# Patient Record
Sex: MALE | Race: BLACK OR AFRICAN AMERICAN | NOT HISPANIC OR LATINO | Employment: OTHER | URBAN - METROPOLITAN AREA
[De-identification: names, ages, dates, MRNs, and addresses within clinical notes are randomized per-mention and may not be internally consistent; named-entity substitution may affect disease eponyms.]

---

## 2017-08-23 ENCOUNTER — OFFICE VISIT (OUTPATIENT)
Dept: FAMILY MEDICINE | Facility: CLINIC | Age: 62
End: 2017-08-23
Payer: MEDICARE

## 2017-08-23 VITALS
OXYGEN SATURATION: 97 % | WEIGHT: 168.31 LBS | DIASTOLIC BLOOD PRESSURE: 62 MMHG | BODY MASS INDEX: 28.04 KG/M2 | SYSTOLIC BLOOD PRESSURE: 106 MMHG | TEMPERATURE: 99 F | HEART RATE: 97 BPM | HEIGHT: 65 IN

## 2017-08-23 DIAGNOSIS — I50.22 CHRONIC SYSTOLIC CONGESTIVE HEART FAILURE: ICD-10-CM

## 2017-08-23 DIAGNOSIS — Z12.11 SCREEN FOR COLON CANCER: ICD-10-CM

## 2017-08-23 DIAGNOSIS — Z72.0 TOBACCO ABUSE: ICD-10-CM

## 2017-08-23 DIAGNOSIS — Z11.59 NEED FOR HEPATITIS C SCREENING TEST: ICD-10-CM

## 2017-08-23 DIAGNOSIS — E78.5 HYPERLIPIDEMIA, UNSPECIFIED HYPERLIPIDEMIA TYPE: ICD-10-CM

## 2017-08-23 DIAGNOSIS — E11.9 TYPE 2 DIABETES MELLITUS WITHOUT COMPLICATION, WITH LONG-TERM CURRENT USE OF INSULIN: ICD-10-CM

## 2017-08-23 DIAGNOSIS — I25.2 HISTORY OF MI (MYOCARDIAL INFARCTION): ICD-10-CM

## 2017-08-23 DIAGNOSIS — I10 ESSENTIAL HYPERTENSION: ICD-10-CM

## 2017-08-23 DIAGNOSIS — Z79.4 TYPE 2 DIABETES MELLITUS WITHOUT COMPLICATION, WITH LONG-TERM CURRENT USE OF INSULIN: ICD-10-CM

## 2017-08-23 DIAGNOSIS — I25.10 CORONARY ARTERY DISEASE, ANGINA PRESENCE UNSPECIFIED, UNSPECIFIED VESSEL OR LESION TYPE, UNSPECIFIED WHETHER NATIVE OR TRANSPLANTED HEART: Primary | ICD-10-CM

## 2017-08-23 PROCEDURE — 3078F DIAST BP <80 MM HG: CPT | Mod: S$GLB,,, | Performed by: NURSE PRACTITIONER

## 2017-08-23 PROCEDURE — 99499 UNLISTED E&M SERVICE: CPT | Mod: S$GLB,,, | Performed by: NURSE PRACTITIONER

## 2017-08-23 PROCEDURE — 99999 PR PBB SHADOW E&M-EST. PATIENT-LVL V: CPT | Mod: PBBFAC,,, | Performed by: NURSE PRACTITIONER

## 2017-08-23 PROCEDURE — 3008F BODY MASS INDEX DOCD: CPT | Mod: S$GLB,,, | Performed by: NURSE PRACTITIONER

## 2017-08-23 PROCEDURE — 99214 OFFICE O/P EST MOD 30 MIN: CPT | Mod: S$GLB,,, | Performed by: NURSE PRACTITIONER

## 2017-08-23 PROCEDURE — 3074F SYST BP LT 130 MM HG: CPT | Mod: S$GLB,,, | Performed by: NURSE PRACTITIONER

## 2017-08-23 RX ORDER — NITROGLYCERIN 0.4 MG/1
0.4 TABLET SUBLINGUAL EVERY 5 MIN PRN
Qty: 30 TABLET | Refills: 6 | Status: ON HOLD | OUTPATIENT
Start: 2017-08-23 | End: 2018-12-05 | Stop reason: SDUPTHER

## 2017-08-23 NOTE — PROGRESS NOTES
Subjective:       Patient ID: Fredrick Cox is a 62 y.o. male.    Chief Complaint: Hyperlipidemia (F/U); Hypertension (F/U); and Diabetes (F/U)    62-year-old male presents to the clinic today for follow-up on hyperlipidemia, hypertension, and diabetes.  He does not take his insulin as prescribed.  He does not check his blood sugars her blood pressures.  He has fair dietary habits.  He does not exercise but says he is very active.  He is noncompliant with his medications.  He denies any chest pain, heart palpitations, shortness breath, or swelling to lower extremities.  He denies any headaches, dizziness, or blurred vision.      Past Medical History:   Diagnosis Date    CHF (congestive heart failure)     Coronary artery disease     HLD (hyperlipidemia)     HTN (hypertension)     Tobacco abuse      Past Surgical History:   Procedure Laterality Date    CARDIAC CATHETERIZATION  2007    x1    CARDIAC CATHETERIZATION  ?    x1      reports that he has been smoking Cigarettes.  He has a 17.50 pack-year smoking history. He does not have any smokeless tobacco history on file. He reports that he drinks about 0.5 oz of alcohol per week . He reports that he does not use drugs.  Review of Systems   Respiratory: Negative for cough, shortness of breath and wheezing.    Cardiovascular: Negative for chest pain, palpitations and leg swelling.   Gastrointestinal: Negative for abdominal pain, diarrhea, nausea and vomiting.   Musculoskeletal: Negative for gait problem.   Neurological: Negative for dizziness, light-headedness and headaches.       Objective:      Physical Exam   Constitutional: He is oriented to person, place, and time. He appears well-developed and well-nourished. No distress.   Eyes: Conjunctivae and EOM are normal. Pupils are equal, round, and reactive to light. Right eye exhibits no discharge. Left eye exhibits no discharge. No scleral icterus.   Neck: Normal range of motion. Neck supple. No JVD present.    Cardiovascular: Normal rate, regular rhythm and normal heart sounds.  Exam reveals no gallop and no friction rub.    No murmur heard.  Pulmonary/Chest: Effort normal and breath sounds normal. No respiratory distress. He has no wheezes. He has no rales.   Abdominal: Soft. Bowel sounds are normal. There is no tenderness.   Musculoskeletal: Normal range of motion. He exhibits no edema.   Protective Sensation (w/ 10 gram monofilament):  Right: Intact  Left: Intact    Visual Inspection:  Onychomycosis -  Bilateral Bunions noted both feet     Pedal Pulses:   Right: Present  Left: Present    Posterior tibialis:   Right:Present  Left: Present     Neurological: He is alert and oriented to person, place, and time.   Skin: Skin is warm and dry. He is not diaphoretic.   Psychiatric: He has a normal mood and affect.       Assessment:       1. Coronary artery disease, angina presence unspecified, unspecified vessel or lesion type, unspecified whether native or transplanted heart    2. Chronic systolic congestive heart failure    3. Type 2 diabetes mellitus without complication, with long-term current use of insulin    4. Hyperlipidemia, unspecified hyperlipidemia type    5. Essential hypertension    6. Tobacco abuse    7. History of MI (myocardial infarction)    8. Screen for colon cancer    9. Need for hepatitis C screening test        Plan:         Coronary artery disease, angina presence unspecified, unspecified vessel or lesion type, unspecified whether native or transplanted heart  -     ticagrelor (BRILINTA) 90 mg tablet; Take 1 tablet (90 mg total) by mouth 2 (two) times daily.  Dispense: 60 tablet; Refill: 5  -     nitroGLYCERIN (NITROSTAT) 0.4 MG SL tablet; Place 1 tablet (0.4 mg total) under the tongue every 5 (five) minutes as needed.  Dispense: 30 tablet; Refill: 6  -     Ambulatory referral to Cardiology  - The current medical regimen is effective;  continue present plan and medications.    Chronic systolic  congestive heart failure  - The current medical regimen is effective;  continue present plan and medications.    Type 2 diabetes mellitus without complication, with long-term current use of insulin  -     Comprehensive metabolic panel; Future; Expected date: 08/23/2017  -     Microalbumin/creatinine urine ratio; Future; Expected date: 08/23/2017  -     Ambulatory referral to Optometry  - stressed the importance of t heaking his insulin but he refuses     Hyperlipidemia, unspecified hyperlipidemia type  -     Comprehensive metabolic panel; Future; Expected date: 08/23/2017  -     Lipid panel; Future; Expected date: 08/23/2017  - The current medical regimen is effective;  continue present plan and medications.    Essential hypertension  -     CBC auto differential; Future; Expected date: 08/23/2017  -     Hemoglobin A1c; Future; Expected date: 08/23/2017  -     Urinalysis; Future; Expected date: 08/23/2017  - The current medical regimen is effective;  continue present plan and medications.    Tobacco abuse  - does not want to go to the smoking cessation clinic     History of MI (myocardial infarction)  -     Ambulatory referral to Cardiology  - Stable / Asymptomatic is on blood pressure and cholesterol lowering medications    Screen for colon cancer  -     Case request GI: COLONOSCOPY    Need for hepatitis C screening test  -     Hepatitis C antibody; Future; Expected date: 08/23/2017

## 2017-08-24 ENCOUNTER — LAB VISIT (OUTPATIENT)
Dept: LAB | Facility: HOSPITAL | Age: 62
End: 2017-08-24
Attending: NURSE PRACTITIONER
Payer: MEDICARE

## 2017-08-24 DIAGNOSIS — E78.5 HYPERLIPIDEMIA, UNSPECIFIED HYPERLIPIDEMIA TYPE: ICD-10-CM

## 2017-08-24 DIAGNOSIS — Z11.59 NEED FOR HEPATITIS C SCREENING TEST: ICD-10-CM

## 2017-08-24 DIAGNOSIS — Z79.4 TYPE 2 DIABETES MELLITUS WITHOUT COMPLICATION, WITH LONG-TERM CURRENT USE OF INSULIN: ICD-10-CM

## 2017-08-24 DIAGNOSIS — E11.9 TYPE 2 DIABETES MELLITUS WITHOUT COMPLICATION, WITH LONG-TERM CURRENT USE OF INSULIN: ICD-10-CM

## 2017-08-24 DIAGNOSIS — I10 ESSENTIAL HYPERTENSION: ICD-10-CM

## 2017-08-24 LAB
ALBUMIN SERPL BCP-MCNC: 3.7 G/DL
ALP SERPL-CCNC: 102 U/L
ALT SERPL W/O P-5'-P-CCNC: 20 U/L
ANION GAP SERPL CALC-SCNC: 8 MMOL/L
AST SERPL-CCNC: 16 U/L
BASOPHILS # BLD AUTO: 0.03 K/UL
BASOPHILS NFR BLD: 0.3 %
BILIRUB SERPL-MCNC: 0.3 MG/DL
BUN SERPL-MCNC: 21 MG/DL
CALCIUM SERPL-MCNC: 9.6 MG/DL
CHLORIDE SERPL-SCNC: 102 MMOL/L
CHOLEST/HDLC SERPL: 4 {RATIO}
CO2 SERPL-SCNC: 27 MMOL/L
CREAT SERPL-MCNC: 1.1 MG/DL
DIFFERENTIAL METHOD: NORMAL
EOSINOPHIL # BLD AUTO: 0.3 K/UL
EOSINOPHIL NFR BLD: 3 %
ERYTHROCYTE [DISTWIDTH] IN BLOOD BY AUTOMATED COUNT: 13.3 %
EST. GFR  (AFRICAN AMERICAN): >60 ML/MIN/1.73 M^2
EST. GFR  (NON AFRICAN AMERICAN): >60 ML/MIN/1.73 M^2
ESTIMATED AVG GLUCOSE: 266 MG/DL
GLUCOSE SERPL-MCNC: 251 MG/DL
HBA1C MFR BLD HPLC: 10.9 %
HCT VFR BLD AUTO: 44.2 %
HDL/CHOLESTEROL RATIO: 24.9 %
HDLC SERPL-MCNC: 217 MG/DL
HDLC SERPL-MCNC: 54 MG/DL
HGB BLD-MCNC: 14.7 G/DL
LDLC SERPL CALC-MCNC: 130.4 MG/DL
LYMPHOCYTES # BLD AUTO: 2.9 K/UL
LYMPHOCYTES NFR BLD: 30 %
MCH RBC QN AUTO: 28.4 PG
MCHC RBC AUTO-ENTMCNC: 33.3 G/DL
MCV RBC AUTO: 85 FL
MONOCYTES # BLD AUTO: 0.7 K/UL
MONOCYTES NFR BLD: 7 %
NEUTROPHILS # BLD AUTO: 5.7 K/UL
NEUTROPHILS NFR BLD: 59.5 %
NONHDLC SERPL-MCNC: 163 MG/DL
PLATELET # BLD AUTO: 251 K/UL
PMV BLD AUTO: 9.3 FL
POTASSIUM SERPL-SCNC: 4.2 MMOL/L
PROT SERPL-MCNC: 7.8 G/DL
RBC # BLD AUTO: 5.18 M/UL
SODIUM SERPL-SCNC: 137 MMOL/L
TRIGL SERPL-MCNC: 163 MG/DL
WBC # BLD AUTO: 9.64 K/UL

## 2017-08-24 PROCEDURE — 80061 LIPID PANEL: CPT

## 2017-08-24 PROCEDURE — 80053 COMPREHEN METABOLIC PANEL: CPT

## 2017-08-24 PROCEDURE — 85025 COMPLETE CBC W/AUTO DIFF WBC: CPT

## 2017-08-24 PROCEDURE — 36415 COLL VENOUS BLD VENIPUNCTURE: CPT | Mod: PO

## 2017-08-24 PROCEDURE — 83036 HEMOGLOBIN GLYCOSYLATED A1C: CPT

## 2017-08-24 PROCEDURE — 86803 HEPATITIS C AB TEST: CPT

## 2017-08-25 LAB — HCV AB SERPL QL IA: NEGATIVE

## 2017-08-31 ENCOUNTER — TELEPHONE (OUTPATIENT)
Dept: OPTOMETRY | Facility: CLINIC | Age: 62
End: 2017-08-31

## 2017-09-01 ENCOUNTER — INITIAL CONSULT (OUTPATIENT)
Dept: CARDIOLOGY | Facility: CLINIC | Age: 62
End: 2017-09-01
Payer: MEDICARE

## 2017-09-01 ENCOUNTER — OFFICE VISIT (OUTPATIENT)
Dept: OPTOMETRY | Facility: CLINIC | Age: 62
End: 2017-09-01
Payer: MEDICARE

## 2017-09-01 VITALS
HEIGHT: 65 IN | HEART RATE: 88 BPM | DIASTOLIC BLOOD PRESSURE: 78 MMHG | WEIGHT: 175 LBS | BODY MASS INDEX: 29.16 KG/M2 | SYSTOLIC BLOOD PRESSURE: 118 MMHG | OXYGEN SATURATION: 98 %

## 2017-09-01 DIAGNOSIS — I25.10 CORONARY ARTERY DISEASE INVOLVING NATIVE CORONARY ARTERY OF NATIVE HEART WITHOUT ANGINA PECTORIS: Primary | ICD-10-CM

## 2017-09-01 DIAGNOSIS — E11.9 TYPE 2 DIABETES MELLITUS WITHOUT RETINOPATHY: Primary | ICD-10-CM

## 2017-09-01 DIAGNOSIS — I50.22 CHRONIC SYSTOLIC CONGESTIVE HEART FAILURE: ICD-10-CM

## 2017-09-01 DIAGNOSIS — E78.2 MIXED HYPERLIPIDEMIA: ICD-10-CM

## 2017-09-01 DIAGNOSIS — H52.4 PRESBYOPIA: ICD-10-CM

## 2017-09-01 DIAGNOSIS — I10 ESSENTIAL HYPERTENSION: ICD-10-CM

## 2017-09-01 DIAGNOSIS — H25.13 NUCLEAR SCLEROSIS, BILATERAL: ICD-10-CM

## 2017-09-01 DIAGNOSIS — E08.00 DIABETES MELLITUS DUE TO UNDERLYING CONDITION WITH HYPEROSMOLARITY WITHOUT COMA, WITHOUT LONG-TERM CURRENT USE OF INSULIN: ICD-10-CM

## 2017-09-01 DIAGNOSIS — R06.02 SOB (SHORTNESS OF BREATH): ICD-10-CM

## 2017-09-01 DIAGNOSIS — Z72.0 TOBACCO ABUSE: ICD-10-CM

## 2017-09-01 DIAGNOSIS — Z13.5 SCREENING FOR GLAUCOMA: ICD-10-CM

## 2017-09-01 PROCEDURE — 93000 ELECTROCARDIOGRAM COMPLETE: CPT | Mod: S$GLB,,, | Performed by: INTERNAL MEDICINE

## 2017-09-01 PROCEDURE — 3078F DIAST BP <80 MM HG: CPT | Mod: S$GLB,,, | Performed by: INTERNAL MEDICINE

## 2017-09-01 PROCEDURE — 99499 UNLISTED E&M SERVICE: CPT | Mod: S$GLB,,, | Performed by: INTERNAL MEDICINE

## 2017-09-01 PROCEDURE — 3008F BODY MASS INDEX DOCD: CPT | Mod: S$GLB,,, | Performed by: INTERNAL MEDICINE

## 2017-09-01 PROCEDURE — 99999 PR PBB SHADOW E&M-EST. PATIENT-LVL III: CPT | Mod: PBBFAC,,, | Performed by: INTERNAL MEDICINE

## 2017-09-01 PROCEDURE — 99214 OFFICE O/P EST MOD 30 MIN: CPT | Mod: 25,S$GLB,, | Performed by: INTERNAL MEDICINE

## 2017-09-01 PROCEDURE — 3074F SYST BP LT 130 MM HG: CPT | Mod: S$GLB,,, | Performed by: INTERNAL MEDICINE

## 2017-09-01 PROCEDURE — 99499 UNLISTED E&M SERVICE: CPT | Mod: S$GLB,,, | Performed by: OPTOMETRIST

## 2017-09-01 PROCEDURE — 99999 PR PBB SHADOW E&M-EST. PATIENT-LVL II: CPT | Mod: PBBFAC,,, | Performed by: OPTOMETRIST

## 2017-09-01 PROCEDURE — 92004 COMPRE OPH EXAM NEW PT 1/>: CPT | Mod: S$GLB,,, | Performed by: OPTOMETRIST

## 2017-09-01 NOTE — LETTER
September 1, 2017      Ilir Russo, FNP-C  [address] [facility]  [place] [address]           [facility]  [address] [facility]  [place] [address]  Phone: [phone]          Patient: Fredrick Cox   MR Number: [id number]   YOB: 1955   Date of Visit: 9/1/2017       Dear Ilir Russo:    Thank you for referring Fredrick Cox to me for evaluation. Attached you will find relevant portions of my assessment and plan of care.    If you have questions, please do not hesitate to call me. I look forward to following Fredrick Cox along with you.    Sincerely,    Stevie Cesar MD    Enclosure  CC:  No Recipients    If you would like to receive this communication electronically, please contact [email or web address] or [phone] to request more information on [employer] Link access.    For providers and/or their staff who would like to refer a patient to [facility], please contact us through our one-stop-shop provider referral line, [facility] , at [phone].    If you feel you have received this communication in error or would no longer like to receive these types of communications, please e-mail [email or web address]

## 2017-09-01 NOTE — PROGRESS NOTES
"Subjective:       Patient ID: Fredrick Cox is a 62 y.o. male      Chief Complaint   Patient presents with    Diabetic Eye Exam     IDDM 2; A1c = 10.9 (8/24/17), LBS unknown     History of Present Illness  Dls: ? Yrs     Pt here for diabetic eye exam.  Pt states no changes in vision. Pt does not wear any glasses. Pt states no   tearing no itching no burning ha's floaters ou.     Eye meds:   None    Hemoglobin A1C       Date                     Value               Ref Range           Status                08/24/2017               10.9 (H)            4.0 - 5.6 %         Final                  05/06/2016               11.3 (H)            4.8 - 5.6 %         Final             ----------     Assessment/Plan:     1. Type 2 diabetes mellitus without retinopathy  No diabetic retinopathy. Educated patient on importance of good blood sugar control, compliance with meds, and follow up care with PCP. Return in 1 year for dilated eye exam, sooner PRN.    2. Nuclear sclerosis, bilateral  Educated patient on the presence of cataracts and effects on vision, including clouded, blurred or dim vision, increasing difficulty with vision at night, sensitivity to light and glare, need for brighter light for reading and other activities, and seeing "halos" around lights. No surgery at this time. Recheck in one year.    3. Screening for glaucoma  No changes related to glaucoma. Monitor yearly.    4. Presbyopia  Declined refraction. Recommend OTC readers for near PRN.    Return in about 1 year (around 9/1/2018) for Diabetic Eye Exam.       "

## 2017-09-01 NOTE — PROGRESS NOTES
Subjective:    Patient ID:  Fredrick Cox is a 62 y.o. male who presents for follow-up of No chief complaint on file.      HPI  Previous history:  Here for f/u.  Records reviewed from [facility].  Anterior STEMI 3.0 MICH to prox LAD by Dr. Stuart.  Has still sob but no cp.  Still smoking 3-4 cigs/day.  No pnd, orthopnea or le edema.  No dizziness, pre or syncope.    Today:  Here for follow-up of coronary artery disease.  He was remotely seen by me in 2014.  He previously had had an acute MI undergoing PCI and outside hospital.  He was lost to follow-up after that.  He was scheduled to do an echo with depressed EF post [facility].  He's continued to smoke.  He's been having worsening substernal chest pains.  He does have some associated shortness of breath but no palpitations.  He denies any PND, orthopnea or lower edema.  He's not expressing dizziness, presyncope or syncope.      Review of Systems   Constitution: Negative.   HENT: Negative.    Eyes: Negative.    Cardiovascular: Positive for chest pain and dyspnea on exertion. Negative for irregular heartbeat, leg swelling, near-syncope, orthopnea, palpitations, paroxysmal nocturnal dyspnea and syncope.   Respiratory: Positive for shortness of breath.    Skin: Negative.    Musculoskeletal: Negative.    Gastrointestinal: Negative for abdominal pain, constipation and diarrhea.   Genitourinary: Negative for dysuria.   Neurological: Negative for dizziness.   Psychiatric/Behavioral: Negative.         Objective:    Physical Exam   Constitutional: He is oriented to person, place, and time. He appears well-developed and well-nourished. No distress.   HENT:   Head: Normocephalic and atraumatic.   Eyes: Conjunctivae and EOM are normal. Pupils are equal, round, and reactive to light.   Neck: Normal range of motion. Neck supple. No thyromegaly present.   Cardiovascular: Normal rate, regular rhythm and normal heart sounds.    No murmur heard.  Pulmonary/Chest: Effort normal and breath  sounds normal. No respiratory distress. He has no wheezes. He has no rales. He exhibits no tenderness.   Abdominal: Soft. Bowel sounds are normal.   Musculoskeletal: He exhibits no edema.   Neurological: He is alert and oriented to person, place, and time.   Skin: Skin is warm and dry.   Psychiatric: He has a normal mood and affect. His behavior is normal.         Assessment:       1. Coronary artery disease involving native coronary artery of native heart without angina pectoris    2. Chronic systolic congestive heart failure    3. Essential hypertension    4. Mixed hyperlipidemia    5. Diabetes mellitus due to underlying condition with hyperosmolarity without coma, without long-term current use of insulin    6. Tobacco abuse         Plan:       -Previous anterior MI with continued tobacco use disorder, plan for echo and stress ASAP  -Also on tobacco cessation, refer to cessation clinic  -ED for worsening symptoms    Return to clinic in 2 weeks with testing ASAP

## 2017-09-01 NOTE — LETTER
September 1, 2017      Ilir Russo, FNP-C  [address]  [place] [address]           [facility]  [address] [facility]  [place] [address]  Phone: [phone]  Fax: [phone]          Patient: Ferdrick Cox   MR Number: [id number]   YOB: 1955   Date of Visit: 9/1/2017       Dear Ilir Russo:    Thank you for referring Fredrick Cox to me for evaluation. Attached you will find relevant portions of my assessment and plan of care.    If you have questions, please do not hesitate to call me. I look forward to following Fredrick Cox along with you.    Sincerely,    Bhavani Parker, OD    Enclosure  CC:  No Recipients    If you would like to receive this communication electronically, please contact [email or web address] or [phone] to request more information on [employer] Link access.    For providers and/or their staff who would like to refer a patient to [facility], please contact us through our one-stop-shop provider referral line, [facility] , at [phone].    If you feel you have received this communication in error or would no longer like to receive these types of communications, please e-mail [email or web address]

## 2017-09-12 ENCOUNTER — HOSPITAL ENCOUNTER (OUTPATIENT)
Dept: RADIOLOGY | Facility: HOSPITAL | Age: 62
Discharge: HOME | End: 2017-09-12
Attending: INTERNAL MEDICINE
Payer: MEDICARE

## 2017-09-12 ENCOUNTER — HOSPITAL ENCOUNTER (OUTPATIENT)
Dept: CARDIOLOGY | Facility: HOSPITAL | Age: 62
Discharge: HOME | End: 2017-09-12
Attending: INTERNAL MEDICINE
Payer: MEDICARE

## 2017-09-12 DIAGNOSIS — I25.10 CORONARY ARTERY DISEASE INVOLVING NATIVE CORONARY ARTERY OF NATIVE HEART WITHOUT ANGINA PECTORIS: ICD-10-CM

## 2017-09-12 PROCEDURE — 93306 TTE W/DOPPLER COMPLETE: CPT | Mod: 26,,, | Performed by: INTERNAL MEDICINE

## 2017-09-12 PROCEDURE — 93306 TTE W/DOPPLER COMPLETE: CPT

## 2017-09-13 LAB
AORTIC VALVE REGURGITATION: ABNORMAL
DIASTOLIC DYSFUNCTION: YES
ESTIMATED PA SYSTOLIC PRESSURE: 24.72
GLOBAL PERICARDIAL EFFUSION: ABNORMAL
RETIRED EF AND QEF - SEE NOTES: 40 (ref 55–65)
TRICUSPID VALVE REGURGITATION: ABNORMAL

## 2017-09-14 ENCOUNTER — HOSPITAL ENCOUNTER (OUTPATIENT)
Dept: CARDIOLOGY | Facility: HOSPITAL | Age: 62
Discharge: HOME | End: 2017-09-14
Attending: INTERNAL MEDICINE
Payer: MEDICARE

## 2017-09-14 ENCOUNTER — HOSPITAL ENCOUNTER (OUTPATIENT)
Dept: RADIOLOGY | Facility: HOSPITAL | Age: 62
Discharge: HOME | End: 2017-09-14
Attending: INTERNAL MEDICINE
Payer: MEDICARE

## 2017-09-14 LAB — DIASTOLIC DYSFUNCTION: NO

## 2017-09-14 PROCEDURE — 93016 CV STRESS TEST SUPVJ ONLY: CPT | Mod: ,,, | Performed by: INTERNAL MEDICINE

## 2017-09-14 PROCEDURE — 63600175 PHARM REV CODE 636 W HCPCS

## 2017-09-14 PROCEDURE — 78452 HT MUSCLE IMAGE SPECT MULT: CPT | Mod: 26,,, | Performed by: INTERNAL MEDICINE

## 2017-09-14 PROCEDURE — 93017 CV STRESS TEST TRACING ONLY: CPT

## 2017-09-14 PROCEDURE — 78452 HT MUSCLE IMAGE SPECT MULT: CPT | Mod: TC

## 2017-09-14 PROCEDURE — 93018 CV STRESS TEST I&R ONLY: CPT | Mod: ,,, | Performed by: INTERNAL MEDICINE

## 2017-09-14 RX ORDER — REGADENOSON 0.08 MG/ML
INJECTION, SOLUTION INTRAVENOUS
Status: DISPENSED
Start: 2017-09-14 | End: 2017-09-14

## 2017-12-04 ENCOUNTER — HOSPITAL ENCOUNTER (EMERGENCY)
Facility: OTHER | Age: 62
Discharge: LEFT BEFORE DISCHARGE | End: 2017-12-04
Attending: EMERGENCY MEDICINE
Payer: MEDICARE

## 2017-12-04 VITALS
RESPIRATION RATE: 20 BRPM | HEIGHT: 65 IN | OXYGEN SATURATION: 100 % | DIASTOLIC BLOOD PRESSURE: 78 MMHG | HEART RATE: 81 BPM | WEIGHT: 160 LBS | TEMPERATURE: 98 F | BODY MASS INDEX: 26.66 KG/M2 | SYSTOLIC BLOOD PRESSURE: 135 MMHG

## 2017-12-04 DIAGNOSIS — R07.9 CHEST PAIN: ICD-10-CM

## 2017-12-04 LAB
ALBUMIN SERPL-MCNC: 3.5 G/DL (ref 3.3–5.5)
ALP SERPL-CCNC: 84 U/L (ref 42–141)
BILIRUB SERPL-MCNC: 0.6 MG/DL (ref 0.2–1.6)
BUN SERPL-MCNC: 14 MG/DL (ref 7–22)
CALCIUM SERPL-MCNC: 9.6 MG/DL (ref 8–10.3)
CHLORIDE SERPL-SCNC: 101 MMOL/L (ref 98–108)
CREAT SERPL-MCNC: 0.8 MG/DL (ref 0.6–1.2)
GLUCOSE SERPL-MCNC: 254 MG/DL (ref 73–118)
POC ALT (SGPT): 23 U/L (ref 10–47)
POC AST (SGOT): 28 U/L (ref 11–38)
POC B-TYPE NATRIURETIC PEPTIDE: 32.9 PG/ML (ref 0–100)
POC CARDIAC TROPONIN I: 0.01 NG/ML
POC TCO2: 25 MMOL/L (ref 18–33)
POCT GLUCOSE: 256 MG/DL (ref 70–110)
POCT GLUCOSE: 264 MG/DL (ref 70–110)
POTASSIUM BLD-SCNC: 4.2 MMOL/L (ref 3.6–5.1)
PROTEIN, POC: 7.5 G/DL (ref 6.4–8.1)
SAMPLE: NORMAL
SODIUM BLD-SCNC: 138 MMOL/L (ref 128–145)

## 2017-12-04 PROCEDURE — 80053 COMPREHEN METABOLIC PANEL: CPT

## 2017-12-04 PROCEDURE — 84484 ASSAY OF TROPONIN QUANT: CPT

## 2017-12-04 PROCEDURE — 85025 COMPLETE CBC W/AUTO DIFF WBC: CPT

## 2017-12-04 PROCEDURE — 25000003 PHARM REV CODE 250: Performed by: EMERGENCY MEDICINE

## 2017-12-04 PROCEDURE — 83880 ASSAY OF NATRIURETIC PEPTIDE: CPT

## 2017-12-04 PROCEDURE — 99284 EMERGENCY DEPT VISIT MOD MDM: CPT

## 2017-12-04 RX ORDER — ASPIRIN 325 MG
325 TABLET ORAL
Status: COMPLETED | OUTPATIENT
Start: 2017-12-04 | End: 2017-12-04

## 2017-12-04 RX ORDER — NITROGLYCERIN 0.4 MG/1
0.4 TABLET SUBLINGUAL
Status: DISCONTINUED | OUTPATIENT
Start: 2017-12-04 | End: 2017-12-04

## 2017-12-04 RX ADMIN — ASPIRIN 325 MG ORAL TABLET 325 MG: 325 PILL ORAL at 07:12

## 2017-12-04 NOTE — ED PROVIDER NOTES
Encounter Date: 12/4/2017       History     Chief Complaint   Patient presents with    Chest Pain     reports CP since 0610 denies SOB, reports dizziness. Pt aaox4  PS 1/10     Mr Cox was walking around outside of his house this am and began feeling pain in center of chest 'up high'. Reports took one nitro and all his daily meds and came to ed. Upon my eval in room just after arrival, he states he is pain free and has no complaints. He admits he doesn't take any of his meds regularly and usu takes his meds about 1-2 times a week. He ate 3 plates of boiled seafood last night and his wife thinks that caused his pain. He has no complaints now.         Chest Pain   The current episode started just prior to arrival. Duration of episode(s) is 15 minutes. Chest pain occurs constantly. The chest pain is resolved. Associated with: light activity. The chest pain is currently at 0/10. The quality of the pain is described as tightness and pressure-like. The pain does not radiate. Pertinent negatives for primary symptoms include no fatigue, no syncope, no shortness of breath, no wheezing, no palpitations, no abdominal pain, no nausea, no vomiting, no dizziness and no altered mental status.   Pertinent negatives for associated symptoms include no lower extremity edema, no near-syncope, no numbness, no orthopnea, no paroxysmal nocturnal dyspnea and no weakness. He tried nitroglycerin for the symptoms. Risk factors include male gender and smoking/tobacco exposure.   His past medical history is significant for CAD, CHF, diabetes, hyperlipidemia and hypertension.   Procedure history is positive for cardiac catheterization, echocardiogram and stress echo.     Review of patient's allergies indicates:  No Known Allergies  Past Medical History:   Diagnosis Date    CHF (congestive heart failure)     Coronary artery disease     Diabetes mellitus     HLD (hyperlipidemia)     HTN (hypertension)     Tobacco abuse      Past  Surgical History:   Procedure Laterality Date    CARDIAC CATHETERIZATION  2007    x1    CARDIAC CATHETERIZATION  ?    x1     Family History   Problem Relation Age of Onset    No Known Problems Mother     No Known Problems Father     No Known Problems Sister     No Known Problems Brother     No Known Problems Maternal Aunt     No Known Problems Maternal Uncle     No Known Problems Paternal Aunt     No Known Problems Paternal Uncle     No Known Problems Maternal Grandmother     No Known Problems Maternal Grandfather     No Known Problems Paternal Grandmother     No Known Problems Paternal Grandfather     Amblyopia Neg Hx     Blindness Neg Hx     Cancer Neg Hx     Cataracts Neg Hx     Diabetes Neg Hx     Glaucoma Neg Hx     Hypertension Neg Hx     Macular degeneration Neg Hx     Retinal detachment Neg Hx     Strabismus Neg Hx     Stroke Neg Hx     Thyroid disease Neg Hx      Social History   Substance Use Topics    Smoking status: Current Every Day Smoker     Packs/day: 0.50     Years: 35.00     Types: Cigarettes    Smokeless tobacco: Never Used    Alcohol use 0.5 oz/week     1 Standard drinks or equivalent per week      Comment: social     Review of Systems   Constitutional: Negative for fatigue.   HENT: Negative.    Respiratory: Negative.  Negative for shortness of breath and wheezing.    Cardiovascular: Positive for chest pain. Negative for palpitations, orthopnea, syncope and near-syncope.   Gastrointestinal: Negative for abdominal pain, nausea and vomiting.   Musculoskeletal: Negative.    Neurological: Negative for dizziness, weakness and numbness.   All other systems reviewed and are negative.      Physical Exam     Initial Vitals   BP Pulse Resp Temp SpO2   12/04/17 0655 12/04/17 0655 12/04/17 0655 12/04/17 0659 12/04/17 0655   (!) 142/89 82 20 97.9 °F (36.6 °C) 98 %      MAP       12/04/17 0655       106.67         Physical Exam    Nursing note and vitals reviewed.  Constitutional:  He appears well-developed and well-nourished. He is not diaphoretic. No distress.   HENT:   Head: Normocephalic and atraumatic.   Right Ear: External ear normal.   Left Ear: External ear normal.   Eyes: EOM are normal.   Neck: Neck supple.   Cardiovascular: Normal rate, regular rhythm and normal heart sounds.   No murmur heard.  Pulmonary/Chest: Breath sounds normal. No respiratory distress. He has no wheezes. He has no rales. He exhibits no tenderness.   Abdominal: Soft. He exhibits no distension. There is no tenderness. There is no rebound and no guarding.   Musculoskeletal: Normal range of motion. He exhibits no edema or tenderness.   Neurological: He is alert and oriented to person, place, and time. He has normal strength. No cranial nerve deficit or sensory deficit.   Skin: Skin is warm. Capillary refill takes less than 2 seconds. No rash noted. No erythema.   Psychiatric: He has a normal mood and affect. His behavior is normal. Thought content normal.         ED Course   Procedures  Labs Reviewed   POCT GLUCOSE - Abnormal; Notable for the following:        Result Value    POCT Glucose 264 (*)     All other components within normal limits   POCT GLUCOSE - Abnormal; Notable for the following:     POCT Glucose 256 (*)     All other components within normal limits   POCT CMP - Abnormal; Notable for the following:     POC Glucose 254 (*)     All other components within normal limits   TROPONIN ISTAT   POCT CBC   POCT GLUCOSE   POCT CMP   POCT B-TYPE NATRIURETIC PEPTIDE (BNP)   POCT B-TYPE NATRIURETIC PEPTIDE (BNP)   POCT TROPONIN     EKG Readings: (Independently Interpreted)   Initial Reading: No STEMI. Rhythm: Normal Sinus Rhythm. Ectopy: No Ectopy. Conduction: Normal.          Medical Decision Making:   Clinical Tests:   Lab Tests: Ordered and Reviewed  ED Management:  Mr Cox has refused to be admitted for further evaluation at [facility]. I have spoken with him several times and his wife has as well. He voiced  good understanding of the risks of leaving, including heart attack and death and he still wants to leave.                    ED Course      Clinical Impression:   The encounter diagnosis was Chest pain.                           Cata Parada MD  12/22/17 1097       Cata Parada MD  12/22/17 1382

## 2017-12-04 NOTE — ED NOTES
"Pt refusing to sign consent for transfer. States he does not want to be admitted, "I'm going to see my doctor today". md notified.   "

## 2017-12-04 NOTE — ED NOTES
Pt denies pain/discomfort at this time. resp even/unlabored. nad noted. Bed in lowest position/locked. sr up x2. Call bell in reach. Wife at bedside. Test results pending.

## 2017-12-04 NOTE — ED TRIAGE NOTES
Reports chest pain, onset this am, improved pta after taking ntg sl at home. Denies chest pain at this time.

## 2017-12-04 NOTE — DISCHARGE INSTRUCTIONS
You need to be admitted to the hospital. Since you are refusing, you need to follow-up with your cardiologist as soon as possible, preferably today.  Please feel free to come back to the ER any time you would like.  We are always here to see.

## 2018-02-22 ENCOUNTER — TELEPHONE (OUTPATIENT)
Dept: ENDOCRINOLOGY | Facility: CLINIC | Age: 63
End: 2018-02-22

## 2018-04-17 ENCOUNTER — TELEPHONE (OUTPATIENT)
Dept: FAMILY MEDICINE | Facility: CLINIC | Age: 63
End: 2018-04-17

## 2018-04-17 NOTE — TELEPHONE ENCOUNTER
I left a message on patient's voice mail to return a call to the office to discuss scheduling a colonoscopy or can do a fit kit.

## 2018-05-03 ENCOUNTER — TELEPHONE (OUTPATIENT)
Dept: FAMILY MEDICINE | Facility: CLINIC | Age: 63
End: 2018-05-03

## 2018-05-03 NOTE — TELEPHONE ENCOUNTER
Called patient to advise patient due for colon screening, FLORA Russo NP will place order for fit kit if patient wants it.

## 2018-11-09 DIAGNOSIS — M25.511 RIGHT SHOULDER PAIN: Primary | ICD-10-CM

## 2018-12-04 ENCOUNTER — HOSPITAL ENCOUNTER (OUTPATIENT)
Facility: HOSPITAL | Age: 63
Discharge: HOME | End: 2018-12-05
Attending: EMERGENCY MEDICINE | Admitting: EMERGENCY MEDICINE
Payer: MEDICARE

## 2018-12-04 DIAGNOSIS — I21.4 NSTEMI (NON-ST ELEVATED MYOCARDIAL INFARCTION): Primary | ICD-10-CM

## 2018-12-04 DIAGNOSIS — I25.10 CORONARY ARTERY DISEASE, ANGINA PRESENCE UNSPECIFIED, UNSPECIFIED VESSEL OR LESION TYPE, UNSPECIFIED WHETHER NATIVE OR TRANSPLANTED HEART: ICD-10-CM

## 2018-12-04 DIAGNOSIS — R07.9 CHEST PAIN: ICD-10-CM

## 2018-12-04 DIAGNOSIS — I50.9 CONGESTIVE HEART FAILURE: ICD-10-CM

## 2018-12-04 DIAGNOSIS — I10 BENIGN ESSENTIAL HTN: ICD-10-CM

## 2018-12-04 LAB
ALBUMIN SERPL BCP-MCNC: 4.1 G/DL
ALP SERPL-CCNC: 88 U/L
ALT SERPL W/O P-5'-P-CCNC: 14 U/L
ANION GAP SERPL CALC-SCNC: 10 MMOL/L
AST SERPL-CCNC: 23 U/L
BASOPHILS # BLD AUTO: 0.01 K/UL
BASOPHILS NFR BLD: 0.1 %
BILIRUB SERPL-MCNC: 0.6 MG/DL
BNP SERPL-MCNC: 33 PG/ML
BUN SERPL-MCNC: 12 MG/DL
CALCIUM SERPL-MCNC: 10 MG/DL
CHLORIDE SERPL-SCNC: 99 MMOL/L
CO2 SERPL-SCNC: 26 MMOL/L
CREAT SERPL-MCNC: 1 MG/DL
DIFFERENTIAL METHOD: ABNORMAL
EOSINOPHIL # BLD AUTO: 0.1 K/UL
EOSINOPHIL NFR BLD: 0.9 %
ERYTHROCYTE [DISTWIDTH] IN BLOOD BY AUTOMATED COUNT: 12.8 %
EST. GFR  (AFRICAN AMERICAN): >60 ML/MIN/1.73 M^2
EST. GFR  (NON AFRICAN AMERICAN): >60 ML/MIN/1.73 M^2
GLUCOSE SERPL-MCNC: 167 MG/DL
HCT VFR BLD AUTO: 43.5 %
HGB BLD-MCNC: 14.4 G/DL
LYMPHOCYTES # BLD AUTO: 1.6 K/UL
LYMPHOCYTES NFR BLD: 17.7 %
MCH RBC QN AUTO: 28.5 PG
MCHC RBC AUTO-ENTMCNC: 33.1 G/DL
MCV RBC AUTO: 86 FL
MONOCYTES # BLD AUTO: 0.4 K/UL
MONOCYTES NFR BLD: 4.9 %
NEUTROPHILS # BLD AUTO: 6.8 K/UL
NEUTROPHILS NFR BLD: 76.4 %
PLATELET # BLD AUTO: 239 K/UL
PMV BLD AUTO: 9.9 FL
POCT GLUCOSE: 188 MG/DL (ref 70–110)
POCT GLUCOSE: 216 MG/DL (ref 70–110)
POTASSIUM SERPL-SCNC: 4.3 MMOL/L
PROT SERPL-MCNC: 7.9 G/DL
RBC # BLD AUTO: 5.06 M/UL
SODIUM SERPL-SCNC: 135 MMOL/L
T4 FREE SERPL-MCNC: 1.22 NG/DL
TROPONIN I SERPL DL<=0.01 NG/ML-MCNC: 0.02 NG/ML
TROPONIN I SERPL DL<=0.01 NG/ML-MCNC: 0.11 NG/ML
TROPONIN I SERPL DL<=0.01 NG/ML-MCNC: 0.25 NG/ML
TSH SERPL DL<=0.005 MIU/L-ACNC: 0.06 UIU/ML
WBC # BLD AUTO: 8.96 K/UL

## 2018-12-04 PROCEDURE — 63600175 PHARM REV CODE 636 W HCPCS: Performed by: EMERGENCY MEDICINE

## 2018-12-04 PROCEDURE — 96372 THER/PROPH/DIAG INJ SC/IM: CPT | Mod: 59

## 2018-12-04 PROCEDURE — 36415 COLL VENOUS BLD VENIPUNCTURE: CPT

## 2018-12-04 PROCEDURE — 83036 HEMOGLOBIN GLYCOSYLATED A1C: CPT

## 2018-12-04 PROCEDURE — 84443 ASSAY THYROID STIM HORMONE: CPT

## 2018-12-04 PROCEDURE — 84484 ASSAY OF TROPONIN QUANT: CPT | Mod: 91

## 2018-12-04 PROCEDURE — 25000003 PHARM REV CODE 250: Performed by: HOSPITALIST

## 2018-12-04 PROCEDURE — 85025 COMPLETE CBC W/AUTO DIFF WBC: CPT

## 2018-12-04 PROCEDURE — S5571 INSULIN DISPOS PEN 3 ML: HCPCS | Performed by: HOSPITALIST

## 2018-12-04 PROCEDURE — 94761 N-INVAS EAR/PLS OXIMETRY MLT: CPT

## 2018-12-04 PROCEDURE — G0378 HOSPITAL OBSERVATION PER HR: HCPCS

## 2018-12-04 PROCEDURE — 82962 GLUCOSE BLOOD TEST: CPT

## 2018-12-04 PROCEDURE — 93010 ELECTROCARDIOGRAM REPORT: CPT | Mod: ,,, | Performed by: INTERNAL MEDICINE

## 2018-12-04 PROCEDURE — A4216 STERILE WATER/SALINE, 10 ML: HCPCS | Performed by: HOSPITALIST

## 2018-12-04 PROCEDURE — 80053 COMPREHEN METABOLIC PANEL: CPT

## 2018-12-04 PROCEDURE — 84439 ASSAY OF FREE THYROXINE: CPT

## 2018-12-04 PROCEDURE — 93005 ELECTROCARDIOGRAM TRACING: CPT

## 2018-12-04 PROCEDURE — 83880 ASSAY OF NATRIURETIC PEPTIDE: CPT

## 2018-12-04 PROCEDURE — 96374 THER/PROPH/DIAG INJ IV PUSH: CPT

## 2018-12-04 PROCEDURE — 63600175 PHARM REV CODE 636 W HCPCS: Performed by: HOSPITALIST

## 2018-12-04 PROCEDURE — 99285 EMERGENCY DEPT VISIT HI MDM: CPT | Mod: 25

## 2018-12-04 RX ORDER — ACETAMINOPHEN 325 MG/1
650 TABLET ORAL EVERY 4 HOURS PRN
Status: DISCONTINUED | OUTPATIENT
Start: 2018-12-04 | End: 2018-12-05 | Stop reason: SDUPTHER

## 2018-12-04 RX ORDER — RANOLAZINE 500 MG/1
500 TABLET, EXTENDED RELEASE ORAL 2 TIMES DAILY
Status: DISCONTINUED | OUTPATIENT
Start: 2018-12-04 | End: 2018-12-05 | Stop reason: HOSPADM

## 2018-12-04 RX ORDER — ASPIRIN 325 MG
325 TABLET ORAL DAILY
COMMUNITY
End: 2019-04-05

## 2018-12-04 RX ORDER — ENOXAPARIN SODIUM 100 MG/ML
40 INJECTION SUBCUTANEOUS EVERY 24 HOURS
Status: DISCONTINUED | OUTPATIENT
Start: 2018-12-04 | End: 2018-12-05

## 2018-12-04 RX ORDER — POLYETHYLENE GLYCOL 3350 17 G/17G
17 POWDER, FOR SOLUTION ORAL DAILY
Status: DISCONTINUED | OUTPATIENT
Start: 2018-12-05 | End: 2018-12-05 | Stop reason: HOSPADM

## 2018-12-04 RX ORDER — METOPROLOL TARTRATE 25 MG/1
12.5 TABLET ORAL 2 TIMES DAILY
Status: DISCONTINUED | OUTPATIENT
Start: 2018-12-04 | End: 2018-12-05 | Stop reason: HOSPADM

## 2018-12-04 RX ORDER — ONDANSETRON 2 MG/ML
4 INJECTION INTRAMUSCULAR; INTRAVENOUS EVERY 8 HOURS PRN
Status: DISCONTINUED | OUTPATIENT
Start: 2018-12-04 | End: 2018-12-05 | Stop reason: HOSPADM

## 2018-12-04 RX ORDER — IBUPROFEN 200 MG
24 TABLET ORAL
Status: DISCONTINUED | OUTPATIENT
Start: 2018-12-04 | End: 2018-12-05 | Stop reason: HOSPADM

## 2018-12-04 RX ORDER — LISINOPRIL 5 MG/1
5 TABLET ORAL DAILY
Status: DISCONTINUED | OUTPATIENT
Start: 2018-12-05 | End: 2018-12-05 | Stop reason: HOSPADM

## 2018-12-04 RX ORDER — SODIUM CHLORIDE 0.9 % (FLUSH) 0.9 %
3 SYRINGE (ML) INJECTION EVERY 8 HOURS
Status: DISCONTINUED | OUTPATIENT
Start: 2018-12-04 | End: 2018-12-05 | Stop reason: HOSPADM

## 2018-12-04 RX ORDER — GLUCAGON 1 MG
1 KIT INJECTION
Status: DISCONTINUED | OUTPATIENT
Start: 2018-12-04 | End: 2018-12-05 | Stop reason: HOSPADM

## 2018-12-04 RX ORDER — METFORMIN HYDROCHLORIDE 1000 MG/1
1000 TABLET ORAL 2 TIMES DAILY WITH MEALS
Status: ON HOLD | COMMUNITY
End: 2019-04-15 | Stop reason: HOSPADM

## 2018-12-04 RX ORDER — RAMELTEON 8 MG/1
8 TABLET ORAL NIGHTLY PRN
Status: DISCONTINUED | OUTPATIENT
Start: 2018-12-04 | End: 2018-12-05 | Stop reason: HOSPADM

## 2018-12-04 RX ORDER — ASPIRIN 325 MG
325 TABLET ORAL DAILY
Status: DISCONTINUED | OUTPATIENT
Start: 2018-12-05 | End: 2018-12-05 | Stop reason: HOSPADM

## 2018-12-04 RX ORDER — IBUPROFEN 200 MG
16 TABLET ORAL
Status: DISCONTINUED | OUTPATIENT
Start: 2018-12-04 | End: 2018-12-05 | Stop reason: HOSPADM

## 2018-12-04 RX ORDER — ONDANSETRON 2 MG/ML
4 INJECTION INTRAMUSCULAR; INTRAVENOUS
Status: COMPLETED | OUTPATIENT
Start: 2018-12-04 | End: 2018-12-04

## 2018-12-04 RX ORDER — FUROSEMIDE 40 MG/1
40 TABLET ORAL DAILY
Status: DISCONTINUED | OUTPATIENT
Start: 2018-12-05 | End: 2018-12-05 | Stop reason: HOSPADM

## 2018-12-04 RX ORDER — ROSUVASTATIN CALCIUM 10 MG/1
40 TABLET, COATED ORAL DAILY
Status: DISCONTINUED | OUTPATIENT
Start: 2018-12-05 | End: 2018-12-05 | Stop reason: HOSPADM

## 2018-12-04 RX ORDER — ISOSORBIDE MONONITRATE 30 MG/1
30 TABLET, EXTENDED RELEASE ORAL DAILY
Status: DISCONTINUED | OUTPATIENT
Start: 2018-12-05 | End: 2018-12-05 | Stop reason: HOSPADM

## 2018-12-04 RX ORDER — NITROGLYCERIN 0.4 MG/1
0.4 TABLET SUBLINGUAL EVERY 5 MIN PRN
Status: DISCONTINUED | OUTPATIENT
Start: 2018-12-04 | End: 2018-12-05 | Stop reason: HOSPADM

## 2018-12-04 RX ADMIN — Medication 3 ML: at 10:12

## 2018-12-04 RX ADMIN — METOPROLOL TARTRATE 12.5 MG: 25 TABLET, FILM COATED ORAL at 08:12

## 2018-12-04 RX ADMIN — TICAGRELOR 90 MG: 90 TABLET ORAL at 08:12

## 2018-12-04 RX ADMIN — ONDANSETRON 4 MG: 2 INJECTION INTRAMUSCULAR; INTRAVENOUS at 12:12

## 2018-12-04 RX ADMIN — RANOLAZINE 500 MG: 500 TABLET, FILM COATED, EXTENDED RELEASE ORAL at 08:12

## 2018-12-04 RX ADMIN — INSULIN DETEMIR 20 UNITS: 100 INJECTION, SOLUTION SUBCUTANEOUS at 08:12

## 2018-12-04 NOTE — SUBJECTIVE & OBJECTIVE
Past Medical History:   Diagnosis Date    CHF (congestive heart failure)     Coronary artery disease     Diabetes mellitus     HLD (hyperlipidemia)     HTN (hypertension)     MI (myocardial infarction)     X's 2    Tobacco abuse        Past Surgical History:   Procedure Laterality Date    CARDIAC CATHETERIZATION  2007    x1    CARDIAC CATHETERIZATION  ?    x1    CORONARY ANGIOPLASTY WITH STENT PLACEMENT  2007 and ???       Review of patient's allergies indicates:  No Known Allergies    No current facility-administered medications on file prior to encounter.      Current Outpatient Medications on File Prior to Encounter   Medication Sig    aspirin 325 MG tablet Take 325 mg by mouth once daily.    lisinopril (PRINIVIL,ZESTRIL) 5 MG tablet Take 1 tablet (5 mg total) by mouth once daily.    metFORMIN (GLUCOPHAGE) 1000 MG tablet Take 1,000 mg by mouth 2 (two) times daily with meals.    metoprolol tartrate (LOPRESSOR) 25 MG tablet Take 0.5 tablets (12.5 mg total) by mouth 2 (two) times daily.    nitroGLYCERIN (NITROSTAT) 0.4 MG SL tablet Place 1 tablet (0.4 mg total) under the tongue every 5 (five) minutes as needed.    ranolazine (RANEXA) 500 MG Tb12 Take 1 tablet (500 mg total) by mouth 2 (two) times daily.    rosuvastatin (CRESTOR) 40 MG Tab TAKE 1 TABLET (40 MG TOTAL) BY MOUTH ONCE DAILY.    ticagrelor (BRILINTA) 90 mg tablet Take 1 tablet (90 mg total) by mouth 2 (two) times daily.    blood sugar diagnostic Strp 1 strip by Misc.(Non-Drug; Combo Route) route 2 (two) times daily.    canagliflozin (INVOKANA) 100 mg Tab Take 1 tablet (100 mg total) by mouth once daily.    dapagliflozin 5 mg Tab Take 5 mg by mouth once daily.    furosemide (LASIX) 40 MG tablet Take 1 tablet (40 mg total) by mouth once daily.    insulin glargine (LANTUS SOLOSTAR) 100 unit/mL (3 mL) InPn pen Inject 30 Units into the skin once daily.    isosorbide mononitrate (IMDUR) 30 MG 24 hr tablet Take 1 tablet (30 mg total) by  "mouth once daily.    pen needle, diabetic (BD INSULIN PEN NEEDLE UF MINI) 31 gauge x 3/16" Ndle USE AS DIRECTED    pen needle, diabetic 31 gauge x 5/16" Ndle 1 each by Misc.(Non-Drug; Combo Route) route 2 (two) times daily.    tramadol (ULTRAM) 50 mg tablet Take 1 tablet (50 mg total) by mouth 2 (two) times daily as needed.     Family History     Problem Relation (Age of Onset)    No Known Problems Mother, Father, Sister, Brother, Maternal Aunt, Maternal Uncle, Paternal Aunt, Paternal Uncle, Maternal Grandmother, Maternal Grandfather, Paternal Grandmother, Paternal Grandfather        Tobacco Use    Smoking status: Current Every Day Smoker     Packs/day: 0.50     Years: 35.00     Pack years: 17.50     Types: Cigarettes    Smokeless tobacco: Never Used   Substance and Sexual Activity    Alcohol use: Yes     Alcohol/week: 0.5 oz     Types: 1 Standard drinks or equivalent per week     Comment: social    Drug use: No    Sexual activity: Not on file     Review of Systems   Constitutional: Negative for chills and fever.   Eyes: Negative for photophobia and visual disturbance.   Respiratory: Negative for cough and shortness of breath.    Cardiovascular: Positive for chest pain. Negative for palpitations and leg swelling.   Gastrointestinal: Negative for abdominal pain, diarrhea, nausea and vomiting.   Genitourinary: Negative for frequency, hematuria and urgency.   Skin: Negative for pallor, rash and wound.   Neurological: Negative for light-headedness and headaches.   Psychiatric/Behavioral: Negative for confusion and decreased concentration.     Objective:     Vital Signs (Most Recent):  Temp: 99.1 °F (37.3 °C) (12/04/18 1420)  Pulse: 76 (12/04/18 1452)  Resp: 15 (12/04/18 1452)  BP: 114/67 (12/04/18 1341)  SpO2: 100 % (12/04/18 1452) Vital Signs (24h Range):  Temp:  [98.2 °F (36.8 °C)-99.1 °F (37.3 °C)] 99.1 °F (37.3 °C)  Pulse:  [73-92] 76  Resp:  [13-20] 15  SpO2:  [99 %-100 %] 100 %  BP: (114-145)/(67-87) " 114/67     Weight: 74.8 kg (165 lb)  Body mass index is 27.46 kg/m².    Physical Exam   Constitutional: He is oriented to person, place, and time. He appears well-developed and well-nourished. No distress.   HENT:   Head: Normocephalic and atraumatic.   Right Ear: External ear normal.   Left Ear: External ear normal.   Nose: Nose normal.   Mouth/Throat: Oropharynx is clear and moist.   Eyes: Conjunctivae and EOM are normal. Pupils are equal, round, and reactive to light.   Neck: Normal range of motion. Neck supple.   Cardiovascular: Normal rate, regular rhythm and intact distal pulses.   Pulmonary/Chest: Effort normal and breath sounds normal. No respiratory distress. He has no wheezes. He has no rales.   Abdominal: Soft. Bowel sounds are normal. He exhibits no distension. There is no tenderness.   No palpable hepatomegaly or splenomegaly   Musculoskeletal: Normal range of motion. He exhibits no edema or tenderness.   Neurological: He is alert and oriented to person, place, and time.   Skin: Skin is warm and dry.   Psychiatric: He has a normal mood and affect. Thought content normal.   Nursing note and vitals reviewed.        CRANIAL NERVES     CN III, IV, VI   Pupils are equal, round, and reactive to light.  Extraocular motions are normal.        Significant Labs: All pertinent labs within the past 24 hours have been reviewed.    Significant Imaging: I have reviewed all pertinent imaging results/findings within the past 24 hours.

## 2018-12-04 NOTE — ASSESSMENT & PLAN NOTE
Concerning pain characteristics, abnormal stress a year ago and has not since followed up, EKG nonischemic and initial trop neg.  HEART score 5.  -cardiac monitoring  -serial troponins  -ASA and PRN NTG  -2D echo  -TSH and lipid panel  -consult Cardiology  -NPO p MN

## 2018-12-04 NOTE — ED PROVIDER NOTES
Encounter Date: 12/4/2018    SCRIBE #1 NOTE: I, Lin Senia, am scribing for, and in the presence of,  Robert Oquendo MD. I have scribed the following portions of the note - the EKG reading. Other sections scribed: HPI, ROS, PE.       History     Chief Complaint   Patient presents with    Chest Pain     pt states he was working on his car when he began to have 10/10 cp; pt denies any pain at this time; denies n/v/d, dizziness or numbness; pt took 1 nitro sublingual at home and per ems  was given en route     CC: Chest Pain    HPI: This is a 62 y/o male with PMHx of CHF, CAD, DM, HLD, HTN, and Tobacco abuse who presents to the ED for emergent evaluation of acute onset mid-sternal chest pain that began while working on a car with a lot of exertion just PTA. Pt rates pain as severe (10/10) when onset, but denies any current pain after taking old Nitro at home with relief. Denies radiation of pain. Pt notes that this episode of chest pain felt similar to MI 4-5 years ago. Pt takes Aspirin, and he took it today. Pt takes Brilinta 90 mg BID. Pt's wife reports that pt is not compliant with medications but pt denies this allegation. Pt had 2 stents placed after past MI. No complications since last MI. Pt followed up with cardiologist, Dr. Cesar, after past MI, but he has not been to cardiologist in years. Denies further symptoms.        The history is provided by the patient. No  was used.     Review of patient's allergies indicates:  No Known Allergies  Past Medical History:   Diagnosis Date    CHF (congestive heart failure)     Coronary artery disease     Diabetes mellitus     HLD (hyperlipidemia)     HTN (hypertension)     MI (myocardial infarction)     X's 2    Tobacco abuse      Past Surgical History:   Procedure Laterality Date    CARDIAC CATHETERIZATION  2007    x1    CARDIAC CATHETERIZATION  ?    x1    CORONARY ANGIOPLASTY WITH STENT PLACEMENT  2007 and ???     Family  History   Problem Relation Age of Onset    No Known Problems Mother     No Known Problems Father     No Known Problems Sister     No Known Problems Brother     No Known Problems Maternal Aunt     No Known Problems Maternal Uncle     No Known Problems Paternal Aunt     No Known Problems Paternal Uncle     No Known Problems Maternal Grandmother     No Known Problems Maternal Grandfather     No Known Problems Paternal Grandmother     No Known Problems Paternal Grandfather     Amblyopia Neg Hx     Blindness Neg Hx     Cancer Neg Hx     Cataracts Neg Hx     Diabetes Neg Hx     Glaucoma Neg Hx     Hypertension Neg Hx     Macular degeneration Neg Hx     Retinal detachment Neg Hx     Strabismus Neg Hx     Stroke Neg Hx     Thyroid disease Neg Hx      Social History     Tobacco Use    Smoking status: Current Every Day Smoker     Packs/day: 0.50     Years: 35.00     Pack years: 17.50     Types: Cigarettes    Smokeless tobacco: Never Used   Substance Use Topics    Alcohol use: Yes     Alcohol/week: 0.5 oz     Types: 1 Standard drinks or equivalent per week     Comment: social    Drug use: No     Review of Systems   Constitutional: Negative for chills and fever.   HENT: Negative for congestion, ear pain, rhinorrhea and sore throat.    Eyes: Negative for pain and visual disturbance.   Respiratory: Negative for cough and shortness of breath.    Cardiovascular: Positive for chest pain (mid-sternal (currently resolved)).   Gastrointestinal: Negative for abdominal pain, diarrhea, nausea and vomiting.   Genitourinary: Negative for dysuria.   Musculoskeletal: Negative for back pain and neck pain.   Skin: Negative for rash.   Neurological: Negative for headaches.   All other systems reviewed and are negative.      Physical Exam     Initial Vitals [12/04/18 1101]   BP Pulse Resp Temp SpO2   123/71 80 20 98.2 °F (36.8 °C) 99 %      MAP       --         Physical Exam    Nursing note and vitals  reviewed.  Constitutional: Vital signs are normal. He appears well-developed and well-nourished. He is active.  Non-toxic appearance. No distress.   HENT:   Head: Normocephalic and atraumatic.   Eyes: EOM are normal.   Neck: Trachea normal. Neck supple.   Cardiovascular: Normal rate and regular rhythm.   Pulmonary/Chest: Breath sounds normal. No respiratory distress.   Abdominal: Soft. Normal appearance and bowel sounds are normal. He exhibits no distension. There is no tenderness.   Musculoskeletal: Normal range of motion. He exhibits no edema.   Neurological: He is alert.   Skin: Skin is warm, dry and intact.   Psychiatric: He has a normal mood and affect.         ED Course   Procedures  Labs Reviewed   CBC W/ AUTO DIFFERENTIAL - Abnormal; Notable for the following components:       Result Value    Gran% 76.4 (*)     Lymph% 17.7 (*)     All other components within normal limits   COMPREHENSIVE METABOLIC PANEL - Abnormal; Notable for the following components:    Sodium 135 (*)     Glucose 167 (*)     All other components within normal limits   POCT GLUCOSE - Abnormal; Notable for the following components:    POCT Glucose 188 (*)     All other components within normal limits   TROPONIN I   B-TYPE NATRIURETIC PEPTIDE     EKG Readings: (Independently Interpreted)   Rhythm: Normal Sinus Rhythm. Heart Rate: 74. ST Segments: Normal ST Segments. Axis: Left Axis Deviation.   No ST segment elevation or depression.       Imaging Results          X-Ray Chest PA And Lateral (Final result)  Result time 12/04/18 11:24:49    Final result by Archie Tavares MD (12/04/18 11:24:49)                 Impression:      No acute abnormality.      Electronically signed by: Archie Tavares MD  Date:    12/04/2018  Time:    11:24             Narrative:    EXAMINATION:  XR CHEST PA AND LATERAL    CLINICAL HISTORY:  Chest pain, unspecified    TECHNIQUE:  PA and lateral views of the chest were  performed.    COMPARISON:  None    12/04/2018    FINDINGS:  The lungs are clear, with normal appearance of pulmonary vasculature and no pleural effusion or pneumothorax.    The cardiac silhouette is normal in size. The hilar and mediastinal contours are unremarkable.    Bones are intact.                              X-Rays:   Independently Interpreted Readings:   Other Readings:  No acute cardiopulmonary abnormality    Medical Decision Making:   Initial Assessment:   Patient with h/o MI with 2 stents per patient, continues to smoke, questionable medication compliance. Patient reports his pain today is consistent with his pain when he had his MI. Patient reports paiin alleviated with nitroglycerin. Non ischemic EKG. Troponin negative however approaching upper limit of normal. I am concerned that this patient is experiencing Acute Coronary syndrome. Patient to be admitted to obs for further eval and monitoring. Patient pain free in the ED.            Scribe Attestation:   Scribe #1: I performed the above scribed service and the documentation accurately describes the services I performed. I attest to the accuracy of the note.    Attending Attestation:           Physician Attestation for Scribe:  Physician Attestation Statement for Scribe #1: I, Robert Oquendo MD, reviewed documentation, as scribed by Lin Conn in my presence, and it is both accurate and complete.                    Clinical Impression:   The encounter diagnosis was Chest pain.      Disposition:   Disposition: Placed in Observation                        Robert Oquendo MD  12/04/18 0260

## 2018-12-04 NOTE — Clinical Note
Catheter is inserted into the ostium right coronary artery. The vessel(s) were injected and visualized.

## 2018-12-04 NOTE — Clinical Note
The site was marked. Prepped: groin. Prepped with: ChloraPrep. The site was clipped. The patient was draped.

## 2018-12-04 NOTE — ASSESSMENT & PLAN NOTE
Last echo showed EF 40% + DD, no evidence of acute decompensation or fluid overload, continue home meds and monitor on tele, obtain echo, I/O's, daily weights

## 2018-12-04 NOTE — Clinical Note
Catheter is inserted into the ostium left main artery. Wire removed prior to angiography. Catheter removed. The vessel(s) were injected and visualized in multiple views.

## 2018-12-04 NOTE — Clinical Note
45 ml injected throughout the case. 40 mL total wasted during the case. 85 mL total used in the case.

## 2018-12-04 NOTE — ASSESSMENT & PLAN NOTE
5 minutes spent counseling the patient on smoking cessation and he is not currently ready to stop smoking. He will be offereded a nicotine transdermal patch while hospitalized if ACS ruled out and monitored for withdrawal.  Will provide additional smoking cessation counseling prior to discharge.

## 2018-12-04 NOTE — ED TRIAGE NOTES
Pt reports mid sternal chest pain that began this morning while working on his car.  Reports chest pain lasted several minutes and took one nitro tab and 325mg aspirin prior to arrival of EMS.  Described pain as pressure 10/10, however, denies current CP.  Reports having nausea at time of CP however has resolved.  Denies sob, f/c/n/v/d.  Placed on cardiac monitor, bp cuff, and pulse ox.  NAD.  Will continue to monitor and assess.

## 2018-12-04 NOTE — ASSESSMENT & PLAN NOTE
Last HgbA1c   Lab Results   Component Value Date    LABA1C 11.6 (H) 12/03/2015    HGBA1C 10.9 (H) 08/24/2017     Check a1c  Hold oral antihyperglycemics while inpatient  Continue home basal insulin along with PRN sliding scale  ACHS glucose monitoring   ADA diet, NPO p MN

## 2018-12-04 NOTE — HPI
63 y.o. male with CAD s/p PCI, HTN, HLD, tobacco abuse, DM2, and chronic combined heart failure presents with a complaint of chest pain.  Pain is acute onset this morning while working on his car, located sternal, does not radiate, severity 10/10, described as similar to MI 4 years ago, relieved by 1 SL NTG at home PTA.  Denies fever, chills, cough, SOB, palpitations, orthopnea, PND, dizziness, syncope, N/V/D, abdominal pain, bloody or black stools, or dysuria.  Treated for STEMI at [facility] in 2014 with stents placed by Dr. Stuart.  Has followed up intermittently with Dr. Cesar since that time, but has been poorly adherent to outpatient follow up and treatment regimen.  Echo and stress in Sep 2017 show EF 40% + DD and moderate ischemia, but he has not followed up since undergoing testing.  He continues to smoke.  EKG NSR without evidence of acute ischemia, initial troponin negative, chest xray and routine labs unremarkable.  Placed in observation for ACS rule out.

## 2018-12-05 VITALS
HEART RATE: 96 BPM | RESPIRATION RATE: 18 BRPM | TEMPERATURE: 98 F | SYSTOLIC BLOOD PRESSURE: 107 MMHG | OXYGEN SATURATION: 97 % | DIASTOLIC BLOOD PRESSURE: 60 MMHG | HEIGHT: 65 IN | BODY MASS INDEX: 27.49 KG/M2 | WEIGHT: 165 LBS

## 2018-12-05 PROBLEM — I21.4 NSTEMI (NON-ST ELEVATED MYOCARDIAL INFARCTION): Status: ACTIVE | Noted: 2018-12-05

## 2018-12-05 PROBLEM — R07.9 CHEST PAIN: Status: RESOLVED | Noted: 2018-12-04 | Resolved: 2018-12-05

## 2018-12-05 LAB
AORTIC ROOT ANNULUS: 2.84 CM
AORTIC VALVE CUSP SEPERATION: 2.01 CM
ASCENDING AORTA: 2.86 CM
AV INDEX (PROSTH): 0.77
AV MEAN GRADIENT: 3.34 MMHG
AV PEAK GRADIENT: 5.66 MMHG
AV VALVE AREA: 3.07 CM2
BSA FOR ECHO PROCEDURE: 1.85 M2
CHOLEST SERPL-MCNC: 235 MG/DL
CHOLEST/HDLC SERPL: 4.6 {RATIO}
CV ECHO LV RWT: 0.41 CM
DOP CALC AO PEAK VEL: 1.19 M/S
DOP CALC AO VTI: 21.54 CM
DOP CALC LVOT AREA: 4.01 CM2
DOP CALC LVOT DIAMETER: 2.26 CM
DOP CALC LVOT STROKE VOLUME: 66.08 CM3
DOP CALCLVOT PEAK VEL VTI: 16.48 CM
E WAVE DECELERATION TIME: 193.48 MSEC
E/A RATIO: 0.56
ECHO LV POSTERIOR WALL: 1.01 CM (ref 0.6–1.1)
ESTIMATED AVG GLUCOSE: 252 MG/DL
FRACTIONAL SHORTENING: 28 % (ref 28–44)
HBA1C MFR BLD HPLC: 10.4 %
HDLC SERPL-MCNC: 51 MG/DL
HDLC SERPL: 21.7 %
INTERVENTRICULAR SEPTUM: 1.11 CM (ref 0.6–1.1)
IVRT: 0.12 MSEC
LA MAJOR: 4.74 CM
LA MINOR: 4.68 CM
LA WIDTH: 4.13 CM
LDLC SERPL CALC-MCNC: 161.8 MG/DL
LEFT ATRIUM SIZE: 3.55 CM
LEFT ATRIUM VOLUME INDEX: 32.2 ML/M2
LEFT ATRIUM VOLUME: 58.69 CM3
LEFT INTERNAL DIMENSION IN SYSTOLE: 3.52 CM (ref 2.1–4)
LEFT VENTRICLE DIASTOLIC VOLUME INDEX: 61.7 ML/M2
LEFT VENTRICLE DIASTOLIC VOLUME: 112.46 ML
LEFT VENTRICLE MASS INDEX: 104.2 G/M2
LEFT VENTRICLE SYSTOLIC VOLUME INDEX: 28.2 ML/M2
LEFT VENTRICLE SYSTOLIC VOLUME: 51.47 ML
LEFT VENTRICULAR INTERNAL DIMENSION IN DIASTOLE: 4.89 CM (ref 3.5–6)
LEFT VENTRICULAR MASS: 189.91 G
MV PEAK A VEL: 0.89 M/S
MV PEAK E VEL: 0.5 M/S
NONHDLC SERPL-MCNC: 184 MG/DL
OHS CV CATH LVEDP PRE: 5
PISA TR MAX VEL: 1.52 M/S
POCT GLUCOSE: 136 MG/DL (ref 70–110)
POCT GLUCOSE: 146 MG/DL (ref 70–110)
POCT GLUCOSE: 294 MG/DL (ref 70–110)
PULM VEIN S/D RATIO: 1.37
PV PEAK D VEL: 0.38 M/S
PV PEAK S VEL: 0.52 M/S
PV PEAK VELOCITY: 0.81 CM/S
RA MAJOR: 4.79 CM
RA PRESSURE: 3 MMHG
RA WIDTH: 3.77 CM
RIGHT VENTRICULAR END-DIASTOLIC DIMENSION: 3.47 CM
RV TISSUE DOPPLER FREE WALL SYSTOLIC VELOCITY 1 (APICAL 4 CHAMBER VIEW): 11.39 M/S
SINUS: 3.4 CM
STJ: 2.62 CM
TR MAX PG: 9.24 MMHG
TRICUSPID ANNULAR PLANE SYSTOLIC EXCURSION: 1.87 CM
TRIGL SERPL-MCNC: 111 MG/DL
TV REST PULMONARY ARTERY PRESSURE: 12.24 MMHG

## 2018-12-05 PROCEDURE — 25000003 PHARM REV CODE 250: Performed by: HOSPITALIST

## 2018-12-05 PROCEDURE — 99152 MOD SED SAME PHYS/QHP 5/>YRS: CPT | Mod: ,,, | Performed by: INTERNAL MEDICINE

## 2018-12-05 PROCEDURE — 80061 LIPID PANEL: CPT

## 2018-12-05 PROCEDURE — 93458 L HRT ARTERY/VENTRICLE ANGIO: CPT | Performed by: INTERNAL MEDICINE

## 2018-12-05 PROCEDURE — A4216 STERILE WATER/SALINE, 10 ML: HCPCS | Performed by: HOSPITALIST

## 2018-12-05 PROCEDURE — G0378 HOSPITAL OBSERVATION PER HR: HCPCS

## 2018-12-05 PROCEDURE — 25000003 PHARM REV CODE 250: Performed by: INTERNAL MEDICINE

## 2018-12-05 PROCEDURE — 25500020 PHARM REV CODE 255: Performed by: INTERNAL MEDICINE

## 2018-12-05 PROCEDURE — C1769 GUIDE WIRE: HCPCS | Performed by: INTERNAL MEDICINE

## 2018-12-05 PROCEDURE — 99152 MOD SED SAME PHYS/QHP 5/>YRS: CPT | Performed by: INTERNAL MEDICINE

## 2018-12-05 PROCEDURE — C1894 INTRO/SHEATH, NON-LASER: HCPCS | Performed by: INTERNAL MEDICINE

## 2018-12-05 PROCEDURE — 36415 COLL VENOUS BLD VENIPUNCTURE: CPT

## 2018-12-05 PROCEDURE — C1887 CATHETER, GUIDING: HCPCS | Performed by: INTERNAL MEDICINE

## 2018-12-05 PROCEDURE — 99203 OFFICE O/P NEW LOW 30 MIN: CPT | Mod: ,,, | Performed by: INTERNAL MEDICINE

## 2018-12-05 PROCEDURE — 63600175 PHARM REV CODE 636 W HCPCS: Performed by: INTERNAL MEDICINE

## 2018-12-05 PROCEDURE — C1760 CLOSURE DEV, VASC: HCPCS | Performed by: INTERNAL MEDICINE

## 2018-12-05 PROCEDURE — 93458 L HRT ARTERY/VENTRICLE ANGIO: CPT | Mod: 26,,, | Performed by: INTERNAL MEDICINE

## 2018-12-05 DEVICE — ANGIO-SEAL VIP VASCULAR CLOSURE DEVICE
Type: IMPLANTABLE DEVICE | Site: GROIN | Status: FUNCTIONAL
Brand: ANGIO-SEAL

## 2018-12-05 RX ORDER — ISOSORBIDE MONONITRATE 30 MG/1
30 TABLET, EXTENDED RELEASE ORAL DAILY
Qty: 90 TABLET | Refills: 1 | Status: SHIPPED | OUTPATIENT
Start: 2018-12-05 | End: 2018-12-05 | Stop reason: SDUPTHER

## 2018-12-05 RX ORDER — ROSUVASTATIN CALCIUM 40 MG/1
40 TABLET, COATED ORAL DAILY
Qty: 90 TABLET | Refills: 1 | Status: SHIPPED | OUTPATIENT
Start: 2018-12-05 | End: 2020-12-01 | Stop reason: SDUPTHER

## 2018-12-05 RX ORDER — NITROGLYCERIN 0.4 MG/1
0.4 TABLET SUBLINGUAL EVERY 5 MIN PRN
Qty: 20 TABLET | Refills: 0 | Status: SHIPPED | OUTPATIENT
Start: 2018-12-05 | End: 2019-04-05 | Stop reason: SDUPTHER

## 2018-12-05 RX ORDER — METOPROLOL TARTRATE 25 MG/1
12.5 TABLET, FILM COATED ORAL 2 TIMES DAILY
Qty: 90 TABLET | Refills: 1 | Status: SHIPPED | OUTPATIENT
Start: 2018-12-05 | End: 2018-12-05 | Stop reason: SDUPTHER

## 2018-12-05 RX ORDER — ISOSORBIDE MONONITRATE 30 MG/1
30 TABLET, EXTENDED RELEASE ORAL DAILY
Qty: 90 TABLET | Refills: 1 | Status: ON HOLD | OUTPATIENT
Start: 2018-12-05 | End: 2019-04-15 | Stop reason: HOSPADM

## 2018-12-05 RX ORDER — LISINOPRIL 5 MG/1
5 TABLET ORAL DAILY
Qty: 90 TABLET | Refills: 1 | Status: SHIPPED | OUTPATIENT
Start: 2018-12-05 | End: 2018-12-05 | Stop reason: SDUPTHER

## 2018-12-05 RX ORDER — SODIUM CHLORIDE 9 MG/ML
INJECTION, SOLUTION INTRAVENOUS CONTINUOUS
Status: CANCELLED | OUTPATIENT
Start: 2018-12-05

## 2018-12-05 RX ORDER — MIDAZOLAM HYDROCHLORIDE 1 MG/ML
INJECTION, SOLUTION INTRAMUSCULAR; INTRAVENOUS
Status: DISCONTINUED | OUTPATIENT
Start: 2018-12-05 | End: 2018-12-05 | Stop reason: HOSPADM

## 2018-12-05 RX ORDER — NITROGLYCERIN 0.4 MG/1
0.4 TABLET SUBLINGUAL EVERY 5 MIN PRN
Status: DISCONTINUED | OUTPATIENT
Start: 2018-12-05 | End: 2018-12-05 | Stop reason: HOSPADM

## 2018-12-05 RX ORDER — FUROSEMIDE 40 MG/1
40 TABLET ORAL DAILY
Qty: 90 TABLET | Refills: 1 | Status: SHIPPED | OUTPATIENT
Start: 2018-12-05 | End: 2018-12-05 | Stop reason: SDUPTHER

## 2018-12-05 RX ORDER — FENTANYL CITRATE 50 UG/ML
INJECTION, SOLUTION INTRAMUSCULAR; INTRAVENOUS
Status: DISCONTINUED | OUTPATIENT
Start: 2018-12-05 | End: 2018-12-05 | Stop reason: HOSPADM

## 2018-12-05 RX ORDER — HYDROCODONE BITARTRATE AND ACETAMINOPHEN 5; 325 MG/1; MG/1
1 TABLET ORAL EVERY 4 HOURS PRN
Status: DISCONTINUED | OUTPATIENT
Start: 2018-12-05 | End: 2018-12-05 | Stop reason: HOSPADM

## 2018-12-05 RX ORDER — METOPROLOL TARTRATE 25 MG/1
12.5 TABLET, FILM COATED ORAL 2 TIMES DAILY
Qty: 90 TABLET | Refills: 1 | Status: ON HOLD | OUTPATIENT
Start: 2018-12-05 | End: 2019-04-15 | Stop reason: HOSPADM

## 2018-12-05 RX ORDER — LISINOPRIL 5 MG/1
5 TABLET ORAL DAILY
Qty: 90 TABLET | Refills: 1 | Status: ON HOLD | OUTPATIENT
Start: 2018-12-05 | End: 2019-04-15 | Stop reason: HOSPADM

## 2018-12-05 RX ORDER — SODIUM CHLORIDE 9 MG/ML
INJECTION, SOLUTION INTRAVENOUS CONTINUOUS
Status: ACTIVE | OUTPATIENT
Start: 2018-12-05 | End: 2018-12-05

## 2018-12-05 RX ORDER — ROSUVASTATIN CALCIUM 40 MG/1
40 TABLET, COATED ORAL DAILY
Qty: 90 TABLET | Refills: 1 | Status: SHIPPED | OUTPATIENT
Start: 2018-12-05 | End: 2018-12-05 | Stop reason: SDUPTHER

## 2018-12-05 RX ORDER — LIDOCAINE HYDROCHLORIDE 10 MG/ML
INJECTION, SOLUTION EPIDURAL; INFILTRATION; INTRACAUDAL; PERINEURAL
Status: DISCONTINUED | OUTPATIENT
Start: 2018-12-05 | End: 2018-12-05 | Stop reason: HOSPADM

## 2018-12-05 RX ORDER — FUROSEMIDE 40 MG/1
40 TABLET ORAL DAILY
Qty: 90 TABLET | Refills: 1 | Status: ON HOLD | OUTPATIENT
Start: 2018-12-05 | End: 2020-04-26 | Stop reason: SDUPTHER

## 2018-12-05 RX ORDER — ACETAMINOPHEN 325 MG/1
650 TABLET ORAL EVERY 4 HOURS PRN
Status: DISCONTINUED | OUTPATIENT
Start: 2018-12-05 | End: 2018-12-05 | Stop reason: HOSPADM

## 2018-12-05 RX ORDER — HEPARIN SODIUM 200 [USP'U]/100ML
INJECTION, SOLUTION INTRAVENOUS
Status: DISCONTINUED | OUTPATIENT
Start: 2018-12-05 | End: 2018-12-05 | Stop reason: HOSPADM

## 2018-12-05 RX ADMIN — TICAGRELOR 90 MG: 90 TABLET ORAL at 10:12

## 2018-12-05 RX ADMIN — Medication 3 ML: at 06:12

## 2018-12-05 RX ADMIN — ASPIRIN 325 MG ORAL TABLET 325 MG: 325 PILL ORAL at 09:12

## 2018-12-05 RX ADMIN — ROSUVASTATIN CALCIUM 40 MG: 10 TABLET, FILM COATED ORAL at 10:12

## 2018-12-05 RX ADMIN — RANOLAZINE 500 MG: 500 TABLET, FILM COATED, EXTENDED RELEASE ORAL at 10:12

## 2018-12-05 NOTE — PROCEDURES
"Fredrick Cox is a 63 y.o. male patient.    Temp: 98.5 °F (36.9 °C) (12/05/18 0732)  Pulse: 63 (12/05/18 0732)  Resp: 18 (12/05/18 0732)  BP: (!) 100/58 (12/05/18 0732)  SpO2: 97 % (12/05/18 0732)  Weight: 75 kg (165 lb 5.5 oz) (12/05/18 0500)  Height: 5' 5" (165.1 cm) (12/04/18 1501)       Procedures     [facility]   Dr Middleton  Pre-op Dx NSTEMI  Post-op Dx same  Specimen none   EBL < 50 cc    12/5/18 [facility] - EDP 5, LM osteal 20%, LAD prox stent patent, Cx distal 90% moderate diffuse disease, % prox - some left to right collaterals    Reviewed with Dr Cesar - medical Rx recommended  Angioseal  Ok for d/c later today or in [facility]  12/5/2018  "

## 2018-12-05 NOTE — SUBJECTIVE & OBJECTIVE
Past Medical History:   Diagnosis Date    CHF (congestive heart failure)     Coronary artery disease     Diabetes mellitus     HLD (hyperlipidemia)     HTN (hypertension)     MI (myocardial infarction)     X's 2    Tobacco abuse        Past Surgical History:   Procedure Laterality Date    CARDIAC CATHETERIZATION  2007    x1    CARDIAC CATHETERIZATION  ?    x1    CORONARY ANGIOPLASTY WITH STENT PLACEMENT  2007 and ???       Review of patient's allergies indicates:  No Known Allergies    No current facility-administered medications on file prior to encounter.      Current Outpatient Medications on File Prior to Encounter   Medication Sig    aspirin 325 MG tablet Take 325 mg by mouth once daily.    lisinopril (PRINIVIL,ZESTRIL) 5 MG tablet Take 1 tablet (5 mg total) by mouth once daily.    metFORMIN (GLUCOPHAGE) 1000 MG tablet Take 1,000 mg by mouth 2 (two) times daily with meals.    metoprolol tartrate (LOPRESSOR) 25 MG tablet Take 0.5 tablets (12.5 mg total) by mouth 2 (two) times daily.    nitroGLYCERIN (NITROSTAT) 0.4 MG SL tablet Place 1 tablet (0.4 mg total) under the tongue every 5 (five) minutes as needed.    ranolazine (RANEXA) 500 MG Tb12 Take 1 tablet (500 mg total) by mouth 2 (two) times daily.    rosuvastatin (CRESTOR) 40 MG Tab TAKE 1 TABLET (40 MG TOTAL) BY MOUTH ONCE DAILY.    ticagrelor (BRILINTA) 90 mg tablet Take 1 tablet (90 mg total) by mouth 2 (two) times daily.    blood sugar diagnostic Strp 1 strip by Misc.(Non-Drug; Combo Route) route 2 (two) times daily.    canagliflozin (INVOKANA) 100 mg Tab Take 1 tablet (100 mg total) by mouth once daily.    dapagliflozin 5 mg Tab Take 5 mg by mouth once daily.    furosemide (LASIX) 40 MG tablet Take 1 tablet (40 mg total) by mouth once daily.    insulin glargine (LANTUS SOLOSTAR) 100 unit/mL (3 mL) InPn pen Inject 30 Units into the skin once daily.    isosorbide mononitrate (IMDUR) 30 MG 24 hr tablet Take 1 tablet (30 mg total) by  "mouth once daily.    pen needle, diabetic (BD INSULIN PEN NEEDLE UF MINI) 31 gauge x 3/16" Ndle USE AS DIRECTED    pen needle, diabetic 31 gauge x 5/16" Ndle 1 each by Misc.(Non-Drug; Combo Route) route 2 (two) times daily.    tramadol (ULTRAM) 50 mg tablet Take 1 tablet (50 mg total) by mouth 2 (two) times daily as needed.     Family History     Problem Relation (Age of Onset)    No Known Problems Mother, Father, Sister, Brother, Maternal Aunt, Maternal Uncle, Paternal Aunt, Paternal Uncle, Maternal Grandmother, Maternal Grandfather, Paternal Grandmother, Paternal Grandfather        Tobacco Use    Smoking status: Current Every Day Smoker     Packs/day: 0.50     Years: 35.00     Pack years: 17.50     Types: Cigarettes    Smokeless tobacco: Never Used   Substance and Sexual Activity    Alcohol use: Yes     Alcohol/week: 0.5 oz     Types: 1 Standard drinks or equivalent per week     Comment: social    Drug use: No    Sexual activity: Not on file     Review of Systems   Constitution: Negative for decreased appetite.   HENT: Negative for ear discharge.    Eyes: Negative for blurred vision.   Endocrine: Negative for polyphagia.   Skin: Negative for nail changes.   Neurological: Negative for aphonia.   Psychiatric/Behavioral: Negative for hallucinations.     Objective:     Vital Signs (Most Recent):  Temp: 98.5 °F (36.9 °C) (12/05/18 0732)  Pulse: 63 (12/05/18 0732)  Resp: 18 (12/05/18 0732)  BP: (!) 100/58 (12/05/18 0732)  SpO2: 97 % (12/05/18 0732) Vital Signs (24h Range):  Temp:  [98 °F (36.7 °C)-99.1 °F (37.3 °C)] 98.5 °F (36.9 °C)  Pulse:  [63-92] 63  Resp:  [13-20] 18  SpO2:  [96 %-100 %] 97 %  BP: ()/(53-87) 100/58     Weight: 75 kg (165 lb 5.5 oz)  Body mass index is 27.51 kg/m².    SpO2: 97 %  O2 Device (Oxygen Therapy): room air      Intake/Output Summary (Last 24 hours) at 12/5/2018 0970  Last data filed at 12/4/2018 1800  Gross per 24 hour   Intake 240 ml   Output --   Net 240 ml "       Lines/Drains/Airways     Peripheral Intravenous Line                 Peripheral IV - Single Lumen 12/04/18 Left Antecubital 1 day                Physical Exam   Constitutional: He is oriented to person, place, and time. He appears well-developed and well-nourished.   HENT:   Head: Normocephalic and atraumatic.   Eyes: Conjunctivae are normal. Pupils are equal, round, and reactive to light.   Neck: Normal range of motion. Neck supple.   Cardiovascular: Normal rate, normal heart sounds and intact distal pulses.   Pulmonary/Chest: Effort normal and breath sounds normal.   Abdominal: Soft. Bowel sounds are normal.   Musculoskeletal: Normal range of motion.   Neurological: He is alert and oriented to person, place, and time.   Skin: Skin is warm and dry.       Significant Labs: All pertinent lab results from the last 24 hours have been reviewed.    Significant Imaging: Echocardiogram:   2D echo with color flow doppler:   Results for orders placed or performed during the hospital encounter of 09/12/17   2D echo with color flow doppler   Result Value Ref Range    QEF 40 (A) 55 - 65    Diastolic Dysfunction Yes (A)     Aortic Valve Regurgitation TRIVIAL     Est. PA Systolic Pressure 24.72     Pericardial Effusion NONE     Tricuspid Valve Regurgitation TRIVIAL TO MILD

## 2018-12-05 NOTE — PROGRESS NOTES
[facility] Medicine  Progress Note    Patient Name: Fredrick Cox  MRN: [id number]  Patient Class: OP- Observation   Admission Date: 12/4/2018  Length of Stay: 0 days  Attending Physician: Dorcas Merchant MD  Primary Care Provider: Amos Tee III, MD        Subjective:     Principal Problem:NSTEMI (non-ST elevated myocardial infarction)    HPI:  63 y.o. male with CAD s/p PCI, HTN, HLD, tobacco abuse, DM2, and chronic combined heart failure presents with a complaint of chest pain.  Pain is acute onset this morning while working on his car, located sternal, does not radiate, severity 10/10, described as similar to MI 4 years ago, relieved by 1 SL NTG at home PTA.  Denies fever, chills, cough, SOB, palpitations, orthopnea, PND, dizziness, syncope, N/V/D, abdominal pain, bloody or black stools, or dysuria.  Treated for STEMI at [facility] in 2014 with stents placed by Dr. Stuart.  Has followed up intermittently with Dr. Cesar since that time, but has been poorly adherent to outpatient follow up and treatment regimen.  Echo and stress in Sep 2017 show EF 40% + DD and moderate ischemia, but he has not followed up since undergoing testing.  He continues to smoke.  EKG NSR without evidence of acute ischemia, initial troponin negative, chest xray and routine labs unremarkable.  Placed in observation for ACS rule out.    Hospital Course:  Troponin continued to elevate, consistent with NSTEMI. Repeat echo pending. Cardiology took the patient for [facility] which showed LM osteal 20%, LAD prox stent patent, Cx distal 90% moderate diffuse disease, % prox - some left to right collaterals. Cardiology recommended medical management. He is unlikely to be compliant with Plavix in the event that he required stent. He needs to stop smoking, take his medications, and follow up with cardiology. He was counseled on all these things and explained the risk of sudden cardiac death in the event he  continues to be non-compliant.    Interval History: Tolerated [facility] well.     Review of Systems   Constitutional: Negative for chills and fever.   HENT: Negative for congestion and rhinorrhea.    Eyes: Negative for photophobia and visual disturbance.   Respiratory: Negative for cough and shortness of breath.    Cardiovascular: Positive for chest pain (no chest pain since admission). Negative for leg swelling.   Gastrointestinal: Negative for abdominal pain, constipation, diarrhea and nausea.   Genitourinary: Negative for difficulty urinating and dysuria.   Musculoskeletal: Negative for gait problem and joint swelling.   Neurological: Negative for dizziness and light-headedness.   Psychiatric/Behavioral: Negative for confusion and dysphoric mood.     Objective:     Vital Signs (Most Recent):  Temp: 98.7 °F (37.1 °C) (12/05/18 1202)  Pulse: 64 (12/05/18 1230)  Resp: 18 (12/05/18 1230)  BP: 100/62 (12/05/18 1230)  SpO2: 98 % (12/05/18 1202) Vital Signs (24h Range):  Temp:  [98 °F (36.7 °C)-99 °F (37.2 °C)] 98.7 °F (37.1 °C)  Pulse:  [58-91] 64  Resp:  [16-18] 18  SpO2:  [96 %-99 %] 98 %  BP: ()/(53-84) 100/62     Weight: 74.8 kg (165 lb)  Body mass index is 27.46 kg/m².    Intake/Output Summary (Last 24 hours) at 12/5/2018 1556  Last data filed at 12/4/2018 1800  Gross per 24 hour   Intake 240 ml   Output --   Net 240 ml      Physical Exam   Constitutional: He is oriented to person, place, and time. He appears well-developed and well-nourished. No distress.   chronically ill appearing   HENT:   Right Ear: External ear normal.   Left Ear: External ear normal.   Nose: Nose normal.   Eyes: EOM are normal. Pupils are equal, round, and reactive to light. No scleral icterus.   Neck: Normal range of motion. Neck supple. No thyromegaly present.   Cardiovascular: Normal rate and normal heart sounds. Exam reveals no friction rub.   No murmur heard.  Pulmonary/Chest: Effort normal and breath sounds normal. No respiratory  distress. He has no wheezes. He has no rales.   Abdominal: Soft. Bowel sounds are normal. He exhibits no mass. There is no tenderness.   Musculoskeletal: Normal range of motion. He exhibits no edema or deformity.   Neurological: He is alert and oriented to person, place, and time. No cranial nerve deficit.   Skin: Skin is warm and dry. No pallor.   Psychiatric: He has a normal mood and affect. His behavior is normal. Thought content normal.       Significant Labs: All pertinent labs within the past 24 hours have been reviewed.    Significant Imaging: I have reviewed and interpreted all pertinent imaging results/findings within the past 24 hours.    Assessment/Plan:      * NSTEMI (non-ST elevated myocardial infarction)    See hospital course.     Type 2 diabetes mellitus, with long-term current use of insulin    Last HgbA1c   Lab Results   Component Value Date    LABA1C 11.6 (H) 12/03/2015    HGBA1C 10.4 (H) 12/04/2018     Hold oral antihyperglycemics while inpatient  Continue home basal insulin along with PRN sliding scale  ACHS glucose monitoring   ADA diet     HLD (hyperlipidemia)    Continue statin     Tobacco abuse    5 minutes spent counseling the patient on smoking cessation and he is not currently ready to stop smoking. He will be offereded a nicotine transdermal patch while hospitalized if ACS ruled out and monitored for withdrawal.  Will provide additional smoking cessation counseling prior to discharge.     Chronic combined systolic and diastolic congestive heart failure    Last echo showed EF 40% + DD, no evidence of acute decompensation or fluid overload, continue home meds and monitor on tele, obtain echo, I/O's, daily weights     Essential hypertension    Well controlled, continue home medications and monitor blood pressure, adjust as needed.     Coronary artery disease of native artery of native heart with stable angina pectoris    As above.       VTE Risk Mitigation (From admission, onward)         Ordered     heparin infusion 1,000 units/500 ml in 0.9% NaCl (pressure line flush)  Intra-op continuous PRN      12/05/18 1040     IP VTE HIGH RISK PATIENT  Once      12/04/18 1540              Dorcas Merchant MD  Department of Hospital Medicine   [facility]

## 2018-12-05 NOTE — NURSING
Received report from ABBY Epperson. Alert and oriented. No pain. No sign of distress. IV line intact over LAC. Call bell given on his reach. Instructed to call for assistane or for any concerns. Safety maintained.

## 2018-12-05 NOTE — PLAN OF CARE
12/05/18 1628   Final Note   Assessment Type Final Discharge Note   Anticipated Discharge Disposition Home   Hospital Follow Up  Appt(s) scheduled? Yes   Discharge plans and expectations educations in teach back method with documentation complete? Yes   Right Care Referral Info   Post Acute Recommendation No Care

## 2018-12-05 NOTE — SUBJECTIVE & OBJECTIVE
Interval History: Tolerated [facility] well.     Review of Systems   Constitutional: Negative for chills and fever.   HENT: Negative for congestion and rhinorrhea.    Eyes: Negative for photophobia and visual disturbance.   Respiratory: Negative for cough and shortness of breath.    Cardiovascular: Positive for chest pain (no chest pain since admission). Negative for leg swelling.   Gastrointestinal: Negative for abdominal pain, constipation, diarrhea and nausea.   Genitourinary: Negative for difficulty urinating and dysuria.   Musculoskeletal: Negative for gait problem and joint swelling.   Neurological: Negative for dizziness and light-headedness.   Psychiatric/Behavioral: Negative for confusion and dysphoric mood.     Objective:     Vital Signs (Most Recent):  Temp: 98.7 °F (37.1 °C) (12/05/18 1202)  Pulse: 64 (12/05/18 1230)  Resp: 18 (12/05/18 1230)  BP: 100/62 (12/05/18 1230)  SpO2: 98 % (12/05/18 1202) Vital Signs (24h Range):  Temp:  [98 °F (36.7 °C)-99 °F (37.2 °C)] 98.7 °F (37.1 °C)  Pulse:  [58-91] 64  Resp:  [16-18] 18  SpO2:  [96 %-99 %] 98 %  BP: ()/(53-84) 100/62     Weight: 74.8 kg (165 lb)  Body mass index is 27.46 kg/m².    Intake/Output Summary (Last 24 hours) at 12/5/2018 1556  Last data filed at 12/4/2018 1800  Gross per 24 hour   Intake 240 ml   Output --   Net 240 ml      Physical Exam   Constitutional: He is oriented to person, place, and time. He appears well-developed and well-nourished. No distress.   chronically ill appearing   HENT:   Right Ear: External ear normal.   Left Ear: External ear normal.   Nose: Nose normal.   Eyes: EOM are normal. Pupils are equal, round, and reactive to light. No scleral icterus.   Neck: Normal range of motion. Neck supple. No thyromegaly present.   Cardiovascular: Normal rate and normal heart sounds. Exam reveals no friction rub.   No murmur heard.  Pulmonary/Chest: Effort normal and breath sounds normal. No respiratory distress. He has no wheezes. He has no  rales.   Abdominal: Soft. Bowel sounds are normal. He exhibits no mass. There is no tenderness.   Musculoskeletal: Normal range of motion. He exhibits no edema or deformity.   Neurological: He is alert and oriented to person, place, and time. No cranial nerve deficit.   Skin: Skin is warm and dry. No pallor.   Psychiatric: He has a normal mood and affect. His behavior is normal. Thought content normal.       Significant Labs: All pertinent labs within the past 24 hours have been reviewed.    Significant Imaging: I have reviewed and interpreted all pertinent imaging results/findings within the past 24 hours.

## 2018-12-05 NOTE — PLAN OF CARE
Problem: Diabetes, Type 2 (Adult)  Intervention: Support/Optimize Psychosocial Response to Condition   12/05/18 0323   Coping/Psychosocial Interventions   Supportive Measures active listening utilized;verbalization of feelings encouraged;relaxation techniques promoted   Environmental Support calm environment promoted;rest periods encouraged;environmental consistency promoted     Intervention: Optimize Glycemic Control   12/05/18 0323   Nutrition Interventions   Glycemic Management blood glucose monitoring       Goal: Signs and Symptoms of Listed Potential Problems Will be Absent, Minimized or Managed (Diabetes, Type 2)  Signs and symptoms of listed potential problems will be absent, minimized or managed by discharge/transition of care (reference Diabetes, Type 2 (Adult) CPG).  Outcome: Ongoing (interventions implemented as appropriate)   12/05/18 0323   Diabetes, Type 2   Problems Assessed (Type 2 Diabetes) all   Problems Present (Type 2 Diabetes) hyperglycemia

## 2018-12-05 NOTE — PROGRESS NOTES
Received call from  nurse Lashaun stating that she spoke to Dr Merchant regarding discharge to home on today and advised that the pt reported that he has no ride to home on today to discharge on today.  TN discussed this information with Jyoti  Manager and was given permission to get pt a cab to his home address despite living in [place].  TN notified pts nurse Zhou of this information and will schedule a cab for pt to discharge to home on today.  TN also notified Dr Merchant of this information and will be putting in d/c order shortly.

## 2018-12-05 NOTE — H&P
[facility] Medicine  History & Physical    Patient Name: Fredrick Cox  MRN: [id number]  Admission Date: 12/4/2018  Attending Physician: Amos Chen MD   Primary Care Provider: Amos Tee III, MD         Patient information was obtained from patient, past medical records and ER records.     Subjective:     Principal Problem:Chest pain    Chief Complaint:   Chief Complaint   Patient presents with    Chest Pain     pt states he was working on his car when he began to have 10/10 cp; pt denies any pain at this time; denies n/v/d, dizziness or numbness; pt took 1 nitro sublingual at home and per ems  was given en route        HPI: 63 y.o. male with CAD s/p PCI, HTN, HLD, tobacco abuse, DM2, and chronic combined heart failure presents with a complaint of chest pain.  Pain is acute onset this morning while working on his car, located sternal, does not radiate, severity 10/10, described as similar to MI 4 years ago, relieved by 1 SL NTG at home PTA.  Denies fever, chills, cough, sOB, palpitations, orthopnea, PND, dizziness, syncope, N/V/D, abdominal pain, bloody or black stools, or dysuria.  Treated for STEMI at [facility] in 2014 with stents placed by Dr. Stuart.  Has followed up intermittently with Dr. Cesar since that time, but has been poorly adherent to outpatient follow up and treatment regimen.  Echo and stress in Sep 2017 show EF 40% + DD and moderate ischemia, but he has not followed up since undergoing testing.  He continues to smoke.  EKG NSR without evidence of acute ischemia, initial troponin negative, chest xray and routine labs unremarkable.  Placed in observation for ACS rule out.    Past Medical History:   Diagnosis Date    CHF (congestive heart failure)     Coronary artery disease     Diabetes mellitus     HLD (hyperlipidemia)     HTN (hypertension)     MI (myocardial infarction)     X's 2    Tobacco abuse        Past Surgical History:   Procedure  "Laterality Date    CARDIAC CATHETERIZATION  2007    x1    CARDIAC CATHETERIZATION  ?    x1    CORONARY ANGIOPLASTY WITH STENT PLACEMENT  2007 and ???       Review of patient's allergies indicates:  No Known Allergies    No current facility-administered medications on file prior to encounter.      Current Outpatient Medications on File Prior to Encounter   Medication Sig    aspirin 325 MG tablet Take 325 mg by mouth once daily.    lisinopril (PRINIVIL,ZESTRIL) 5 MG tablet Take 1 tablet (5 mg total) by mouth once daily.    metFORMIN (GLUCOPHAGE) 1000 MG tablet Take 1,000 mg by mouth 2 (two) times daily with meals.    metoprolol tartrate (LOPRESSOR) 25 MG tablet Take 0.5 tablets (12.5 mg total) by mouth 2 (two) times daily.    nitroGLYCERIN (NITROSTAT) 0.4 MG SL tablet Place 1 tablet (0.4 mg total) under the tongue every 5 (five) minutes as needed.    ranolazine (RANEXA) 500 MG Tb12 Take 1 tablet (500 mg total) by mouth 2 (two) times daily.    rosuvastatin (CRESTOR) 40 MG Tab TAKE 1 TABLET (40 MG TOTAL) BY MOUTH ONCE DAILY.    ticagrelor (BRILINTA) 90 mg tablet Take 1 tablet (90 mg total) by mouth 2 (two) times daily.    blood sugar diagnostic Strp 1 strip by Misc.(Non-Drug; Combo Route) route 2 (two) times daily.    canagliflozin (INVOKANA) 100 mg Tab Take 1 tablet (100 mg total) by mouth once daily.    dapagliflozin 5 mg Tab Take 5 mg by mouth once daily.    furosemide (LASIX) 40 MG tablet Take 1 tablet (40 mg total) by mouth once daily.    insulin glargine (LANTUS SOLOSTAR) 100 unit/mL (3 mL) InPn pen Inject 30 Units into the skin once daily.    isosorbide mononitrate (IMDUR) 30 MG 24 hr tablet Take 1 tablet (30 mg total) by mouth once daily.    pen needle, diabetic (BD INSULIN PEN NEEDLE UF MINI) 31 gauge x 3/16" Ndle USE AS DIRECTED    pen needle, diabetic 31 gauge x 5/16" Ndle 1 each by Misc.(Non-Drug; Combo Route) route 2 (two) times daily.    tramadol (ULTRAM) 50 mg tablet Take 1 tablet (50 " mg total) by mouth 2 (two) times daily as needed.     Family History     Problem Relation (Age of Onset)    No Known Problems Mother, Father, Sister, Brother, Maternal Aunt, Maternal Uncle, Paternal Aunt, Paternal Uncle, Maternal Grandmother, Maternal Grandfather, Paternal Grandmother, Paternal Grandfather        Tobacco Use    Smoking status: Current Every Day Smoker     Packs/day: 0.50     Years: 35.00     Pack years: 17.50     Types: Cigarettes    Smokeless tobacco: Never Used   Substance and Sexual Activity    Alcohol use: Yes     Alcohol/week: 0.5 oz     Types: 1 Standard drinks or equivalent per week     Comment: social    Drug use: No    Sexual activity: Not on file     Review of Systems   Constitutional: Negative for chills and fever.   Eyes: Negative for photophobia and visual disturbance.   Respiratory: Negative for cough and shortness of breath.    Cardiovascular: Positive for chest pain. Negative for palpitations and leg swelling.   Gastrointestinal: Negative for abdominal pain, diarrhea, nausea and vomiting.   Genitourinary: Negative for frequency, hematuria and urgency.   Skin: Negative for pallor, rash and wound.   Neurological: Negative for light-headedness and headaches.   Psychiatric/Behavioral: Negative for confusion and decreased concentration.     Objective:     Vital Signs (Most Recent):  Temp: 99.1 °F (37.3 °C) (12/04/18 1420)  Pulse: 76 (12/04/18 1452)  Resp: 15 (12/04/18 1452)  BP: 114/67 (12/04/18 1341)  SpO2: 100 % (12/04/18 1452) Vital Signs (24h Range):  Temp:  [98.2 °F (36.8 °C)-99.1 °F (37.3 °C)] 99.1 °F (37.3 °C)  Pulse:  [73-92] 76  Resp:  [13-20] 15  SpO2:  [99 %-100 %] 100 %  BP: (114-145)/(67-87) 114/67     Weight: 74.8 kg (165 lb)  Body mass index is 27.46 kg/m².    Physical Exam   Constitutional: He is oriented to person, place, and time. He appears well-developed and well-nourished. No distress.   HENT:   Head: Normocephalic and atraumatic.   Right Ear: External ear  normal.   Left Ear: External ear normal.   Nose: Nose normal.   Mouth/Throat: Oropharynx is clear and moist.   Eyes: Conjunctivae and EOM are normal. Pupils are equal, round, and reactive to light.   Neck: Normal range of motion. Neck supple.   Cardiovascular: Normal rate, regular rhythm and intact distal pulses.   Pulmonary/Chest: Effort normal and breath sounds normal. No respiratory distress. He has no wheezes. He has no rales.   Abdominal: Soft. Bowel sounds are normal. He exhibits no distension. There is no tenderness.   No palpable hepatomegaly or splenomegaly   Musculoskeletal: Normal range of motion. He exhibits no edema or tenderness.   Neurological: He is alert and oriented to person, place, and time.   Skin: Skin is warm and dry.   Psychiatric: He has a normal mood and affect. Thought content normal.   Nursing note and vitals reviewed.        CRANIAL NERVES     CN III, IV, VI   Pupils are equal, round, and reactive to light.  Extraocular motions are normal.        Significant Labs: All pertinent labs within the past 24 hours have been reviewed.    Significant Imaging: I have reviewed all pertinent imaging results/findings within the past 24 hours.    Assessment/Plan:     * Chest pain    Concerning pain characteristics, abnormal stress a year ago and has not since followed up, EKG nonischemic and initial trop neg.  HEART score 5.  -cardiac monitoring  -serial troponins  -ASA and PRN NTG  -2D echo  -TSH and lipid panel  -consult Cardiology  -NPO p MN     Coronary artery disease of native artery of native heart with stable angina pectoris    As above.     Type 2 diabetes mellitus, with long-term current use of insulin    Last HgbA1c   Lab Results   Component Value Date    LABA1C 11.6 (H) 12/03/2015    HGBA1C 10.9 (H) 08/24/2017     Check a1c  Hold oral antihyperglycemics while inpatient  Continue home basal insulin along with PRN sliding scale  ACHS glucose monitoring   ADA diet, NPO p MN     HLD  (hyperlipidemia)    Continue statin     Tobacco abuse    5 minutes spent counseling the patient on smoking cessation and he is not currently ready to stop smoking. He will be offereded a nicotine transdermal patch while hospitalized if ACS ruled out and monitored for withdrawal.  Will provide additional smoking cessation counseling prior to discharge.     Chronic combined systolic and diastolic congestive heart failure    Last echo showed EF 40% + DD, no evidence of acute decompensation or fluid overload, continue home meds and monitor on tele, obtain echo, I/O's, daily weights     Essential hypertension    Well controlled, continue home medications and monitor blood pressure, adjust as needed.       VTE Risk Mitigation (From admission, onward)        Ordered     enoxaparin injection 40 mg  Daily      12/04/18 1540     IP VTE HIGH RISK PATIENT  Once      12/04/18 1540        Jeffery Mccabe Jr., APRN, AGACN-BC  Hospitalist - Department of Hospital Medicine  [facility] [address] [facility] [place] [address]  Office #: [phone]; Pager #: [phone]

## 2018-12-05 NOTE — PROGRESS NOTES
WRITTEN HEALTHCARE DISCHARGE INFORMATION      Things that YOU are responsible for to Manage Your Care At Home:  1. Getting your prescriptions filled.  2. Taking you medications as directed. DO NOT MISS ANY DOSES!  3. Going to your follow-up doctor appointments. This is important because it allows the doctor to monitor your progress and to determine if any changes need to be made to your treatment plan.     If you are unable to make your follow up appointments, please call the number listed and reschedule this appointment.      After discharge, if you need assistance, you can call [facility] Line for 24/7 assistance at [phone]     If you are experience any signs or symptoms, Call your doctor or Call 911 and come to your nearest Emergency Room.     Thank you for choosing [facility] and allowing us to care for you.        You should receive a call from [facility] within 48-72 hours to help manage your care after discharge. Please try to make sure that you answer your phone for this important phone call.     Follow-up Information     Kenny Middleton MD On 12/14/2018.    Specialty:  Cardiology  Why:  Appointment scheduled for Friday December 14th at 1:45pm  Contact information:  [address]  [address]  [facility] [address]  [phone]             Amos Tee III, MD.    Specialty:  Internal Medicine  Why:  call to schedule PCP follow up appointment as needed  Contact information:  [address]  [facility]  [place] [address]  [phone]

## 2018-12-05 NOTE — HOSPITAL COURSE
Troponin continued to elevate, consistent with NSTEMI. Repeat echo pending. Cardiology took the patient for [facility] which showed LM osteal 20%, LAD prox stent patent, Cx distal 90% moderate diffuse disease, % prox - some left to right collaterals. Cardiology recommended medical management. He is unlikely to be compliant with Plavix in the event that he required stent. He needs to stop smoking, take his medications, and follow up with cardiology. He was counseled on all these things and explained the risk of sudden cardiac death in the event he continues to be non-compliant.

## 2018-12-05 NOTE — ASSESSMENT & PLAN NOTE
Peak troponin 0.2. Known CAD with MI and stent to LAD. noncompliant with follow up. [facility] today - risks/benefits explained and he agrees to proceed. On brilinta

## 2018-12-05 NOTE — ASSESSMENT & PLAN NOTE
Last HgbA1c   Lab Results   Component Value Date    LABA1C 11.6 (H) 12/03/2015    HGBA1C 10.4 (H) 12/04/2018     Hold oral antihyperglycemics while inpatient  Continue home basal insulin along with PRN sliding scale  ACHS glucose monitoring   ADA diet

## 2018-12-05 NOTE — NURSING
Dr Cloud informed of repeat troponin result 0.248. No chest pain. To inform him if patient developes further pain. Cardiology already consulted.

## 2018-12-05 NOTE — PROCEDURES
"Fredrick Cox is a 63 y.o. male patient.    Temp: 98.5 °F (36.9 °C) (12/05/18 0732)  Pulse: 63 (12/05/18 0732)  Resp: 18 (12/05/18 0732)  BP: (!) 100/58 (12/05/18 0732)  SpO2: 97 % (12/05/18 0732)  Weight: 75 kg (165 lb 5.5 oz) (12/05/18 0500)  Height: 5' 5" (165.1 cm) (12/04/18 1501)       Procedures     Pre-sedation Assessment:    1. ASA Score: ASA 2 - Patient with mild systemic disease with no functional limitations  2. Mallampati Class: II (hard and soft palate, upper portion of tonsils anduvula visible)  3. Patient or family history of any reaction to anesthesia or sedation: No  4. Plan for Sedation: Moderate  5. H&P within 30 days of the procedure and updated within 24 hrs of admission or registration: Yes    Kenny Middleton  12/5/2018  "

## 2018-12-05 NOTE — PLAN OF CARE
12/05/18 1020   Discharge Assessment   Assessment Type Discharge Planning Assessment   Assessment information obtained from? Patient   Prior to hospitilization cognitive status: Alert/Oriented   Prior to hospitalization functional status: Independent   Current cognitive status: Alert/Oriented   Current Functional Status: Independent   Lives With spouse   Able to Return to Prior Arrangements yes   Is patient able to care for self after discharge? Yes   Who are your caregiver(s) and their phone number(s)? [phone]   Patient's perception of discharge disposition home or selfcare   Readmission Within The Last 30 Days no previous admission in last 30 days   Patient currently receives any other outside agency services? No   Equipment Currently Used at Home glucometer   Do you have any problems affording any of your prescribed medications? TBD   Is the patient taking medications as prescribed? yes   Does the patient have transportation home? Yes   Transportation Available family or friend will provide   Does the patient receive services at the [facility]? No   Discharge Plan A Home with family   Discharge Plan B Home with family  (with follow up appointments)   Patient/Family In Agreement With Plan yes     [facility] - [facility]  [address], [address]  [address], [address]  [place] [address]  Phone: [phone] Fax: [phone]

## 2018-12-05 NOTE — PLAN OF CARE
12/05/18 1510   Post-Acute Status   Post-Acute Authorization (TBD- likely d/c to home with no needs)

## 2018-12-05 NOTE — DISCHARGE SUMMARY
[facility] Hospital Medicine  Discharge Summary      Patient Name: Fredrick Cox  MRN: [id number]  Admission Date: 12/4/2018  Hospital Length of Stay: 0 days  Discharge Date and Time:  12/05/2018 4:32 PM  Attending Physician: Dorcas Merchant MD   Discharging Provider: Dorcas Merchant MD  Primary Care Provider: Amos Tee III, MD      HPI:   63 y.o. male with CAD s/p PCI, HTN, HLD, tobacco abuse, DM2, and chronic combined heart failure presents with a complaint of chest pain.  Pain is acute onset this morning while working on his car, located sternal, does not radiate, severity 10/10, described as similar to MI 4 years ago, relieved by 1 SL NTG at home PTA.  Denies fever, chills, cough, SOB, palpitations, orthopnea, PND, dizziness, syncope, N/V/D, abdominal pain, bloody or black stools, or dysuria.  Treated for STEMI at [facility] in 2014 with stents placed by Dr. Stuart.  Has followed up intermittently with Dr. Cesar since that time, but has been poorly adherent to outpatient follow up and treatment regimen.  Echo and stress in Sep 2017 show EF 40% + DD and moderate ischemia, but he has not followed up since undergoing testing.  He continues to smoke.  EKG NSR without evidence of acute ischemia, initial troponin negative, chest xray and routine labs unremarkable.  Placed in observation for ACS rule out.    Procedure(s) (LRB):  Left heart cath (Left)      Hospital Course:   Troponin continued to elevate, consistent with NSTEMI. Repeat echo pending. Cardiology took the patient for [facility] which showed LM osteal 20%, LAD prox stent patent, Cx distal 90% moderate diffuse disease, % prox - some left to right collaterals. Cardiology recommended medical management. He is unlikely to be compliant with Plavix in the event that he required stent. He needs to stop smoking, take his medications, and follow up with cardiology. He was counseled on all these things and explained the risk of  sudden cardiac death in the event he continues to be non-compliant.     Consults:   Consults (From admission, onward)        Status Ordering Provider     Inpatient consult to Cardiology  Once     Provider:  Kenny Middleton MD    Acknowledged DOC CUEVAS JR        Final Active Diagnoses:    Diagnosis Date Noted POA    PRINCIPAL PROBLEM:  NSTEMI (non-ST elevated myocardial infarction) [I21.4] 12/05/2018 Yes    Type 2 diabetes mellitus, with long-term current use of insulin [E11.9, Z79.4] 04/02/2014 Not Applicable     Chronic    Coronary artery disease of native artery of native heart with stable angina pectoris [I25.118] 01/17/2014 Yes     Chronic    Essential hypertension [I10] 01/17/2014 Yes     Chronic    Chronic combined systolic and diastolic congestive heart failure [I50.42] 01/17/2014 Yes     Chronic    Tobacco abuse [Z72.0] 01/17/2014 Yes     Chronic    HLD (hyperlipidemia) [E78.5] 01/17/2014 Yes     Chronic      Problems Resolved During this Admission:    Diagnosis Date Noted Date Resolved POA    Chest pain [R07.9] 12/04/2018 12/05/2018 Yes       Discharged Condition: stable    Disposition: Home or Self Care    Follow Up:  Follow-up Information     Kenny Middleton MD On 12/14/2018.    Specialty:  Cardiology  Why:  Appointment scheduled for Friday December 14th at 1:45pm  Contact information:  [address]  [address]  [facility] [address]  [phone]             Amos Tee III, MD.    Specialty:  Internal Medicine  Why:  call to schedule PCP follow up appointment as needed  Contact information:  [address]  [place] [facility]  [facility] [address]  [phone]                 Patient Instructions:      Diet Cardiac     Diet diabetic     Notify your health care provider if you experience any of the following:  temperature >100.4     Notify your health care provider if you experience any of the following:  increased confusion or weakness     Notify your health care provider if you  experience any of the following:  persistent dizziness, light-headedness, or visual disturbances     Notify your health care provider if you experience any of the following:  worsening rash     Notify your health care provider if you experience any of the following:  severe persistent headache     Notify your health care provider if you experience any of the following:  difficulty breathing or increased cough     Notify your health care provider if you experience any of the following:  redness, tenderness, or signs of infection (pain, swelling, redness, odor or green/yellow discharge around incision site)     Notify your health care provider if you experience any of the following:  severe uncontrolled pain     Notify your health care provider if you experience any of the following:  persistent nausea and vomiting or diarrhea     Activity as tolerated        Medications:  Reconciled Home Medications:      Medication List      CONTINUE taking these medications    aspirin 325 MG tablet  Take 325 mg by mouth once daily.     blood sugar diagnostic Strp  1 strip by Misc.(Non-Drug; Combo Route) route 2 (two) times daily.     canagliflozin 100 mg Tab  Commonly known as:  INVOKANA  Take 1 tablet (100 mg total) by mouth once daily.     dapagliflozin 5 mg Tab tablet  Commonly known as:  FARXIGA  Take 5 mg by mouth once daily.     furosemide 40 MG tablet  Commonly known as:  LASIX  Take 1 tablet (40 mg total) by mouth once daily.     insulin glargine 100 unit/mL (3 mL) Inpn pen  Commonly known as:  LANTUS SOLOSTAR  Inject 30 Units into the skin once daily.     isosorbide mononitrate 30 MG 24 hr tablet  Commonly known as:  IMDUR  Take 1 tablet (30 mg total) by mouth once daily.     lisinopril 5 MG tablet  Commonly known as:  PRINIVIL,ZESTRIL  Take 1 tablet (5 mg total) by mouth once daily.     metFORMIN 1000 MG tablet  Commonly known as:  GLUCOPHAGE  Take 1,000 mg by mouth 2 (two) times daily with meals.     metoprolol tartrate  "25 MG tablet  Commonly known as:  LOPRESSOR  Take 0.5 tablets (12.5 mg total) by mouth 2 (two) times daily.     nitroGLYCERIN 0.4 MG SL tablet  Commonly known as:  NITROSTAT  Place 1 tablet (0.4 mg total) under the tongue every 5 (five) minutes as needed.     pen needle, diabetic 31 gauge x 3/16" Ndle  Commonly known as:  BD ULTRA-FINE MINI PEN NEEDLE  USE AS DIRECTED     pen needle, diabetic 31 gauge x 5/16" Ndle  1 each by Misc.(Non-Drug; Combo Route) route 2 (two) times daily.     ranolazine 500 MG Tb12  Commonly known as:  RANEXA  Take 1 tablet (500 mg total) by mouth 2 (two) times daily.     rosuvastatin 40 MG Tab  Commonly known as:  CRESTOR  Take 1 tablet (40 mg total) by mouth once daily.     ticagrelor 90 mg tablet  Commonly known as:  BRILINTA  Take 1 tablet (90 mg total) by mouth 2 (two) times daily.     traMADol 50 mg tablet  Commonly known as:  ULTRAM  Take 1 tablet (50 mg total) by mouth 2 (two) times daily as needed.          Time spent on the discharge of patient: 35 minutes  Patient was seen and examined on the date of discharge and determined to be suitable for discharge.         Dorcas Merchant MD  Department of Hospital Medicine  [facility]  "

## 2018-12-14 ENCOUNTER — OFFICE VISIT (OUTPATIENT)
Dept: CARDIOLOGY | Facility: CLINIC | Age: 63
End: 2018-12-14
Payer: MEDICARE

## 2018-12-14 VITALS
WEIGHT: 165 LBS | DIASTOLIC BLOOD PRESSURE: 80 MMHG | BODY MASS INDEX: 27.49 KG/M2 | HEART RATE: 84 BPM | HEIGHT: 65 IN | OXYGEN SATURATION: 99 % | SYSTOLIC BLOOD PRESSURE: 116 MMHG

## 2018-12-14 DIAGNOSIS — Z79.4 TYPE 2 DIABETES MELLITUS WITH HYPEROSMOLARITY WITHOUT COMA, WITH LONG-TERM CURRENT USE OF INSULIN: Chronic | ICD-10-CM

## 2018-12-14 DIAGNOSIS — E11.00 TYPE 2 DIABETES MELLITUS WITH HYPEROSMOLARITY WITHOUT COMA, WITH LONG-TERM CURRENT USE OF INSULIN: Chronic | ICD-10-CM

## 2018-12-14 DIAGNOSIS — I21.4 NSTEMI (NON-ST ELEVATED MYOCARDIAL INFARCTION): ICD-10-CM

## 2018-12-14 DIAGNOSIS — I50.42 CHRONIC COMBINED SYSTOLIC AND DIASTOLIC CONGESTIVE HEART FAILURE: Chronic | ICD-10-CM

## 2018-12-14 DIAGNOSIS — I10 ESSENTIAL HYPERTENSION: Chronic | ICD-10-CM

## 2018-12-14 DIAGNOSIS — E78.2 MIXED HYPERLIPIDEMIA: Chronic | ICD-10-CM

## 2018-12-14 DIAGNOSIS — Z72.0 TOBACCO ABUSE: Chronic | ICD-10-CM

## 2018-12-14 DIAGNOSIS — I25.118 CORONARY ARTERY DISEASE OF NATIVE ARTERY OF NATIVE HEART WITH STABLE ANGINA PECTORIS: Primary | Chronic | ICD-10-CM

## 2018-12-14 PROCEDURE — 99999 PR PBB SHADOW E&M-EST. PATIENT-LVL III: CPT | Mod: PBBFAC,,, | Performed by: INTERNAL MEDICINE

## 2018-12-14 PROCEDURE — 99214 OFFICE O/P EST MOD 30 MIN: CPT | Mod: S$GLB,,, | Performed by: INTERNAL MEDICINE

## 2018-12-14 PROCEDURE — 3046F HEMOGLOBIN A1C LEVEL >9.0%: CPT | Mod: S$GLB,,, | Performed by: INTERNAL MEDICINE

## 2018-12-14 PROCEDURE — 3008F BODY MASS INDEX DOCD: CPT | Mod: S$GLB,,, | Performed by: INTERNAL MEDICINE

## 2018-12-14 PROCEDURE — 3079F DIAST BP 80-89 MM HG: CPT | Mod: S$GLB,,, | Performed by: INTERNAL MEDICINE

## 2018-12-14 PROCEDURE — 3074F SYST BP LT 130 MM HG: CPT | Mod: S$GLB,,, | Performed by: INTERNAL MEDICINE

## 2018-12-14 NOTE — PROGRESS NOTES
Subjective:    Patient ID:  Fredrick Cox is a 63 y.o. male who presents for follow-up of Hospital Follow Up      HPI     Admitted 12/4/18  63 y.o. male with CAD s/p PCI, HTN, HLD, tobacco abuse, DM2, and chronic combined heart failure presents with a complaint of chest pain.  Pain is acute onset this morning while working on his car, located sternal, does not radiate, severity 10/10, described as similar to MI 4 years ago, relieved by 1 SL NTG at home PTA.  Denies fever, chills, cough, SOB, palpitations, orthopnea, PND, dizziness, syncope, N/V/D, abdominal pain, bloody or black stools, or dysuria.  Treated for STEMI at [facility] in 2014 with stents placed by Dr. Stuart.  Has followed up intermittently with Dr. Cesar since that time, but has been poorly adherent to outpatient follow up and treatment regimen.  Echo and stress in Sep 2017 show EF 40% + DD and moderate ischemia, but he has not followed up since undergoing testing.  He continues to smoke.  EKG NSR without evidence of acute ischemia, initial troponin negative, chest xray and routine labs unremarkable.  Placed in observation for ACS rule out.    Troponin continued to elevate, consistent with NSTEMI. Repeat echo pending. Cardiology took the patient for [facility] which showed LM osteal 20%, LAD prox stent patent, Cx distal 90% moderate diffuse disease, % prox - some left to right collaterals. Cardiology recommended medical management. He is unlikely to be compliant with Plavix in the event that he required stent. He needs to stop smoking, take his medications, and follow up with cardiology. He was counseled on all these things and explained the risk of sudden cardiac death in the event he continues to be non-compliant.     12/5/18 [facility] - EDP 5, LM osteal 20%, LAD prox stent patent, Cx distal 90% moderate diffuse disease, % prox - some left to right collaterals     Reviewed with Dr Cesar - medical Rx recommended    Echo 12/5/18  · Normal left  ventricular systolic function. The estimated ejection fraction is 55%  · Normal LV diastolic function.  · Concentric left ventricular hypertrophy.  · Normal right ventricular systolic function.  · Mild left atrial enlargement.  · Trace aortic regurgitation.  · Trace tricuspid regurgitation.    Denies CP since discharge    Review of Systems   Constitution: Negative for decreased appetite.   HENT: Negative for ear discharge.    Eyes: Negative for blurred vision.   Endocrine: Negative for polyphagia.   Skin: Negative for nail changes.   Neurological: Negative for aphonia.   Psychiatric/Behavioral: Negative for hallucinations.        Objective:    Physical Exam   Constitutional: He is oriented to person, place, and time. He appears well-developed and well-nourished.   HENT:   Head: Normocephalic and atraumatic.   Eyes: Conjunctivae are normal. Pupils are equal, round, and reactive to light.   Neck: Normal range of motion. Neck supple.   Cardiovascular: Normal rate, normal heart sounds and intact distal pulses.   Pulmonary/Chest: Effort normal and breath sounds normal.   Abdominal: Soft. Bowel sounds are normal.   Musculoskeletal: Normal range of motion.   Neurological: He is alert and oriented to person, place, and time.   Skin: Skin is warm and dry.         Assessment:       1. Coronary artery disease of native artery of native heart with stable angina pectoris    2. Essential hypertension    3. Chronic combined systolic and diastolic congestive heart failure    4. Mixed hyperlipidemia    5. NSTEMI (non-ST elevated myocardial infarction)    6. Type 2 diabetes mellitus with hyperosmolarity without coma, with long-term current use of insulin    7. Tobacco abuse         Plan:       Discussed cardiac rehab - he wants to exercise on his own  If angina worsens could consider PCI of Cx  OV 3 months

## 2019-04-05 ENCOUNTER — OFFICE VISIT (OUTPATIENT)
Dept: CARDIOLOGY | Facility: CLINIC | Age: 64
End: 2019-04-05
Payer: MEDICARE

## 2019-04-05 VITALS
SYSTOLIC BLOOD PRESSURE: 122 MMHG | HEIGHT: 65 IN | BODY MASS INDEX: 26.08 KG/M2 | OXYGEN SATURATION: 99 % | HEART RATE: 74 BPM | WEIGHT: 156.5 LBS | DIASTOLIC BLOOD PRESSURE: 75 MMHG

## 2019-04-05 DIAGNOSIS — Z79.4 TYPE 2 DIABETES MELLITUS WITH HYPEROSMOLARITY WITHOUT COMA, WITH LONG-TERM CURRENT USE OF INSULIN: Chronic | ICD-10-CM

## 2019-04-05 DIAGNOSIS — E11.00 TYPE 2 DIABETES MELLITUS WITH HYPEROSMOLARITY WITHOUT COMA, WITH LONG-TERM CURRENT USE OF INSULIN: Chronic | ICD-10-CM

## 2019-04-05 DIAGNOSIS — E78.2 MIXED HYPERLIPIDEMIA: Chronic | ICD-10-CM

## 2019-04-05 DIAGNOSIS — I50.42 CHRONIC COMBINED SYSTOLIC AND DIASTOLIC CONGESTIVE HEART FAILURE: Chronic | ICD-10-CM

## 2019-04-05 DIAGNOSIS — I25.2 HISTORY OF MI (MYOCARDIAL INFARCTION): ICD-10-CM

## 2019-04-05 DIAGNOSIS — I10 HYPERTENSION: ICD-10-CM

## 2019-04-05 DIAGNOSIS — I25.118 CORONARY ARTERY DISEASE OF NATIVE ARTERY OF NATIVE HEART WITH STABLE ANGINA PECTORIS: Primary | Chronic | ICD-10-CM

## 2019-04-05 DIAGNOSIS — I10 ESSENTIAL HYPERTENSION: Chronic | ICD-10-CM

## 2019-04-05 DIAGNOSIS — I25.10 CORONARY ARTERY DISEASE, ANGINA PRESENCE UNSPECIFIED, UNSPECIFIED VESSEL OR LESION TYPE, UNSPECIFIED WHETHER NATIVE OR TRANSPLANTED HEART: ICD-10-CM

## 2019-04-05 PROCEDURE — 99214 PR OFFICE/OUTPT VISIT, EST, LEVL IV, 30-39 MIN: ICD-10-PCS | Mod: S$GLB,,, | Performed by: INTERNAL MEDICINE

## 2019-04-05 PROCEDURE — 99999 PR PBB SHADOW E&M-EST. PATIENT-LVL IV: ICD-10-PCS | Mod: PBBFAC,,, | Performed by: INTERNAL MEDICINE

## 2019-04-05 PROCEDURE — 3078F DIAST BP <80 MM HG: CPT | Mod: S$GLB,,, | Performed by: INTERNAL MEDICINE

## 2019-04-05 PROCEDURE — 99999 PR PBB SHADOW E&M-EST. PATIENT-LVL IV: CPT | Mod: PBBFAC,,, | Performed by: INTERNAL MEDICINE

## 2019-04-05 PROCEDURE — 3008F BODY MASS INDEX DOCD: CPT | Mod: S$GLB,,, | Performed by: INTERNAL MEDICINE

## 2019-04-05 PROCEDURE — 3074F PR MOST RECENT SYSTOLIC BLOOD PRESSURE < 130 MM HG: ICD-10-PCS | Mod: S$GLB,,, | Performed by: INTERNAL MEDICINE

## 2019-04-05 PROCEDURE — 3046F PR MOST RECENT HEMOGLOBIN A1C LEVEL > 9.0%: ICD-10-PCS | Mod: S$GLB,,, | Performed by: INTERNAL MEDICINE

## 2019-04-05 PROCEDURE — 93000 EKG 12-LEAD: ICD-10-PCS | Mod: S$GLB,,, | Performed by: INTERNAL MEDICINE

## 2019-04-05 PROCEDURE — 3008F PR BODY MASS INDEX (BMI) DOCUMENTED: ICD-10-PCS | Mod: S$GLB,,, | Performed by: INTERNAL MEDICINE

## 2019-04-05 PROCEDURE — 99214 OFFICE O/P EST MOD 30 MIN: CPT | Mod: S$GLB,,, | Performed by: INTERNAL MEDICINE

## 2019-04-05 PROCEDURE — 3046F HEMOGLOBIN A1C LEVEL >9.0%: CPT | Mod: S$GLB,,, | Performed by: INTERNAL MEDICINE

## 2019-04-05 PROCEDURE — 93000 ELECTROCARDIOGRAM COMPLETE: CPT | Mod: S$GLB,,, | Performed by: INTERNAL MEDICINE

## 2019-04-05 PROCEDURE — 3074F SYST BP LT 130 MM HG: CPT | Mod: S$GLB,,, | Performed by: INTERNAL MEDICINE

## 2019-04-05 PROCEDURE — 3078F PR MOST RECENT DIASTOLIC BLOOD PRESSURE < 80 MM HG: ICD-10-PCS | Mod: S$GLB,,, | Performed by: INTERNAL MEDICINE

## 2019-04-05 RX ORDER — ASPIRIN 81 MG/1
81 TABLET ORAL DAILY
Refills: 0 | Status: ON HOLD | COMMUNITY
Start: 2019-04-05 | End: 2020-11-21 | Stop reason: HOSPADM

## 2019-04-05 RX ORDER — NITROGLYCERIN 0.4 MG/1
0.4 TABLET SUBLINGUAL EVERY 5 MIN PRN
Qty: 25 TABLET | Refills: 11 | Status: ON HOLD | OUTPATIENT
Start: 2019-04-05 | End: 2021-06-02 | Stop reason: SDUPTHER

## 2019-04-05 NOTE — PROGRESS NOTES
Subjective:    Patient ID:  Fredrick Cox is a 63 y.o. male who presents for follow-up of Coronary Artery Disease      HPI     CAD - PCI of LAD 2014 after STEMI - %/Cx distal 90% - medical Rx, HTN, HLD, DM, tobacco abuse      Admitted 12/4/18  63 y.o. male with CAD s/p PCI, HTN, HLD, tobacco abuse, DM2, and chronic combined heart failure presents with a complaint of chest pain.  Pain is acute onset this morning while working on his car, located sternal, does not radiate, severity 10/10, described as similar to MI 4 years ago, relieved by 1 SL NTG at home PTA.  Denies fever, chills, cough, SOB, palpitations, orthopnea, PND, dizziness, syncope, N/V/D, abdominal pain, bloody or black stools, or dysuria.  Treated for STEMI at [facility] in 2014 with stents placed by Dr. Stuart.  Has followed up intermittently with Dr. Cesar since that time, but has been poorly adherent to outpatient follow up and treatment regimen.  Echo and stress in Sep 2017 show EF 40% + DD and moderate ischemia, but he has not followed up since undergoing testing.  He continues to smoke.  EKG NSR without evidence of acute ischemia, initial troponin negative, chest xray and routine labs unremarkable.  Placed in observation for ACS rule out.     Troponin continued to elevate, consistent with NSTEMI. Repeat echo pending. Cardiology took the patient for [facility] which showed LM osteal 20%, LAD prox stent patent, Cx distal 90% moderate diffuse disease, % prox - some left to right collaterals. Cardiology recommended medical management. He is unlikely to be compliant with Plavix in the event that he required stent. He needs to stop smoking, take his medications, and follow up with cardiology. He was counseled on all these things and explained the risk of sudden cardiac death in the event he continues to be non-compliant.      12/5/18 [facility] - EDP 5, LM osteal 20%, LAD prox stent patent, Cx distal 90% moderate diffuse disease, % prox - some  left to right collaterals     Reviewed with Dr Cesar - medical Rx recommended     Echo 12/5/18  · Normal left ventricular systolic function. The estimated ejection fraction is 55%  · Normal LV diastolic function.  · Concentric left ventricular hypertrophy.  · Normal right ventricular systolic function.  · Mild left atrial enlargement.  · Trace aortic regurgitation.  · Trace tricuspid regurgitation.     12/14/18 Denies CP since discharge  Discussed cardiac rehab - he wants to exercise on his own  If angina worsens could consider PCI of Cx    Needs clearance for  surgery and coloniscopy  Denies significant angina  Mild stable CHAN  EKG NSR LAD PRWP      Review of Systems   Constitution: Negative for decreased appetite.   HENT: Negative for ear discharge.    Eyes: Negative for blurred vision.   Endocrine: Negative for polyphagia.   Skin: Negative for nail changes.   Genitourinary: Negative for bladder incontinence.   Neurological: Negative for aphonia.   Psychiatric/Behavioral: Negative for hallucinations.   Allergic/Immunologic: Negative for hives.        Objective:    Physical Exam   Constitutional: He is oriented to person, place, and time. He appears well-developed and well-nourished.   HENT:   Head: Normocephalic and atraumatic.   Eyes: Pupils are equal, round, and reactive to light. Conjunctivae are normal.   Neck: Normal range of motion. Neck supple.   Cardiovascular: Normal rate, normal heart sounds and intact distal pulses.   Pulmonary/Chest: Effort normal and breath sounds normal.   Abdominal: Soft. Bowel sounds are normal.   Musculoskeletal: Normal range of motion.   Neurological: He is alert and oriented to person, place, and time.   Skin: Skin is warm and dry.         Assessment:       1. Coronary artery disease of native artery of native heart with stable angina pectoris    2. Essential hypertension    3. Chronic combined systolic and diastolic congestive heart failure    4. Mixed hyperlipidemia    5.  History of MI (myocardial infarction)    6. Type 2 diabetes mellitus with hyperosmolarity without coma, with long-term current use of insulin         Plan:       Cleared for  surgery and colonoscopy at moderate cardiac risk - ok to hold ASA/brilinta for 5-7 days before  OV 6 months with echo and lexiscan myoview

## 2019-04-12 ENCOUNTER — HOSPITAL ENCOUNTER (INPATIENT)
Facility: HOSPITAL | Age: 64
LOS: 3 days | Discharge: HOME HEALTH | DRG: 065 | End: 2019-04-15
Attending: EMERGENCY MEDICINE | Admitting: HOSPITALIST
Payer: MEDICARE

## 2019-04-12 DIAGNOSIS — I63.512 ACUTE ISCHEMIC LEFT MCA STROKE: ICD-10-CM

## 2019-04-12 DIAGNOSIS — I63.9 STROKE: Primary | ICD-10-CM

## 2019-04-12 LAB
ALBUMIN SERPL BCP-MCNC: 3.8 G/DL (ref 3.5–5.2)
ALP SERPL-CCNC: 107 U/L (ref 55–135)
ALT SERPL W/O P-5'-P-CCNC: 11 U/L (ref 10–44)
ANION GAP SERPL CALC-SCNC: 12 MMOL/L (ref 8–16)
AST SERPL-CCNC: 14 U/L (ref 10–40)
BASOPHILS # BLD AUTO: 0.02 K/UL (ref 0–0.2)
BASOPHILS NFR BLD: 0.3 % (ref 0–1.9)
BILIRUB SERPL-MCNC: 0.2 MG/DL (ref 0.1–1)
BNP SERPL-MCNC: 14 PG/ML (ref 0–99)
BUN SERPL-MCNC: 15 MG/DL (ref 8–23)
CALCIUM SERPL-MCNC: 10.2 MG/DL (ref 8.7–10.5)
CHLORIDE SERPL-SCNC: 105 MMOL/L (ref 95–110)
CHOLEST SERPL-MCNC: 235 MG/DL (ref 120–199)
CHOLEST/HDLC SERPL: 4.9 {RATIO} (ref 2–5)
CO2 SERPL-SCNC: 22 MMOL/L (ref 23–29)
CREAT SERPL-MCNC: 1.2 MG/DL (ref 0.5–1.4)
DIFFERENTIAL METHOD: NORMAL
EOSINOPHIL # BLD AUTO: 0.2 K/UL (ref 0–0.5)
EOSINOPHIL NFR BLD: 3.3 % (ref 0–8)
ERYTHROCYTE [DISTWIDTH] IN BLOOD BY AUTOMATED COUNT: 13.3 % (ref 11.5–14.5)
EST. GFR  (AFRICAN AMERICAN): >60 ML/MIN/1.73 M^2
EST. GFR  (NON AFRICAN AMERICAN): >60 ML/MIN/1.73 M^2
GLUCOSE SERPL-MCNC: 171 MG/DL (ref 70–110)
GLUCOSE SERPL-MCNC: 181 MG/DL (ref 70–110)
HCT VFR BLD AUTO: 45.6 % (ref 40–54)
HDLC SERPL-MCNC: 48 MG/DL (ref 40–75)
HDLC SERPL: 20.4 % (ref 20–50)
HGB BLD-MCNC: 14.6 G/DL (ref 14–18)
INR PPP: 1 (ref 0.8–1.2)
LDLC SERPL CALC-MCNC: 147.6 MG/DL (ref 63–159)
LYMPHOCYTES # BLD AUTO: 2.5 K/UL (ref 1–4.8)
LYMPHOCYTES NFR BLD: 34.6 % (ref 18–48)
MCH RBC QN AUTO: 27.5 PG (ref 27–31)
MCHC RBC AUTO-ENTMCNC: 32 G/DL (ref 32–36)
MCV RBC AUTO: 86 FL (ref 82–98)
MONOCYTES # BLD AUTO: 0.5 K/UL (ref 0.3–1)
MONOCYTES NFR BLD: 6.2 % (ref 4–15)
NEUTROPHILS # BLD AUTO: 4 K/UL (ref 1.8–7.7)
NEUTROPHILS NFR BLD: 55.6 % (ref 38–73)
NONHDLC SERPL-MCNC: 187 MG/DL
PLATELET # BLD AUTO: 266 K/UL (ref 150–350)
PMV BLD AUTO: 9.3 FL (ref 9.2–12.9)
POCT GLUCOSE: 199 MG/DL (ref 70–110)
POTASSIUM SERPL-SCNC: 4.2 MMOL/L (ref 3.5–5.1)
PROT SERPL-MCNC: 8.3 G/DL (ref 6–8.4)
PROTHROMBIN TIME: 10 SEC (ref 9–12.5)
RBC # BLD AUTO: 5.31 M/UL (ref 4.6–6.2)
SODIUM SERPL-SCNC: 139 MMOL/L (ref 136–145)
TRIGL SERPL-MCNC: 197 MG/DL (ref 30–150)
TSH SERPL DL<=0.005 MIU/L-ACNC: 0.7 UIU/ML (ref 0.4–4)
WBC # BLD AUTO: 7.23 K/UL (ref 3.9–12.7)

## 2019-04-12 PROCEDURE — 85610 PROTHROMBIN TIME: CPT

## 2019-04-12 PROCEDURE — 96374 THER/PROPH/DIAG INJ IV PUSH: CPT

## 2019-04-12 PROCEDURE — 82962 GLUCOSE BLOOD TEST: CPT

## 2019-04-12 PROCEDURE — 84481 FREE ASSAY (FT-3): CPT

## 2019-04-12 PROCEDURE — 93005 ELECTROCARDIOGRAM TRACING: CPT

## 2019-04-12 PROCEDURE — 25000003 PHARM REV CODE 250: Performed by: EMERGENCY MEDICINE

## 2019-04-12 PROCEDURE — 21400001 HC TELEMETRY ROOM

## 2019-04-12 PROCEDURE — 93010 ELECTROCARDIOGRAM REPORT: CPT | Mod: ,,, | Performed by: INTERNAL MEDICINE

## 2019-04-12 PROCEDURE — 83880 ASSAY OF NATRIURETIC PEPTIDE: CPT

## 2019-04-12 PROCEDURE — 80061 LIPID PANEL: CPT

## 2019-04-12 PROCEDURE — 80053 COMPREHEN METABOLIC PANEL: CPT

## 2019-04-12 PROCEDURE — 99285 EMERGENCY DEPT VISIT HI MDM: CPT | Mod: 25

## 2019-04-12 PROCEDURE — 83036 HEMOGLOBIN GLYCOSYLATED A1C: CPT

## 2019-04-12 PROCEDURE — 84443 ASSAY THYROID STIM HORMONE: CPT

## 2019-04-12 PROCEDURE — 84439 ASSAY OF FREE THYROXINE: CPT

## 2019-04-12 PROCEDURE — 93010 EKG 12-LEAD: ICD-10-PCS | Mod: ,,, | Performed by: INTERNAL MEDICINE

## 2019-04-12 PROCEDURE — 85025 COMPLETE CBC W/AUTO DIFF WBC: CPT

## 2019-04-12 RX ORDER — ATORVASTATIN CALCIUM 40 MG/1
80 TABLET, FILM COATED ORAL DAILY
Status: DISCONTINUED | OUTPATIENT
Start: 2019-04-12 | End: 2019-04-15 | Stop reason: HOSPADM

## 2019-04-12 RX ORDER — SODIUM CHLORIDE 9 MG/ML
1000 INJECTION, SOLUTION INTRAVENOUS CONTINUOUS
Status: ACTIVE | OUTPATIENT
Start: 2019-04-12 | End: 2019-04-13

## 2019-04-12 RX ORDER — IBUPROFEN 200 MG
1 TABLET ORAL DAILY
Status: DISCONTINUED | OUTPATIENT
Start: 2019-04-13 | End: 2019-04-15 | Stop reason: HOSPADM

## 2019-04-12 RX ORDER — SODIUM CHLORIDE 0.9 % (FLUSH) 0.9 %
10 SYRINGE (ML) INJECTION
Status: DISCONTINUED | OUTPATIENT
Start: 2019-04-12 | End: 2019-04-15 | Stop reason: HOSPADM

## 2019-04-12 RX ADMIN — SODIUM CHLORIDE 1000 ML: 0.9 INJECTION, SOLUTION INTRAVENOUS at 09:04

## 2019-04-12 RX ADMIN — ATORVASTATIN CALCIUM 80 MG: 40 TABLET, FILM COATED ORAL at 07:04

## 2019-04-12 RX ADMIN — TICAGRELOR 90 MG: 90 TABLET ORAL at 09:04

## 2019-04-12 NOTE — ED TRIAGE NOTES
Pt presents to the ED by EMS with wife at the bedside. Spouse states 3 days ago that the pt has been having difficulty with getting words out and right sided weakness. States symptoms have been coming and going for the past 3 days. Spouse states she noticed some foot drop on the right side earlier today. Denies SOB. Denies chest pain.

## 2019-04-12 NOTE — ED PROVIDER NOTES
"Encounter Date: 4/12/2019       History     Chief Complaint   Patient presents with    Stroke Like Symptoms x 3 days     spouse states pt started x 3 days ago with difficulty getting words out and weakness.  Symptoms have been coming and going since.   today noticed some foot drop.  Spouse attempted to get pt to ER yesterday but pt refused.           63 y.o. male with CAD s/p PCI, HTN, HLD, tobacco abuse, DM2, and chronic combined heart failure presents for evaluation of stroke like symptoms starting 3 days ago. Pts wife notes that he was "speaking in giberish" and was "dragging R leg" and "anything you put in his right hand would fall out". He has since somewhat improved in terms of his speech but his other symptoms have persisted.    The patient is on brilenta which he states he is compliant with. He was treated for STEMI at [facility] in 2014 with stents placed by Dr. Stuart.  Has followed up intermittently with Dr. Cesar since that time, but has been poorly adherent to outpatient follow up and treatment regimen.       12/5/18 Cardiology took the patient for [facility] which showed LM osteal 20%, LAD prox stent patent, Cx distal 90% moderate diffuse disease, % prox - some left to right collaterals. Cardiology recommended medical management.        Echo 12/5/18  · Normal left ventricular systolic function. The estimated ejection fraction is 55%  · Normal LV diastolic function.  · Concentric left ventricular hypertrophy.  · Normal right ventricular systolic function.  · Mild left atrial enlargement.  · Trace aortic regurgitation.  · Trace tricuspid regurgitation.          Review of patient's allergies indicates:  No Known Allergies  Past Medical History:   Diagnosis Date    CHF (congestive heart failure)     Coronary artery disease     Diabetes mellitus     HLD (hyperlipidemia)     HTN (hypertension)     MI (myocardial infarction)     X's 2    Tobacco abuse      Past Surgical History:   Procedure Laterality " Date    CARDIAC CATHETERIZATION  2007    x1    CARDIAC CATHETERIZATION  ?    x1    CORONARY ANGIOPLASTY WITH STENT PLACEMENT  2007 and ???    Left heart cath Left 12/5/2018    Performed by Kenny Middleton MD at [facility] CATH LAB     Family History   Problem Relation Age of Onset    No Known Problems Mother     No Known Problems Father     No Known Problems Sister     No Known Problems Brother     No Known Problems Maternal Aunt     No Known Problems Maternal Uncle     No Known Problems Paternal Aunt     No Known Problems Paternal Uncle     No Known Problems Maternal Grandmother     No Known Problems Maternal Grandfather     No Known Problems Paternal Grandmother     No Known Problems Paternal Grandfather     Amblyopia Neg Hx     Blindness Neg Hx     Cancer Neg Hx     Cataracts Neg Hx     Diabetes Neg Hx     Glaucoma Neg Hx     Hypertension Neg Hx     Macular degeneration Neg Hx     Retinal detachment Neg Hx     Strabismus Neg Hx     Stroke Neg Hx     Thyroid disease Neg Hx      Social History     Tobacco Use    Smoking status: Current Every Day Smoker     Packs/day: 0.50     Years: 35.00     Pack years: 17.50     Types: Cigarettes    Smokeless tobacco: Never Used   Substance Use Topics    Alcohol use: Yes     Alcohol/week: 0.5 oz     Types: 1 Standard drinks or equivalent per week     Comment: social    Drug use: No     Review of Systems   Constitutional: Negative for fever.   HENT: Negative for sore throat.    Respiratory: Negative for shortness of breath.    Cardiovascular: Negative for chest pain.   Gastrointestinal: Negative for nausea.   Genitourinary: Negative for dysuria.   Musculoskeletal: Negative for back pain.   Skin: Negative for rash.   Neurological: Positive for weakness.   Hematological: Does not bruise/bleed easily.   All other systems reviewed and are negative.      Physical Exam     Initial Vitals [04/12/19 1707]   BP Pulse Resp Temp SpO2   (!) 142/85 76 (!) 22 98.9 °F  (37.2 °C) 97 %      MAP       --         Physical Exam    Nursing note and vitals reviewed.  Constitutional: He appears well-developed and well-nourished.   HENT:   Head: Normocephalic and atraumatic.   Eyes: EOM are normal. Pupils are equal, round, and reactive to light.   Cardiovascular: Normal rate and regular rhythm.   Pulmonary/Chest: Effort normal.   Abdominal: He exhibits no distension.   Musculoskeletal: He exhibits no edema or tenderness.   Neurological: He is alert and oriented to person, place, and time. No cranial nerve deficit.   Skin: Skin is warm and dry.   Psychiatric: He has a normal mood and affect.     R nasolabial flattening  4/5 str rue/rle, see nihss    ED Course   Procedures  Labs Reviewed - No data to display  EKG Readings: (Independently Interpreted)   Hr 76, sinus, L axis, nl intervals,n o uri/twi, non acute, no stemi.       Imaging Results    None          Medical Decision Making:   Initial Assessment:   Pt is outside TPA window. I have ordered routine mri/mra/doppler and neuro consult.    I have d/w Dr. Morales from neuro who is aware of the patient and agrees with admission and will consult.    Given pt on Brilenta I have not started ASA therapy in ED.                      Clinical Impression:       ICD-10-CM ICD-9-CM   1. Stroke I63.9 434.91                                Katie Armendariz MD  04/13/19 1603

## 2019-04-13 LAB
AORTIC ROOT ANNULUS: 3.19 CM
AORTIC VALVE CUSP SEPERATION: 2.19 CM
ASCENDING AORTA: 2.97 CM
AV INDEX (PROSTH): 0.74
AV MEAN GRADIENT: 4.8 MMHG
AV PEAK GRADIENT: 7.95 MMHG
AV VALVE AREA: 2.65 CM2
AV VELOCITY RATIO: 0.7
BSA FOR ECHO PROCEDURE: 1.81 M2
CV ECHO LV RWT: 0.52 CM
DOP CALC AO PEAK VEL: 1.41 M/S
DOP CALC AO VTI: 31.61 CM
DOP CALC LVOT AREA: 3.56 CM2
DOP CALC LVOT DIAMETER: 2.13 CM
DOP CALC LVOT PEAK VEL: 0.99 M/S
DOP CALC LVOT STROKE VOLUME: 83.62 CM3
DOP CALCLVOT PEAK VEL VTI: 23.48 CM
E WAVE DECELERATION TIME: 227 MSEC
E/A RATIO: 0.91
E/E' RATIO: 8.63
ECHO LV POSTERIOR WALL: 1.21 CM (ref 0.6–1.1)
ESTIMATED AVG GLUCOSE: 289 MG/DL (ref 68–131)
FRACTIONAL SHORTENING: 29 % (ref 28–44)
HBA1C MFR BLD HPLC: 11.7 % (ref 4–5.6)
INTERVENTRICULAR SEPTUM: 1.21 CM (ref 0.6–1.1)
IVRT: 0.17 MSEC
LA MAJOR: 4.72 CM
LA MINOR: 4.34 CM
LA WIDTH: 3.28 CM
LEFT ATRIUM SIZE: 3.27 CM
LEFT ATRIUM VOLUME INDEX: 23 ML/M2
LEFT ATRIUM VOLUME: 41.23 CM3
LEFT INTERNAL DIMENSION IN SYSTOLE: 3.3 CM (ref 2.1–4)
LEFT VENTRICLE DIASTOLIC VOLUME INDEX: 56.28 ML/M2
LEFT VENTRICLE DIASTOLIC VOLUME: 100.71 ML
LEFT VENTRICLE MASS INDEX: 118.7 G/M2
LEFT VENTRICLE SYSTOLIC VOLUME INDEX: 24.7 ML/M2
LEFT VENTRICLE SYSTOLIC VOLUME: 44.22 ML
LEFT VENTRICULAR INTERNAL DIMENSION IN DIASTOLE: 4.67 CM (ref 3.5–6)
LEFT VENTRICULAR MASS: 212.39 G
LV LATERAL E/E' RATIO: 7.67
LV SEPTAL E/E' RATIO: 9.86
MV PEAK A VEL: 0.76 M/S
MV PEAK E VEL: 0.69 M/S
PISA TR MAX VEL: 2.47 M/S
POCT GLUCOSE: 171 MG/DL (ref 70–110)
POCT GLUCOSE: 184 MG/DL (ref 70–110)
POCT GLUCOSE: 361 MG/DL (ref 70–110)
PULM VEIN S/D RATIO: 0.87
PV PEAK D VEL: 0.39 M/S
PV PEAK S VEL: 0.34 M/S
PV PEAK VELOCITY: 0.88 CM/S
RA MAJOR: 4.74 CM
RA PRESSURE: 8 MMHG
RA WIDTH: 3.78 CM
RIGHT VENTRICULAR END-DIASTOLIC DIMENSION: 3.71 CM
RV TISSUE DOPPLER FREE WALL SYSTOLIC VELOCITY 1 (APICAL 4 CHAMBER VIEW): 13.62 M/S
SINUS: 3.36 CM
STJ: 2.81 CM
T3FREE SERPL-MCNC: 2.2 PG/ML (ref 2.3–4.2)
T4 FREE SERPL-MCNC: 1.17 NG/DL (ref 0.71–1.51)
TDI LATERAL: 0.09
TDI SEPTAL: 0.07
TDI: 0.08
TR MAX PG: 24.4 MMHG
TRICUSPID ANNULAR PLANE SYSTOLIC EXCURSION: 2.1 CM
TV REST PULMONARY ARTERY PRESSURE: 32 MMHG

## 2019-04-13 PROCEDURE — 99222 PR INITIAL HOSPITAL CARE,LEVL II: ICD-10-PCS | Mod: ,,, | Performed by: PSYCHIATRY & NEUROLOGY

## 2019-04-13 PROCEDURE — 25500020 PHARM REV CODE 255: Performed by: HOSPITALIST

## 2019-04-13 PROCEDURE — A9585 GADOBUTROL INJECTION: HCPCS | Performed by: HOSPITALIST

## 2019-04-13 PROCEDURE — 25000003 PHARM REV CODE 250: Performed by: HOSPITALIST

## 2019-04-13 PROCEDURE — 94761 N-INVAS EAR/PLS OXIMETRY MLT: CPT

## 2019-04-13 PROCEDURE — 97165 OT EVAL LOW COMPLEX 30 MIN: CPT

## 2019-04-13 PROCEDURE — 97162 PT EVAL MOD COMPLEX 30 MIN: CPT

## 2019-04-13 PROCEDURE — 63600175 PHARM REV CODE 636 W HCPCS: Performed by: HOSPITALIST

## 2019-04-13 PROCEDURE — S4991 NICOTINE PATCH NONLEGEND: HCPCS | Performed by: HOSPITALIST

## 2019-04-13 PROCEDURE — 99222 1ST HOSP IP/OBS MODERATE 55: CPT | Mod: ,,, | Performed by: PSYCHIATRY & NEUROLOGY

## 2019-04-13 PROCEDURE — 94799 UNLISTED PULMONARY SVC/PX: CPT

## 2019-04-13 PROCEDURE — 21400001 HC TELEMETRY ROOM

## 2019-04-13 RX ORDER — NITROGLYCERIN 0.4 MG/1
0.4 TABLET SUBLINGUAL EVERY 5 MIN PRN
Status: DISCONTINUED | OUTPATIENT
Start: 2019-04-13 | End: 2019-04-15 | Stop reason: HOSPADM

## 2019-04-13 RX ORDER — RANOLAZINE 500 MG/1
500 TABLET, EXTENDED RELEASE ORAL 2 TIMES DAILY
Status: DISCONTINUED | OUTPATIENT
Start: 2019-04-13 | End: 2019-04-15 | Stop reason: HOSPADM

## 2019-04-13 RX ORDER — GLUCAGON 1 MG
1 KIT INJECTION
Status: DISCONTINUED | OUTPATIENT
Start: 2019-04-13 | End: 2019-04-15 | Stop reason: HOSPADM

## 2019-04-13 RX ORDER — FUROSEMIDE 40 MG/1
40 TABLET ORAL DAILY
Status: DISCONTINUED | OUTPATIENT
Start: 2019-04-13 | End: 2019-04-15 | Stop reason: HOSPADM

## 2019-04-13 RX ORDER — GADOBUTROL 604.72 MG/ML
9 INJECTION INTRAVENOUS
Status: COMPLETED | OUTPATIENT
Start: 2019-04-13 | End: 2019-04-13

## 2019-04-13 RX ORDER — ISOSORBIDE MONONITRATE 30 MG/1
30 TABLET, EXTENDED RELEASE ORAL DAILY
Status: DISCONTINUED | OUTPATIENT
Start: 2019-04-13 | End: 2019-04-13

## 2019-04-13 RX ORDER — METOPROLOL TARTRATE 25 MG/1
12.5 TABLET ORAL 2 TIMES DAILY
Status: DISCONTINUED | OUTPATIENT
Start: 2019-04-13 | End: 2019-04-13

## 2019-04-13 RX ORDER — INSULIN ASPART 100 [IU]/ML
1-10 INJECTION, SOLUTION INTRAVENOUS; SUBCUTANEOUS EVERY 6 HOURS PRN
Status: DISCONTINUED | OUTPATIENT
Start: 2019-04-13 | End: 2019-04-15 | Stop reason: HOSPADM

## 2019-04-13 RX ORDER — ENOXAPARIN SODIUM 100 MG/ML
40 INJECTION SUBCUTANEOUS EVERY 24 HOURS
Status: DISCONTINUED | OUTPATIENT
Start: 2019-04-13 | End: 2019-04-15 | Stop reason: HOSPADM

## 2019-04-13 RX ADMIN — INSULIN ASPART 10 UNITS: 100 INJECTION, SOLUTION INTRAVENOUS; SUBCUTANEOUS at 02:04

## 2019-04-13 RX ADMIN — TICAGRELOR 90 MG: 90 TABLET ORAL at 08:04

## 2019-04-13 RX ADMIN — ISOSORBIDE MONONITRATE 30 MG: 30 TABLET, EXTENDED RELEASE ORAL at 08:04

## 2019-04-13 RX ADMIN — FUROSEMIDE 40 MG: 40 TABLET ORAL at 08:04

## 2019-04-13 RX ADMIN — RANOLAZINE 500 MG: 500 TABLET, FILM COATED, EXTENDED RELEASE ORAL at 08:04

## 2019-04-13 RX ADMIN — GADOBUTROL 9 ML: 604.72 INJECTION INTRAVENOUS at 11:04

## 2019-04-13 RX ADMIN — SODIUM CHLORIDE 1000 ML: 0.9 INJECTION, SOLUTION INTRAVENOUS at 08:04

## 2019-04-13 RX ADMIN — ENOXAPARIN SODIUM 40 MG: 100 INJECTION SUBCUTANEOUS at 05:04

## 2019-04-13 RX ADMIN — METOPROLOL TARTRATE 12.5 MG: 25 TABLET, FILM COATED ORAL at 08:04

## 2019-04-13 RX ADMIN — ATORVASTATIN CALCIUM 80 MG: 40 TABLET, FILM COATED ORAL at 08:04

## 2019-04-13 RX ADMIN — NICOTINE 1 PATCH: 21 PATCH, EXTENDED RELEASE TRANSDERMAL at 05:04
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
## 2019-04-13 NOTE — PT/OT/SLP EVAL
"Occupational Therapy   Evaluation and Discharge Note    Name: Fredrick Cox  MRN: [id number]  Admitting Diagnosis:  Acute ischemic left MCA stroke      Recommendations:     Discharge Recommendations: home with home health  Discharge Equipment Recommendations:  none  Barriers to discharge:  None    Assessment:     Fredrick Cox is a 63 y.o. male with a medical diagnosis of Acute ischemic left MCA stroke. At this time, patient is functioning at their prior level of function and does not require further acute OT services.     Plan:     During this hospitalization, patient does not require further acute OT services.  Please re-consult if situation changes.    · Plan of Care Reviewed with: patient    Subjective     Chief Complaint: "You want me to walk with you?"  Patient/Family Comments/goals: to go home    Occupational Profile:  Living Environment: lives with his wife in a house with a 5 step entry with B hand rails  Previous level of function: independent  Roles and Routines: was driving  Equipment Used at home:  cane, straight  Assistance upon Discharge: from his wife    Pain/Comfort:  · Pain Rating 1: 0/10    Patients cultural, spiritual, Catholic conflicts given the current situation: no    Objective:     Communicated with: nurse Guerra prior to session.  Patient found supine with telemetry, peripheral IV upon OT entry to room.    General Precautions: Standard, fall   Orthopedic Precautions:N/A   Braces: N/A     Occupational Performance:    Bed Mobility:    · Patient completed Rolling/Turning to Left with  modified independence  · Patient completed Rolling/Turning to Right with modified independence  · Patient completed Scooting/Bridging with modified independence  · Patient completed Supine to Sit with modified independence  · Patient completed Sit to Supine with modified independence    Functional Mobility/Transfers:  · Patient completed Sit <> Stand Transfer with contact guard assistance  with  no assistive " device   · Functional Mobility: ambulalted in the cohen with ' with no AD, 2 mild LOB while ambulating    Activities of Daily Living:  · Feeding:  independence    · Grooming: modified independence    · Upper Body Dressing: supervision    · Lower Body Dressing: supervision      Cognitive/Visual Perceptual:  Cognitive/Psychosocial Skills:     -       Oriented to: Person, Place and Situation   -       Follows Commands/attention:Follows one-step commands  -       Communication: clear/fluent and word-finding difficulties, difficulty organizing thoughts  -       Memory: No Deficits noted  -       Safety awareness/insight to disability: intact   -       Mood/Affect/Coping skills/emotional control: Appropriate to situation    Physical Exam:  Balance:    -       sit balance good; standing balance good minus  Postural examination/scapula alignment:    -       Rounded shoulders  Skin integrity: Visible skin intact  Motor Planning:    -       decreased  Upper Extremity Range of Motion:     -       Right Upper Extremity: WFL except shoulder limited with flexion about 60%  -       Left Upper Extremity: WFL  Upper Extremity Strength:    -       Right Upper Extremity: WFL  -       Left Upper Extremity: WFL  Fine Motor Coordination:    -       Impaired  Right hand thumb/finger opposition skills slow and decreased     AMPAC 6 Click ADL:  AMPAC Total Score: 23    Treatment & Education:  Evaluation   Education:    Patient left supine with all lines intact, call button in reach and nurse notified    GOALS:   Multidisciplinary Problems     Occupational Therapy Goals     Not on file          Multidisciplinary Problems (Resolved)        Problem: Occupational Therapy Goal    Goal Priority Disciplines Outcome Interventions   Occupational Therapy Goal   (Resolved)     OT, PT/OT Outcome(s) achieved                    History:     Past Medical History:   Diagnosis Date    CHF (congestive heart failure)     Coronary artery disease      Diabetes mellitus     HLD (hyperlipidemia)     HTN (hypertension)     MI (myocardial infarction)     X's 2    Tobacco abuse        Past Surgical History:   Procedure Laterality Date    CARDIAC CATHETERIZATION  2007    x1    CARDIAC CATHETERIZATION  ?    x1    CORONARY ANGIOPLASTY WITH STENT PLACEMENT  2007 and ???    Left heart cath Left 12/5/2018    Performed by Kenny Middleton MD at [facility] CATH LAB       Time Tracking:     OT Date of Treatment: 04/13/19  OT Start Time: 1400  OT Stop Time: 1410  OT Total Time (min): 10 min    Billable Minutes:Evaluation 10 minutes with PT    SHELTON Burks, MS  4/13/2019

## 2019-04-13 NOTE — ED NOTES
MD request to hold on report until inpatient hospitalist is reached to discuss pt's admittance to the hospital

## 2019-04-13 NOTE — PT/OT/SLP PROGRESS
Physical Therapy      Patient Name:  Fredrick Cox   MRN:  [id number]    Patient not seen today secondary to other (off floor for testing). Will follow-up again as able.    Kristin Yeager, PT

## 2019-04-13 NOTE — PLAN OF CARE
Problem: Occupational Therapy Goal  Goal: Occupational Therapy Goal  Outcome: Outcome(s) achieved Date Met: 04/13/19  Patient tolerated evaluation well, patient with no need for skilled OT services at this time in this setting.  Patient with strength and coordination deficits on the right side and would benefit from continued OT services once discharged from acute hospital. SHELTON Burks, MS

## 2019-04-13 NOTE — NURSING
Upon departure from floor: cardiac monitor in progress, patient awake, alert, and oriented x 4. No apparent distress noted at this time.
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
## 2019-04-13 NOTE — PLAN OF CARE
04/13/19 1115   Discharge Assessment   Assessment Type Discharge Planning Assessment   Confirmed/corrected address and phone number on facesheet? Yes   Assessment information obtained from? Medical Record   Discharge assessment was attempted but patient was off the unit for testing; CM to follow-up for assessment

## 2019-04-13 NOTE — PLAN OF CARE
Problem: Fall Injury Risk  Goal: Absence of Fall and Fall-Related Injury  Outcome: Ongoing (interventions implemented as appropriate)  Intervention: Identify and Manage Contributors to Fall Injury Risk     04/13/19 1512   Manage Acute Allergic Reaction   Medication Review/Management medications reviewed;infusion held;provider consulted   Identify and Manage Contributors to Fall Injury Risk   Self-Care Promotion independence encouraged;BADL personal objects within reach;meal setup provided

## 2019-04-13 NOTE — PLAN OF CARE
04/13/19 1219   Discharge Assessment   Assessment Type Discharge Planning Assessment   Confirmed/corrected address and phone number on facesheet? Yes   Assessment information obtained from? Caregiver  (Spouse-Mary: [phone])   Communicated expected length of stay with patient/caregiver no   Prior to hospitilization cognitive status: Alert/Oriented   Prior to hospitalization functional status: Independent   Current cognitive status: Alert/Oriented   Current Functional Status:   (Unsure of current needs at this time)   Facility Arrived From: Home   Lives With spouse   Able to Return to Prior Arrangements   (TBD)   Is patient able to care for self after discharge? Unable to determine at this time (comments)   Who are your caregiver(s) and their phone number(s)? Mary-spouse: [phone]   Patient's perception of discharge disposition other (comments)  (TBD; possible rehab)   Readmission Within the Last 30 Days no previous admission in last 30 days   Patient currently being followed by outpatient case management? No   Patient currently receives any other outside agency services? No   Equipment Currently Used at Home none   Do you have any problems affording any of your prescribed medications? No   Is the patient taking medications as prescribed? yes   Does the patient have transportation home? Yes   Transportation Anticipated car, drives self;family or friend will provide   Does the patient receive services at the [facility]? No   Discharge Plan A Other  (TBD)   Discharge Plan B Other  (TBD)   DME Needed Upon Discharge  other (see comments)  (TBD)   Patient/Family in Agreement with Plan yes   Introductions made; SW role explained to patient; two patient identifiers recognized; SW contact information placed on Communication board. Discussed patient managing health care at home; determined who would be helping patient at home with recovery: Mary-spouse will help with recovery at home.    PCP. Amos Tee  III, MD [address] [facility] [place]   Prefers morning appointments    Extended Emergency Contact Information  Primary Emergency Contact: Mary Cox   [place]  Mobile Phone: [phone]  Relation: Spouse  Secondary Emergency Contact: Marlen Cox  Mobile Phone: [phone]  Relation: Daughter   needed? No     [facility] - [place] - [address]  [address]  [place] [address]  Phone: [phone] Fax: [phone]    [facility] [place] - [place] - [address]  [address]  [place] [address]  Phone: [phone] Fax: [phone]    Payor: BCBS [place] MEDICARE / [place] / Product Type: Medicare Advantage /

## 2019-04-13 NOTE — PT/OT/SLP EVAL
Physical Therapy Evaluation    Patient Name:  Fredrick Cox   MRN:  [id number]    Recommendations:     Discharge Recommendations:  home health PT   Discharge Equipment Recommendations: none   Barriers to discharge: right leg weakness and loss of balance while ambulating    Assessment:     Fredrick Cox is a 63 y.o. male admitted with a medical diagnosis of Acute ischemic left MCA stroke.  He presents with the following impairments/functional limitations:  weakness, gait instability, impaired functional mobilty, impaired balance, decreased lower extremity function, decreased safety awareness, impaired cognition. Patient demonstrated R LE weakness and had 2 loss of balances while ambulating in the hallway. He required minimal assistance to regain upright posture after loss of balance. Also, he had difficulty expressing his thoughts and word finding during PT evaluation. He would continue to benefit from PT while in the hospital.     Rehab Prognosis: Good; patient would benefit from acute skilled PT services to address these deficits and reach maximum level of function.    Recent Surgery: * No surgery found *      Plan:     During this hospitalization, patient to be seen 6 x/week to address the identified rehab impairments via gait training, therapeutic activities, therapeutic exercises and progress toward the following goals:    · Plan of Care Expires:  04/27/19    Subjective     Chief Complaint: Difficulty finding his words.   Patient/Family Comments/goals: To walk.   Pain/Comfort:  · Pain Rating 1: 0/10    Living Environment:  Patient lives with his spouse in a [facility] with 5 steps and bilateral handrails. Prior to admission, patients level of function was independent. Equipment used at home: cane, straight.   Upon discharge, patient will have assistance from spouse.    Objective:     Communicated with nurse Guerra prior to session.  Patient found supine with peripheral IV, telemetry  upon PT entry to room.    General  Precautions: Standard, fall   Orthopedic Precautions:N/A   Braces: N/A     Exams:  · Cognitive Exam:  Patient is oriented to Person and Place  · Gross Motor Coordination:  impaired on heel to shin test  · Postural Exam:  Patient presented with the following abnormalities:    · -       Rounded shoulders  · -       Forward head  · Sensation:    · -       Intact  · Skin Integrity/Edema:      · -       Skin integrity: Visible skin intact  · RLE ROM: WFL  · RLE Strength: 4-/5  · LLE ROM: WFL  · LLE Strength: 5/5    Functional Mobility:  · Bed Mobility:     · Supine to Sit: modified independence  · Sit to Supine: modified independence  · Transfers:     · Sit to Stand:  contact guard assistance with no device  · Gait:  Patient ambulated 100ft with No Assistive Device and CGA. He had two loss of balances to the right when he turned to look left and required minimal assistance to recover upright posture. Patient demonstrated decreased dada, decreased velocity of limb motion, decreased step length and increased stride width during gait due to impaired balance, impaired coordination, impaired motor control and decreased strength.    Therapeutic Activities and Exercises:   Patient educated in PT role and POC.    AM-PAC 6 CLICK MOBILITY  Total Score:21     Patient left supine with all lines intact, call button in reach and nurse Mari notified.    GOALS:   Multidisciplinary Problems     Physical Therapy Goals        Problem: Physical Therapy Goal    Goal Priority Disciplines Outcome Goal Variances Interventions   Physical Therapy Goal     PT, PT/OT      Description:  Goals to be met by: 19    Patient will increase functional independence with mobility by performin. Sit to stand transfer with [place]  2. Gait x250 feet with [place]   3. Lower extremity exercise program (sitting and standing) x30 reps, with independence                      History:     Past Medical History:   Diagnosis Date    CHF  (congestive heart failure)     Coronary artery disease     Diabetes mellitus     HLD (hyperlipidemia)     HTN (hypertension)     MI (myocardial infarction)     X's 2    Tobacco abuse        Past Surgical History:   Procedure Laterality Date    CARDIAC CATHETERIZATION  2007    x1    CARDIAC CATHETERIZATION  ?    x1    CORONARY ANGIOPLASTY WITH STENT PLACEMENT  2007 and ???    Left heart cath Left 12/5/2018    Performed by Kenny Middleton MD at [facility] CATH LAB       Time Tracking:     PT Received On: 04/13/19  PT Start Time: 1359     PT Stop Time: 1409  PT Total Time (min): 10 min     Billable Minutes: Evaluation 10 with OT     Kristin Yeager, PT  04/13/2019

## 2019-04-13 NOTE — PLAN OF CARE
Problem: Diabetes Comorbidity  Goal: Blood Glucose Level Within Desired Range  Outcome: Ongoing (interventions implemented as appropriate)  Intervention: Maintain Glycemic Control     04/13/19 1513   Monitor and Manage Ketoacidosis   Glycemic Management blood glucose monitoring;oral hydration promoted;supplemental insulin given

## 2019-04-13 NOTE — NURSING
Upon arrival to floor from MRI: cardiac monitoring in progress, patient oriented to room, call bell in reach and bed in lowest position. Denies pain or discomfort at this time. No apparent distress noted.

## 2019-04-13 NOTE — CONSULTS
[facility]  Neurology  Consult Note    Patient Name: Fredrick Cox  MRN: [id number]  Admission Date: 4/12/2019  Hospital Length of Stay: 1 days  Code Status: Full Code   Attending Provider: Amy Porter MD   Consulting Provider: Nakul Morales MD  Primary Care Physician: Amos Tee III, MD  Principal Problem:Acute ischemic left MCA stroke    Consults  Subjective:     Chief Complaint: Right sided weakness     HPI: 64 y/o male with medical Hx as listed above comes to ED for intermittent RUE and RLE weakness for around one week. This has also been accompanied by slurred speech. Pt denies visual disturbances, vertigo, left sided involvement. No previous episodes. He is supposed to take ticagrelor after a STEMI (S/P stenting) in 2014. No aggravating or alleviating factors.    Past Medical History:   Diagnosis Date    CHF (congestive heart failure)     Coronary artery disease     Diabetes mellitus     HLD (hyperlipidemia)     HTN (hypertension)     MI (myocardial infarction)     X's 2    Tobacco abuse        Past Surgical History:   Procedure Laterality Date    CARDIAC CATHETERIZATION  2007    x1    CARDIAC CATHETERIZATION  ?    x1    CORONARY ANGIOPLASTY WITH STENT PLACEMENT  2007 and ???    Left heart cath Left 12/5/2018    Performed by Kenny Middleton MD at [facility] CATH LAB       Review of patient's allergies indicates:  No Known Allergies    Current Neurological Medications:     No current facility-administered medications on file prior to encounter.      Current Outpatient Medications on File Prior to Encounter   Medication Sig    aspirin (ECOTRIN) 81 MG EC tablet Take 1 tablet (81 mg total) by mouth once daily.    blood sugar diagnostic Strp 1 strip by Misc.(Non-Drug; Combo Route) route 2 (two) times daily.    canagliflozin (INVOKANA) 100 mg Tab Take 1 tablet (100 mg total) by mouth once daily.    dapagliflozin 5 mg Tab Take 5 mg by mouth once daily.    furosemide (LASIX) 40  "MG tablet Take 1 tablet (40 mg total) by mouth once daily.    insulin glargine (LANTUS SOLOSTAR) 100 unit/mL (3 mL) InPn pen Inject 30 Units into the skin once daily.    isosorbide mononitrate (IMDUR) 30 MG 24 hr tablet Take 1 tablet (30 mg total) by mouth once daily.    lisinopril (PRINIVIL,ZESTRIL) 5 MG tablet Take 1 tablet (5 mg total) by mouth once daily.    metFORMIN (GLUCOPHAGE) 1000 MG tablet Take 1,000 mg by mouth 2 (two) times daily with meals.    metoprolol tartrate (LOPRESSOR) 25 MG tablet Take 0.5 tablets (12.5 mg total) by mouth 2 (two) times daily.    nitroGLYCERIN (NITROSTAT) 0.4 MG SL tablet Place 1 tablet (0.4 mg total) under the tongue every 5 (five) minutes as needed.    pen needle, diabetic (BD INSULIN PEN NEEDLE UF MINI) 31 gauge x 3/16" Ndle USE AS DIRECTED    pen needle, diabetic 31 gauge x 5/16" Ndle 1 each by Misc.(Non-Drug; Combo Route) route 2 (two) times daily.    ranolazine (RANEXA) 500 MG Tb12 Take 1 tablet (500 mg total) by mouth 2 (two) times daily.    rosuvastatin (CRESTOR) 40 MG Tab Take 1 tablet (40 mg total) by mouth once daily.    ticagrelor (BRILINTA) 90 mg tablet Take 1 tablet (90 mg total) by mouth 2 (two) times daily.    tramadol (ULTRAM) 50 mg tablet Take 1 tablet (50 mg total) by mouth 2 (two) times daily as needed.      Family History     Problem Relation (Age of Onset)    No Known Problems Mother, Father, Sister, Brother, Maternal Aunt, Maternal Uncle, Paternal Aunt, Paternal Uncle, Maternal Grandmother, Maternal Grandfather, Paternal Grandmother, Paternal Grandfather        Tobacco Use    Smoking status: Current Every Day Smoker     Packs/day: 0.50     Years: 35.00     Pack years: 17.50     Types: Cigarettes    Smokeless tobacco: Never Used   Substance and Sexual Activity    Alcohol use: Yes     Alcohol/week: 0.5 oz     Types: 1 Standard drinks or equivalent per week     Comment: social    Drug use: No    Sexual activity: Not on file     Review of " Systems   Constitutional: Negative for fever.   HENT: Negative for trouble swallowing.    Eyes: Negative for photophobia.   Respiratory: Negative for shortness of breath.    Cardiovascular: Negative for leg swelling.   Gastrointestinal: Negative for abdominal pain.   Genitourinary: Negative for dysuria.   Musculoskeletal: Negative for back pain.   Neurological: Negative for headaches.   Psychiatric/Behavioral: Negative for confusion.          Objective:     Vital Signs (Most Recent):  Temp: 97.6 °F (36.4 °C) (04/13/19 0722)  Pulse: 62 (04/13/19 0838)  Resp: 18 (04/13/19 0521)  BP: 135/74 (04/13/19 0722)  SpO2: 100 % (04/13/19 0838) Vital Signs (24h Range):  Temp:  [97.6 °F (36.4 °C)-98.9 °F (37.2 °C)] 97.6 °F (36.4 °C)  Pulse:  [61-78] 62  Resp:  [18-22] 18  SpO2:  [96 %-100 %] 100 %  BP: (127-145)/(57-85) 135/74     Weight: 71.7 kg (158 lb)  Body mass index is 26.29 kg/m².    Physical Exam   Constitutional: He is oriented to person, place, and time. No distress.   HENT:   Head: Normocephalic.   Eyes: Right eye exhibits no discharge. Left eye exhibits no discharge.   Neck: Normal range of motion.   Cardiovascular: Regular rhythm.   Pulmonary/Chest: Breath sounds normal.   Abdominal: Bowel sounds are normal.   Musculoskeletal: Normal range of motion.   Neurological: He is oriented to person, place, and time. He has a normal Finger-Nose-Finger Test.   Skin: He is not diaphoretic.   Psychiatric: His speech is slurred.       NEUROLOGICAL EXAMINATION:     MENTAL STATUS   Oriented to person, place, and time.   Speech: slurred   Level of consciousness: alert    CRANIAL NERVES     CN III, IV, VI   Right pupil: Size: 2 mm. Shape: regular. Reactivity: brisk.   Left pupil: Size: 2 mm. Shape: regular. Reactivity: brisk.   Nystagmus: none   Ophthalmoparesis: none  Conjugate gaze: present    CN V   Right facial sensation deficit: none  Left facial sensation deficit: none    CN VII   Right facial weakness: none  Left facial  weakness: none    CN IX, X   Palate: symmetric    CN XI   Right sternocleidomastoid strength: normal  Left sternocleidomastoid strength: normal    CN XII   Tongue deviation: none    MOTOR EXAM        RUE/RLE drift  Left 5/5     SENSORY EXAM   Right arm light touch: normal  Left arm light touch: normal  Right leg light touch: normal  Left leg light touch: normal    GAIT AND COORDINATION      Coordination   Finger to nose coordination: normal      Significant Labs:   CBC:   Recent Labs   Lab 04/12/19  1758   WBC 7.23   HGB 14.6   HCT 45.6        CMP:   Recent Labs   Lab 04/12/19  1758   *      K 4.2      CO2 22*   BUN 15   CREATININE 1.2   CALCIUM 10.2   PROT 8.3   ALBUMIN 3.8   BILITOT 0.2   ALKPHOS 107   AST 14   ALT 11   ANIONGAP 12   EGFRNONAA >60       Significant Imaging:  Head CT  Impression       No acute intracranial abnormalities identified.  Multifocal areas of suspected remote infarct.  If clinical concern for acute infarction, further evaluation may be obtained with MRI with diffusion-weighted imaging.      Electronically signed by: Magdalena Angelo MD  Date: 04/12/2019  Time: 17:36         Assessment and Plan:     62 y/o male consulted for possible stroke    1. Stroke: suspect acute stroke given his right sided motor deficits.   -Permisive HTN. Treat if > 220/110.   -MRI/MRA brain and neck.   -Echo  ] -Smoking cessation.   -PT/OT    Active Diagnoses:    Diagnosis Date Noted POA    PRINCIPAL PROBLEM:  Acute ischemic left MCA stroke [I63.512] 04/12/2019 Yes    History of MI (myocardial infarction) [I25.2] 08/23/2017 Not Applicable    Type 2 diabetes mellitus, with long-term current use of insulin [E11.9, Z79.4] 04/02/2014 Not Applicable     Chronic    Chronic combined systolic and diastolic congestive heart failure [I50.42] 01/17/2014 Yes     Chronic    Coronary artery disease of native artery of native heart with stable angina pectoris [I25.118] 01/17/2014 Yes     Chronic     Essential hypertension [I10] 01/17/2014 Yes     Chronic    HLD (hyperlipidemia) [E78.5] 01/17/2014 Yes     Chronic    Tobacco abuse [Z72.0] 01/17/2014 Yes     Chronic      Problems Resolved During this Admission:       VTE Risk Mitigation (From admission, onward)        Ordered     enoxaparin injection 40 mg  Daily      04/13/19 0128     IP VTE HIGH RISK PATIENT  Once      04/12/19 2059     Place sequential compression device  Until discontinued      04/12/19 2059          Thank you for your consult. I will follow-up with patient. Please contact us if you have any additional questions.    Nakul Morales MD  Neurology  [facility]

## 2019-04-13 NOTE — PLAN OF CARE
Problem: Physical Therapy Goal  Goal: Physical Therapy Goal  Goals to be met by: 19    Patient will increase functional independence with mobility by performin. Sit to stand transfer with [place]  2. Gait x250 feet with [place]   3. Lower extremity exercise program (sitting and standing) x30 reps, with independence      Patient demonstrated 2 loss of balances while ambulating and required minimal assistance to regain upright posture.

## 2019-04-14 LAB
POCT GLUCOSE: 185 MG/DL (ref 70–110)
POCT GLUCOSE: 189 MG/DL (ref 70–110)
POCT GLUCOSE: 282 MG/DL (ref 70–110)
POCT GLUCOSE: 296 MG/DL (ref 70–110)
POCT GLUCOSE: 317 MG/DL (ref 70–110)

## 2019-04-14 PROCEDURE — 94761 N-INVAS EAR/PLS OXIMETRY MLT: CPT

## 2019-04-14 PROCEDURE — 82270 OCCULT BLOOD FECES: CPT

## 2019-04-14 PROCEDURE — 94799 UNLISTED PULMONARY SVC/PX: CPT

## 2019-04-14 PROCEDURE — S5571 INSULIN DISPOS PEN 3 ML: HCPCS | Performed by: HOSPITALIST

## 2019-04-14 PROCEDURE — 99232 PR SUBSEQUENT HOSPITAL CARE,LEVL II: ICD-10-PCS | Mod: ,,, | Performed by: PSYCHIATRY & NEUROLOGY

## 2019-04-14 PROCEDURE — S4991 NICOTINE PATCH NONLEGEND: HCPCS | Performed by: HOSPITALIST

## 2019-04-14 PROCEDURE — 25000003 PHARM REV CODE 250: Performed by: HOSPITALIST

## 2019-04-14 PROCEDURE — 99232 SBSQ HOSP IP/OBS MODERATE 35: CPT | Mod: ,,, | Performed by: PSYCHIATRY & NEUROLOGY

## 2019-04-14 PROCEDURE — 21400001 HC TELEMETRY ROOM

## 2019-04-14 PROCEDURE — 63600175 PHARM REV CODE 636 W HCPCS: Performed by: HOSPITALIST

## 2019-04-14 RX ADMIN — INSULIN ASPART 8 UNITS: 100 INJECTION, SOLUTION INTRAVENOUS; SUBCUTANEOUS at 06:04

## 2019-04-14 RX ADMIN — RANOLAZINE 500 MG: 500 TABLET, FILM COATED, EXTENDED RELEASE ORAL at 08:04

## 2019-04-14 RX ADMIN — ENOXAPARIN SODIUM 40 MG: 100 INJECTION SUBCUTANEOUS at 05:04

## 2019-04-14 RX ADMIN — INSULIN ASPART 1 UNITS: 100 INJECTION, SOLUTION INTRAVENOUS; SUBCUTANEOUS at 12:04

## 2019-04-14 RX ADMIN — NICOTINE 1 PATCH: 21 PATCH, EXTENDED RELEASE TRANSDERMAL at 09:04

## 2019-04-14 RX ADMIN — RANOLAZINE 500 MG: 500 TABLET, FILM COATED, EXTENDED RELEASE ORAL at 09:04

## 2019-04-14 RX ADMIN — ATORVASTATIN CALCIUM 80 MG: 40 TABLET, FILM COATED ORAL at 09:04

## 2019-04-14 RX ADMIN — INSULIN DETEMIR 30 UNITS: 100 INJECTION, SOLUTION SUBCUTANEOUS at 08:04

## 2019-04-14 RX ADMIN — INSULIN ASPART 6 UNITS: 100 INJECTION, SOLUTION INTRAVENOUS; SUBCUTANEOUS at 02:04

## 2019-04-14 RX ADMIN — TICAGRELOR 90 MG: 90 TABLET ORAL at 08:04

## 2019-04-14 RX ADMIN — FUROSEMIDE 40 MG: 40 TABLET ORAL at 09:04

## 2019-04-14 RX ADMIN — TICAGRELOR 90 MG: 90 TABLET ORAL at 09:04

## 2019-04-14 NOTE — HOSPITAL COURSE
Pt admitted with right sided weakness and speech difficulty. Pt found to have multiple acute infarcts on imaging. Pt outside of window for TPA. No evidence of embolic etiology. PT/OT consulted. Await dc recs. Hold Bp meds with sharp drop in BP and consider resume tomorrow.     Pt/OT recommend HH services. Pt blood pressure is normal-low range. Holding home medications but needs close follow up with PCP. Pt wants to stop pral diabetes meds and use insulin alone. Blood sugars improved with levemir and SSI. Will continue lantus 30 units Qhs and novolog SSI.  Counseled on diet. Follow up neurology as scheduled. Pt stable for dc home.
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
## 2019-04-14 NOTE — SUBJECTIVE & OBJECTIVE
Interval History: pt reports feeling better but still dropping things with right hand     Review of Systems   Constitutional: Positive for fatigue.   HENT: Negative.    Eyes: Negative.    Respiratory: Negative.    Cardiovascular: Negative.    Gastrointestinal: Negative.    Endocrine: Negative.    Genitourinary: Negative.    Musculoskeletal: Negative.  Negative for gait problem.   Neurological: Positive for speech difficulty and weakness.   Psychiatric/Behavioral: Negative.      Objective:     Vital Signs (Most Recent):  Temp: 98.7 °F (37.1 °C) (04/14/19 0055)  Pulse: 61 (04/14/19 0055)  Resp: 18 (04/14/19 0055)  BP: (!) 127/58 (04/14/19 0055)  SpO2: 98 % (04/14/19 0055) Vital Signs (24h Range):  Temp:  [98.6 °F (37 °C)-98.7 °F (37.1 °C)] 98.7 °F (37.1 °C)  Pulse:  [61-73] 61  Resp:  [18] 18  SpO2:  [97 %-98 %] 98 %  BP: (111-127)/(58-61) 127/58     Weight: 71.7 kg (158 lb)  Body mass index is 26.29 kg/m².  No intake or output data in the 24 hours ending 04/14/19 1835   Physical Exam   Constitutional: He is oriented to person, place, and time. He appears well-developed and well-nourished.   HENT:   Head: Normocephalic and atraumatic.   Mouth/Throat: Oropharynx is clear and moist.   Eyes: Pupils are equal, round, and reactive to light. EOM are normal.   Neck: Normal range of motion. Neck supple. No JVD present.   Cardiovascular: Normal rate, regular rhythm, normal heart sounds and intact distal pulses.   Pulmonary/Chest: Effort normal and breath sounds normal. No respiratory distress.   Abdominal: Soft. Bowel sounds are normal. He exhibits no distension. There is no tenderness.   Musculoskeletal: Normal range of motion. He exhibits no edema.   Neurological: He is alert and oriented to person, place, and time. Coordination abnormal.   Skin: Skin is warm and dry. Capillary refill takes less than 2 seconds.   Psychiatric: He has a normal mood and affect. His behavior is normal.       Significant Labs: All pertinent labs  within the past 24 hours have been reviewed.    Significant Imaging: I have reviewed and interpreted all pertinent imaging results/findings within the past 24 hours.

## 2019-04-14 NOTE — ASSESSMENT & PLAN NOTE
Uncontrolled, A1c 11.7%  Pt prefers going back on insulin and does not want to take metformin   Will discuss dc meds on dc   ssi + add levemir qhs

## 2019-04-15 VITALS
RESPIRATION RATE: 18 BRPM | HEART RATE: 94 BPM | SYSTOLIC BLOOD PRESSURE: 115 MMHG | DIASTOLIC BLOOD PRESSURE: 72 MMHG | BODY MASS INDEX: 26.33 KG/M2 | HEIGHT: 65 IN | WEIGHT: 158 LBS | OXYGEN SATURATION: 99 % | TEMPERATURE: 98 F

## 2019-04-15 LAB
OB PNL STL: NEGATIVE
POCT GLUCOSE: 115 MG/DL (ref 70–110)
POCT GLUCOSE: 246 MG/DL (ref 70–110)
POCT GLUCOSE: 266 MG/DL (ref 70–110)

## 2019-04-15 PROCEDURE — 25000003 PHARM REV CODE 250: Performed by: HOSPITALIST

## 2019-04-15 PROCEDURE — 92610 EVALUATE SWALLOWING FUNCTION: CPT

## 2019-04-15 PROCEDURE — S4991 NICOTINE PATCH NONLEGEND: HCPCS | Performed by: HOSPITALIST

## 2019-04-15 RX ORDER — INSULIN GLARGINE 300 [IU]/ML
30 INJECTION, SOLUTION SUBCUTANEOUS NIGHTLY
Qty: 3 ML | Refills: 3 | Status: SHIPPED | OUTPATIENT
Start: 2019-04-15 | End: 2019-04-15

## 2019-04-15 RX ORDER — INSULIN ASPART 100 [IU]/ML
1-10 INJECTION, SOLUTION INTRAVENOUS; SUBCUTANEOUS
Qty: 9 ML | Refills: 11 | Status: SHIPPED | OUTPATIENT
Start: 2019-04-15 | End: 2021-03-29 | Stop reason: SDUPTHER

## 2019-04-15 RX ORDER — INSULIN GLARGINE 300 [IU]/ML
30 INJECTION, SOLUTION SUBCUTANEOUS NIGHTLY
Qty: 3 ML | Refills: 3 | Status: SHIPPED | OUTPATIENT
Start: 2019-04-15 | End: 2021-03-29 | Stop reason: SDUPTHER

## 2019-04-15 RX ADMIN — FUROSEMIDE 40 MG: 40 TABLET ORAL at 08:04

## 2019-04-15 RX ADMIN — ATORVASTATIN CALCIUM 80 MG: 40 TABLET, FILM COATED ORAL at 08:04

## 2019-04-15 RX ADMIN — NICOTINE 1 PATCH: 21 PATCH, EXTENDED RELEASE TRANSDERMAL at 08:04

## 2019-04-15 RX ADMIN — RANOLAZINE 500 MG: 500 TABLET, FILM COATED, EXTENDED RELEASE ORAL at 08:04

## 2019-04-15 RX ADMIN — TICAGRELOR 90 MG: 90 TABLET ORAL at 08:04

## 2019-04-15 RX ADMIN — INSULIN ASPART 2 UNITS: 100 INJECTION, SOLUTION INTRAVENOUS; SUBCUTANEOUS at 12:04

## 2019-04-15 RX ADMIN — INSULIN ASPART 4 UNITS: 100 INJECTION, SOLUTION INTRAVENOUS; SUBCUTANEOUS at 11:04

## 2019-04-15 NOTE — PROGRESS NOTES
Patient awake, alert, oriented resting comfortably. No signs of distress observed. bed low and locked. Call light in reach. Report given to oncoming nurse, ABBY Toscano. 12hour chart check complete.

## 2019-04-15 NOTE — PLAN OF CARE
Problem: Adult Inpatient Plan of Care  Goal: Plan of Care Review  Outcome: Ongoing (interventions implemented as appropriate)     04/15/19 0577   Plan of Care Review   Plan of Care Reviewed With patient     Pt with no injuries or complaints and VSS this shift. Blood glucose checked Q6H and pt medicated as per MD orderBed in locked and low position with call bell within reach and bed alarm set. Will continue with current plan of care.

## 2019-04-15 NOTE — PT/OT/SLP EVAL
Speech Language Pathology Evaluation  Bedside Swallow    Patient Name:  Fredrick Cox   MRN:  [id number]  Admitting Diagnosis: Acute ischemic left MCA stroke    Recommendations:                 General Recommendations:  Follow-up not indicated  Diet recommendations:  Regular, Thin   Aspiration Precautions: 1 bite/sip at a time   General Precautions: Standard,    Communication strategies:  none    History:     Past Medical History:   Diagnosis Date    CHF (congestive heart failure)     Coronary artery disease     Diabetes mellitus     HLD (hyperlipidemia)     HTN (hypertension)     MI (myocardial infarction)     X's 2    Tobacco abuse        Past Surgical History:   Procedure Laterality Date    CARDIAC CATHETERIZATION  2007    x1    CARDIAC CATHETERIZATION  ?    x1    CORONARY ANGIOPLASTY WITH STENT PLACEMENT  2007 and ???    Left heart cath Left 12/5/2018    Performed by Kenny Middleton MD at [facility] CATH LAB       Social History: Patient (I) for ADLs.    Modified Barium Swallow: n/a    Chest X-Rays: 4/15 No acute abnormality.    Prior diet: unrestricted      Subjective   Pt reporting swallow and cognitive linguistic skill are at baseline. He passed cognitive linguistic screen.   Patient goals:discharge soon    Pain/Comfort:  · Pain Rating 1: 0/10    Objective:     Oral Musculature Evaluation  · Oral Musculature: WFL(mild left weakness upon labial retraction)  · Dentition: teeth in poor condition  · Mandibular Strength and Mobility: WFL  · Oral Labial Strength and Mobility: WFL  · Lingual Strength and Mobility: WFL  · Velar Elevation: WFL  · Buccal Strength and Mobility: WFL    Bedside Swallow Eval:   Consistencies Assessed:  · Thin liquids 8oz via straw self presented  · Solids X3     Oral Phase:   · WFL    Pharyngeal Phase:   · no overt clinical signs/symptoms of aspiration    Compensatory Strategies  · None    Treatment: please note silent aspiration cannot be r/o at bedside.     Assessment:     Fredrick MARINO  Kenny is a 63 y.o. male with dx of CVA he presents with functional swallow at reported baseline level of function.     Goals:   Multidisciplinary Problems     SLP Goals     Not on file          Multidisciplinary Problems (Resolved)        Problem: SLP Goal    Goal Priority Disciplines Outcome   SLP Goal   (Resolved)    Low SLP Outcome(s) achieved   Description:  Pt will participate in bedside swallow study -goal met 4/15                    Plan:     ·   · Plan of Care expires:   4/15  · Plan of Care reviewed with:  patient   · SLP Follow-Up:  No       Discharge recommendations:  other (see comments)(no further ST is warranted)   Barriers to Discharge:  None    Time Tracking:     SLP Treatment Date:   04/15/19  Speech Start Time:  1120  Speech Stop Time:  1130     Speech Total Time (min):  10 min    Billable Minutes: Eval Swallow and Oral Function 10    Lisa Leonard CCC-SLP  04/15/2019

## 2019-04-15 NOTE — PLAN OF CARE
"SW met with patient to provide discharge follow-up instructions. SW reviewed with patient contents of "[facility] Packet" including "help at home", "things patient responsible for to manage his health at home" and "preferences". SW discussed with patient how he will manage his health at home is by going on his doctor appointments, getting prescriptions filled and taking his medications.  EDUCATION: Patient provided with educational information on Stroke.  Information reviewed and placed in "My Healthcare Packet" to be brought home for patient to use as resource after discharge.  Information included:  signs and symptoms to look for and when to call the doctor if experiencing any symptoms that may indicate a medical emergency: CALL 911. All questions answered.  Teach back method used. Patient was able to verbalize understanding of signs and symptoms and managing his health by paying attention to having weakness and trouble walking . TASHI informed nurse Shavonne cisneros manglore completed all discharge planning for patient and is clear to discharge from case management standpoint.         04/15/19 1230   Final Note   Assessment Type Final Discharge Note   Anticipated Discharge Disposition Home   Hospital Follow Up  Appt(s) scheduled? Yes   Discharge plans and expectations educations in teach back method with documentation complete? Yes   Right Care Referral Info   Post Acute Recommendation No Care     "

## 2019-04-15 NOTE — NURSING
Patient discharged to personal vehicle. Transported via hospital transport wheelchair. All personal belongings with patient at the time of discharge.

## 2019-04-15 NOTE — PLAN OF CARE
[facility] HOME HEALTH ORDERS  FACE TO FACE ENCOUNTER    Patient Name: Fredrick Cox  YOB: 1955    PCP: Amos Tee III, MD   PCP Address: [address] [place] [address] [facility] [place] [facility]  PCP Phone Number: [phone]  PCP Fax: [phone]    Encounter Date: 04/15/2019    Admit to Home Health    Diagnoses:  Active Hospital Problems    Diagnosis  POA    *Acute ischemic left MCA stroke [I63.512]  Yes    History of MI (myocardial infarction) [I25.2]  Not Applicable     2007 plus 2 more does not know dates       Type 2 diabetes mellitus, with long-term current use of insulin [E11.9, Z79.4]  Not Applicable     Chronic    Chronic combined systolic and diastolic congestive heart failure [I50.42]  Yes     Chronic    Coronary artery disease of native artery of native heart with stable angina pectoris [I25.118]  Yes     Chronic    Essential hypertension [I10]  Yes     Chronic    HLD (hyperlipidemia) [E78.5]  Yes     Chronic    Tobacco abuse [Z72.0]  Yes     Chronic      Resolved Hospital Problems   No resolved problems to display.       No future appointments.  Follow-up Information     Amos Tee III, MD In 1 week.    Specialty:  Internal Medicine  Why:  follow up s/p CVA, BP check and diabetes   Contact information:  [address] [place] [address]  [facility]  [place] [address]  [phone]             Steve Alex MD.    Specialty:  Neurology  Why:  CVA  Contact information:  [address] [facility] [address]  [address]  [facility] [address]  [phone]                     I have seen and examined this patient face to face today. My clinical findings that support the need for the home health skilled services and home bound status are the following:  Weakness/numbness causing balance and gait disturbance due to Heart Failure making it taxing to leave home.  Requiring assistive device to leave home due to unsteady gait caused by  Stroke, Heart Failure and Coronary  Artery Disease.    Allergies:Review of patient's allergies indicates:  No Known Allergies    Diet: cardiac diet and diabetic diet: 2000 calorie    Activities: activity as tolerated    Nursing:   SN to complete comprehensive assessment including routine vital signs. Instruct on disease process and s/s of complications to report to MD. Review/verify medication list sent home with the patient at time of discharge  and instruct patient/caregiver as needed. Frequency may be adjusted depending on start of care date.    Notify MD if SBP > 160 or < 90; DBP > 90 or < 50; HR > 120 or < 50; Temp > 101; Other:         CONSULTS:    Physical Therapy to evaluate and treat. Evaluate for home safety and equipment needs; Establish/upgrade home exercise program. Perform / instruct on therapeutic exercises, gait training, transfer training, and Range of Motion.  Occupational Therapy to evaluate and treat. Evaluate home environment for safety and equipment needs. Perform/Instruct on transfers, ADL training, ROM, and therapeutic exercises.  Aide to provide assistance with personal care, ADLs, and vital signs.    MISCELLANEOUS CARE:  Diabetic Care:   SN to perform and educate Diabetic management with blood glucose monitoring:, Fingerstick blood sugar AC and HS and Report CBG < 60 or > 350 to physician.      Medications: Review discharge medications with patient and family and provide education.      Current Discharge Medication List      START taking these medications    Details   insulin aspart U-100 (NOVOLOG) 100 unit/mL InPn pen Inject 1-10 Units into the skin 3 (three) times daily with meals. Blood Glucose  mg/dL                  0600                0000                               1200                               1800  151-200                2 units             1 unit  201-250                4 units             2 units  251-300                6 units             3 units  301-350                8 units             4 units  >350    "                   10 units           5 units  Qty: 9 mL, Refills: 11      insulin glargine, TOUJEO, (TOUJEO SOLOSTAR U-300 INSULIN) 300 unit/mL (1.5 mL) InPn pen Inject 30 Units into the skin every evening.  Qty: 3 mL, Refills: 3         CONTINUE these medications which have NOT CHANGED    Details   aspirin (ECOTRIN) 81 MG EC tablet Take 1 tablet (81 mg total) by mouth once daily.  Refills: 0      blood sugar diagnostic Strp 1 strip by Misc.(Non-Drug; Combo Route) route 2 (two) times daily.  Qty: 100 each, Refills: 4    Comments: Of patient's or glucometers choice      furosemide (LASIX) 40 MG tablet Take 1 tablet (40 mg total) by mouth once daily.  Qty: 90 tablet, Refills: 1    Associated Diagnoses: Congestive heart failure      nitroGLYCERIN (NITROSTAT) 0.4 MG SL tablet Place 1 tablet (0.4 mg total) under the tongue every 5 (five) minutes as needed.  Qty: 25 tablet, Refills: 11    Associated Diagnoses: Coronary artery disease, angina presence unspecified, unspecified vessel or lesion type, unspecified whether native or transplanted heart      pen needle, diabetic (BD INSULIN PEN NEEDLE UF MINI) 31 gauge x 3/16" Ndle USE AS DIRECTED  Qty: 100 each, Refills: 3      pen needle, diabetic 31 gauge x 5/16" Ndle 1 each by Misc.(Non-Drug; Combo Route) route 2 (two) times daily.  Qty: 100 each, Refills: 0      ranolazine (RANEXA) 500 MG Tb12 Take 1 tablet (500 mg total) by mouth 2 (two) times daily.  Qty: 180 tablet, Refills: 1      rosuvastatin (CRESTOR) 40 MG Tab Take 1 tablet (40 mg total) by mouth once daily.  Qty: 90 tablet, Refills: 1      ticagrelor (BRILINTA) 90 mg tablet Take 1 tablet (90 mg total) by mouth 2 (two) times daily.  Qty: 60 tablet, Refills: 5    Associated Diagnoses: Coronary artery disease, angina presence unspecified, unspecified vessel or lesion type, unspecified whether native or transplanted heart         STOP taking these medications       canagliflozin (INVOKANA) 100 mg Tab Comments:   Reason " for Stopping:         dapagliflozin 5 mg Tab Comments:   Reason for Stopping:         insulin glargine (LANTUS SOLOSTAR) 100 unit/mL (3 mL) InPn pen Comments:   Reason for Stopping:         isosorbide mononitrate (IMDUR) 30 MG 24 hr tablet Comments:   Reason for Stopping:         lisinopril (PRINIVIL,ZESTRIL) 5 MG tablet Comments:   Reason for Stopping:         metFORMIN (GLUCOPHAGE) 1000 MG tablet Comments:   Reason for Stopping:         metoprolol tartrate (LOPRESSOR) 25 MG tablet Comments:   Reason for Stopping:         tramadol (ULTRAM) 50 mg tablet Comments:   Reason for Stopping:               I certify that this patient is confined to his home and needs intermittent skilled nursing care, physical therapy and occupational therapy.

## 2019-04-15 NOTE — PROGRESS NOTES
Follow-up Information     Amos Tee III, MD In 1 week.    Specialty:  Internal Medicine  Why:  follow up s/p CVA, BP check and diabetes   Contact information:  [address]  [facility]  [place] [address]  [phone]             Domenica Diaz MD. Go on 4/26/2019.    Specialty:  Neurology  Why:  Outpatient Services, Neurology Follow-up Appointment, Pleasea arrive to clinic for 10:30AM  Contact information:  [address] [place] [address]  [address]  [place] [address] [phone]                   [facility]    WRITTEN HEALTHCARE AND DISCHARGE INFORMATION                        Help at Home           [phone]  After discharge for assistance [facility] On Call Nurse Care Line 24/7  Assistance    Things You are responsible For To Manage Your Care At Home:  1.    Getting your prescriptions filled   2.    Taking your medications as directed, DO NOT MISS ANY DOSES!  3.    Going to your follow-up doctor appointment. This is important because it  allow the doctor to monitor your progress and determine if  any changes need to made to your treatment plan.     Thank you for choosing [facility] for your care.  Please answer any calls you may receive from [facility] we want to continue to support you as you manage your healthcare needs. [facility] is happy to have the opportunity to serve you.     Sincerely,  Your [facility] Team,  Linda Wagner LMSW   II  [phone]

## 2019-04-15 NOTE — PLAN OF CARE
Problem: SLP Goal  Goal: SLP Goal  Pt will participate in bedside swallow study -goal met 4/15  Outcome: Outcome(s) achieved Date Met: 04/15/19  ST RECS: continue current diet

## 2019-04-15 NOTE — NURSING
Bedside report received from ABBY De Dios. Patient lying in bed with eyes closed. Easily aroused by verbal stimuli. NAD noted at this time. All safety precautions in place. Will continue to monitor.
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
## 2019-04-15 NOTE — PROGRESS NOTES
[facility]  Neurology  Progress Note    Patient Name: Fredrick Cox  MRN: [id number]  Admission Date: 4/12/2019  Hospital Length of Stay: 2 days  Code Status: Full Code   Attending Provider: Amy Porter MD  Primary Care Physician: Amos Tee III, MD   Principal Problem:Acute ischemic left MCA stroke    Subjective:     Interval History: 64 y/o male with medical Hx as listed above comes to ED for intermittent RUE and RLE weakness for around one week. This has also been accompanied by slurred speech. Pt denies visual disturbances, vertigo, left sided involvement. No previous episodes. He is supposed to take ticagrelor after a STEMI (S/P stenting) in 2014. No aggravating or alleviating factors.    -4/14/19: Pt denies speech disturbances, new weakness or numbness.    Current Neurological Medications:     Current Facility-Administered Medications   Medication Dose Route Frequency Provider Last Rate Last Dose    atorvastatin tablet 80 mg  80 mg Oral Daily George La MD   80 mg at 04/14/19 0900    dextrose 50% injection 12.5 g  12.5 g Intravenous PRN George La MD        enoxaparin injection 40 mg  40 mg Subcutaneous Daily George La MD   40 mg at 04/14/19 1700    furosemide tablet 40 mg  40 mg Oral Daily George La MD   40 mg at 04/14/19 0900    glucagon (human recombinant) injection 1 mg  1 mg Intramuscular PRN George La MD        insulin aspart U-100 pen 1-10 Units  1-10 Units Subcutaneous Q6H PRN George La MD   8 Units at 04/14/19 1830    insulin detemir U-100 pen 30 Units  30 Units Subcutaneous QHS Amy Porter MD   30 Units at 04/14/19 2011    nicotine 21 mg/24 hr 1 patch  1 patch Transdermal Daily George La MD   1 patch at 04/14/19 0900    nitroGLYCERIN SL tablet 0.4 mg  0.4 mg Sublingual Q5 Min PRN George La MD        pneumoc 13-harpreet conj-dip cr(PF) (PREVNAR 13 (PF)) 0.5 mL  0.5 mL Intramuscular  Prior to discharge George La MD        ranolazine 12 hr tablet 500 mg  500 mg Oral BID George La MD   500 mg at 04/14/19 2011    sodium chloride 0.9% flush 10 mL  10 mL Intravenous PRN George La MD        ticagrelor tablet 90 mg  90 mg Oral BID Geroge La MD   90 mg at 04/14/19 2011       Review of Systems   Constitutional: Negative for fever.   HENT: Negative for trouble swallowing.    Eyes: Negative for photophobia.   Respiratory: Negative for shortness of breath.    Cardiovascular: Negative for leg swelling.   Gastrointestinal: Negative for abdominal pain.   Genitourinary: Negative for dysuria.   Musculoskeletal: Negative for back pain.   Neurological: Negative for headaches.   Psychiatric/Behavioral: Negative for confusion.         Objective:     Vital Signs (Most Recent):  Temp: 98.5 °F (36.9 °C) (04/14/19 1920)  Pulse: 80 (04/14/19 1920)  Resp: 18 (04/14/19 1920)  BP: 136/83 (04/14/19 1920)  SpO2: 100 % (04/14/19 1920) Vital Signs (24h Range):  Temp:  [98.5 °F (36.9 °C)-98.7 °F (37.1 °C)] 98.5 °F (36.9 °C)  Pulse:  [61-80] 80  Resp:  [18] 18  SpO2:  [98 %-100 %] 100 %  BP: (127-136)/(58-83) 136/83     Weight: 71.7 kg (158 lb)  Body mass index is 26.29 kg/m².    Physical Exam  Constitutional: He is oriented to person, place, and time. No distress.   HENT:   Head: Normocephalic.   Eyes: Right eye exhibits no discharge. Left eye exhibits no discharge.   Neck: Normal range of motion.   Cardiovascular: Regular rhythm.   Pulmonary/Chest: Breath sounds normal.   Abdominal: Bowel sounds are normal.   Musculoskeletal: Normal range of motion.   Neurological: He is oriented to person, place, and time. He has a normal Finger-Nose-Finger Test.   Skin: He is not diaphoretic.   Psychiatric: His speech is slurred.         NEUROLOGICAL EXAMINATION:      MENTAL STATUS   Oriented to person, place, and time.   Speech: slurred   Level of consciousness: alert     CRANIAL NERVES      CN III,  "IV, VI   Right pupil: Size: 2 mm. Shape: regular. Reactivity: brisk.   Left pupil: Size: 2 mm. Shape: regular. Reactivity: brisk.   Nystagmus: none   Ophthalmoparesis: none  Conjugate gaze: present     CN V   Right facial sensation deficit: none  Left facial sensation deficit: none     CN VII   Right facial weakness: none  Left facial weakness: none     CN IX, X   Palate: symmetric     CN XI   Right sternocleidomastoid strength: normal  Left sternocleidomastoid strength: normal     CN XII   Tongue deviation: none     MOTOR EXAM        RUE/RLE drift otherwise 5/5  Left 5/5      SENSORY EXAM   Right arm light touch: normal  Left arm light touch: normal  Right leg light touch: normal  Left leg light touch: normal     GAIT AND COORDINATION      Coordination   Finger to nose coordination: normal         Significant Imaging:   Echo  Fredrick MARINO Kenny   Transthoracic echo (TTE) complete   Order# [id number]   Reading physician: George Ho MD Ordering physician: George La MD Study date: 4/13/19   Patient Information     Name MRN Description   Fredrick Cox [id number] 63 y.o. Male   Performed Procedure     TRANSTHORACIC ECHO (TTE) COMPLETE   Conclusion        · Low normal left ventricular systolic function. The estimated ejection fraction is 50%  · Cannot exclude mid-distal anteroseptal/apical WMA.  · Concentric left ventricular hypertrophy.  · Normal LV diastolic function.  · Normal right ventricular systolic function.  · The estimated PA systolic pressure is 32 mm Hg  · No intracardiac source of embolus noted on this exam. If high clinical suspicion, consider JAMI +/- bubble study.               Performing Sonographer     ERNA Carter    Reason for Exam   Priority: Routine   Dx: Stroke [I63.9 (ICD-10-CM)]   Comments: With bubble      Vitals     Height Weight BMI (Calculated) BSA (Calculated - sq m) BP   5' 5" (1.651 m) 71.7 kg (158 lb) 26.3 1.81 sq meters 115/72   PACS Images for [employer] Viewer "      Show images for Transthoracic echo (TTE) 2D with Color Flow   Result Image Hyperlink      Show images for Transthoracic echo (TTE) 2D with Color Flow       Study Details     A complete echo was performed using complete 2D, color flow Doppler and spectral Doppler. During the study the apical, parasternal and subcostal views were captured. Overall the study quality was good. This was a portable study performed at the patient's bedside.   Echocardiography Findings     Left Ventricle Low normal ejection fraction at 50%. Normal left ventricular cavity size. Concentric observed. Normal left ventricular diastolic function. Segmental wall motion abnormalities(see wall scoring diagram).   Right Ventricle Normal cavity size, wall thickness and ejection fraction. Wall motion normal.   Left Atrium Normal atrial size. .   Right Atrium Normal atrial size.   Aortic Valve The valve is trileaflet. Aortic valve sclerosis is mild. No stenosis. No regurgitation. Mobility is normal   Mitral Valve Normal valve structure. No stenosis. No regurgitation. Normal leaflet mobility.   Tricuspid Valve The tricuspid valve is normal. No stenosis. Trace regurgitation. Mobility is normal. The estimated PA systolic pressure is 32 mmHg.   Pulmonic Valve The pulmonic valve was not well visualized.   IVC/SVC Intermediate central venous pressure (8 mm Hg).   Ascending Aorta The aortic root and ascending aorta are normal in size.   Pericardium No pericardial effusion. Pericardium is normal.   2D Measurements     Dimensions   LVIDD 4.67 cm      LVIDS 3.3 cm      IVS 1.21 cm      PW 1.21 cm      FS 29 %      LA size 3.27 cm      LA volume 41.23 cm3      LA Volume Index 23 mL/m2      LV mass 212.39 g       Dimensions   RVDD 3.71 cm      TAPSE 2.1 cm      RV S' 13.62 m/s      RA Width 3.78 cm      RA Major Axis 4.74 cm       Aortic Root - End Diastolic   Ao root annulus 3.19 cm      Sinus 3.36 cm      STJ 2.81 cm      Ascending aorta 2.97 cm            Doppler Measurements - Diastolic Filling     Diastolic Filling   E/A ratio 0.91       IVRT 0.17 msec      Pulm vein S/D ratio 0.87       Mean e' 0.08        E wave decelartion time 227 msec      E/E' ratio 8.63          Doppler Measurements - Aortic Valve     Stenosis   LVOT diameter 2.13 cm      LVOT area 3.56 cm2      LVOT peak mike [id number] m/s      LVOT peak VTI 23.48 cm      Ao peak mike 1.41 m/s      Ao VTI 31.61 cm      AV valve area 2.65 cm2      AV mean gradient 4.8 mmHg      AV peak gradient 7.95 mmHg      AV Velocity Ratio 0.7       AV index (prosthetic) 0.74             Doppler Measurements - Mitral Valve     PISA-MS   MV Peak E Mike 0.69 m/s             Doppler Measurements - Shunt Ratio     Shunt Ratio   LVOT stroke volume 83.62 cm3          Doppler Measurements - Tricuspid Valve     PISA   TR Max Mike 2.47 m/s       Stress Evaluation   TV rest pulmonary artery pressure 32 mmHg           Signed     Electronically signed by George Ho MD on 4/13/19 at 1313 CDT         Assessment and Plan:     64 y/o male consulted for possible stroke     1. Stroke: suspect acute stroke given his right sided motor deficits.           -OK to treat BP    -Continue antiplatelets statin.           -No significant vessel stenosis on MRA           -Unfortunately pt is a smoker despite Hx of MI. Smoking cessation discussed again.    -OK to D/C home when BP normalized.           Active Diagnoses:    Diagnosis Date Noted POA    PRINCIPAL PROBLEM:  Acute ischemic left MCA stroke [I63.512] 04/12/2019 Yes    History of MI (myocardial infarction) [I25.2] 08/23/2017 Not Applicable    Type 2 diabetes mellitus, with long-term current use of insulin [E11.9, Z79.4] 04/02/2014 Not Applicable     Chronic    Chronic combined systolic and diastolic congestive heart failure [I50.42] 01/17/2014 Yes     Chronic    Coronary artery disease of native artery of native heart with stable angina pectoris [I25.118] 01/17/2014 Yes      Chronic    Essential hypertension [I10] 01/17/2014 Yes     Chronic    HLD (hyperlipidemia) [E78.5] 01/17/2014 Yes     Chronic    Tobacco abuse [Z72.0] 01/17/2014 Yes     Chronic      Problems Resolved During this Admission:       VTE Risk Mitigation (From admission, onward)        Ordered     enoxaparin injection 40 mg  Daily      04/13/19 0128     IP VTE HIGH RISK PATIENT  Once      04/12/19 2059     Place sequential compression device  Until discontinued      04/12/19 2059          Nakul Morales MD  Neurology  [facility]

## 2019-04-15 NOTE — NURSING
Discharge instructions given to patient prior to discharge. Information regarding follow up appointments and prescriptions given to patient. Put into blue patient folder. States understanding of information. IV and tele monitor discontinued. Tolerated well.

## 2019-04-15 NOTE — PLAN OF CARE
Problem: Fall Injury Risk  Goal: Absence of Fall and Fall-Related Injury    Intervention: Promote Injury-Free Environment     04/15/19 1104   Optimize [place] and Functional Mobility   Environmental Safety Modification assistive device/personal items within reach;clutter free environment maintained;lighting adjusted;room organization consistent   Optimize Balance and Safe Activity   Safety Promotion/Fall Prevention assistive device/personal item within reach;bed alarm set;Fall Risk reviewed with patient/family;medications reviewed;nonskid shoes/socks when out of bed;side rails raised x 2;instructed to call staff for mobility         Problem: Adult Inpatient Plan of Care  Goal: Optimal Comfort and Wellbeing    Intervention: Provide Person-Centered Care     04/15/19 1104   Support Dyspnea Relief   Trust Relationship/Rapport care explained;choices provided;emotional support provided;empathic listening provided;questions answered;questions encouraged;reassurance provided;thoughts/feelings acknowledged

## 2019-04-15 NOTE — PROGRESS NOTES
TASHI met with patient and spouse to offer home health services and explain why patient would benefit from the services, both patient and spouse declined home health. SW provided update to attending MD. Spouse also informed SW she will schedule PCP follow-up for patient because she will schedule an appointment on the same day for herself. TASHI contacted [facility] @ [phone] to schedule follow-up, TASHI spoke to Malena that reported [facility] does not accept patient insurance. TASHI contacted [facility] @ [phone] to inquire if insurance accepted at clinic, TASHI spoke to Jennifer. Patient will see Dr. Diaz on 4/26/19 @ 10:30am.

## 2019-04-16 NOTE — PT/OT/SLP DISCHARGE
Physical Therapy Discharge Summary    Name: Fredrick Cox  MRN: [id number]   Principal Problem: Acute ischemic left MCA stroke     Patient Discharged from acute Physical Therapy on 4/15/19.  Please refer to prior PT noted date on 19 for functional status.     Assessment:     Patient appropriate for care in another setting.    Objective:     GOALS:   Multidisciplinary Problems     Physical Therapy Goals        Problem: Physical Therapy Goal    Goal Priority Disciplines Outcome Goal Variances Interventions   Physical Therapy Goal     PT, PT/OT      Description:  Goals to be met by: 19    Patient will increase functional independence with mobility by performin. Sit to stand transfer with Bibb  2. Gait x250 feet with Bibb   3. Lower extremity exercise program (sitting and standing) x30 reps, with independence                      Reasons for Discontinuation of Therapy Services  Transfer to alternate level of care.      Plan:     Patient Discharged to: patient declined home health services.    Kristin Yeager, PT  2019

## 2019-04-17 ENCOUNTER — PATIENT OUTREACH (OUTPATIENT)
Dept: ADMINISTRATIVE | Facility: CLINIC | Age: 64
End: 2019-04-17

## 2019-04-17 NOTE — PATIENT INSTRUCTIONS
Discharge Instructions for Stroke  You have been diagnosed with a stroke, or with a TIA (transient ischemic attack). Or you have been identified as having a high risk for stroke. During a stroke, blood stops flowing to part of your brain. This can damage areas in the brain that control other parts of the body. Symptoms after a stroke depend on which part of the brain has been affected.  Stroke risk factors  Once youve had a stroke, youre at greater risk for another one. Listed below are some other factors that can increase your risk for a stroke:  · High blood pressure  · High cholesterol  · Cigarette or cigar smoking  · Diabetes  · Carotid or other artery disease  · Atrial fibrillation, atrial flutter, or other heart disease  · Not being physically active  · Obesity  · Certain blood disorders (such as sickle cell anemia)  · Excessive alcohol use  · Abuse of street drugs  · Race  · Gender  · Family history of stroke  · Diet high in salty, fried, or greasy foods  Changes in daily living  Doing your regular tasks may be difficult after youve had a stroke, but you can learn new ways to manage your daily activities. In fact, doing daily activities may help you to regain muscle strength and bring back function to affected limbs. Be patient, give yourself time to adjust, and appreciate the progress you make.  Daily activities  You may be at risk of falling. Make changes to your home to help you walk more easily. A therapist will decide if you need an assistive device to walk safely.  You may need to see an occupational therapist or physical therapist to learn new ways of doing things. For example, you may need to make adjustments when bathing or dressing:  Tips for showering or bathing  · Test the water temperature with a hand or foot that was not affected by the stroke.  · Use grab bars, a shower seat, a hand-held showerhead, and a long-handled brush.  Tips for getting dressed  · Dress while sitting, starting with  the affected side or limb.  · Wear shirts that pull easily over your head. Wear pants or skirts with elastic waistbands.  · Use zippers with loops attached to the pull tabs.  Lifestyle changes  · Take your medicines exactly as directed. Dont skip doses.  · Begin an exercise program. Ask your provider how to get started. Also ask how much activity you should try to get on a daily or weekly basis. You can benefit from simple activities such as walking or gardening.  · Limit alcohol intake. Men should have no more than 2 alcoholic drinks a day. Women should limit themselves to 1 alcoholic drink per day.  · Know your cholesterol level. Follow your providers recommendations about how to keep cholesterol under control.  · If you are a smoker, quit now. Join a stop-smoking program to improve your chances of success. Ask your provider about medicines or other methods to help you quit.  · Learn stress management techniques to help you deal with stress in your home and work life.  Diet  Your healthcare provider will give you information on dietary changes that you may need to make, based on your situation. Your provider may recommend that you see a registered dietitian for help with diet changes. Changes may include:  · Reducing fat and cholesterol intake  · Reducing salt (sodium) intake, especially if you have high blood pressure  · Eating more fresh vegetables and fruits  · Eating more lean proteins, such as fish, poultry, and beans and peas (legumes)  · Eating less red meat and processed meats  · Using low-fat dairy products  · Limiting vegetable oils and nut oils  · Limiting sweets and processed foods such as chips, cookies, and baked goods  Follow-up care  · Keep your medical appointments. Close follow-up is important to stroke rehabilitation and recovery.  · Some medicines require blood tests to check for progress or problems. Keep follow-up appointments for any blood tests ordered by your providers.  When to call  911  Call 911 right away if you have any of the following symptoms of stroke:  · Weakness, tingling, or loss of feeling on one side of your face or body  · Sudden double vision or trouble seeing in one or both eyes  · Sudden trouble talking or slurred speech  · Trouble understanding others  · Sudden, severe headache  · Dizziness, loss of balance, or a sense of falling  · Blackouts or seizures      F.A.S.T. is an easy way to remember the signs of stroke. When you see these signs, you know that you need to call 911 fast.  F.A.S.T. stands for:  · F is for face drooping. One side of the face is drooping or numb. When the person smiles, the smile is uneven.  · A is for arm weakness. One arm is weak or numb. When the person lifts both arms at the same time, one arm may drift downward.  · S is for speech difficulty. You may notice slurred speech or trouble speaking. The person can't repeat a simple sentence correctly when asked.  · T is for time to call 911. If someone shows any of these symptoms, even if they go away, call 911 immediately. Make note of the time the symptoms first appeared.  Date Last Reviewed: 8/26/2015 © 2000-2017 [email or web address]. [address] [place] [address], [place] [address]. All rights reserved. This information is not intended as a substitute for professional medical care. Always follow your healthcare professional's instructions.

## 2019-09-04 DIAGNOSIS — I50.22 CHRONIC SYSTOLIC HEART FAILURE: ICD-10-CM

## 2019-09-04 DIAGNOSIS — Z13.6 ENCOUNTER FOR SCREENING FOR CARDIOVASCULAR DISORDERS: Primary | ICD-10-CM

## 2019-11-07 ENCOUNTER — HOSPITAL ENCOUNTER (OUTPATIENT)
Dept: RADIOLOGY | Facility: HOSPITAL | Age: 64
Discharge: HOME | End: 2019-11-07
Attending: GENERAL PRACTICE
Payer: MEDICARE

## 2019-11-07 ENCOUNTER — HOSPITAL ENCOUNTER (OUTPATIENT)
Dept: CARDIOLOGY | Facility: HOSPITAL | Age: 64
Discharge: HOME | End: 2019-11-07
Attending: GENERAL PRACTICE
Payer: MEDICARE

## 2019-11-07 DIAGNOSIS — Z13.6 ENCOUNTER FOR SCREENING FOR CARDIOVASCULAR DISORDERS: ICD-10-CM

## 2019-11-07 DIAGNOSIS — I50.22 CHRONIC SYSTOLIC HEART FAILURE: ICD-10-CM

## 2019-11-07 LAB
AORTIC ROOT ANNULUS: 3.28 CM
AORTIC VALVE CUSP SEPERATION: 1.76 CM
ASCENDING AORTA: 2.86 CM
AV INDEX (PROSTH): 0.77
AV MEAN GRADIENT: 4 MMHG
AV PEAK GRADIENT: 7 MMHG
AV VALVE AREA: 3.35 CM2
AV VELOCITY RATIO: 0.83
CV ECHO LV RWT: 0.42 CM
DOP CALC AO PEAK VEL: 1.35 M/S
DOP CALC AO VTI: 27.07 CM
DOP CALC LVOT AREA: 4.4 CM2
DOP CALC LVOT DIAMETER: 2.36 CM
DOP CALC LVOT PEAK VEL: 1.12 M/S
DOP CALC LVOT STROKE VOLUME: 90.59 CM3
DOP CALCLVOT PEAK VEL VTI: 20.72 CM
E WAVE DECELERATION TIME: 174.93 MSEC
E/A RATIO: 0.67
E/E' RATIO: 16.22 M/S
ECHO LV POSTERIOR WALL: 0.97 CM (ref 0.6–1.1)
FRACTIONAL SHORTENING: 17 % (ref 28–44)
INTERVENTRICULAR SEPTUM: 1.01 CM (ref 0.6–1.1)
IVRT: 0.13 MSEC
LA MAJOR: 4.94 CM
LA MINOR: 4.73 CM
LA WIDTH: 3.69 CM
LEFT ATRIUM SIZE: 3.01 CM
LEFT ATRIUM VOLUME: 45.63 CM3
LEFT INTERNAL DIMENSION IN SYSTOLE: 3.78 CM (ref 2.1–4)
LEFT VENTRICLE DIASTOLIC VOLUME: 96.26 ML
LEFT VENTRICLE SYSTOLIC VOLUME: 61.09 ML
LEFT VENTRICULAR INTERNAL DIMENSION IN DIASTOLE: 4.58 CM (ref 3.5–6)
LEFT VENTRICULAR MASS: 155.54 G
LV LATERAL E/E' RATIO: 14.6 M/S
LV SEPTAL E/E' RATIO: 18.25 M/S
MV PEAK A VEL: 1.09 M/S
MV PEAK E VEL: 0.73 M/S
PISA TR MAX VEL: 2.3 M/S
PULM VEIN S/D RATIO: 1.38
PV PEAK D VEL: 0.42 M/S
PV PEAK S VEL: 0.58 M/S
PV PEAK VELOCITY: 0.88 CM/S
RA MAJOR: 4.34 CM
RA PRESSURE: 3 MMHG
RA WIDTH: 3.7 CM
RIGHT VENTRICULAR END-DIASTOLIC DIMENSION: 3.12 CM
RV TISSUE DOPPLER FREE WALL SYSTOLIC VELOCITY 1 (APICAL 4 CHAMBER VIEW): 10.06 CM/S
SINUS: 3.64 CM
STJ: 2.8 CM
TDI LATERAL: 0.05 M/S
TDI SEPTAL: 0.04 M/S
TDI: 0.05 M/S
TR MAX PG: 21 MMHG
TRICUSPID ANNULAR PLANE SYSTOLIC EXCURSION: 1.84 CM
TV REST PULMONARY ARTERY PRESSURE: 24 MMHG

## 2019-11-07 PROCEDURE — 93306 TTE W/DOPPLER COMPLETE: CPT

## 2019-11-07 PROCEDURE — 93306 ECHO (CUPID ONLY): ICD-10-PCS | Mod: 26,,, | Performed by: INTERNAL MEDICINE

## 2019-11-07 PROCEDURE — 93306 TTE W/DOPPLER COMPLETE: CPT | Mod: 26,,, | Performed by: INTERNAL MEDICINE

## 2019-11-13 ENCOUNTER — HOSPITAL ENCOUNTER (OUTPATIENT)
Dept: RADIOLOGY | Facility: HOSPITAL | Age: 64
Discharge: HOME | End: 2019-11-13
Attending: GENERAL PRACTICE
Payer: MEDICARE

## 2019-11-13 PROCEDURE — 76775 US EXAM ABDO BACK WALL LIM: CPT | Mod: TC

## 2019-11-13 PROCEDURE — 76775 US EXAM ABDO BACK WALL LIM: CPT | Mod: 26,,, | Performed by: RADIOLOGY

## 2019-11-13 PROCEDURE — 76775 US ABDOMINAL AORTA: ICD-10-PCS | Mod: 26,,, | Performed by: RADIOLOGY

## 2020-03-02 ENCOUNTER — HOSPITAL ENCOUNTER (INPATIENT)
Facility: HOSPITAL | Age: 65
LOS: 4 days | Discharge: HOME | DRG: 270 | End: 2020-03-06
Attending: EMERGENCY MEDICINE | Admitting: INTERNAL MEDICINE
Payer: MEDICARE

## 2020-03-02 DIAGNOSIS — R53.1 WEAKNESS: Primary | ICD-10-CM

## 2020-03-02 DIAGNOSIS — I21.02 STEMI INVOLVING LEFT ANTERIOR DESCENDING CORONARY ARTERY: ICD-10-CM

## 2020-03-02 DIAGNOSIS — R07.9 CHEST PAIN: ICD-10-CM

## 2020-03-02 DIAGNOSIS — I21.02 ST ELEVATION MYOCARDIAL INFARCTION INVOLVING LEFT ANTERIOR DESCENDING (LAD) CORONARY ARTERY: ICD-10-CM

## 2020-03-02 PROBLEM — I21.3 STEMI (ST ELEVATION MYOCARDIAL INFARCTION): Status: ACTIVE | Noted: 2020-03-02

## 2020-03-02 LAB
ALBUMIN SERPL BCP-MCNC: 3.7 G/DL (ref 3.5–5.2)
ALP SERPL-CCNC: 86 U/L (ref 55–135)
ALT SERPL W/O P-5'-P-CCNC: 10 U/L (ref 10–44)
ANION GAP SERPL CALC-SCNC: 13 MMOL/L (ref 8–16)
AST SERPL-CCNC: 12 U/L (ref 10–40)
BASOPHILS # BLD AUTO: 0.04 K/UL (ref 0–0.2)
BASOPHILS NFR BLD: 0.5 % (ref 0–1.9)
BILIRUB SERPL-MCNC: 0.3 MG/DL (ref 0.1–1)
BNP SERPL-MCNC: 46 PG/ML (ref 0–99)
BUN SERPL-MCNC: 8 MG/DL (ref 8–23)
CALCIUM SERPL-MCNC: 9.7 MG/DL (ref 8.7–10.5)
CHLORIDE SERPL-SCNC: 103 MMOL/L (ref 95–110)
CO2 SERPL-SCNC: 24 MMOL/L (ref 23–29)
CREAT SERPL-MCNC: 1.1 MG/DL (ref 0.5–1.4)
DIFFERENTIAL METHOD: ABNORMAL
EOSINOPHIL # BLD AUTO: 0.2 K/UL (ref 0–0.5)
EOSINOPHIL NFR BLD: 2 % (ref 0–8)
ERYTHROCYTE [DISTWIDTH] IN BLOOD BY AUTOMATED COUNT: 12.8 % (ref 11.5–14.5)
EST. GFR  (AFRICAN AMERICAN): >60 ML/MIN/1.73 M^2
EST. GFR  (NON AFRICAN AMERICAN): >60 ML/MIN/1.73 M^2
GLUCOSE SERPL-MCNC: 295 MG/DL (ref 70–110)
HCT VFR BLD AUTO: 44.3 % (ref 40–54)
HGB BLD-MCNC: 13.5 G/DL (ref 14–18)
IMM GRANULOCYTES # BLD AUTO: 0.02 K/UL (ref 0–0.04)
IMM GRANULOCYTES NFR BLD AUTO: 0.3 % (ref 0–0.5)
LYMPHOCYTES # BLD AUTO: 2.4 K/UL (ref 1–4.8)
LYMPHOCYTES NFR BLD: 30.4 % (ref 18–48)
MCH RBC QN AUTO: 26.2 PG (ref 27–31)
MCHC RBC AUTO-ENTMCNC: 30.5 G/DL (ref 32–36)
MCV RBC AUTO: 86 FL (ref 82–98)
MONOCYTES # BLD AUTO: 0.7 K/UL (ref 0.3–1)
MONOCYTES NFR BLD: 8.4 % (ref 4–15)
NEUTROPHILS # BLD AUTO: 4.6 K/UL (ref 1.8–7.7)
NEUTROPHILS NFR BLD: 58.4 % (ref 38–73)
NRBC BLD-RTO: 0 /100 WBC
PLATELET # BLD AUTO: 238 K/UL (ref 150–350)
PMV BLD AUTO: 9 FL (ref 9.2–12.9)
POC ACTIVATED CLOTTING TIME K: 142 SEC (ref 74–137)
POC ACTIVATED CLOTTING TIME K: 323 SEC (ref 74–137)
POCT GLUCOSE: 306 MG/DL (ref 70–110)
POTASSIUM SERPL-SCNC: 3.5 MMOL/L (ref 3.5–5.1)
PROT SERPL-MCNC: 7.3 G/DL (ref 6–8.4)
RBC # BLD AUTO: 5.15 M/UL (ref 4.6–6.2)
SAMPLE: ABNORMAL
SAMPLE: ABNORMAL
SODIUM SERPL-SCNC: 140 MMOL/L (ref 136–145)
TROPONIN I SERPL DL<=0.01 NG/ML-MCNC: 8.6 NG/ML (ref 0–0.03)
TROPONIN I SERPL DL<=0.01 NG/ML-MCNC: <0.006 NG/ML (ref 0–0.03)
WBC # BLD AUTO: 7.87 K/UL (ref 3.9–12.7)

## 2020-03-02 PROCEDURE — 84484 ASSAY OF TROPONIN QUANT: CPT | Mod: 91

## 2020-03-02 PROCEDURE — 83880 ASSAY OF NATRIURETIC PEPTIDE: CPT

## 2020-03-02 PROCEDURE — 25500020 PHARM REV CODE 255: Performed by: INTERNAL MEDICINE

## 2020-03-02 PROCEDURE — 93010 ELECTROCARDIOGRAM REPORT: CPT | Mod: ,,, | Performed by: INTERNAL MEDICINE

## 2020-03-02 PROCEDURE — 33967 INSERT I-AORT PERCUT DEVICE: CPT | Mod: 59,,, | Performed by: INTERNAL MEDICINE

## 2020-03-02 PROCEDURE — 36415 COLL VENOUS BLD VENIPUNCTURE: CPT

## 2020-03-02 PROCEDURE — C1769 GUIDE WIRE: HCPCS | Performed by: INTERNAL MEDICINE

## 2020-03-02 PROCEDURE — 93005 ELECTROCARDIOGRAM TRACING: CPT

## 2020-03-02 PROCEDURE — 85025 COMPLETE CBC W/AUTO DIFF WBC: CPT

## 2020-03-02 PROCEDURE — 20000000 HC ICU ROOM

## 2020-03-02 PROCEDURE — C1753 CATH, INTRAVAS ULTRASOUND: HCPCS | Performed by: INTERNAL MEDICINE

## 2020-03-02 PROCEDURE — 33967 INSERT I-AORT PERCUT DEVICE: CPT | Mod: 59 | Performed by: INTERNAL MEDICINE

## 2020-03-02 PROCEDURE — 33967 PR IABP INSERTION: ICD-10-PCS | Mod: 59,,, | Performed by: INTERNAL MEDICINE

## 2020-03-02 PROCEDURE — C1887 CATHETER, GUIDING: HCPCS | Performed by: INTERNAL MEDICINE

## 2020-03-02 PROCEDURE — 99291 CRITICAL CARE FIRST HOUR: CPT | Mod: ,,, | Performed by: INTERNAL MEDICINE

## 2020-03-02 PROCEDURE — 25000003 PHARM REV CODE 250: Performed by: INTERNAL MEDICINE

## 2020-03-02 PROCEDURE — 99152 MOD SED SAME PHYS/QHP 5/>YRS: CPT | Mod: ,,, | Performed by: INTERNAL MEDICINE

## 2020-03-02 PROCEDURE — 93458 L HRT ARTERY/VENTRICLE ANGIO: CPT | Mod: 26,59,, | Performed by: INTERNAL MEDICINE

## 2020-03-02 PROCEDURE — C9600 PERC DRUG-EL COR STENT SING: HCPCS | Mod: LD | Performed by: INTERNAL MEDICINE

## 2020-03-02 PROCEDURE — 36569 INSJ PICC 5 YR+ W/O IMAGING: CPT

## 2020-03-02 PROCEDURE — 93010 EKG 12-LEAD: ICD-10-PCS | Mod: ,,, | Performed by: INTERNAL MEDICINE

## 2020-03-02 PROCEDURE — 92928 PR STENT: ICD-10-PCS | Mod: LC,,, | Performed by: INTERNAL MEDICINE

## 2020-03-02 PROCEDURE — C1874 STENT, COATED/COV W/DEL SYS: HCPCS | Performed by: INTERNAL MEDICINE

## 2020-03-02 PROCEDURE — 99291 PR CRITICAL CARE, E/M 30-74 MINUTES: ICD-10-PCS | Mod: ,,, | Performed by: INTERNAL MEDICINE

## 2020-03-02 PROCEDURE — 92978 ENDOLUMINL IVUS OCT C 1ST: CPT | Performed by: INTERNAL MEDICINE

## 2020-03-02 PROCEDURE — 99152 PR MOD CONSCIOUS SEDATION, SAME PHYS, 5+ YRS, FIRST 15 MIN: ICD-10-PCS | Mod: ,,, | Performed by: INTERNAL MEDICINE

## 2020-03-02 PROCEDURE — 93458 PR CATH PLACE/CORON ANGIO, IMG SUPER/INTERP,W LEFT HEART VENTRICULOGRAPHY: ICD-10-PCS | Mod: 26,59,, | Performed by: INTERNAL MEDICINE

## 2020-03-02 PROCEDURE — 93010 ELECTROCARDIOGRAM REPORT: CPT | Mod: 76,,, | Performed by: INTERNAL MEDICINE

## 2020-03-02 PROCEDURE — 63600175 PHARM REV CODE 636 W HCPCS: Performed by: INTERNAL MEDICINE

## 2020-03-02 PROCEDURE — C1725 CATH, TRANSLUMIN NON-LASER: HCPCS | Performed by: INTERNAL MEDICINE

## 2020-03-02 PROCEDURE — 25000003 PHARM REV CODE 250: Performed by: EMERGENCY MEDICINE

## 2020-03-02 PROCEDURE — 96374 THER/PROPH/DIAG INJ IV PUSH: CPT

## 2020-03-02 PROCEDURE — 99153 MOD SED SAME PHYS/QHP EA: CPT | Performed by: INTERNAL MEDICINE

## 2020-03-02 PROCEDURE — 92978 ENDOLUMINL IVUS OCT C 1ST: CPT | Mod: 26,,, | Performed by: INTERNAL MEDICINE

## 2020-03-02 PROCEDURE — 92978 PR IVUS, CORONARY, 1ST VESSEL: ICD-10-PCS | Mod: 26,,, | Performed by: INTERNAL MEDICINE

## 2020-03-02 PROCEDURE — 93458 L HRT ARTERY/VENTRICLE ANGIO: CPT | Mod: 59 | Performed by: INTERNAL MEDICINE

## 2020-03-02 PROCEDURE — 85347 COAGULATION TIME ACTIVATED: CPT | Performed by: INTERNAL MEDICINE

## 2020-03-02 PROCEDURE — C1894 INTRO/SHEATH, NON-LASER: HCPCS | Performed by: INTERNAL MEDICINE

## 2020-03-02 PROCEDURE — 99152 MOD SED SAME PHYS/QHP 5/>YRS: CPT | Performed by: INTERNAL MEDICINE

## 2020-03-02 PROCEDURE — 63600175 PHARM REV CODE 636 W HCPCS: Performed by: EMERGENCY MEDICINE

## 2020-03-02 PROCEDURE — 80053 COMPREHEN METABOLIC PANEL: CPT

## 2020-03-02 PROCEDURE — 92928 PRQ TCAT PLMT NTRAC ST 1 LES: CPT | Mod: LC,,, | Performed by: INTERNAL MEDICINE

## 2020-03-02 PROCEDURE — 27201423 OPTIME MED/SURG SUP & DEVICES STERILE SUPPLY: Performed by: INTERNAL MEDICINE

## 2020-03-02 PROCEDURE — 99291 CRITICAL CARE FIRST HOUR: CPT | Mod: 25

## 2020-03-02 DEVICE — STENT RONYX25022UX RESOLUTE ONYX 2.50X22
Type: IMPLANTABLE DEVICE | Site: HEART | Status: FUNCTIONAL
Brand: RESOLUTE ONYX™

## 2020-03-02 DEVICE — STENT RONYX27526UX RESOLUTE ONYX 2.75X26
Type: IMPLANTABLE DEVICE | Site: HEART | Status: FUNCTIONAL
Brand: RESOLUTE ONYX™

## 2020-03-02 RX ORDER — INSULIN ASPART 100 [IU]/ML
0-5 INJECTION, SOLUTION INTRAVENOUS; SUBCUTANEOUS
Status: DISCONTINUED | OUTPATIENT
Start: 2020-03-02 | End: 2020-03-06 | Stop reason: HOSPADM

## 2020-03-02 RX ORDER — SODIUM CHLORIDE 0.9 % (FLUSH) 0.9 %
10 SYRINGE (ML) INJECTION EVERY 6 HOURS
Status: DISCONTINUED | OUTPATIENT
Start: 2020-03-03 | End: 2020-03-06 | Stop reason: HOSPADM

## 2020-03-02 RX ORDER — IBUPROFEN 200 MG
16 TABLET ORAL
Status: DISCONTINUED | OUTPATIENT
Start: 2020-03-02 | End: 2020-03-06 | Stop reason: HOSPADM

## 2020-03-02 RX ORDER — MAG HYDROX/ALUMINUM HYD/SIMETH 200-200-20
15 SUSPENSION, ORAL (FINAL DOSE FORM) ORAL EVERY 6 HOURS PRN
Status: DISCONTINUED | OUTPATIENT
Start: 2020-03-02 | End: 2020-03-04

## 2020-03-02 RX ORDER — TIROFIBAN HYDROCHLORIDE 50 UG/ML
25 INJECTION INTRAVENOUS ONCE
Status: DISCONTINUED | OUTPATIENT
Start: 2020-03-02 | End: 2020-03-04

## 2020-03-02 RX ORDER — ACETAMINOPHEN 325 MG/1
650 TABLET ORAL EVERY 4 HOURS PRN
Status: DISCONTINUED | OUTPATIENT
Start: 2020-03-02 | End: 2020-03-04

## 2020-03-02 RX ORDER — SODIUM CHLORIDE 900 MG/100ML
500 INJECTION INTRAVENOUS
Status: COMPLETED | OUTPATIENT
Start: 2020-03-02 | End: 2020-03-02

## 2020-03-02 RX ORDER — LIDOCAINE HYDROCHLORIDE 10 MG/ML
INJECTION, SOLUTION EPIDURAL; INFILTRATION; INTRACAUDAL; PERINEURAL
Status: DISCONTINUED | OUTPATIENT
Start: 2020-03-02 | End: 2020-03-02 | Stop reason: HOSPADM

## 2020-03-02 RX ORDER — TIROFIBAN HYDROCHLORIDE 50 UG/ML
0.07 INJECTION INTRAVENOUS CONTINUOUS
Status: ACTIVE | OUTPATIENT
Start: 2020-03-02 | End: 2020-03-03

## 2020-03-02 RX ORDER — HYDROCODONE BITARTRATE AND ACETAMINOPHEN 5; 325 MG/1; MG/1
1 TABLET ORAL EVERY 4 HOURS PRN
Status: DISCONTINUED | OUTPATIENT
Start: 2020-03-02 | End: 2020-03-04

## 2020-03-02 RX ORDER — SODIUM CHLORIDE 0.9 % (FLUSH) 0.9 %
10 SYRINGE (ML) INJECTION
Status: DISCONTINUED | OUTPATIENT
Start: 2020-03-02 | End: 2020-03-06 | Stop reason: HOSPADM

## 2020-03-02 RX ORDER — IBUPROFEN 200 MG
24 TABLET ORAL
Status: DISCONTINUED | OUTPATIENT
Start: 2020-03-02 | End: 2020-03-06 | Stop reason: HOSPADM

## 2020-03-02 RX ORDER — CEFAZOLIN SODIUM 1 G/3ML
INJECTION, POWDER, FOR SOLUTION INTRAMUSCULAR; INTRAVENOUS
Status: DISCONTINUED | OUTPATIENT
Start: 2020-03-02 | End: 2020-03-02 | Stop reason: HOSPADM

## 2020-03-02 RX ORDER — HEPARIN SODIUM 5000 [USP'U]/ML
3500 INJECTION, SOLUTION INTRAVENOUS; SUBCUTANEOUS
Status: COMPLETED | OUTPATIENT
Start: 2020-03-02 | End: 2020-03-02

## 2020-03-02 RX ORDER — DIPHENHYDRAMINE HCL 25 MG
25 CAPSULE ORAL EVERY 6 HOURS PRN
Status: DISCONTINUED | OUTPATIENT
Start: 2020-03-02 | End: 2020-03-04

## 2020-03-02 RX ORDER — TIROFIBAN HYDROCHLORIDE 50 UG/ML
INJECTION INTRAVENOUS
Status: DISCONTINUED | OUTPATIENT
Start: 2020-03-02 | End: 2020-03-02

## 2020-03-02 RX ORDER — ASPIRIN 81 MG/1
81 TABLET ORAL DAILY
Status: DISCONTINUED | OUTPATIENT
Start: 2020-03-03 | End: 2020-03-06 | Stop reason: HOSPADM

## 2020-03-02 RX ORDER — FUROSEMIDE 10 MG/ML
20 INJECTION INTRAMUSCULAR; INTRAVENOUS ONCE
Status: COMPLETED | OUTPATIENT
Start: 2020-03-02 | End: 2020-03-02

## 2020-03-02 RX ORDER — MORPHINE SULFATE 10 MG/ML
2 INJECTION INTRAMUSCULAR; INTRAVENOUS; SUBCUTANEOUS EVERY 10 MIN PRN
Status: DISCONTINUED | OUTPATIENT
Start: 2020-03-02 | End: 2020-03-04

## 2020-03-02 RX ORDER — MIDAZOLAM HYDROCHLORIDE 1 MG/ML
INJECTION, SOLUTION INTRAMUSCULAR; INTRAVENOUS
Status: DISCONTINUED | OUTPATIENT
Start: 2020-03-02 | End: 2020-03-02 | Stop reason: HOSPADM

## 2020-03-02 RX ORDER — HEPARIN SODIUM 1000 [USP'U]/ML
INJECTION, SOLUTION INTRAVENOUS; SUBCUTANEOUS
Status: DISCONTINUED | OUTPATIENT
Start: 2020-03-02 | End: 2020-03-02 | Stop reason: HOSPADM

## 2020-03-02 RX ORDER — ATROPINE SULFATE 0.1 MG/ML
0.5 INJECTION INTRAVENOUS
Status: DISCONTINUED | OUTPATIENT
Start: 2020-03-02 | End: 2020-03-04

## 2020-03-02 RX ORDER — FENTANYL CITRATE 50 UG/ML
INJECTION, SOLUTION INTRAMUSCULAR; INTRAVENOUS
Status: DISCONTINUED | OUTPATIENT
Start: 2020-03-02 | End: 2020-03-02 | Stop reason: HOSPADM

## 2020-03-02 RX ORDER — GLUCAGON 1 MG
1 KIT INJECTION
Status: DISCONTINUED | OUTPATIENT
Start: 2020-03-02 | End: 2020-03-06 | Stop reason: HOSPADM

## 2020-03-02 RX ORDER — ROSUVASTATIN CALCIUM 10 MG/1
40 TABLET, COATED ORAL DAILY
Status: DISCONTINUED | OUTPATIENT
Start: 2020-03-03 | End: 2020-03-06 | Stop reason: HOSPADM

## 2020-03-02 RX ORDER — ONDANSETRON 2 MG/ML
4 INJECTION INTRAMUSCULAR; INTRAVENOUS EVERY 12 HOURS PRN
Status: DISCONTINUED | OUTPATIENT
Start: 2020-03-02 | End: 2020-03-03

## 2020-03-02 RX ORDER — ALPRAZOLAM 0.25 MG/1
0.25 TABLET ORAL EVERY 8 HOURS PRN
Status: DISCONTINUED | OUTPATIENT
Start: 2020-03-02 | End: 2020-03-04

## 2020-03-02 RX ADMIN — TICAGRELOR 180 MG: 90 TABLET ORAL at 05:03

## 2020-03-02 RX ADMIN — HEPARIN SODIUM 3500 UNITS: 5000 INJECTION, SOLUTION INTRAVENOUS; SUBCUTANEOUS at 05:03

## 2020-03-02 RX ADMIN — INSULIN ASPART 2 UNITS: 100 INJECTION, SOLUTION INTRAVENOUS; SUBCUTANEOUS at 10:03

## 2020-03-02 RX ADMIN — NOREPINEPHRINE BITARTRATE 0.05 MCG/KG/MIN: 1 INJECTION, SOLUTION, CONCENTRATE INTRAVENOUS at 08:03

## 2020-03-02 RX ADMIN — ONDANSETRON HYDROCHLORIDE 4 MG: 2 SOLUTION INTRAMUSCULAR; INTRAVENOUS at 09:03

## 2020-03-02 RX ADMIN — DIPHENHYDRAMINE HYDROCHLORIDE 25 MG: 25 CAPSULE ORAL at 09:03

## 2020-03-02 RX ADMIN — TICAGRELOR 90 MG: 90 TABLET ORAL at 09:03

## 2020-03-02 RX ADMIN — FUROSEMIDE 20 MG: 10 INJECTION, SOLUTION INTRAMUSCULAR; INTRAVENOUS at 08:03

## 2020-03-02 RX ADMIN — SODIUM CHLORIDE 500 ML: 0.9 INJECTION, SOLUTION INTRAVENOUS at 05:03

## 2020-03-02 RX ADMIN — SODIUM CHLORIDE 500 ML: 0.9 INJECTION, SOLUTION INTRAVENOUS at 09:03

## 2020-03-02 NOTE — ASSESSMENT & PLAN NOTE
CP and anterior WILBER on EKG  Load brilinta, cont ASA, load heparin  Emergency cath  Borderline BP, IVF resuscitation    Risks, benefits and alternatives of the catheterization procedure were discussed with the patient and his wife in attendance, which include but are not limited to: bleeding, infection, death, heart attack, arrhythmia, kidney failure, stroke, need for emergency surgery, etc.  Patient understands and and agrees to proceed.  Consent was placed on the chart.

## 2020-03-02 NOTE — SUBJECTIVE & OBJECTIVE
"Past Medical History:   Diagnosis Date    CHF (congestive heart failure)     Coronary artery disease     Diabetes mellitus     HLD (hyperlipidemia)     HTN (hypertension)     MI (myocardial infarction)     X's 2    Tobacco abuse        Past Surgical History:   Procedure Laterality Date    CARDIAC CATHETERIZATION  2007    x1    CARDIAC CATHETERIZATION  ?    x1    CORONARY ANGIOPLASTY WITH STENT PLACEMENT  2007 and ???    LEFT HEART CATHETERIZATION Left 12/5/2018    Procedure: Left heart cath;  Surgeon: Kenny Middleton MD;  Location: [facility] CATH LAB;  Service: Cardiology;  Laterality: Left;       Review of patient's allergies indicates:  No Known Allergies    No current facility-administered medications on file prior to encounter.      Current Outpatient Medications on File Prior to Encounter   Medication Sig    aspirin (ECOTRIN) 81 MG EC tablet Take 1 tablet (81 mg total) by mouth once daily.    blood sugar diagnostic Strp 1 strip by Misc.(Non-Drug; Combo Route) route 2 (two) times daily.    furosemide (LASIX) 40 MG tablet Take 1 tablet (40 mg total) by mouth once daily.    insulin aspart U-100 (NOVOLOG) 100 unit/mL InPn pen Inject 1-10 Units into the skin 3 (three) times daily with meals. Blood Glucose  mg/dL                  0600                0000                               1200                               1800  151-200                2 units             1 unit  201-250                4 units             2 units  251-300                6 units             3 units  301-350                8 units             4 units  >350                      10 units           5 units    pen needle, diabetic (BD INSULIN PEN NEEDLE UF MINI) 31 gauge x 3/16" Ndle USE AS DIRECTED    pen needle, diabetic 31 gauge x 5/16" Ndle 1 each by Misc.(Non-Drug; Combo Route) route 2 (two) times daily.    ranolazine (RANEXA) 500 MG Tb12 Take 1 tablet (500 mg total) by mouth 2 (two) times daily.    rosuvastatin (CRESTOR) 40 " MG Tab Take 1 tablet (40 mg total) by mouth once daily.    ticagrelor (BRILINTA) 90 mg tablet Take 1 tablet (90 mg total) by mouth 2 (two) times daily.    insulin glargine, TOUJEO, (TOUJEO SOLOSTAR U-300 INSULIN) 300 unit/mL (1.5 mL) InPn pen Inject 30 Units into the skin every evening.    nitroGLYCERIN (NITROSTAT) 0.4 MG SL tablet Place 1 tablet (0.4 mg total) under the tongue every 5 (five) minutes as needed.     Family History     Problem Relation (Age of Onset)    No Known Problems Mother, Father, Sister, Brother, Maternal Aunt, Maternal Uncle, Paternal Aunt, Paternal Uncle, Maternal Grandmother, Maternal Grandfather, Paternal Grandmother, Paternal Grandfather        Tobacco Use    Smoking status: Current Every Day Smoker     Packs/day: 0.50     Years: 35.00     Pack years: 17.50     Types: Cigarettes    Smokeless tobacco: Never Used   Substance and Sexual Activity    Alcohol use: Yes     Alcohol/week: 0.8 standard drinks     Types: 1 Standard drinks or equivalent per week     Comment: social    Drug use: No    Sexual activity: Not on file     Review of Systems   Constitution: Negative for chills, diaphoresis, fever and malaise/fatigue.   HENT: Negative for nosebleeds.    Eyes: Negative for blurred vision and double vision.   Cardiovascular: Positive for chest pain. Negative for claudication, cyanosis, dyspnea on exertion, leg swelling, orthopnea, palpitations, paroxysmal nocturnal dyspnea and syncope.   Respiratory: Negative for cough, shortness of breath and wheezing.    Skin: Negative for dry skin and poor wound healing.   Musculoskeletal: Negative for back pain, joint swelling and myalgias.   Gastrointestinal: Negative for abdominal pain, nausea and vomiting.   Genitourinary: Negative for hematuria.   Neurological: Negative for dizziness, headaches, numbness, seizures and weakness.   Psychiatric/Behavioral: Negative for altered mental status and depression.     Objective:     Vital Signs (Most  Recent):    Vital Signs (24h Range):           There is no height or weight on file to calculate BMI.            No intake or output data in the 24 hours ending 03/02/20 1738    Lines/Drains/Airways     Peripheral Intravenous Line                 Peripheral IV - Single Lumen 04/12/19 1600 18 G Left Antecubital 325 days         Peripheral IV - Single Lumen 03/02/20 1730 20 G Right Forearm less than 1 day                Physical Exam   Constitutional: He is oriented to person, place, and time. He appears well-developed and well-nourished.   HENT:   Head: Normocephalic and atraumatic.   Eyes: Pupils are equal, round, and reactive to light. Conjunctivae and EOM are normal. No scleral icterus.   Neck: Normal range of motion. Neck supple. No JVD present. No tracheal deviation present. No thyromegaly present.   Cardiovascular: Normal rate, regular rhythm, S1 normal and S2 normal. Exam reveals no gallop and no friction rub.   No murmur heard.  Pulses:       Radial pulses are 2+ on the right side.   Pulmonary/Chest: Effort normal and breath sounds normal. No respiratory distress. He has no wheezes. He has no rales. He exhibits no tenderness.   Abdominal: Soft. He exhibits no distension.   Musculoskeletal: Normal range of motion. He exhibits no edema.   Neurological: He is alert and oriented to person, place, and time. He has normal strength. No cranial nerve deficit.   Skin: Skin is warm and dry. No rash noted.   Psychiatric: He has a normal mood and affect. His behavior is normal.       Current Medications:          Laboratory (all labs reviewed):  CBC:  Recent Labs   Lab 08/24/17  0755 12/04/18  1115 04/12/19  1758   WBC 9.64 8.96 7.23   Hemoglobin 14.7 14.4 14.6   Hematocrit 44.2 43.5 45.6   Platelets 251 239 266       CHEMISTRIES:  Recent Labs   Lab 08/24/17  0755 12/04/18  1115 04/12/19  1758   Glucose 251 H 167 H 181 H   Sodium 137 135 L 139   Potassium 4.2 4.3 4.2   BUN, Bld 21 12 15   Creatinine 1.1 1.0 1.2   eGFR if   >60.0 >60 >60   eGFR if non African American >60.0 >60 >60   Calcium 9.6 10.0 10.2       CARDIAC BIOMARKERS:  Recent Labs   Lab 12/04/18  1115 12/04/18  1621 12/04/18  2052   Troponin I 0.023 0.110 H 0.248 H       COAGS:  Recent Labs   Lab 04/12/19  1758   INR 1.0       LIPIDS/LFTS:  Recent Labs   Lab 08/24/17  0755 12/04/18  1115 12/05/18  0448 04/12/19  1758   Cholesterol 217 H  --  235 H 235 H   Triglycerides 163 H  --  111 197 H   HDL 54  --  51 48   LDL Cholesterol 130.4  --  161.8 H 147.6   Non-HDL Cholesterol 163  --  184 187   AST 16 23  --  14   ALT 20 14  --  11       BNP:  Recent Labs   Lab 12/04/18  1115 04/12/19  1758   BNP 33 14       TSH:  Recent Labs   Lab 12/04/18  1621 04/12/19  1758   TSH 0.064 L 0.699       Free T4:  Recent Labs   Lab 12/04/18  1621 04/12/19  1758   Free T4 1.22 1.17       Diagnostic Results:  ECG (personally reviewed and interpreted tracing(s)):  3/2/20 1719 , AWMI/STEMI, septal q waves noted    Chest X-Ray (personally reviewed and interpreted image(s)): pending    Echo: 11/7/19 (repeat ordered)  · Mildly decreased left ventricular systolic function. The estimated ejection fraction is 40%. Apical septal hypokinesis  · Grade I (mild) left ventricular diastolic dysfunction consistent with impaired relaxation.  · Normal right ventricular systolic function.  · Mild tricuspid regurgitation.    Cath: 12/5/18 ([place]) (images personally reviewed and interpreted)  Left Main   20% osteal   Left Anterior Descending   The vessel exhibits minimal luminal irregularities. Proximal stent patent   Left Circumflex   Dist Cx lesion is 90% stenosed. Moderate diffuse distal disease   Right Coronary Artery   Faint left to right collaterals   Prox RCA lesion is 100% stenosed.

## 2020-03-02 NOTE — CONSULTS
[facility]  Cardiology  Consult Note    Patient Name: Fredrick Cox  MRN: [id number]  Admission Date: 3/2/2020  Hospital Length of Stay: 0 days  Code Status: Prior   Attending Provider: Yamilet Andrade MD   Consulting Provider: George Ho MD  Primary Care Physician: Amos Tee III, MD  Principal Problem:STEMI (ST elevation myocardial infarction)    Patient information was obtained from patient and ER records.     Consults  Subjective:     Chief Complaint:  CP     HPI:   The patient is a 64-year-old man with known history of coronary artery disease status post proximal and mid LAD stents in the past with known right coronary chronic occlusion and distal circumflex stenosis.  He has an ejection fraction previously measured at 40% in November 2019.  He presents to the emergency room with 1 hr of chest discomfort and found to have anterior ST elevations.  He does report not taking his Brilinta yesterday, but has been otherwise compliant with his medications.  He does smoke cigarettes.  His wife is at the bedside.  The pros and cons of catheterization were discussed with the patient and he agrees to proceed.  Permit was signed.    Past Medical History:   Diagnosis Date    CHF (congestive heart failure)     Coronary artery disease     Diabetes mellitus     HLD (hyperlipidemia)     HTN (hypertension)     MI (myocardial infarction)     X's 2    Tobacco abuse        Past Surgical History:   Procedure Laterality Date    CARDIAC CATHETERIZATION  2007    x1    CARDIAC CATHETERIZATION  ?    x1    CORONARY ANGIOPLASTY WITH STENT PLACEMENT  2007 and ???    LEFT HEART CATHETERIZATION Left 12/5/2018    Procedure: Left heart cath;  Surgeon: Kenny Middleton MD;  Location: [facility] CATH LAB;  Service: Cardiology;  Laterality: Left;       Review of patient's allergies indicates:  No Known Allergies    No current facility-administered medications on file prior to encounter.      Current Outpatient  "Medications on File Prior to Encounter   Medication Sig    aspirin (ECOTRIN) 81 MG EC tablet Take 1 tablet (81 mg total) by mouth once daily.    blood sugar diagnostic Strp 1 strip by Misc.(Non-Drug; Combo Route) route 2 (two) times daily.    furosemide (LASIX) 40 MG tablet Take 1 tablet (40 mg total) by mouth once daily.    insulin aspart U-100 (NOVOLOG) 100 unit/mL InPn pen Inject 1-10 Units into the skin 3 (three) times daily with meals. Blood Glucose  mg/dL                  0600                0000                               1200                               1800  151-200                2 units             1 unit  201-250                4 units             2 units  251-300                6 units             3 units  301-350                8 units             4 units  >350                      10 units           5 units    pen needle, diabetic (BD INSULIN PEN NEEDLE UF MINI) 31 gauge x 3/16" Ndle USE AS DIRECTED    pen needle, diabetic 31 gauge x 5/16" Ndle 1 each by Misc.(Non-Drug; Combo Route) route 2 (two) times daily.    ranolazine (RANEXA) 500 MG Tb12 Take 1 tablet (500 mg total) by mouth 2 (two) times daily.    rosuvastatin (CRESTOR) 40 MG Tab Take 1 tablet (40 mg total) by mouth once daily.    ticagrelor (BRILINTA) 90 mg tablet Take 1 tablet (90 mg total) by mouth 2 (two) times daily.    insulin glargine, TOUJEO, (TOUJEO SOLOSTAR U-300 INSULIN) 300 unit/mL (1.5 mL) InPn pen Inject 30 Units into the skin every evening.    nitroGLYCERIN (NITROSTAT) 0.4 MG SL tablet Place 1 tablet (0.4 mg total) under the tongue every 5 (five) minutes as needed.     Family History     Problem Relation (Age of Onset)    No Known Problems Mother, Father, Sister, Brother, Maternal Aunt, Maternal Uncle, Paternal Aunt, Paternal Uncle, Maternal Grandmother, Maternal Grandfather, Paternal Grandmother, Paternal Grandfather        Tobacco Use    Smoking status: Current Every Day Smoker     Packs/day: 0.50     Years: " 35.00     Pack years: 17.50     Types: Cigarettes    Smokeless tobacco: Never Used   Substance and Sexual Activity    Alcohol use: Yes     Alcohol/week: 0.8 standard drinks     Types: 1 Standard drinks or equivalent per week     Comment: social    Drug use: No    Sexual activity: Not on file     Review of Systems   Constitution: Negative for chills, diaphoresis, fever and malaise/fatigue.   HENT: Negative for nosebleeds.    Eyes: Negative for blurred vision and double vision.   Cardiovascular: Positive for chest pain. Negative for claudication, cyanosis, dyspnea on exertion, leg swelling, orthopnea, palpitations, paroxysmal nocturnal dyspnea and syncope.   Respiratory: Negative for cough, shortness of breath and wheezing.    Skin: Negative for dry skin and poor wound healing.   Musculoskeletal: Negative for back pain, joint swelling and myalgias.   Gastrointestinal: Negative for abdominal pain, nausea and vomiting.   Genitourinary: Negative for hematuria.   Neurological: Negative for dizziness, headaches, numbness, seizures and weakness.   Psychiatric/Behavioral: Negative for altered mental status and depression.     Objective:     Vital Signs (Most Recent):    Vital Signs (24h Range):           There is no height or weight on file to calculate BMI.            No intake or output data in the 24 hours ending 03/02/20 1738    Lines/Drains/Airways     Peripheral Intravenous Line                 Peripheral IV - Single Lumen 04/12/19 1600 18 G Left Antecubital 325 days         Peripheral IV - Single Lumen 03/02/20 1730 20 G Right Forearm less than 1 day                Physical Exam   Constitutional: He is oriented to person, place, and time. He appears well-developed and well-nourished.   HENT:   Head: Normocephalic and atraumatic.   Eyes: Pupils are equal, round, and reactive to light. Conjunctivae and EOM are normal. No scleral icterus.   Neck: Normal range of motion. Neck supple. No JVD present. No tracheal  deviation present. No thyromegaly present.   Cardiovascular: Normal rate, regular rhythm, S1 normal and S2 normal. Exam reveals no gallop and no friction rub.   No murmur heard.  Pulses:       Radial pulses are 2+ on the right side.   Pulmonary/Chest: Effort normal and breath sounds normal. No respiratory distress. He has no wheezes. He has no rales. He exhibits no tenderness.   Abdominal: Soft. He exhibits no distension.   Musculoskeletal: Normal range of motion. He exhibits no edema.   Neurological: He is alert and oriented to person, place, and time. He has normal strength. No cranial nerve deficit.   Skin: Skin is warm and dry. No rash noted.   Psychiatric: He has a normal mood and affect. His behavior is normal.       Current Medications:          Laboratory (all labs reviewed):  CBC:  Recent Labs   Lab 08/24/17 0755 12/04/18 1115 04/12/19  1758   WBC 9.64 8.96 7.23   Hemoglobin 14.7 14.4 14.6   Hematocrit 44.2 43.5 45.6   Platelets 251 239 266       CHEMISTRIES:  Recent Labs   Lab 08/24/17 0755 12/04/18  1115 04/12/19  1758   Glucose 251 H 167 H 181 H   Sodium 137 135 L 139   Potassium 4.2 4.3 4.2   BUN, Bld 21 12 15   Creatinine 1.1 1.0 1.2   eGFR if African American >60.0 >60 >60   eGFR if non African American >60.0 >60 >60   Calcium 9.6 10.0 10.2       CARDIAC BIOMARKERS:  Recent Labs   Lab 12/04/18  1115 12/04/18  1621 12/04/18  2052   Troponin I 0.023 0.110 H 0.248 H       COAGS:  Recent Labs   Lab 04/12/19  1758   INR 1.0       LIPIDS/LFTS:  Recent Labs   Lab 08/24/17  0755 12/04/18  1115 12/05/18  0448 04/12/19  1758   Cholesterol 217 H  --  235 H 235 H   Triglycerides 163 H  --  111 197 H   HDL 54  --  51 48   LDL Cholesterol 130.4  --  161.8 H 147.6   Non-HDL Cholesterol 163  --  184 187   AST 16 23  --  14   ALT 20 14  --  11       BNP:  Recent Labs   Lab 12/04/18  1115 04/12/19  1758   BNP 33 14       TSH:  Recent Labs   Lab 12/04/18  1621 04/12/19  1758   TSH 0.064 L 0.699       Free T4:  Recent  Labs   Lab 12/04/18  1621 04/12/19  1758   Free T4 1.22 1.17       Diagnostic Results:  ECG (personally reviewed and interpreted tracing(s)):  3/2/20 1719 , AWMI/STEMI, septal q waves noted    Chest X-Ray (personally reviewed and interpreted image(s)): pending    Echo: 11/7/19 (repeat ordered)  · Mildly decreased left ventricular systolic function. The estimated ejection fraction is 40%. Apical septal hypokinesis  · Grade I (mild) left ventricular diastolic dysfunction consistent with impaired relaxation.  · Normal right ventricular systolic function.  · Mild tricuspid regurgitation.    Cath: 12/5/18 ([place]) (images personally reviewed and interpreted)  Left Main   20% osteal   Left Anterior Descending   The vessel exhibits minimal luminal irregularities. Proximal stent patent   Left Circumflex   Dist Cx lesion is 90% stenosed. Moderate diffuse distal disease   Right Coronary Artery   Faint left to right collaterals   Prox RCA lesion is 100% stenosed.           Assessment and Plan:     * STEMI (ST elevation myocardial infarction)  CP and anterior WILBER on EKG  Load brilinta, cont ASA, load heparin  Emergency cath  Borderline BP, IVF resuscitation    Risks, benefits and alternatives of the catheterization procedure were discussed with the patient and his wife in attendance, which include but are not limited to: bleeding, infection, death, heart attack, arrhythmia, kidney failure, stroke, need for emergency surgery, etc.  Patient understands and and agrees to proceed.  Consent was placed on the chart.      Type 2 diabetes mellitus, with long-term current use of insulin  SSI with glargine 30units qhs    HLD (hyperlipidemia)  Cont rosuva 40mg    Tobacco abuse  Smoking cessation stressed to pt    Chronic combined systolic and diastolic congestive heart failure  Cont GDMT  Check echo    Essential hypertension  Cont med rx as BP will allow        VTE Risk Mitigation (From admission, onward)    None        Critical  care time 35min    Thank you for your consult. I will follow-up with patient. Please contact us if you have any additional questions.    George Ho MD  Cardiology   [facility]

## 2020-03-02 NOTE — Clinical Note
129 ml injected throughout the case. 171 mL total wasted during the case. 300 mL total used in the case.

## 2020-03-02 NOTE — Clinical Note
Catheter is inserted into the ostium   left main. Angiography performed of the left coronary arteries in multiple views. Wire removed prior to angiography. Telephone Encounter by Moni Barrientos at 05/29/18 03:46 PM     Author:  Moni Barrientos Service:  (none) Author Type:       Filed:  05/29/18 03:49 PM Encounter Date:  5/29/2018 Status:  Signed     :  Moni Barrientos ()            Call to parent. Patient scheduled on 05/31 and will be coming with sibling at 1:25 pm.[AC1.1M]  Electronically Signed by:    Moni Barrientos 5/29/2018[AC1.2T]        Revision History        User Key Date/Time User Provider Type Action    > AC1.2 05/29/18 03:49 PM Moni Barrientos  Sign     AC1.1 05/29/18 03:46 PM Moni Barrientos      M - Manual, T - Template

## 2020-03-02 NOTE — HPI
The patient is a 64-year-old man with known history of coronary artery disease status post proximal and mid LAD stents in the past with known right coronary chronic occlusion and distal circumflex stenosis.  He has an ejection fraction previously measured at 40% in November 2019.  He presents to the emergency room with 1 hr of chest discomfort and found to have anterior ST elevations.  He does report not taking his Brilinta yesterday, but has been otherwise compliant with his medications.  He does smoke cigarettes.  His wife is at the bedside.  The pros and cons of catheterization were discussed with the patient and he agrees to proceed.  Permit was signed.

## 2020-03-03 PROBLEM — R57.0 CARDIOGENIC SHOCK: Status: ACTIVE | Noted: 2020-03-03

## 2020-03-03 LAB
ANION GAP SERPL CALC-SCNC: 13 MMOL/L (ref 8–16)
AORTIC ROOT ANNULUS: 3.12 CM
AORTIC VALVE CUSP SEPERATION: 1.66 CM
ASCENDING AORTA: 2.35 CM
AV INDEX (PROSTH): 0.73
AV MEAN GRADIENT: 3 MMHG
AV PEAK GRADIENT: 5 MMHG
AV VALVE AREA: 2.86 CM2
AV VELOCITY RATIO: 0.86
BASOPHILS # BLD AUTO: 0.01 K/UL (ref 0–0.2)
BASOPHILS NFR BLD: 0.1 % (ref 0–1.9)
BSA FOR ECHO PROCEDURE: 1.85 M2
BUN SERPL-MCNC: 8 MG/DL (ref 8–23)
CALCIUM SERPL-MCNC: 9.1 MG/DL (ref 8.7–10.5)
CHLORIDE SERPL-SCNC: 106 MMOL/L (ref 95–110)
CO2 SERPL-SCNC: 22 MMOL/L (ref 23–29)
CREAT SERPL-MCNC: 1 MG/DL (ref 0.5–1.4)
CV ECHO LV RWT: 0.36 CM
DIFFERENTIAL METHOD: ABNORMAL
DOP CALC AO PEAK VEL: 1.15 M/S
DOP CALC AO VTI: 17.21 CM
DOP CALC LVOT AREA: 3.9 CM2
DOP CALC LVOT DIAMETER: 2.23 CM
DOP CALC LVOT PEAK VEL: 0.99 M/S
DOP CALC LVOT STROKE VOLUME: 49.3 CM3
DOP CALCLVOT PEAK VEL VTI: 12.63 CM
E WAVE DECELERATION TIME: 103.21 MSEC
E/A RATIO: 1.18
E/E' RATIO: 13.27 M/S
ECHO LV POSTERIOR WALL: 0.97 CM (ref 0.6–1.1)
EOSINOPHIL # BLD AUTO: 0 K/UL (ref 0–0.5)
EOSINOPHIL NFR BLD: 0 % (ref 0–8)
ERYTHROCYTE [DISTWIDTH] IN BLOOD BY AUTOMATED COUNT: 13 % (ref 11.5–14.5)
EST. GFR  (AFRICAN AMERICAN): >60 ML/MIN/1.73 M^2
EST. GFR  (NON AFRICAN AMERICAN): >60 ML/MIN/1.73 M^2
ESTIMATED AVG GLUCOSE: 318 MG/DL (ref 68–131)
FRACTIONAL SHORTENING: 29 % (ref 28–44)
GLUCOSE SERPL-MCNC: 269 MG/DL (ref 70–110)
HBA1C MFR BLD HPLC: 12.7 % (ref 4–5.6)
HCT VFR BLD AUTO: 42.9 % (ref 40–54)
HGB BLD-MCNC: 13.4 G/DL (ref 14–18)
IMM GRANULOCYTES # BLD AUTO: 0.04 K/UL (ref 0–0.04)
IMM GRANULOCYTES NFR BLD AUTO: 0.3 % (ref 0–0.5)
INTERVENTRICULAR SEPTUM: 1.14 CM (ref 0.6–1.1)
IVRT: 0.07 MSEC
LA MAJOR: 5.01 CM
LA MINOR: 5.23 CM
LA WIDTH: 3.9 CM
LEFT ATRIUM SIZE: 2.7 CM
LEFT ATRIUM VOLUME INDEX: 25.1 ML/M2
LEFT ATRIUM VOLUME: 45.81 CM3
LEFT INTERNAL DIMENSION IN SYSTOLE: 3.87 CM (ref 2.1–4)
LEFT VENTRICLE DIASTOLIC VOLUME INDEX: 78.51 ML/M2
LEFT VENTRICLE DIASTOLIC VOLUME: 143.24 ML
LEFT VENTRICLE MASS INDEX: 123 G/M2
LEFT VENTRICLE SYSTOLIC VOLUME INDEX: 35.5 ML/M2
LEFT VENTRICLE SYSTOLIC VOLUME: 64.75 ML
LEFT VENTRICULAR INTERNAL DIMENSION IN DIASTOLE: 5.43 CM (ref 3.5–6)
LEFT VENTRICULAR MASS: 224.04 G
LV LATERAL E/E' RATIO: 10.43 M/S
LV SEPTAL E/E' RATIO: 18.25 M/S
LYMPHOCYTES # BLD AUTO: 0.9 K/UL (ref 1–4.8)
LYMPHOCYTES NFR BLD: 7.7 % (ref 18–48)
MCH RBC QN AUTO: 26.3 PG (ref 27–31)
MCHC RBC AUTO-ENTMCNC: 31.2 G/DL (ref 32–36)
MCV RBC AUTO: 84 FL (ref 82–98)
MONOCYTES # BLD AUTO: 0.6 K/UL (ref 0.3–1)
MONOCYTES NFR BLD: 5.3 % (ref 4–15)
MV PEAK A VEL: 0.62 M/S
MV PEAK E VEL: 0.73 M/S
NEUTROPHILS # BLD AUTO: 10.1 K/UL (ref 1.8–7.7)
NEUTROPHILS NFR BLD: 86.6 % (ref 38–73)
NRBC BLD-RTO: 0 /100 WBC
PISA TR MAX VEL: 1.03 M/S
PLATELET # BLD AUTO: 236 K/UL (ref 150–350)
PMV BLD AUTO: 8.7 FL (ref 9.2–12.9)
POCT GLUCOSE: 210 MG/DL (ref 70–110)
POCT GLUCOSE: 220 MG/DL (ref 70–110)
POCT GLUCOSE: 245 MG/DL (ref 70–110)
POCT GLUCOSE: 248 MG/DL (ref 70–110)
POTASSIUM SERPL-SCNC: 3.9 MMOL/L (ref 3.5–5.1)
RA MAJOR: 4.05 CM
RA WIDTH: 3.67 CM
RBC # BLD AUTO: 5.1 M/UL (ref 4.6–6.2)
RIGHT VENTRICULAR END-DIASTOLIC DIMENSION: 3.22 CM
RV TISSUE DOPPLER FREE WALL SYSTOLIC VELOCITY 1 (APICAL 4 CHAMBER VIEW): 9.53 CM/S
SINUS: 3.25 CM
SODIUM SERPL-SCNC: 141 MMOL/L (ref 136–145)
STJ: 2.1 CM
TDI LATERAL: 0.07 M/S
TDI SEPTAL: 0.04 M/S
TDI: 0.06 M/S
TR MAX PG: 4 MMHG
TRICUSPID ANNULAR PLANE SYSTOLIC EXCURSION: 2.15 CM
WBC # BLD AUTO: 11.66 K/UL (ref 3.9–12.7)

## 2020-03-03 PROCEDURE — 93010 EKG 12-LEAD: ICD-10-PCS | Mod: ,,, | Performed by: INTERNAL MEDICINE

## 2020-03-03 PROCEDURE — 25000003 PHARM REV CODE 250: Performed by: INTERNAL MEDICINE

## 2020-03-03 PROCEDURE — 63600175 PHARM REV CODE 636 W HCPCS: Performed by: INTERNAL MEDICINE

## 2020-03-03 PROCEDURE — 99232 SBSQ HOSP IP/OBS MODERATE 35: CPT | Mod: 25,,, | Performed by: INTERNAL MEDICINE

## 2020-03-03 PROCEDURE — 36415 COLL VENOUS BLD VENIPUNCTURE: CPT

## 2020-03-03 PROCEDURE — A4216 STERILE WATER/SALINE, 10 ML: HCPCS | Performed by: INTERNAL MEDICINE

## 2020-03-03 PROCEDURE — 85025 COMPLETE CBC W/AUTO DIFF WBC: CPT

## 2020-03-03 PROCEDURE — 99232 PR SUBSEQUENT HOSPITAL CARE,LEVL II: ICD-10-PCS | Mod: 25,,, | Performed by: INTERNAL MEDICINE

## 2020-03-03 PROCEDURE — 80048 BASIC METABOLIC PNL TOTAL CA: CPT

## 2020-03-03 PROCEDURE — 93005 ELECTROCARDIOGRAM TRACING: CPT

## 2020-03-03 PROCEDURE — 93010 ELECTROCARDIOGRAM REPORT: CPT | Mod: ,,, | Performed by: INTERNAL MEDICINE

## 2020-03-03 PROCEDURE — 83036 HEMOGLOBIN GLYCOSYLATED A1C: CPT

## 2020-03-03 PROCEDURE — 20000000 HC ICU ROOM

## 2020-03-03 PROCEDURE — 63600175 PHARM REV CODE 636 W HCPCS: Performed by: CLINIC/CENTER

## 2020-03-03 RX ORDER — ONDANSETRON 2 MG/ML
4 INJECTION INTRAMUSCULAR; INTRAVENOUS EVERY 6 HOURS PRN
Status: DISCONTINUED | OUTPATIENT
Start: 2020-03-03 | End: 2020-03-04

## 2020-03-03 RX ORDER — ONDANSETRON 2 MG/ML
4 INJECTION INTRAMUSCULAR; INTRAVENOUS EVERY 6 HOURS PRN
Status: DISCONTINUED | OUTPATIENT
Start: 2020-03-03 | End: 2020-03-03

## 2020-03-03 RX ADMIN — Medication 10 ML: at 12:03

## 2020-03-03 RX ADMIN — TICAGRELOR 90 MG: 90 TABLET ORAL at 09:03

## 2020-03-03 RX ADMIN — Medication 10 ML: at 05:03

## 2020-03-03 RX ADMIN — INSULIN ASPART 2 UNITS: 100 INJECTION, SOLUTION INTRAVENOUS; SUBCUTANEOUS at 06:03

## 2020-03-03 RX ADMIN — Medication 10 ML: at 06:03

## 2020-03-03 RX ADMIN — HYDROCODONE BITARTRATE AND ACETAMINOPHEN 1 TABLET: 5; 325 TABLET ORAL at 02:03

## 2020-03-03 RX ADMIN — INSULIN ASPART 1 UNITS: 100 INJECTION, SOLUTION INTRAVENOUS; SUBCUTANEOUS at 09:03

## 2020-03-03 RX ADMIN — INSULIN ASPART 2 UNITS: 100 INJECTION, SOLUTION INTRAVENOUS; SUBCUTANEOUS at 11:03

## 2020-03-03 RX ADMIN — ONDANSETRON HYDROCHLORIDE 4 MG: 2 SOLUTION INTRAMUSCULAR; INTRAVENOUS at 05:03

## 2020-03-03 RX ADMIN — PROMETHAZINE HYDROCHLORIDE 25 MG: 25 INJECTION INTRAMUSCULAR; INTRAVENOUS at 02:03

## 2020-03-03 RX ADMIN — ALUMINUM HYDROXIDE, MAGNESIUM HYDROXIDE, AND SIMETHICONE 15 ML: 200; 200; 20 SUSPENSION ORAL at 01:03

## 2020-03-03 RX ADMIN — ONDANSETRON HYDROCHLORIDE 4 MG: 2 SOLUTION INTRAMUSCULAR; INTRAVENOUS at 12:03

## 2020-03-03 RX ADMIN — ASPIRIN 81 MG: 81 TABLET, COATED ORAL at 09:03

## 2020-03-03 RX ADMIN — ALPRAZOLAM 0.25 MG: 0.25 TABLET ORAL at 03:03

## 2020-03-03 RX ADMIN — ROSUVASTATIN CALCIUM 40 MG: 10 TABLET, COATED ORAL at 09:03

## 2020-03-03 RX ADMIN — INSULIN ASPART 2 UNITS: 100 INJECTION, SOLUTION INTRAVENOUS; SUBCUTANEOUS at 05:03

## 2020-03-03 RX ADMIN — SODIUM CHLORIDE 250 ML: 0.9 INJECTION, SOLUTION INTRAVENOUS at 05:03

## 2020-03-03 NOTE — PROCEDURES
Fredrick Cox is a 64 y.o. male patient.    Temp: 97.9 °F (36.6 °C) (03/02/20 1743)  Pulse: (!) 118 (03/02/20 2015)  Resp: (!) 38 (03/02/20 2015)  BP: 106/71 (03/02/20 2015)  SpO2: (!) 91 % (03/02/20 2015)  Weight: 72.6 kg (160 lb) (03/02/20 1743)    PICC  Date/Time: 3/2/2020 8:40 PM  Performed by: Sloan Sanchez, RN  Consent Done: Yes  Time out: Immediately prior to procedure a time out was called to verify the correct patient, procedure, equipment, support staff and site/side marked as required  Indications: med administration and vascular access  Anesthesia: local infiltration  Local anesthetic: lidocaine 1% without epinephrine  Anesthetic Total (mL): 2  Preparation: skin prepped with chlorhexidine (without alcohol)  Skin prep agent dried: skin prep agent completely dried prior to procedure  Sterile barriers: all five maximum sterile barriers used - cap, mask, sterile gown, sterile gloves, and large sterile sheet  Hand hygiene: hand hygiene performed prior to central venous catheter insertion  Location details: right basilic  Catheter type: double lumen  Catheter size: 5 Fr  Catheter Length: 36cm    Ultrasound guidance: yes  Vessel Caliber: medium, compressibility normal  Vascular Doppler: not done  Needle advanced into vessel with real time Ultrasound guidance.  Guidewire confirmed in vessel.  Sterile sheath used.  no esophageal manometryNumber of attempts: 1  Post-procedure: blood return through all ports and sterile dressing applied            Sloan Sanchez  3/2/2020

## 2020-03-03 NOTE — SUBJECTIVE & OBJECTIVE
Review of Systems   Cardiovascular: Negative for chest pain, leg swelling, orthopnea and paroxysmal nocturnal dyspnea.   Respiratory: Negative for shortness of breath.    Musculoskeletal: Positive for back pain.     Objective:     Vital Signs (Most Recent):  Temp: 99.4 °F (37.4 °C) (03/03/20 0715)  Pulse: 103 (03/03/20 0800)  Resp: 19 (03/03/20 0800)  BP: 123/62 (03/03/20 0800)  SpO2: 95 % (03/03/20 0800) Vital Signs (24h Range):  Temp:  [97.8 °F (36.6 °C)-99.4 °F (37.4 °C)] 99.4 °F (37.4 °C)  Pulse:  [102-122] 103  Resp:  [15-21] 19  SpO2:  [82 %-98 %] 95 %  BP: ()/(54-95) 123/62     Weight: 75 kg (165 lb 5.5 oz)  Body mass index is 27.51 kg/m².     SpO2: 95 %  O2 Device (Oxygen Therapy): nasal cannula      Intake/Output Summary (Last 24 hours) at 3/3/2020 0813  Last data filed at 3/3/2020 0800  Gross per 24 hour   Intake 1594.87 ml   Output 2380 ml   Net -785.13 ml       Lines/Drains/Airways     Peripherally Inserted Central Catheter Line            PICC Double Lumen 03/02/20 2040 right basilic less than 1 day          Drain            Male External Urinary Catheter 03/02/20 2000 Medium less than 1 day          Peripheral Intravenous Line                 Peripheral IV - Single Lumen 03/02/20 1730 20 G Right Forearm less than 1 day         Peripheral IV - Single Lumen 03/02/20 1800 18 G Left Antecubital less than 1 day                Physical Exam   Cardiovascular: Regular rhythm and intact distal pulses. Tachycardia present.   Pulmonary/Chest: Effort normal and breath sounds normal.   Abdominal: Soft. Bowel sounds are normal.   Musculoskeletal:        Right lower leg: He exhibits no edema.        Left lower leg: He exhibits no edema.   Neurological: He is alert.   Vitals reviewed.      Significant Labs:   CMP   Recent Labs   Lab 03/02/20  1730 03/03/20  0320    141   K 3.5 3.9    106   CO2 24 22*   * 269*   BUN 8 8   CREATININE 1.1 1.0   CALCIUM 9.7 9.1   PROT 7.3  --    ALBUMIN 3.7   --    BILITOT 0.3  --    ALKPHOS 86  --    AST 12  --    ALT 10  --    ANIONGAP 13 13   ESTGFRAFRICA >60 >60   EGFRNONAA >60 >60    and CBC   Recent Labs   Lab 03/02/20  1730 03/03/20  0320   WBC 7.87 11.66   HGB 13.5* 13.4*   HCT 44.3 42.9    236       Significant Imaging: Cardiac Cath:     Dominance: Right  LM: prox 40-50%, MLA 7.1mm2, no significant MLA difference vs distal vessel  LAD: prox stent thrombosed, after PCI dist/transapical LAD occluded (small caliber)  LCx: dist 95%  RCA: not injected, known severe diffuse prox-mid disease with dist  and L-R collaterals     PCI prox LAD stent thrombosis:  Preop ASA/ticagrelor/heparin, intraop aggrastat  Predil 2.5x12  Stent 2.75x26 Resolute Forbes MICH 16 patricia  De romy thrombus formed aftre PCI->POBA 2.5x12 at 20 patricia with aggrastat  Excellent result, T3 flow, 0% residual stenosis     PCI dist LCx:  Predil 2.5x12  Stent 2.5x22 Resolute Tony MICH 14 patricia  Excellent result, T3 flow 0% residual stenosis        Echocardiogram:   Transthoracic echo (TTE) complete (Cupid Only):   Results for orders placed or performed during the hospital encounter of 11/07/19   Echo Color Flow Doppler? Yes   Result Value Ref Range    Ascending aorta 2.86 cm    STJ 2.80 cm    AV mean gradient 4 mmHg    Ao peak mike 1.35 m/s    Ao VTI 27.07 cm    IVRT 0.13 msec    IVS 1.01 0.6 - 1.1 cm    LA size 3.01 cm    Left Atrium Major Axis 4.94 cm    Left Atrium Minor Axis 4.73 cm    LVIDD 4.58 3.5 - 6.0 cm    LVIDS 3.78 2.1 - 4.0 cm    LVOT diameter 2.36 cm    LVOT peak VTI 20.72 cm    PW 0.97 0.6 - 1.1 cm    MV Peak A Mike 1.09 m/s    E wave decelartion time 174.93 msec    MV Peak E Mike 0.73 m/s    PV Peak D Mike 0.42 m/s    PV Peak S Mike 0.58 m/s    RA Major Axis 4.34 cm    RA Width 3.70 cm    RVDD 3.12 cm    Sinus 3.64 cm    TAPSE 1.84 cm    TR Max Mike 2.30 m/s    TDI LATERAL 0.05 m/s    TDI SEPTAL 0.04 m/s    LA WIDTH 3.69 cm    Ao root annulus 3.28 cm    AORTIC VALVE CUSP SEPERATION 1.76 cm     PV PEAK VELOCITY 0.88 cm/s    LV Diastolic Volume 96.26 mL    LV Systolic Volume 61.09 mL    RV S' 10.06 cm/s    LVOT peak carlos 1.12 m/s    LV LATERAL E/E' RATIO 14.60 m/s    LV SEPTAL E/E' RATIO 18.25 m/s    FS 17 %    LA volume 45.63 cm3    LV mass 155.54 g    Left Ventricle Relative Wall Thickness 0.42 cm    AV valve area 3.35 cm2    AV Velocity Ratio 0.83     AV index (prosthetic) 0.77     E/A ratio 0.67     Mean e' 0.05 m/s    Pulm vein S/D ratio 1.38     LVOT area 4.4 cm2    LVOT stroke volume 90.59 cm3    AV peak gradient 7 mmHg    E/E' ratio 16.22 m/s    Triscuspid Valve Regurgitation Peak Gradient 21 mmHg    Right Atrial Pressure (from IVC) 3 mmHg    TV rest pulmonary artery pressure 24 mmHg    Narrative    · Mildly decreased left ventricular systolic function. The estimated   ejection fraction is 40%. Apical septal hypokinesis  · Grade I (mild) left ventricular diastolic dysfunction consistent with   impaired relaxation.  · Normal right ventricular systolic function.  · Mild tricuspid regurgitation.

## 2020-03-03 NOTE — CARE UPDATE
[facility]  ICU Multidisciplinary Bedside Rounds   SUMMARY     Date: 3/3/2020    Prehospitalization: Home  Admit Date / LOS : 3/2/2020/ 1 days    Diagnosis: ST elevation myocardial infarction involving left anterior descending (LAD) coronary artery    Consults:        Active: Hospitalist for DM mgmt., CM        Needed: N/A     Code Status: Prior   Advanced Directive: <no information>    LDA: PICC and PIV, condom cath       Central Lines/Site/Justification:Pressors       Urinary Cath/Order/Justification:Patient Does Not Have Urinary Catheter    Vasopressors/Infusions:    norepinephrine bitartrate-D5W 0.01 mcg/kg/min (03/03/20 0545)    tirofiban-0.9% sodium chloride 12.5 mg/250ml 0.075 mcg/kg/min (03/03/20 0500)          GOALS: Volume/ Hemodynamic: augmented blood pressure                      RASS: N/A    CAM ICU: N/A  Pain Management: PO       Pain Controlled: yes     Rhythm: ST    Respiratory Device: Nasal Cannula            Most Recent SBT/ SAT: N/A       VTE Prophylaxis: Pharm  Mobility: Bedrest  Stress Ulcer Prophylaxis: No    Dietary: PO  Tolerance: yes  /  Advancement: @ goal    Isolation: No active isolations    Restraints: No    Significant Dates:  Post Op Date: 3/2/20 cardiac cath  Rescue Date: N/A  Imaging/ Diagnostics: N/A    Noteworthy Labs:  none    CBC/Anemia Labs: Coags:    Recent Labs   Lab 03/02/20 1730 03/03/20 0320   WBC 7.87 11.66   HGB 13.5* 13.4*   HCT 44.3 42.9    236   MCV 86 84   RDW 12.8 13.0    No results for input(s): PT, INR, APTT in the last 168 hours.     Chemistries:   Recent Labs   Lab 03/02/20 1730 03/03/20  0320    141   K 3.5 3.9    106   CO2 24 22*   BUN 8 8   CREATININE 1.1 1.0   CALCIUM 9.7 9.1   PROT 7.3  --    BILITOT 0.3  --    ALKPHOS 86  --    ALT 10  --    AST 12  --         Cardiac Enzymes: Ejection Fractions:    Recent Labs     03/02/20 1730 03/02/20 2022   TROPONINI <0.006 8.603*    No results found for: EF     POCT  Glucose: HbA1c:    Recent Labs   Lab 03/02/20  2202   POCTGLUCOSE 306*    Hemoglobin A1C   Date Value Ref Range Status   04/12/2019 11.7 (H) 4.0 - 5.6 % Final     Comment:     ADA Screening Guidelines:  5.7-6.4%  Consistent with prediabetes  >or=6.5%  Consistent with diabetes  High levels of fetal hemoglobin interfere with the HbA1C  assay. Heterozygous hemoglobin variants (HbS, HgC, etc)do  not significantly interfere with this assay.   However, presence of multiple variants may affect accuracy.          Needs from Care Team: none     ICU LOS 10h  Level of Care: Critical Care

## 2020-03-03 NOTE — PLAN OF CARE
03/03/20 1520   Discharge Assessment   Assessment Type Discharge Planning Assessment   Confirmed/corrected address and phone number on facesheet? Yes   Assessment information obtained from? Patient   Prior to hospitilization cognitive status: Alert/Oriented   Prior to hospitalization functional status: Independent   Current cognitive status: Alert/Oriented   Current Functional Status: Independent   Facility Arrived From: home   Lives With spouse   Able to Return to Prior Arrangements yes   Is patient able to care for self after discharge? Yes   Who are your caregiver(s) and their phone number(s)? Mary [phone]   Patient's perception of discharge disposition home or selfcare   Readmission Within the Last 30 Days no previous admission in last 30 days   Patient currently being followed by outpatient case management? No   Patient currently receives any other outside agency services? No   Equipment Currently Used at Home glucometer   Do you have any problems affording any of your prescribed medications? No   Is the patient taking medications as prescribed? yes   Does the patient have transportation home? Yes   Transportation Anticipated family or friend will provide;car, drives self   Dialysis Name and Scheduled days N/A   Does the patient receive services at the [facility]? No   Discharge Plan A Home with family   Discharge Plan B Home with family;Home Health   DME Needed Upon Discharge  none   Patient/Family in Agreement with Plan [facility] - [place] - [address]  [address], [address]  [place] [address]  Phone: [phone] Fax: [phone]    [facility] [place] - [place] - [address]  [address]  [place] [address]  Phone: [phone] Fax: [phone]

## 2020-03-03 NOTE — PROGRESS NOTES
[facility]  Cardiology  Progress Note    Patient Name: Fredrick Cox  MRN: [id number]  Admission Date: 3/2/2020  Hospital Length of Stay: 1 days  Code Status: Prior   Attending Physician: George Ho MD   Primary Care Physician: Amos Tee III, MD  Expected Discharge Date:   Principal Problem:ST elevation myocardial infarction involving left anterior descending (LAD) coronary artery    Subjective:     Hospital Course:   3/3/20: Levophed off. IABP 1:1, augmenting 117. EKG ST        Review of Systems   Cardiovascular: Negative for chest pain, leg swelling, orthopnea and paroxysmal nocturnal dyspnea.   Respiratory: Negative for shortness of breath.    Musculoskeletal: Positive for back pain.     Objective:     Vital Signs (Most Recent):  Temp: 99.4 °F (37.4 °C) (03/03/20 0715)  Pulse: 103 (03/03/20 0800)  Resp: 19 (03/03/20 0800)  BP: 123/62 (03/03/20 0800)  SpO2: 95 % (03/03/20 0800) Vital Signs (24h Range):  Temp:  [97.8 °F (36.6 °C)-99.4 °F (37.4 °C)] 99.4 °F (37.4 °C)  Pulse:  [102-122] 103  Resp:  [15-21] 19  SpO2:  [82 %-98 %] 95 %  BP: ()/(54-95) 123/62     Weight: 75 kg (165 lb 5.5 oz)  Body mass index is 27.51 kg/m².     SpO2: 95 %  O2 Device (Oxygen Therapy): nasal cannula      Intake/Output Summary (Last 24 hours) at 3/3/2020 0813  Last data filed at 3/3/2020 0800  Gross per 24 hour   Intake 1594.87 ml   Output 2380 ml   Net -785.13 ml       Lines/Drains/Airways     Peripherally Inserted Central Catheter Line            PICC Double Lumen 03/02/20 2040 right basilic less than 1 day          Drain            Male External Urinary Catheter 03/02/20 2000 Medium less than 1 day          Peripheral Intravenous Line                 Peripheral IV - Single Lumen 03/02/20 1730 20 G Right Forearm less than 1 day         Peripheral IV - Single Lumen 03/02/20 1800 18 G Left Antecubital less than 1 day                Physical Exam   Cardiovascular: Regular rhythm and intact distal  pulses. Tachycardia present.   Pulmonary/Chest: Effort normal and breath sounds normal.   Abdominal: Soft. Bowel sounds are normal.   Musculoskeletal:        Right lower leg: He exhibits no edema.        Left lower leg: He exhibits no edema.   Neurological: He is alert.   Vitals reviewed.      Significant Labs:   CMP   Recent Labs   Lab 03/02/20  1730 03/03/20  0320    141   K 3.5 3.9    106   CO2 24 22*   * 269*   BUN 8 8   CREATININE 1.1 1.0   CALCIUM 9.7 9.1   PROT 7.3  --    ALBUMIN 3.7  --    BILITOT 0.3  --    ALKPHOS 86  --    AST 12  --    ALT 10  --    ANIONGAP 13 13   ESTGFRAFRICA >60 >60   EGFRNONAA >60 >60    and CBC   Recent Labs   Lab 03/02/20  1730 03/03/20  0320   WBC 7.87 11.66   HGB 13.5* 13.4*   HCT 44.3 42.9    236       Significant Imaging: Cardiac Cath:     Dominance: Right  LM: prox 40-50%, MLA 7.1mm2, no significant MLA difference vs distal vessel  LAD: prox stent thrombosed, after PCI dist/transapical LAD occluded (small caliber)  LCx: dist 95%  RCA: not injected, known severe diffuse prox-mid disease with dist  and L-R collaterals     PCI prox LAD stent thrombosis:  Preop ASA/ticagrelor/heparin, intraop aggrastat  Predil 2.5x12  Stent 2.75x26 Resolute Tony MICH 16 patricia  De romy thrombus formed aftre PCI->POBA 2.5x12 at 20 patricia with aggrastat  Excellent result, T3 flow, 0% residual stenosis     PCI dist LCx:  Predil 2.5x12  Stent 2.5x22 Resolute [place] MICH 14 patricia  Excellent result, T3 flow 0% residual stenosis        Echocardiogram:   Transthoracic echo (TTE) complete (Cupid Only):   Results for orders placed or performed during the hospital encounter of 11/07/19   Echo Color Flow Doppler? Yes   Result Value Ref Range    Ascending aorta 2.86 cm    STJ 2.80 cm    AV mean gradient 4 mmHg    Ao peak carlos 1.35 m/s    Ao VTI 27.07 cm    IVRT 0.13 msec    IVS 1.01 0.6 - 1.1 cm    LA size 3.01 cm    Left Atrium Major Axis 4.94 cm    Left Atrium Minor Axis 4.73 cm    LVIDD  4.58 3.5 - 6.0 cm    LVIDS 3.78 2.1 - 4.0 cm    LVOT diameter 2.36 cm    LVOT peak VTI 20.72 cm    PW 0.97 0.6 - 1.1 cm    MV Peak A Mike 1.09 m/s    E wave decelartion time 174.93 msec    MV Peak E Mike 0.73 m/s    PV Peak D Mike 0.42 m/s    PV Peak S Mike 0.58 m/s    RA Major Axis 4.34 cm    RA Width 3.70 cm    RVDD 3.12 cm    Sinus 3.64 cm    TAPSE 1.84 cm    TR Max Mike 2.30 m/s    TDI LATERAL 0.05 m/s    TDI SEPTAL 0.04 m/s    LA WIDTH 3.69 cm    Ao root annulus 3.28 cm    AORTIC VALVE CUSP SEPERATION 1.76 cm    PV PEAK VELOCITY 0.88 cm/s    LV Diastolic Volume 96.26 mL    LV Systolic Volume 61.09 mL    RV S' 10.06 cm/s    LVOT peak mike 1.12 m/s    LV LATERAL E/E' RATIO 14.60 m/s    LV SEPTAL E/E' RATIO 18.25 m/s    FS 17 %    LA volume 45.63 cm3    LV mass 155.54 g    Left Ventricle Relative Wall Thickness 0.42 cm    AV valve area 3.35 cm2    AV Velocity Ratio 0.83     AV index (prosthetic) 0.77     E/A ratio 0.67     Mean e' 0.05 m/s    Pulm vein S/D ratio 1.38     LVOT area 4.4 cm2    LVOT stroke volume 90.59 cm3    AV peak gradient 7 mmHg    E/E' ratio 16.22 m/s    Triscuspid Valve Regurgitation Peak Gradient 21 mmHg    Right Atrial Pressure (from IVC) 3 mmHg    TV rest pulmonary artery pressure 24 mmHg    Narrative    · Mildly decreased left ventricular systolic function. The estimated   ejection fraction is 40%. Apical septal hypokinesis  · Grade I (mild) left ventricular diastolic dysfunction consistent with   impaired relaxation.  · Normal right ventricular systolic function.  · Mild tricuspid regurgitation.        Assessment and Plan:       * ST elevation myocardial infarction involving left anterior descending (LAD) coronary artery  CP and anterior WILBER on EKG  Load brilinta, cont ASA, load heparin  Emergency cath  Borderline BP, IVF resuscitation    Risks, benefits and alternatives of the catheterization procedure were discussed with the patient and his wife in attendance, which include but are not limited  to: bleeding, infection, death, heart attack, arrhythmia, kidney failure, stroke, need for emergency surgery, etc.  Patient understands and and agrees to proceed.  Consent was placed on the chart.    3/3/20:   PCI prox LAD stent thrombosis:  Stent 2.75x26 Resolute Tony MICH 16 patricia  De romy thrombus formed aftre PCI->POBA 2.5x12 at 20 patricia with aggrastat  Excellent result, T3 flow, 0% residual stenosis     PCI dist LCx:  Predil 2.5x12  Stent 2.5x22 Resolute Tony MICH 14 patricia  Excellent result, T3 flow 0% residual stenosis      Cardiogenic shock  IABP currently 1:1. Monitor, wean as tolerated. Off pressors.    Type 2 diabetes mellitus, with long-term current use of insulin  SSI with glargine 30units qhs    HLD (hyperlipidemia)  Continue rosuvstatin 40mg    Tobacco abuse  Smoking cessation stressed to pt    Chronic combined systolic and diastolic congestive heart failure  Last EF 40%, FU echo    Essential hypertension  Hypotension. Off pressors. IABP in place. Wean as tolerated.        VTE Risk Mitigation (From admission, onward)    None          Archie Pathak MD  Cardiology  [facility]

## 2020-03-03 NOTE — PLAN OF CARE
Admitted to ICU on last PM via cath lab for STEMI, currently w/ IABP via R groin, site without bleeding or hematoma. O2 4 L NC intact. Condom cath in place w/ adequate urine output post Lasix 20 mg IVP on last PM. Aggrestat currently infusing, to stop at 0700. Levo infusing at 0.02 mcg/kg/min w/ weaning in process. R upper arm dual lumen PICC placed on last PM for Levo infusion. R wrist Vasband removed last PM without difficulty, no bleeding or hematoma noted to site. Patient w/ emesis X 2, PRN Zofran and one time dose of Phenergan given w/ relief. Xanax given x 1 for patient anxiety and restlessness w/ good result. HOB flat throughout night, patient continually redirected that right leg is to remain straight. Positive pulses noted to BUE and BLE. Remains free of falls and injuries.

## 2020-03-03 NOTE — PLAN OF CARE
1745  VSS, low grade temp all day (99.6), IABP d/c'd at 1635, pressure dressing clean, dry, and intact, no s/s of hematoma or bleeding, 2+ pulses BLE, emesis x2 from hiccups, nausea relieved after removal of sheath, c/o discomfort from lying flat, declined pain medication due to nausea, c/o lack of appetite, tolerating diet poorly, urine output dropped significantly after episodes of emesis, MD aware, 250 mL bolus infusing as ordered to replace fluids, no BM this shift, educated pt re: keeping leg immobilized and risk of bleeding, bed in low, locked position, call light within reach, resting comfortably with eyes closed, able to make needs known; no needs at this time.

## 2020-03-03 NOTE — PROGRESS NOTES
1125  Called Dr. Pathak re: titrating ratio from 1:1 to 1:2.  Per Dr. Pathak, change IABP to 1:2 and monitor.  Done.    1200 Pt tolerating well, VSS.    1400  Discussed case with Dr. Pathak.  Change ratio to 1:3.  Done.    1600 Assessed pt; called Dr. Pathak.  IABP to be d/c'd.    1700 Dr. Pathak pulled [id number] at 1635, held pressure for 35 minutes, pt tolerated well.  Pressure dressing applied; no s/s of hematoma or bleeding.

## 2020-03-03 NOTE — PROGRESS NOTES
Balloon pump weaned over course of day. Patient remained hemodynamically stable. IABP removed. Hemostasis achieved. Manual pressure held 35 minutes. Dressing applied. Bedrest x 6 hours.

## 2020-03-03 NOTE — CARE UPDATE
[facility]  ICU Multidisciplinary Bedside Rounds     UPDATE     Date: 3/3/2020      Plan of care reviewed with the following, Nurse, Charge Nurse, Physician, Pulm CC, , Resp. Therapist, Infection Prevention and Dietician.       Needs/ Goals for the day: d/c balloon pump      Level of Care: Critical Care

## 2020-03-03 NOTE — ED PROVIDER NOTES
Encounter Date: 3/2/2020    SCRIBE #1 NOTE: I, Viviana Kaur, am scribing for, and in the presence of,  Yamilet Andrade MD. I have scribed the following portions of the note - Other sections scribed: HPI,ROS,PE,EKG.       History     Chief Complaint   Patient presents with    Chest Pain     EMS reports pt c/o crushing misdternal chest pain starting 30 min PTA, took 2 SL nitro at home with some relief. 324 ASA given by EMS. also c/o n/v with 300cc emesis in bag upon arrival to ED. hx of MI. pt moved immediately to room 27 for EKG with MD at bedside.      The patient is a 64 year old male with past medical history of CHF(EF 40-45%), CAD, HTN, HLD, DM, and MI( 3 stents) who present to the ED for emergent evaluation of sudden onset mid sternal chest pain/ pressure-like with radiation to left arm that started 45 minutes prior to arrival. Per EMS, patient had 2 SL nitro with some relief. Patient was given 1 liter of fluids, and 324 ASA by EMS. Patient also complains of nausea with associated emesis, +SOB and lightheadedness. Patient reports he is currently on brilinta but has not taken this morning, unsure when the last time he took it was, which he denies compliance. He denies any further complaints. Patient reports pain feels like previous MI's. He smokes one pack of cigarettes a day. He denies alcohol abuse, or illicit drug use.     The history is provided by the patient and the EMS personnel. No  was used.     Review of patient's allergies indicates:  No Known Allergies  Past Medical History:   Diagnosis Date    CHF (congestive heart failure)     Coronary artery disease     Diabetes mellitus     HLD (hyperlipidemia)     HTN (hypertension)     MI (myocardial infarction)     X's 2    Tobacco abuse      Past Surgical History:   Procedure Laterality Date    CARDIAC CATHETERIZATION  2007    x1    CARDIAC CATHETERIZATION  ?    x1    CORONARY ANGIOPLASTY WITH STENT PLACEMENT  2007 and ???    LEFT  HEART CATHETERIZATION Left 12/5/2018    Procedure: Left heart cath;  Surgeon: Kenny Middleton MD;  Location: [facility] CATH LAB;  Service: Cardiology;  Laterality: Left;     Family History   Problem Relation Age of Onset    No Known Problems Mother     No Known Problems Father     No Known Problems Sister     No Known Problems Brother     No Known Problems Maternal Aunt     No Known Problems Maternal Uncle     No Known Problems Paternal Aunt     No Known Problems Paternal Uncle     No Known Problems Maternal Grandmother     No Known Problems Maternal Grandfather     No Known Problems Paternal Grandmother     No Known Problems Paternal Grandfather     Amblyopia Neg Hx     Blindness Neg Hx     Cancer Neg Hx     Cataracts Neg Hx     Diabetes Neg Hx     Glaucoma Neg Hx     Hypertension Neg Hx     Macular degeneration Neg Hx     Retinal detachment Neg Hx     Strabismus Neg Hx     Stroke Neg Hx     Thyroid disease Neg Hx      Social History     Tobacco Use    Smoking status: Current Every Day Smoker     Packs/day: 0.50     Years: 35.00     Pack years: 17.50     Types: Cigarettes    Smokeless tobacco: Never Used   Substance Use Topics    Alcohol use: Yes     Alcohol/week: 0.8 standard drinks     Types: 1 Standard drinks or equivalent per week     Comment: social    Drug use: No     Review of Systems   Constitutional: Negative for chills, diaphoresis and fever.   HENT: Negative for congestion, rhinorrhea and sore throat.    Eyes: Negative for photophobia and visual disturbance.   Respiratory: Negative for cough and shortness of breath.    Cardiovascular: Positive for chest pain. Negative for leg swelling.   Gastrointestinal: Positive for nausea and vomiting. Negative for abdominal pain, blood in stool, constipation and diarrhea.   Genitourinary: Negative for dysuria, flank pain, frequency, hematuria and urgency.   Musculoskeletal: Positive for arthralgias (left arm pain ). Negative for neck pain and  neck stiffness.   Skin: Negative for rash and wound.   Neurological: Positive for light-headedness. Negative for weakness, numbness and headaches.   Hematological: Does not bruise/bleed easily.   Psychiatric/Behavioral: Negative for confusion and suicidal ideas.   All other systems reviewed and are negative.      Physical Exam     Initial Vitals   BP Pulse Resp Temp SpO2   03/02/20 1722 03/02/20 1722 03/02/20 1722 03/02/20 1743 03/02/20 1722   99/67 104 20 97.9 °F (36.6 °C) 95 %      MAP       --                Physical Exam    Nursing note and vitals reviewed.  Constitutional: He appears well-developed and well-nourished. He is diaphoretic (slightly). He appears distressed.   Appears uncomfortable.    HENT:   Head: Normocephalic and atraumatic.   Right Ear: External ear normal.   Left Ear: External ear normal.   Mouth/Throat: Oropharynx is clear and moist. No oropharyngeal exudate.   Eyes: Conjunctivae and EOM are normal. Pupils are equal, round, and reactive to light. Right eye exhibits no discharge. Left eye exhibits no discharge.   Neck: Normal range of motion. Neck supple. No JVD present.   No JVD.   Cardiovascular: Regular rhythm, normal heart sounds and intact distal pulses. Tachycardia present.  Exam reveals no gallop and no friction rub.    No murmur heard.  Equal pulses bilaterally.    Pulmonary/Chest: Breath sounds normal. No respiratory distress. He has no wheezes. He has no rhonchi. He has no rales. He exhibits no tenderness.   No chest tenderness.    Abdominal: Soft. Bowel sounds are normal. He exhibits no distension. There is no tenderness. There is no rebound and no guarding.   Musculoskeletal: Normal range of motion. He exhibits no edema or tenderness.   Lymphadenopathy:     He has no cervical adenopathy.   Neurological: He is alert and oriented to person, place, and time. He has normal strength and normal reflexes. No cranial nerve deficit or sensory deficit. GCS score is 15. GCS eye subscore is 4.  GCS verbal subscore is 5. GCS motor subscore is 6.   Skin: Skin is warm. Capillary refill takes less than 2 seconds.   Psychiatric: He has a normal mood and affect. Thought content normal.         ED Course   Critical Care  Date/Time: 3/2/2020 7:35 PM  Performed by: Yamilet Andrade MD  Authorized by: Yamilet Andrade MD   Direct patient critical care time: 5 minutes  Additional history critical care time: 5 minutes  Ordering / reviewing critical care time: 5 minutes  Documentation critical care time: 10 minutes  Consulting other physicians critical care time: 10 minutes  Total critical care time (exclusive of procedural time) : 35 minutes  Critical care time was exclusive of separately billable procedures and treating other patients and teaching time.  Critical care was necessary to treat or prevent imminent or life-threatening deterioration of the following conditions: cardiac failure (STEMI).  Critical care was time spent personally by me on the following activities: blood draw for specimens, development of treatment plan with patient or surrogate, discussions with consultants, discussions with primary provider, review of old charts, re-evaluation of patient's condition, interpretation of cardiac output measurements, examination of patient, evaluation of patient's response to treatment, obtaining history from patient or surrogate, ordering and performing treatments and interventions, ordering and review of radiographic studies and ordering and review of laboratory studies.        Labs Reviewed   CBC W/ AUTO DIFFERENTIAL - Abnormal; Notable for the following components:       Result Value    Hemoglobin 13.5 (*)     Mean Corpuscular Hemoglobin 26.2 (*)     Mean Corpuscular Hemoglobin Conc 30.5 (*)     MPV 9.0 (*)     All other components within normal limits     EKG Readings: (Independently Interpreted)   Anterior lateral STEMI with elevation in I, aVL, V2,V3 with depression inferiorly in V5, and V6. He has normal  axes. Normal intervals. Sinus tachycardia at rate 102.        Imaging Results    None        MDM  64 year old patient with hx of CAD s/p stent x 3, DM, HTN, brilinta noncompliance presenting secondary to chest pain via EMS. Patient EKG concerning for anterio-lateral STEMI and the stemi code was activated. Patient given ASA and nitroglycernin by EMS. Blood pressure low and given 1.5L of NACL. Brilinta 180 mg given as well as 3,500 units of heparin as requested by Dr. Ho. Dr. Ho emergently evaluated the patient and he was prepped and taken emergently to the cath lab. Patient to be admitted to the ICU after cath.                   Scribe Attestation:   Scribe #1: I performed the above scribed service and the documentation accurately describes the services I performed. I attest to the accuracy of the note.                          Clinical Impression:       ICD-10-CM ICD-9-CM   1. Chest pain R07.9 786.50   2. STEMI involving left anterior descending coronary artery I21.02 410.10   3. ST elevation myocardial infarction involving left anterior descending (LAD) coronary artery I21.02 410.10             ED Disposition Condition    Admit        I, Yamilet Andrade, personally performed the services described in this documentation. All medical record entries made by the scribe were at my direction and in my presence. I have reviewed the chart and agree that the record reflects my personal performance and is accurate and complete.                   Yamilet Andrade MD  03/02/20 0557

## 2020-03-03 NOTE — BRIEF OP NOTE
[facility]  Brief Operative Note     SUMMARY     Surgery Date: 3/2/2020     Surgeon(s) and Role:     * George Ho MD - Primary    Assisting Surgeon: None    Pre-op Diagnosis:  Chest pain [R07.9]    Post-op Diagnosis:  Post-Op Diagnosis Codes:     * Chest pain [R07.9]    Procedure(s) (LRB):  Left heart cath (Left)  Percutaneous coronary intervention (N/A)  Stent, Drug Eluting, Multi Vessel, Coronary    Anesthesia: RN IV Sedation    Description of the findings of the procedure: LHC/cor angio/PCI LAD late stent thrombosis MICH x1/PCI dist LCx MICH x1/IVUS LM/IABP placement RFA.    Findings/Key Components:  LVEDP: 35mmHg  LVEF: echo ordered    Dominance: Right  LM: prox 40-50%, MLA 7.1mm2, no significant MLA difference vs distal vessel  LAD: prox stent thrombosed, after PCI dist/transapical LAD occluded (small caliber)  LCx: dist 95%  RCA: not injected, known severe diffuse prox-mid disease with dist  and L-R collaterals    PCI prox LAD stent thrombosis:  Preop ASA/ticagrelor/heparin, intraop aggrastat  Predil 2.5x12  Stent 2.75x26 Resolute [place] MICH 16 patricia  De romy thrombus formed aftre PCI->POBA 2.5x12 at 20 patricia with aggrastat  Excellent result, T3 flow, 0% residual stenosis    PCI dist LCx:  Predil 2.5x12  Stent 2.5x22 Resolute [place] MICH 14 patricia  Excellent result, T3 flow 0% residual stenosis    Hemostasis:  R Radial band  RFA IABP sheath in place    Impression:  STEMI/AWMI  CGS on arrival to lab  3V CAD  Late stent thrombosis of prox LAD-> 2.75x26 Resolute [place] MICH  De-romy dist LCx-> 2.5x22 Resolute [place] MICH  RCA , collateralized  IABP placed for hemodynamic support via RFA, sutured in place  R rad vasband for hemostasis    Plan:  Admit to ICU  ASA 81mg qd indefinitely  Ticagrelor 90mg bid for 1 year minimum (thru 3/2021), consider indefinite rx  Rosuva 40mg qd  IABP @ 1:1  Aggrastat gtt  Eventual outpat card rehab referral    Estimated Blood Loss: <50ss         Specimens: None

## 2020-03-03 NOTE — ASSESSMENT & PLAN NOTE
CP and anterior WILBER on EKG  Load brilinta, cont ASA, load heparin  Emergency cath  Borderline BP, IVF resuscitation    Risks, benefits and alternatives of the catheterization procedure were discussed with the patient and his wife in attendance, which include but are not limited to: bleeding, infection, death, heart attack, arrhythmia, kidney failure, stroke, need for emergency surgery, etc.  Patient understands and and agrees to proceed.  Consent was placed on the chart.    3/3/20:   PCI prox LAD stent thrombosis:  Stent 2.75x26 Resolute [place] MICH 16 patricia  De romy thrombus formed aftre PCI->POBA 2.5x12 at 20 patricia with aggrastat  Excellent result, T3 flow, 0% residual stenosis     PCI dist LCx:  Predil 2.5x12  Stent 2.5x22 Resolute [place] MICH 14 patricia  Excellent result, T3 flow 0% residual stenosis

## 2020-03-03 NOTE — EICU
Rounding (Video Assessment):  No    Intervention Initiated From:  Bedside    Kraig Communicated with Bedside Nurse regarding:  Other    Doctor Notified:  Yes    Comments:  Bedside RN called regarding pt's nausea

## 2020-03-03 NOTE — HOSPITAL COURSE
3/3/20: Levophed off. IABP 1:1, augmenting 117. EKG ST    3/4/20: No chest pain. No sob. Sitting up in chair. Hemodynamically stable.     03/06/2020:  Patient doing fine.  Denies any chest pain at rest on exertion, orthopnea, PND.  States his back to his baseline.  Discussed importance of compliance with dual antiplatelet therapy.  Patient will be discharged home today he is eager to go home.  Follow-up in Cardiology Clinic with Dr. Ho.

## 2020-03-04 PROBLEM — R57.0 CARDIOGENIC SHOCK: Status: RESOLVED | Noted: 2020-03-03 | Resolved: 2020-03-04

## 2020-03-04 LAB
POCT GLUCOSE: 224 MG/DL (ref 70–110)
POCT GLUCOSE: 284 MG/DL (ref 70–110)
POCT GLUCOSE: 286 MG/DL (ref 70–110)

## 2020-03-04 PROCEDURE — 21400001 HC TELEMETRY ROOM

## 2020-03-04 PROCEDURE — 63600175 PHARM REV CODE 636 W HCPCS: Performed by: HOSPITALIST

## 2020-03-04 PROCEDURE — 25000003 PHARM REV CODE 250: Performed by: INTERNAL MEDICINE

## 2020-03-04 PROCEDURE — A4216 STERILE WATER/SALINE, 10 ML: HCPCS | Performed by: INTERNAL MEDICINE

## 2020-03-04 PROCEDURE — C9399 UNCLASSIFIED DRUGS OR BIOLOG: HCPCS | Performed by: HOSPITALIST

## 2020-03-04 RX ORDER — INSULIN ASPART 100 [IU]/ML
3 INJECTION, SOLUTION INTRAVENOUS; SUBCUTANEOUS
Status: DISCONTINUED | OUTPATIENT
Start: 2020-03-04 | End: 2020-03-05

## 2020-03-04 RX ADMIN — INSULIN DETEMIR 7 UNITS: 100 INJECTION, SOLUTION SUBCUTANEOUS at 10:03

## 2020-03-04 RX ADMIN — INSULIN ASPART 3 UNITS: 100 INJECTION, SOLUTION INTRAVENOUS; SUBCUTANEOUS at 11:03

## 2020-03-04 RX ADMIN — Medication 10 ML: at 05:03

## 2020-03-04 RX ADMIN — INSULIN ASPART 3 UNITS: 100 INJECTION, SOLUTION INTRAVENOUS; SUBCUTANEOUS at 06:03

## 2020-03-04 RX ADMIN — TICAGRELOR 90 MG: 90 TABLET ORAL at 08:03

## 2020-03-04 RX ADMIN — Medication 10 ML: at 12:03

## 2020-03-04 RX ADMIN — Medication 10 ML: at 07:03

## 2020-03-04 RX ADMIN — ASPIRIN 81 MG: 81 TABLET, COATED ORAL at 08:03

## 2020-03-04 RX ADMIN — ROSUVASTATIN CALCIUM 40 MG: 10 TABLET, COATED ORAL at 08:03

## 2020-03-04 RX ADMIN — INSULIN ASPART 1 UNITS: 100 INJECTION, SOLUTION INTRAVENOUS; SUBCUTANEOUS at 10:03

## 2020-03-04 RX ADMIN — TICAGRELOR 90 MG: 90 TABLET ORAL at 10:03

## 2020-03-04 RX ADMIN — INSULIN ASPART 2 UNITS: 100 INJECTION, SOLUTION INTRAVENOUS; SUBCUTANEOUS at 05:03

## 2020-03-04 RX ADMIN — Medication 10 ML: at 11:03

## 2020-03-04 RX ADMIN — ACETAMINOPHEN 650 MG: 325 TABLET ORAL at 07:03

## 2020-03-04 NOTE — PLAN OF CARE
1400  VSS, afebrile, AAOX4, breath sounds diminished, c/o SOB w/ exertion, tolerating diet moderately well, not much appetite, voiding minimal urine to urinal, MD aware, fluids status closely monitored, PO intake encouraged, no BM this shift, ambulated in room, up in chair with wheels locked, call light within reach, able to make needs known; no needs at this time.

## 2020-03-04 NOTE — NURSING TRANSFER
Nursing Transfer Note      3/4/2020     Transfer From: ICU    Transfer via bed    Transfer with cardiac monitoring    Transported by transport and ICU nurse Ruth    Medicines sent: yes    Chart send with patient: Yes    Notified: family    Patient reassessed at: 3/4/2020b 2:45    Upon arrival to floor: cardiac monitor applied, patient oriented to room, call bell in reach and bed in lowest position

## 2020-03-04 NOTE — ASSESSMENT & PLAN NOTE
CP and anterior WILBER on EKG  Load brilinta, cont ASA, load heparin  Emergency cath  Borderline BP, IVF resuscitation    Risks, benefits and alternatives of the catheterization procedure were discussed with the patient and his wife in attendance, which include but are not limited to: bleeding, infection, death, heart attack, arrhythmia, kidney failure, stroke, need for emergency surgery, etc.  Patient understands and and agrees to proceed.  Consent was placed on the chart.    3/3/20:   PCI prox LAD stent thrombosis:  Stent 2.75x26 Resolute [place] MICH 16 patricia  De romy thrombus formed aftre PCI->POBA 2.5x12 at 20 patricia with aggrastat  Excellent result, T3 flow, 0% residual stenosis     PCI dist LCx:  Predil 2.5x12  Stent 2.5x22 Resolute [place] MICH 14 patricia  Excellent result, T3 flow 0% residual stenosis

## 2020-03-04 NOTE — PLAN OF CARE
Problem: Adult Inpatient Plan of Care  Goal: Plan of Care Review  Outcome: Ongoing, Progressing    Patient is awake alert oriented, Able to move independently on bed. Right groin pressure dressing clean dry intact No hematoma or bleeding noted. Vital signs stable and within limits. Denies any chest pain. Possible transfer to floor today.

## 2020-03-04 NOTE — CARE UPDATE
[facility]  ICU Multidisciplinary Bedside Rounds   SUMMARY     Date: 3/4/2020    Prehospitalization: Home  Admit Date / LOS : 3/2/2020/ 2 days    Diagnosis: ST elevation myocardial infarction involving left anterior descending (LAD) coronary artery    Consults:        Active: Hospital Medicine, CM       Needed: Cardiac Rehab     Code Status: Prior   Advanced Directive: <no information>    LDA: PICC and PIV, Condom Cath       Central Lines/Site/Justification:Patient Does Not Have Central Line       Urinary Cath/Order/Justification:Patient Does Not Have Urinary Catheter    Vasopressors/Infusions:    norepinephrine bitartrate-D5W Stopped (03/03/20 0600)          GOALS: Volume/ Hemodynamic: N/A                     RASS: 0  alert and calm    CAM ICU: Negative  Pain Management: PO       Pain Controlled: yes     Rhythm: ST    Respiratory Device: Nasal Cannula                  Most Recent SBT/ SAT: N/A       MOVE Screen:     VTE Prophylaxis: Mechanical  Mobility: Bedrest  Stress Ulcer Prophylaxis: Yes    Dietary: PO  Tolerance: yes  /  Advancement: @ goal    Isolation: No active isolations    Restraints: No    Significant Dates:  Post Op Date: N/A  Rescue Date: N/A  Imaging/ Diagnostics: N/A    Noteworthy Labs:      CBC/Anemia Labs: Coags:    Recent Labs   Lab 03/02/20 1730 03/03/20  0320   WBC 7.87 11.66   HGB 13.5* 13.4*   HCT 44.3 42.9    236   MCV 86 84   RDW 12.8 13.0    No results for input(s): PT, INR, APTT in the last 168 hours.     Chemistries:   Recent Labs   Lab 03/02/20 1730 03/03/20  0320    141   K 3.5 3.9    106   CO2 24 22*   BUN 8 8   CREATININE 1.1 1.0   CALCIUM 9.7 9.1   PROT 7.3  --    BILITOT 0.3  --    ALKPHOS 86  --    ALT 10  --    AST 12  --         Cardiac Enzymes: Ejection Fractions:    Recent Labs     03/02/20 1730 03/02/20 2022   TROPONINI <0.006 8.603*    No results found for: EF     POCT Glucose: HbA1c:    Recent Labs   Lab 03/03/20  1112  03/03/20  1709 03/03/20  2104   POCTGLUCOSE 248* 220* 210*    Hemoglobin A1C   Date Value Ref Range Status   03/03/2020 12.7 (H) 4.0 - 5.6 % Final     Comment:     ADA Screening Guidelines:  5.7-6.4%  Consistent with prediabetes  >or=6.5%  Consistent with diabetes  High levels of fetal hemoglobin interfere with the HbA1C  assay. Heterozygous hemoglobin variants (HbS, HgC, etc)do  not significantly interfere with this assay.   However, presence of multiple variants may affect accuracy.          Needs from Care Team: none     ICU LOS 1d 10h  Level of Care: OK to Transfer

## 2020-03-04 NOTE — NURSING TRANSFER
Nursing Transfer Note      3/4/2020     Transfer To: 313    Transfer via bed    Transfer with cardiac monitoring    Transported by RN and transport    Medicines sent: Insulin pens & Brilinta    Chart send with patient: Yes    Notified: spouse    Patient reassessed at:03/04/2020 1440    Upon arrival to floor: cardiac monitor applied, patient oriented to room, call bell in reach and bed in lowest position

## 2020-03-04 NOTE — HPI
64 year old male with past medical history of CHF(EF 40-45%), CAD, HTN, HLD, DM, and MI( 3 stents) presented to ER for emergent evaluation of sudden onset mid sternal chest pain/ pressure-like with radiation to left arm that started 45 minutes prior to arrival.  Per EMS, patient had 2 SL nitro with some relief.  Patient was given 1 liter of fluids and ASA by EMS.  Patient also complained of nausea with associated emesis, +SOB and lightheadedness.  Patient reported he is currently on brilinta.  Diagnosed with STEMI on presentation and taken to cath lab.  Stent placed to proximal LAD stent thrombosis and distal LCx.  Patient was then sent to ICU on balloon pump.  Pump removed last night and patient doing well.  No other complaints.  Hospital Medicine consulted for medical management.

## 2020-03-04 NOTE — CONSULTS
[facility] Hospital Medicine  Consult Note    Patient Name: Fredrick Cox  MRN: [id number]  Admission Date: 3/2/2020  Hospital Length of Stay: 2 days  Attending Physician: George Ho MD   Primary Care Provider: Amos Tee III, MD           Patient information was obtained from patient and ER records.     Consults  Subjective:     Principal Problem: ST elevation myocardial infarction involving left anterior descending (LAD) coronary artery    Chief Complaint:   Chief Complaint   Patient presents with    Chest Pain     EMS reports pt c/o crushing misdternal chest pain starting 30 min PTA, took 2 SL nitro at home with some relief. 324 ASA given by EMS. also c/o n/v with 300cc emesis in bag upon arrival to ED. hx of MI. pt moved immediately to room 27 for EKG with MD at bedside.         HPI: 64 year old male with past medical history of CHF(EF 40-45%), CAD, HTN, HLD, DM, and MI( 3 stents) presented to ER for emergent evaluation of sudden onset mid sternal chest pain/ pressure-like with radiation to left arm that started 45 minutes prior to arrival.  Per EMS, patient had 2 SL nitro with some relief.  Patient was given 1 liter of fluids and ASA by EMS.  Patient also complained of nausea with associated emesis, +SOB and lightheadedness.  Patient reported he is currently on brilinta.  Diagnosed with STEMI on presentation and taken to cath lab.  Stent placed to proximal LAD stent thrombosis and distal LCx.  Patient was then sent to ICU on balloon pump.  Pump removed last night and patient doing well.  No other complaints.  Hospital Medicine consulted for medical management.    Past Medical History:   Diagnosis Date    CHF (congestive heart failure)     Coronary artery disease     Diabetes mellitus     HLD (hyperlipidemia)     HTN (hypertension)     MI (myocardial infarction)     X's 2    Tobacco abuse        Past Surgical History:   Procedure Laterality Date    CARDIAC CATHETERIZATION  " 2007    x1    CARDIAC CATHETERIZATION  ?    x1    CORONARY ANGIOPLASTY WITH STENT PLACEMENT  2007 and ???    LEFT HEART CATHETERIZATION Left 12/5/2018    Procedure: Left heart cath;  Surgeon: Kenny Middleton MD;  Location: [facility] CATH LAB;  Service: Cardiology;  Laterality: Left;    LEFT HEART CATHETERIZATION Left 3/2/2020    Procedure: Left heart cath;  Surgeon: George Ho MD;  Location: [facility] CATH LAB;  Service: Cardiology;  Laterality: Left;       Review of patient's allergies indicates:  No Known Allergies    No current facility-administered medications on file prior to encounter.      Current Outpatient Medications on File Prior to Encounter   Medication Sig    aspirin (ECOTRIN) 81 MG EC tablet Take 1 tablet (81 mg total) by mouth once daily.    blood sugar diagnostic Strp 1 strip by Misc.(Non-Drug; Combo Route) route 2 (two) times daily.    furosemide (LASIX) 40 MG tablet Take 1 tablet (40 mg total) by mouth once daily.    insulin aspart U-100 (NOVOLOG) 100 unit/mL InPn pen Inject 1-10 Units into the skin 3 (three) times daily with meals. Blood Glucose  mg/dL                  0600                0000                               1200                               1800  151-200                2 units             1 unit  201-250                4 units             2 units  251-300                6 units             3 units  301-350                8 units             4 units  >350                      10 units           5 units    pen needle, diabetic (BD INSULIN PEN NEEDLE UF MINI) 31 gauge x 3/16" Ndle USE AS DIRECTED    pen needle, diabetic 31 gauge x 5/16" Ndle 1 each by Misc.(Non-Drug; Combo Route) route 2 (two) times daily.    ranolazine (RANEXA) 500 MG Tb12 Take 1 tablet (500 mg total) by mouth 2 (two) times daily.    rosuvastatin (CRESTOR) 40 MG Tab Take 1 tablet (40 mg total) by mouth once daily.    ticagrelor (BRILINTA) 90 mg tablet Take 1 tablet (90 mg total) by mouth 2 (two) times " daily.    insulin glargine, TOUJEO, (TOUJEO SOLOSTAR U-300 INSULIN) 300 unit/mL (1.5 mL) InPn pen Inject 30 Units into the skin every evening.    nitroGLYCERIN (NITROSTAT) 0.4 MG SL tablet Place 1 tablet (0.4 mg total) under the tongue every 5 (five) minutes as needed.     Family History     Problem Relation (Age of Onset)    No Known Problems Mother, Father, Sister, Brother, Maternal Aunt, Maternal Uncle, Paternal Aunt, Paternal Uncle, Maternal Grandmother, Maternal Grandfather, Paternal Grandmother, Paternal Grandfather        Tobacco Use    Smoking status: Current Every Day Smoker     Packs/day: 0.50     Years: 35.00     Pack years: 17.50     Types: Cigarettes    Smokeless tobacco: Never Used   Substance and Sexual Activity    Alcohol use: Yes     Alcohol/week: 0.8 standard drinks     Types: 1 Standard drinks or equivalent per week     Comment: social    Drug use: No    Sexual activity: Not on file     Review of Systems   Constitutional: Negative for chills and fever.   HENT: Negative for ear discharge and ear pain.    Eyes: Negative for discharge and itching.   Respiratory: Positive for shortness of breath. Negative for cough.    Cardiovascular: Positive for chest pain. Negative for palpitations.   Gastrointestinal: Positive for nausea and vomiting.   Endocrine: Negative for polyphagia and polyuria.   Genitourinary: Negative for difficulty urinating and dysuria.   Musculoskeletal: Negative for neck pain and neck stiffness.   Skin: Negative for rash and wound.   Neurological: Negative for seizures and syncope.   Psychiatric/Behavioral: Negative for agitation and hallucinations.     Objective:     Vital Signs (Most Recent):  Temp: 97.9 °F (36.6 °C) (03/04/20 0300)  Pulse: 101 (03/04/20 0700)  Resp: (!) 21 (03/04/20 0700)  BP: 105/64 (03/04/20 0700)  SpO2: 99 % (03/04/20 0700) Vital Signs (24h Range):  Temp:  [97.9 °F (36.6 °C)-99.6 °F (37.6 °C)] 97.9 °F (36.6 °C)  Pulse:  [] 101  Resp:  [15-26]  21  SpO2:  [87 %-100 %] 99 %  BP: ()/(50-85) 105/64     Weight: 75 kg (165 lb 5.5 oz)  Body mass index is 27.51 kg/m².    Physical Exam   Constitutional: He is oriented to person, place, and time. No distress.   HENT:   Head: Normocephalic and atraumatic.   Eyes: Conjunctivae are normal. Right eye exhibits no discharge. Left eye exhibits no discharge.   Neck: Neck supple. No tracheal deviation present.   Cardiovascular: Normal rate and regular rhythm.   Pulmonary/Chest: Effort normal and breath sounds normal.   Abdominal: Soft. Bowel sounds are normal.   Musculoskeletal: Normal range of motion.   Neurological: He is alert and oriented to person, place, and time. No cranial nerve deficit.   Skin: Skin is warm and dry. He is not diaphoretic.       Significant Labs:   BMP:   Recent Labs   Lab 03/03/20  0320   *      K 3.9      CO2 22*   BUN 8   CREATININE 1.0   CALCIUM 9.1     CBC:   Recent Labs   Lab 03/02/20  1730 03/03/20  0320   WBC 7.87 11.66   HGB 13.5* 13.4*   HCT 44.3 42.9    236       Significant Imaging: I have reviewed all pertinent imaging results/findings within the past 24 hours.    Assessment/Plan:     * ST elevation myocardial infarction involving left anterior descending (LAD) coronary artery  S/P PCI to proximal LAD stent thrombosis and distal LCx.  Treatment and plan per Cardiology.      Type 2 diabetes mellitus, with long-term current use of insulin  Start Novolog with meals and Levemir at night.  Resume home regimen upon discharge.  Uncontrolled with hyperglycemia with HgA1c of 12.7  Needs much better control.  Diabetic diet with insulin sliding scale.      HLD (hyperlipidemia)  Continue Statin.      Tobacco abuse  Spent more than 3 minutes on smoking cessation counseling.      Chronic combined systolic and diastolic congestive heart failure  Defer management to Cardiology.      Essential hypertension  Currently normotensive.  Continue to monitor.        VTE Risk  Mitigation (From admission, onward)    None            Thank you for your consult. I will follow-up with patient. Please contact us if you have any additional questions.    Keon Martinez MD  Department of Hospital Medicine   [facility]

## 2020-03-04 NOTE — ASSESSMENT & PLAN NOTE
Hypotension. Off pressors. IABP in place. Wean as tolerated.    3/4/20: add beta blocker if he can tolerate

## 2020-03-04 NOTE — PROVIDER TRANSFER
[facility]  Transfer of Care Note      Patient Name: Fredrick Cox  MRN: [id number]  Admission Date: 3/2/2020  Hospital Length of Stay: 2 days  Transfer Date: 3/4/2020  Attending Physician: George Ho MD   Primary Care Provider: Amos Tee III, MD  Reason for Admission: STEMI    HPI:   The patient is a 64-year-old man with known history of coronary artery disease status post proximal and mid LAD stents in the past with known right coronary chronic occlusion and distal circumflex stenosis.  He has an ejection fraction previously measured at 40% in November 2019.  He presents to the emergency room with 1 hr of chest discomfort and found to have anterior ST elevations.  He does report not taking his Brilinta yesterday, but has been otherwise compliant with his medications.  He does smoke cigarettes.  His wife is at the bedside.  The pros and cons of catheterization were discussed with the patient and he agrees to proceed.  Permit was signed.    Hospital Course:   3/3/20: Levophed off. IABP 1:1, augmenting 117. EKG ST    3/4/20: No chest pain. No sob. Sitting up in chair. Hemodynamically stable.     * ST elevation myocardial infarction involving left anterior descending (LAD) coronary artery  CP and anterior WILBER on EKG  Load brilinta, cont ASA, load heparin  Emergency cath  Borderline BP, IVF resuscitation    Risks, benefits and alternatives of the catheterization procedure were discussed with the patient and his wife in attendance, which include but are not limited to: bleeding, infection, death, heart attack, arrhythmia, kidney failure, stroke, need for emergency surgery, etc.  Patient understands and and agrees to proceed.  Consent was placed on the chart.    3/3/20:   PCI prox LAD stent thrombosis:  Stent 2.75x26 Resolute [place] [facility] 16 patricia  De romy thrombus formed aftre PCI->POBA 2.5x12 at 20 patricia with aggrastat  Excellent result, T3 flow, 0% residual stenosis     PCI dist LCx:  Predil  2.5x12  Stent 2.5x22 Resolute [place] [facility] 14 patricia  Excellent result, T3 flow 0% residual stenosis      Type 2 diabetes mellitus, with long-term current use of insulin  SSI with glargine 30units qhs    HLD (hyperlipidemia)  Continue rosuvstatin 40mg    Tobacco abuse  Smoking cessation stressed to pt    Chronic combined systolic and diastolic congestive heart failure  Last EF 40%, FU echo    3/4/20: EF roughly 25%. Initiate HF meds as tolerated.     Essential hypertension  Hypotension. Off pressors. IABP in place. Wean as tolerated.    3/4/20: add beta blocker if he can tolerate      Consults (From admission, onward)        Status Ordering Provider     Inpatient consult to Hospitalist  Once     Provider:  Amos Chen MD    Acknowledged SULMA DOUGLASS     Inpatient consult to PICC team (\[facility]\"")  Once     Provider:  (Not yet assigned)    Acknowledged SULMA DOUGLASS     Inpatient consult to Social Work/Case Management  Once     Provider:  (Not yet assigned)    Acknowledged SULMA DOUGLASS        Procedure(s) (LRB):  Left heart cath (Left)  Percutaneous coronary intervention (N/A)  Stent, Drug Eluting, Multi Vessel, Coronary  Placement, IABP  IVUS, Coronary    Diet Orders          Diet diabetic [facility]; 2000 Calorie; Cardiac (Low Na/Chol): Diabetic starting at 03/02 1915        Activity Orders          Diet diabetic [facility]; 2000 Calorie; Cardiac (Low Na/Chol): Diabetic starting at 03/02 1915        Pending Diagnostic Studies:     None        Archie Pathak MD  [facility]

## 2020-03-04 NOTE — SUBJECTIVE & OBJECTIVE
"Past Medical History:   Diagnosis Date    CHF (congestive heart failure)     Coronary artery disease     Diabetes mellitus     HLD (hyperlipidemia)     HTN (hypertension)     MI (myocardial infarction)     X's 2    Tobacco abuse        Past Surgical History:   Procedure Laterality Date    CARDIAC CATHETERIZATION  2007    x1    CARDIAC CATHETERIZATION  ?    x1    CORONARY ANGIOPLASTY WITH STENT PLACEMENT  2007 and ???    LEFT HEART CATHETERIZATION Left 12/5/2018    Procedure: Left heart cath;  Surgeon: Kenny Middleton MD;  Location: [facility] CATH LAB;  Service: Cardiology;  Laterality: Left;    LEFT HEART CATHETERIZATION Left 3/2/2020    Procedure: Left heart cath;  Surgeon: George Ho MD;  Location: [facility] CATH LAB;  Service: Cardiology;  Laterality: Left;       Review of patient's allergies indicates:  No Known Allergies    No current facility-administered medications on file prior to encounter.      Current Outpatient Medications on File Prior to Encounter   Medication Sig    aspirin (ECOTRIN) 81 MG EC tablet Take 1 tablet (81 mg total) by mouth once daily.    blood sugar diagnostic Strp 1 strip by Misc.(Non-Drug; Combo Route) route 2 (two) times daily.    furosemide (LASIX) 40 MG tablet Take 1 tablet (40 mg total) by mouth once daily.    insulin aspart U-100 (NOVOLOG) 100 unit/mL InPn pen Inject 1-10 Units into the skin 3 (three) times daily with meals. Blood Glucose  mg/dL                  0600                0000                               1200                               1800  151-200                2 units             1 unit  201-250                4 units             2 units  251-300                6 units             3 units  301-350                8 units             4 units  >350                      10 units           5 units    pen needle, diabetic (BD INSULIN PEN NEEDLE UF MINI) 31 gauge x 3/16" Ndle USE AS DIRECTED    pen needle, diabetic 31 gauge x 5/16" Ndle 1 each by " Misc.(Non-Drug; Combo Route) route 2 (two) times daily.    ranolazine (RANEXA) 500 MG Tb12 Take 1 tablet (500 mg total) by mouth 2 (two) times daily.    rosuvastatin (CRESTOR) 40 MG Tab Take 1 tablet (40 mg total) by mouth once daily.    ticagrelor (BRILINTA) 90 mg tablet Take 1 tablet (90 mg total) by mouth 2 (two) times daily.    insulin glargine, TOUJEO, (TOUJEO SOLOSTAR U-300 INSULIN) 300 unit/mL (1.5 mL) InPn pen Inject 30 Units into the skin every evening.    nitroGLYCERIN (NITROSTAT) 0.4 MG SL tablet Place 1 tablet (0.4 mg total) under the tongue every 5 (five) minutes as needed.     Family History     Problem Relation (Age of Onset)    No Known Problems Mother, Father, Sister, Brother, Maternal Aunt, Maternal Uncle, Paternal Aunt, Paternal Uncle, Maternal Grandmother, Maternal Grandfather, Paternal Grandmother, Paternal Grandfather        Tobacco Use    Smoking status: Current Every Day Smoker     Packs/day: 0.50     Years: 35.00     Pack years: 17.50     Types: Cigarettes    Smokeless tobacco: Never Used   Substance and Sexual Activity    Alcohol use: Yes     Alcohol/week: 0.8 standard drinks     Types: 1 Standard drinks or equivalent per week     Comment: social    Drug use: No    Sexual activity: Not on file     Review of Systems   Constitutional: Negative for chills and fever.   HENT: Negative for ear discharge and ear pain.    Eyes: Negative for discharge and itching.   Respiratory: Positive for shortness of breath. Negative for cough.    Cardiovascular: Positive for chest pain. Negative for palpitations.   Gastrointestinal: Positive for nausea and vomiting.   Endocrine: Negative for polyphagia and polyuria.   Genitourinary: Negative for difficulty urinating and dysuria.   Musculoskeletal: Negative for neck pain and neck stiffness.   Skin: Negative for rash and wound.   Neurological: Negative for seizures and syncope.   Psychiatric/Behavioral: Negative for agitation and hallucinations.      Objective:     Vital Signs (Most Recent):  Temp: 97.9 °F (36.6 °C) (03/04/20 0300)  Pulse: 101 (03/04/20 0700)  Resp: (!) 21 (03/04/20 0700)  BP: 105/64 (03/04/20 0700)  SpO2: 99 % (03/04/20 0700) Vital Signs (24h Range):  Temp:  [97.9 °F (36.6 °C)-99.6 °F (37.6 °C)] 97.9 °F (36.6 °C)  Pulse:  [] 101  Resp:  [15-26] 21  SpO2:  [87 %-100 %] 99 %  BP: ()/(50-85) 105/64     Weight: 75 kg (165 lb 5.5 oz)  Body mass index is 27.51 kg/m².    Physical Exam   Constitutional: He is oriented to person, place, and time. No distress.   HENT:   Head: Normocephalic and atraumatic.   Eyes: Conjunctivae are normal. Right eye exhibits no discharge. Left eye exhibits no discharge.   Neck: Neck supple. No tracheal deviation present.   Cardiovascular: Normal rate and regular rhythm.   Pulmonary/Chest: Effort normal and breath sounds normal.   Abdominal: Soft. Bowel sounds are normal.   Musculoskeletal: Normal range of motion.   Neurological: He is alert and oriented to person, place, and time. No cranial nerve deficit.   Skin: Skin is warm and dry. He is not diaphoretic.       Significant Labs:   BMP:   Recent Labs   Lab 03/03/20  0320   *      K 3.9      CO2 22*   BUN 8   CREATININE 1.0   CALCIUM 9.1     CBC:   Recent Labs   Lab 03/02/20  1730 03/03/20  0320   WBC 7.87 11.66   HGB 13.5* 13.4*   HCT 44.3 42.9    236       Significant Imaging: I have reviewed all pertinent imaging results/findings within the past 24 hours.

## 2020-03-05 PROBLEM — R07.9 CHEST PAIN: Status: RESOLVED | Noted: 2020-03-02 | Resolved: 2020-03-05

## 2020-03-05 LAB
POCT GLUCOSE: 191 MG/DL (ref 70–110)
POCT GLUCOSE: 201 MG/DL (ref 70–110)
POCT GLUCOSE: 205 MG/DL (ref 70–110)
POCT GLUCOSE: 223 MG/DL (ref 70–110)
POCT GLUCOSE: 238 MG/DL (ref 70–110)

## 2020-03-05 PROCEDURE — 99232 PR SUBSEQUENT HOSPITAL CARE,LEVL II: ICD-10-PCS | Mod: ,,, | Performed by: INTERNAL MEDICINE

## 2020-03-05 PROCEDURE — 25000003 PHARM REV CODE 250: Performed by: INTERNAL MEDICINE

## 2020-03-05 PROCEDURE — A4216 STERILE WATER/SALINE, 10 ML: HCPCS | Performed by: INTERNAL MEDICINE

## 2020-03-05 PROCEDURE — 99232 SBSQ HOSP IP/OBS MODERATE 35: CPT | Mod: ,,, | Performed by: INTERNAL MEDICINE

## 2020-03-05 PROCEDURE — 21400001 HC TELEMETRY ROOM

## 2020-03-05 RX ORDER — INSULIN ASPART 100 [IU]/ML
5 INJECTION, SOLUTION INTRAVENOUS; SUBCUTANEOUS
Status: DISCONTINUED | OUTPATIENT
Start: 2020-03-05 | End: 2020-03-06 | Stop reason: HOSPADM

## 2020-03-05 RX ORDER — MAG HYDROX/ALUMINUM HYD/SIMETH 200-200-20
15 SUSPENSION, ORAL (FINAL DOSE FORM) ORAL EVERY 6 HOURS PRN
Status: DISCONTINUED | OUTPATIENT
Start: 2020-03-05 | End: 2020-03-06 | Stop reason: HOSPADM

## 2020-03-05 RX ADMIN — TICAGRELOR 90 MG: 90 TABLET ORAL at 08:03

## 2020-03-05 RX ADMIN — ASPIRIN 81 MG: 81 TABLET, COATED ORAL at 08:03

## 2020-03-05 RX ADMIN — ROSUVASTATIN CALCIUM 40 MG: 10 TABLET, COATED ORAL at 08:03

## 2020-03-05 RX ADMIN — ALUMINUM HYDROXIDE, MAGNESIUM HYDROXIDE, AND SIMETHICONE 15 ML: 200; 200; 20 SUSPENSION ORAL at 11:03

## 2020-03-05 RX ADMIN — INSULIN ASPART 1 UNITS: 100 INJECTION, SOLUTION INTRAVENOUS; SUBCUTANEOUS at 08:03

## 2020-03-05 RX ADMIN — INSULIN ASPART 5 UNITS: 100 INJECTION, SOLUTION INTRAVENOUS; SUBCUTANEOUS at 05:03

## 2020-03-05 RX ADMIN — INSULIN ASPART 2 UNITS: 100 INJECTION, SOLUTION INTRAVENOUS; SUBCUTANEOUS at 08:03

## 2020-03-05 RX ADMIN — Medication 10 ML: at 07:03

## 2020-03-05 RX ADMIN — INSULIN ASPART 3 UNITS: 100 INJECTION, SOLUTION INTRAVENOUS; SUBCUTANEOUS at 08:03

## 2020-03-05 RX ADMIN — INSULIN ASPART 3 UNITS: 100 INJECTION, SOLUTION INTRAVENOUS; SUBCUTANEOUS at 11:03

## 2020-03-05 RX ADMIN — Medication 10 ML: at 05:03

## 2020-03-05 RX ADMIN — Medication 10 ML: at 11:03

## 2020-03-05 NOTE — NURSING
Spoke with Dr. Pathak concerning patient's b/p 87/54 p 107 no new orders given at this time. States he will go to see the patient.

## 2020-03-05 NOTE — SUBJECTIVE & OBJECTIVE
Interval History: feels well. Has no complaints. BG high.     Review of Systems   Respiratory: Negative.    Cardiovascular: Negative.    Gastrointestinal: Negative.      Objective:     Vital Signs (Most Recent):  Temp: 99.1 °F (37.3 °C) (03/05/20 1128)  Pulse: 98 (03/05/20 1128)  Resp: 18 (03/05/20 1128)  BP: 110/71 (03/05/20 1128)  SpO2: 97 % (03/05/20 1128) Vital Signs (24h Range):  Temp:  [98.3 °F (36.8 °C)-99.9 °F (37.7 °C)] 99.1 °F (37.3 °C)  Pulse:  [] 98  Resp:  [16-18] 18  SpO2:  [96 %-97 %] 97 %  BP: ()/(54-75) 110/71     Weight: 66.2 kg (145 lb 15.1 oz)  Body mass index is 24.29 kg/m².    Intake/Output Summary (Last 24 hours) at 3/5/2020 1423  Last data filed at 3/4/2020 2100  Gross per 24 hour   Intake 240 ml   Output --   Net 240 ml      Physical Exam   Constitutional: He is oriented to person, place, and time. No distress.   HENT:   Head: Normocephalic and atraumatic.   Eyes: Conjunctivae are normal. Right eye exhibits no discharge. Left eye exhibits no discharge.   Neck: Neck supple. No tracheal deviation present.   Cardiovascular: Normal rate and regular rhythm.   Pulmonary/Chest: Effort normal and breath sounds normal.   Abdominal: Soft. Bowel sounds are normal.   Musculoskeletal: Normal range of motion.   Neurological: He is alert and oriented to person, place, and time. No cranial nerve deficit.   Skin: Skin is warm and dry. He is not diaphoretic.   Nursing note and vitals reviewed.      Significant Labs: All pertinent labs within the past 24 hours have been reviewed.    Significant Imaging: I have reviewed all pertinent imaging results/findings within the past 24 hours.  I have reviewed and interpreted all pertinent imaging results/findings within the past 24 hours.

## 2020-03-05 NOTE — PROGRESS NOTES
Patient awake, alert, oriented resting comfortably. No signs of distress observed. bed low and locked. Call light in reach. Report given to oncoming nurse, ABBY Serrano. 12hour chart check complete.

## 2020-03-05 NOTE — NURSING
Called Dr. Pathak's office and spoke with Macrina given the message that the patient's b/p is 87/54 p 107. States she would give Dr. Pathak the message.

## 2020-03-05 NOTE — PROGRESS NOTES
[facility] Hospital Medicine  Progress Note    Patient Name: Fredrick Cox  MRN: [id number]  Patient Class: IP- Inpatient   Admission Date: 3/2/2020  Length of Stay: 3 days  Attending Physician: George Ho MD  Primary Care Provider: Amos Tee III, MD        Subjective:     Principal Problem:ST elevation myocardial infarction involving left anterior descending (LAD) coronary artery        HPI:  64 year old male with past medical history of CHF(EF 40-45%), CAD, HTN, HLD, DM, and MI( 3 stents) presented to ER for emergent evaluation of sudden onset mid sternal chest pain/ pressure-like with radiation to left arm that started 45 minutes prior to arrival.  Per EMS, patient had 2 SL nitro with some relief.  Patient was given 1 liter of fluids and ASA by EMS.  Patient also complained of nausea with associated emesis, +SOB and lightheadedness.  Patient reported he is currently on brilinta.  Diagnosed with STEMI on presentation and taken to cath lab.  Stent placed to proximal LAD stent thrombosis and distal LCx.  Patient was then sent to ICU on balloon pump.  Pump removed last night and patient doing well.  No other complaints.  Hospital Medicine consulted for medical management.    Overview/Hospital Course:  No notes on file    Interval History: feels well. Has no complaints. BG high.     Review of Systems   Respiratory: Negative.    Cardiovascular: Negative.    Gastrointestinal: Negative.      Objective:     Vital Signs (Most Recent):  Temp: 99.1 °F (37.3 °C) (03/05/20 1128)  Pulse: 98 (03/05/20 1128)  Resp: 18 (03/05/20 1128)  BP: 110/71 (03/05/20 1128)  SpO2: 97 % (03/05/20 1128) Vital Signs (24h Range):  Temp:  [98.3 °F (36.8 °C)-99.9 °F (37.7 °C)] 99.1 °F (37.3 °C)  Pulse:  [] 98  Resp:  [16-18] 18  SpO2:  [96 %-97 %] 97 %  BP: ()/(54-75) 110/71     Weight: 66.2 kg (145 lb 15.1 oz)  Body mass index is 24.29 kg/m².    Intake/Output Summary (Last 24 hours) at 3/5/2020  1423  Last data filed at 3/4/2020 2100  Gross per 24 hour   Intake 240 ml   Output --   Net 240 ml      Physical Exam   Constitutional: He is oriented to person, place, and time. No distress.   HENT:   Head: Normocephalic and atraumatic.   Eyes: Conjunctivae are normal. Right eye exhibits no discharge. Left eye exhibits no discharge.   Neck: Neck supple. No tracheal deviation present.   Cardiovascular: Normal rate and regular rhythm.   Pulmonary/Chest: Effort normal and breath sounds normal.   Abdominal: Soft. Bowel sounds are normal.   Musculoskeletal: Normal range of motion.   Neurological: He is alert and oriented to person, place, and time. No cranial nerve deficit.   Skin: Skin is warm and dry. He is not diaphoretic.   Nursing note and vitals reviewed.      Significant Labs: All pertinent labs within the past 24 hours have been reviewed.    Significant Imaging: I have reviewed all pertinent imaging results/findings within the past 24 hours.  I have reviewed and interpreted all pertinent imaging results/findings within the past 24 hours.      Assessment/Plan:      * ST elevation myocardial infarction involving left anterior descending (LAD) coronary artery  S/P PCI to proximal LAD stent thrombosis and distal LCx.  Treatment and plan per Cardiology.      Type 2 diabetes mellitus, with long-term current use of insulin  Adjust dose for Novolog with meals and Levemir at night for goal glucose 140-180 (done).  Resume home regimen upon discharge.  Uncontrolled with hyperglycemia with HgA1c of 12.7  Needs much better control.  Diabetic diet with insulin sliding scale.      HLD (hyperlipidemia)  Continue Statin.      Tobacco abuse  Spent more than 3 minutes on smoking cessation counseling.      Chronic combined systolic and diastolic congestive heart failure  Defer management to Cardiology.      Essential hypertension  Currently normotensive.  Continue to monitor.      VTE Risk Mitigation (From admission, onward)     None            Thank you for your consult. I will follow-up with patient. Please contact us if you have any additional questions.    Cris Nye MD  Department of Hospital Medicine   [facility]

## 2020-03-05 NOTE — ASSESSMENT & PLAN NOTE
Adjust dose for Novolog with meals and Levemir at night for goal glucose 140-180 (done).  Resume home regimen upon discharge.  Uncontrolled with hyperglycemia with HgA1c of 12.7  Needs much better control.  Diabetic diet with insulin sliding scale.

## 2020-03-05 NOTE — NURSING
Secure chat to Dr. Ho concerning patient's b/p 87/54 p107. Informed that Dr. Pathak is on call and I should call him per Dr. Ho.

## 2020-03-06 VITALS
BODY MASS INDEX: 24.28 KG/M2 | HEART RATE: 81 BPM | WEIGHT: 145.75 LBS | RESPIRATION RATE: 18 BRPM | OXYGEN SATURATION: 98 % | SYSTOLIC BLOOD PRESSURE: 109 MMHG | TEMPERATURE: 98 F | HEIGHT: 65 IN | DIASTOLIC BLOOD PRESSURE: 70 MMHG

## 2020-03-06 LAB
ALBUMIN SERPL BCP-MCNC: 2.7 G/DL (ref 3.5–5.2)
ALP SERPL-CCNC: 70 U/L (ref 55–135)
ALT SERPL W/O P-5'-P-CCNC: 16 U/L (ref 10–44)
ANION GAP SERPL CALC-SCNC: 8 MMOL/L (ref 8–16)
AST SERPL-CCNC: 33 U/L (ref 10–40)
BILIRUB SERPL-MCNC: 0.5 MG/DL (ref 0.1–1)
BUN SERPL-MCNC: 9 MG/DL (ref 8–23)
CALCIUM SERPL-MCNC: 8.7 MG/DL (ref 8.7–10.5)
CHLORIDE SERPL-SCNC: 103 MMOL/L (ref 95–110)
CO2 SERPL-SCNC: 27 MMOL/L (ref 23–29)
CREAT SERPL-MCNC: 0.8 MG/DL (ref 0.5–1.4)
ERYTHROCYTE [DISTWIDTH] IN BLOOD BY AUTOMATED COUNT: 12.9 % (ref 11.5–14.5)
EST. GFR  (AFRICAN AMERICAN): >60 ML/MIN/1.73 M^2
EST. GFR  (NON AFRICAN AMERICAN): >60 ML/MIN/1.73 M^2
GLUCOSE SERPL-MCNC: 72 MG/DL (ref 70–110)
HCT VFR BLD AUTO: 41.1 % (ref 40–54)
HGB BLD-MCNC: 12.8 G/DL (ref 14–18)
MCH RBC QN AUTO: 26.2 PG (ref 27–31)
MCHC RBC AUTO-ENTMCNC: 31.1 G/DL (ref 32–36)
MCV RBC AUTO: 84 FL (ref 82–98)
PLATELET # BLD AUTO: 201 K/UL (ref 150–350)
PMV BLD AUTO: 9.6 FL (ref 9.2–12.9)
POCT GLUCOSE: 145 MG/DL (ref 70–110)
POCT GLUCOSE: 97 MG/DL (ref 70–110)
POTASSIUM SERPL-SCNC: 2.9 MMOL/L (ref 3.5–5.1)
PROT SERPL-MCNC: 6.1 G/DL (ref 6–8.4)
RBC # BLD AUTO: 4.88 M/UL (ref 4.6–6.2)
SODIUM SERPL-SCNC: 138 MMOL/L (ref 136–145)
WBC # BLD AUTO: 9.67 K/UL (ref 3.9–12.7)

## 2020-03-06 PROCEDURE — 80053 COMPREHEN METABOLIC PANEL: CPT

## 2020-03-06 PROCEDURE — 85027 COMPLETE CBC AUTOMATED: CPT

## 2020-03-06 PROCEDURE — 25000003 PHARM REV CODE 250: Performed by: INTERNAL MEDICINE

## 2020-03-06 PROCEDURE — 99239 HOSP IP/OBS DSCHRG MGMT >30: CPT | Mod: ,,, | Performed by: INTERNAL MEDICINE

## 2020-03-06 PROCEDURE — A4216 STERILE WATER/SALINE, 10 ML: HCPCS | Performed by: INTERNAL MEDICINE

## 2020-03-06 PROCEDURE — 99239 PR HOSPITAL DISCHARGE DAY,>30 MIN: ICD-10-PCS | Mod: ,,, | Performed by: INTERNAL MEDICINE

## 2020-03-06 RX ADMIN — ROSUVASTATIN CALCIUM 40 MG: 10 TABLET, COATED ORAL at 08:03

## 2020-03-06 RX ADMIN — ASPIRIN 81 MG: 81 TABLET, COATED ORAL at 08:03

## 2020-03-06 RX ADMIN — Medication 10 ML: at 12:03

## 2020-03-06 RX ADMIN — Medication 10 ML: at 06:03

## 2020-03-06 RX ADMIN — INSULIN ASPART 5 UNITS: 100 INJECTION, SOLUTION INTRAVENOUS; SUBCUTANEOUS at 12:03

## 2020-03-06 RX ADMIN — TICAGRELOR 90 MG: 90 TABLET ORAL at 08:03

## 2020-03-06 NOTE — ASSESSMENT & PLAN NOTE
Last EF 40%, FU echo    3/4/20: EF roughly 25%. Initiate HF meds as tolerated.     3/5/20: BP still on lower side. Continue beta blocker.

## 2020-03-06 NOTE — PROGRESS NOTES
[facility] CASE MANAGEMENT                  WRITTEN DISCHARGE INFORMATION      APPOINTMENTS AND RESOURCES TO HELP YOU MANAGE YOUR CARE AT HOME BASED ON YOUR PREFERENCES:  (If an appointment is not scheduled for you when you leave the hospital, call your doctor to schedule a follow up visit within a week)    Follow-up Information     Kenny Middleton MD On 3/19/2020.    Specialty:  Cardiology  Why:  Appointment scheduled for March 19th at 2:15pm  Contact information:  [address]  [address]  [place] [address]  [phone]             Amos Tee III, MD.    Specialty:  Internal Medicine  Why:  call to schedule follow up with PCP as needed post hospital discharge.  Contact information:  [address]  [facility]  [place] [address]  [phone]                   Healthy Living Instructions to HELP MANAGE YOUR CARE AT HOME:  Things You are responsible for:  1.    Getting your prescriptions filled   2.    Taking your medications as directed, DO NOT MISS ANY DOSES!  3.    Following the diet and exercise recommended by your doctor  4.    Going to your follow-up doctor appointment. This is important because it allows the doctor to monitor your progress and determine if any changes need to made to your treatment plan.  5. If you have any questions about MANAGING YOUR CARE AT HOME Call the Nurse Care Line for 24/7 Assistance [phone]       Please answer any calls you may receive from [facility]. We want to continue to support you as you manage your healthcare needs. [facility] is happy to have the opportunity to serve you.      Thank you for choosing [facility] for your healthcare needs!  Your [facility] Case Management Team,

## 2020-03-06 NOTE — PROGRESS NOTES
[facility]  Cardiology  Progress Note    Patient Name: Fredrick Cox  MRN: [id number]  Admission Date: 3/2/2020  Hospital Length of Stay: 3 days  Code Status: Prior   Attending Physician: George Ho MD   Primary Care Physician: Amos Tee III, MD  Expected Discharge Date:   Principal Problem:ST elevation myocardial infarction involving left anterior descending (LAD) coronary artery    Subjective:     Hospital Course:   3/3/20: Levophed off. IABP 1:1, augmenting 117. EKG ST    3/4/20: No chest pain. No sob. Sitting up in chair. Hemodynamically stable.         Review of Systems   Cardiovascular: Negative for chest pain, irregular heartbeat, orthopnea and paroxysmal nocturnal dyspnea.   Respiratory: Negative for shortness of breath.    Gastrointestinal: Positive for nausea and vomiting.     Objective:     Vital Signs (Most Recent):  Temp: 98.9 °F (37.2 °C) (03/05/20 1628)  Pulse: 89 (03/05/20 1628)  Resp: 18 (03/05/20 1628)  BP: 106/64 (03/05/20 1628)  SpO2: 95 % (03/05/20 1628) Vital Signs (24h Range):  Temp:  [98.3 °F (36.8 °C)-99.1 °F (37.3 °C)] 98.9 °F (37.2 °C)  Pulse:  [] 89  Resp:  [17-18] 18  SpO2:  [95 %-97 %] 95 %  BP: ()/(54-71) 106/64     Weight: 66.2 kg (145 lb 15.1 oz)  Body mass index is 24.29 kg/m².     SpO2: 95 %  O2 Device (Oxygen Therapy): room air      Intake/Output Summary (Last 24 hours) at 3/5/2020 2012  Last data filed at 3/5/2020 1630  Gross per 24 hour   Intake 720 ml   Output --   Net 720 ml       Lines/Drains/Airways     Peripherally Inserted Central Catheter Line            PICC Double Lumen 03/02/20 2040 right basilic 2 days          Peripheral Intravenous Line                 Peripheral IV - Single Lumen 03/02/20 1730 20 G Right Forearm 3 days         Peripheral IV - Single Lumen 03/02/20 1800 18 G Left Antecubital 3 days                Physical Exam   Constitutional: He is oriented to person, place, and time. No distress.   Cardiovascular:  Normal rate, regular rhythm and intact distal pulses.   Murmur heard.   Holosystolic murmur is present with a grade of 2/6.  Pulmonary/Chest: Effort normal and breath sounds normal.   Abdominal: Soft. Bowel sounds are normal.   Musculoskeletal:        Right lower leg: He exhibits no edema.        Left lower leg: He exhibits no edema.   Neurological: He is alert and oriented to person, place, and time.   Skin: He is not diaphoretic.   Vitals reviewed.      Significant Labs: BMP: No results for input(s): GLU, NA, K, CL, CO2, BUN, CREATININE, CALCIUM, MG in the last 48 hours., CBC No results for input(s): WBC, HGB, HCT, PLT in the last 48 hours. and Lipid Panel No results for input(s): CHOL, HDL, LDLCALC, TRIG, CHOLHDL in the last 48 hours.    Significant Imaging: Echocardiogram:   Transthoracic echo (TTE) complete (Cupid Only):   Results for orders placed or performed during the hospital encounter of 03/02/20   Echo Color Flow Doppler? Yes   Result Value Ref Range    BSA 1.85 m2    TDI SEPTAL 0.04 m/s    LV LATERAL E/E' RATIO 10.43 m/s    LV SEPTAL E/E' RATIO 18.25 m/s    LA WIDTH 3.90 cm    AORTIC VALVE CUSP SEPERATION 1.66 cm    TDI LATERAL 0.07 m/s    LVIDD 5.43 3.5 - 6.0 cm    IVS 1.14 (A) 0.6 - 1.1 cm    PW 0.97 0.6 - 1.1 cm    Ao root annulus 3.12 cm    LVIDS 3.87 2.1 - 4.0 cm    FS 29 28 - 44 %    LA volume 45.81 cm3    Sinus 3.25 cm    STJ 2.10 cm    Ascending aorta 2.35 cm    LV mass 224.04 g    LA size 2.70 cm    RVDD 3.22 cm    TAPSE 2.15 cm    RV S' 9.53 cm/s    Left Ventricle Relative Wall Thickness 0.36 cm    AV mean gradient 3 mmHg    AV valve area 2.86 cm2    AV Velocity Ratio 0.86     AV index (prosthetic) 0.73     E/A ratio 1.18     Mean e' 0.06 m/s    E wave decelartion time 103.21 msec    IVRT 0.07 msec    LVOT diameter 2.23 cm    LVOT area 3.9 cm2    LVOT peak carlos 0.99 m/s    LVOT peak VTI 12.63 cm    Ao peak carlos 1.15 m/s    Ao VTI 17.21 cm    LVOT stroke volume 49.30 cm3    AV peak gradient 5  mmHg    E/E' ratio 13.27 m/s    MV Peak E Miek 0.73 m/s    TR Max Mike 1.03 m/s    MV Peak A Mike 0.62 m/s    LV Systolic Volume 64.75 mL    LV Systolic Volume Index 35.5 mL/m2    LV Diastolic Volume 143.24 mL    LV Diastolic Volume Index 78.51 mL/m2    LA Volume Index 25.1 mL/m2    LV Mass Index 123 g/m2    RA Major Axis 4.05 cm    Left Atrium Minor Axis 5.23 cm    Left Atrium Major Axis 5.01 cm    Triscuspid Valve Regurgitation Peak Gradient 4 mmHg    RA Width 3.67 cm    Narrative    · Severely decreased left ventricular systolic function. The estimated   ejection fraction is 25%.  · Eccentric left ventricular hypertrophy.  · Grade I (mild) left ventricular diastolic dysfunction consistent with   impaired relaxation.  · Local segmental wall motion abnormalities.  · Normal right ventricular systolic function.        Assessment and Plan:         * ST elevation myocardial infarction involving left anterior descending (LAD) coronary artery  CP and anterior WILBER on EKG  Load brilinta, cont ASA, load heparin  Emergency cath  Borderline BP, IVF resuscitation    Risks, benefits and alternatives of the catheterization procedure were discussed with the patient and his wife in attendance, which include but are not limited to: bleeding, infection, death, heart attack, arrhythmia, kidney failure, stroke, need for emergency surgery, etc.  Patient understands and and agrees to proceed.  Consent was placed on the chart.    3/3/20:   PCI prox LAD stent thrombosis:  Stent 2.75x26 Resolute Tony MICH 16 patricia  De romy thrombus formed aftre PCI->POBA 2.5x12 at 20 patricia with aggrastat  Excellent result, T3 flow, 0% residual stenosis     PCI dist LCx:  Predil 2.5x12  Stent 2.5x22 Resolute Tony MICH 14 patricia  Excellent result, T3 flow 0% residual stenosis      Type 2 diabetes mellitus, with long-term current use of insulin  SSI with glargine 30units qhs    HLD (hyperlipidemia)  Continue rosuvstatin 40mg    Tobacco abuse  Smoking cessation stressed  to pt    Chronic combined systolic and diastolic congestive heart failure  Last EF 40%, FU echo    3/4/20: EF roughly 25%. Initiate HF meds as tolerated.     3/5/20: BP still on lower side. Continue beta blocker.    Essential hypertension  Hypotension. Off pressors. IABP in place. Wean as tolerated.    3/4/20: add beta blocker if he can tolerate    3/5/20: monitor        VTE Risk Mitigation (From admission, onward)    None          Archie Pathak MD  Cardiology  [facility]

## 2020-03-06 NOTE — NURSING
Discharge instructions given to patient and family at bedside. Patient and family verbalized an understanding and states a willingness to comply. Picc line removed from the rt upper arm per Aj charge nurse without difficulty. No missing tip to catheter. Tele monitoring removed.

## 2020-03-06 NOTE — PROGRESS NOTES
[facility] Hospital Medicine  Progress Note    Patient Name: Fredrick Cox  MRN: [id number]  Patient Class: IP- Inpatient   Admission Date: 3/2/2020  Length of Stay: 4 days  Attending Physician: George Ho MD  Primary Care Provider: Amos Tee III, MD        Subjective:     Principal Problem:ST elevation myocardial infarction involving left anterior descending (LAD) coronary artery        HPI:  64 year old male with past medical history of CHF(EF 40-45%), CAD, HTN, HLD, DM, and MI( 3 stents) presented to ER for emergent evaluation of sudden onset mid sternal chest pain/ pressure-like with radiation to left arm that started 45 minutes prior to arrival.  Per EMS, patient had 2 SL nitro with some relief.  Patient was given 1 liter of fluids and ASA by EMS.  Patient also complained of nausea with associated emesis, +SOB and lightheadedness.  Patient reported he is currently on brilinta.  Diagnosed with STEMI on presentation and taken to cath lab.  Stent placed to proximal LAD stent thrombosis and distal LCx.  Patient was then sent to ICU on balloon pump.  Pump removed last night and patient doing well.  No other complaints.  Hospital Medicine consulted for medical management.    Overview/Hospital Course:  No notes on file    Interval History: feels well. Has no complaints. BG at goal    Review of Systems   Respiratory: Negative.    Cardiovascular: Negative.    Gastrointestinal: Negative.      Objective:     Vital Signs (Most Recent):  Temp: 98.3 °F (36.8 °C) (03/06/20 1122)  Pulse: 81 (03/06/20 1122)  Resp: 18 (03/06/20 1122)  BP: 109/70 (03/06/20 1122)  SpO2: 98 % (03/06/20 1122) Vital Signs (24h Range):  Temp:  [98.1 °F (36.7 °C)-99.5 °F (37.5 °C)] 98.3 °F (36.8 °C)  Pulse:  [] 81  Resp:  [18] 18  SpO2:  [95 %-98 %] 98 %  BP: ()/(53-74) 109/70     Weight: 66.1 kg (145 lb 11.6 oz)  Body mass index is 24.25 kg/m².    Intake/Output Summary (Last 24 hours) at 3/6/2020 1536  Last  data filed at 3/6/2020 1230  Gross per 24 hour   Intake 600 ml   Output --   Net 600 ml      Physical Exam   Constitutional: He is oriented to person, place, and time. No distress.   HENT:   Head: Normocephalic and atraumatic.   Eyes: Conjunctivae are normal. Right eye exhibits no discharge. Left eye exhibits no discharge.   Neck: Neck supple. No tracheal deviation present.   Cardiovascular: Normal rate and regular rhythm.   Pulmonary/Chest: Effort normal and breath sounds normal.   Abdominal: Soft. Bowel sounds are normal.   Musculoskeletal: Normal range of motion.   Neurological: He is alert and oriented to person, place, and time. No cranial nerve deficit.   Skin: Skin is warm and dry. He is not diaphoretic.   Nursing note and vitals reviewed.      Significant Labs: All pertinent labs within the past 24 hours have been reviewed.    Significant Imaging: I have reviewed all pertinent imaging results/findings within the past 24 hours.  I have reviewed and interpreted all pertinent imaging results/findings within the past 24 hours.      Assessment/Plan:      * ST elevation myocardial infarction involving left anterior descending (LAD) coronary artery  S/P PCI to proximal LAD stent thrombosis and distal LCx.  Treatment and plan per Cardiology.      Type 2 diabetes mellitus, with long-term current use of insulin  Adjust dose for Novolog with meals and Levemir at night for goal glucose 140-180 (done).  Resume home regimen upon discharge.  Uncontrolled with hyperglycemia with HgA1c of 12.7  Needs much better control.  Diabetic diet with insulin sliding scale.      HLD (hyperlipidemia)  Continue Statin.      Tobacco abuse  Spent more than 3 minutes on smoking cessation counseling.      Chronic combined systolic and diastolic congestive heart failure  Defer management to Cardiology.      Essential hypertension  Currently normotensive.  Continue to monitor.        VTE Risk Mitigation (From admission, onward)    None           Dispo appropriate. Will sign off. Please call with questions      Cris Nye MD  Department of Hospital Medicine   [facility]

## 2020-03-06 NOTE — PLAN OF CARE
03/06/20 1352   Medicare Message   Important Message from Medicare regarding Discharge Appeal Rights Given to patient/caregiver;Explained to patient/caregiver;Signed/date by patient/caregiver   Date IMM was signed 03/06/20   Time IMM was signed 1112

## 2020-03-06 NOTE — ASSESSMENT & PLAN NOTE
Last EF 40%, FU echo    3/4/20: EF roughly 25%. Initiate HF meds as tolerated.     3/5/20: BP still on lower side.      03/06/2020:  Patient continues to have borderline blood pressure.  Although asymptomatic.  Follow-up with Dr. Ho in Cardiology Clinic for titration of heart failure medications.  If EF fails to improve after 3 months of revascularization, consider AICD.  Not on Ace inhibitor or beta-blocker because of borderline blood pressure.  Systolic blood pressure is 90 mm without these medications.

## 2020-03-06 NOTE — PROGRESS NOTES
Bedside report given to oncoming nurse RAUL Dowell noted. Pt lying in bed, Respirations even and unlabored. Safety maintained, Call light within reach.     24 hr chart check completed.

## 2020-03-06 NOTE — SUBJECTIVE & OBJECTIVE
Interval History: feels well. Has no complaints. BG at goal    Review of Systems   Respiratory: Negative.    Cardiovascular: Negative.    Gastrointestinal: Negative.      Objective:     Vital Signs (Most Recent):  Temp: 98.3 °F (36.8 °C) (03/06/20 1122)  Pulse: 81 (03/06/20 1122)  Resp: 18 (03/06/20 1122)  BP: 109/70 (03/06/20 1122)  SpO2: 98 % (03/06/20 1122) Vital Signs (24h Range):  Temp:  [98.1 °F (36.7 °C)-99.5 °F (37.5 °C)] 98.3 °F (36.8 °C)  Pulse:  [] 81  Resp:  [18] 18  SpO2:  [95 %-98 %] 98 %  BP: ()/(53-74) 109/70     Weight: 66.1 kg (145 lb 11.6 oz)  Body mass index is 24.25 kg/m².    Intake/Output Summary (Last 24 hours) at 3/6/2020 1536  Last data filed at 3/6/2020 1230  Gross per 24 hour   Intake 600 ml   Output --   Net 600 ml      Physical Exam   Constitutional: He is oriented to person, place, and time. No distress.   HENT:   Head: Normocephalic and atraumatic.   Eyes: Conjunctivae are normal. Right eye exhibits no discharge. Left eye exhibits no discharge.   Neck: Neck supple. No tracheal deviation present.   Cardiovascular: Normal rate and regular rhythm.   Pulmonary/Chest: Effort normal and breath sounds normal.   Abdominal: Soft. Bowel sounds are normal.   Musculoskeletal: Normal range of motion.   Neurological: He is alert and oriented to person, place, and time. No cranial nerve deficit.   Skin: Skin is warm and dry. He is not diaphoretic.   Nursing note and vitals reviewed.      Significant Labs: All pertinent labs within the past 24 hours have been reviewed.    Significant Imaging: I have reviewed all pertinent imaging results/findings within the past 24 hours.  I have reviewed and interpreted all pertinent imaging results/findings within the past 24 hours.

## 2020-03-06 NOTE — PLAN OF CARE
Problem: Fall Injury Risk  Goal: Absence of Fall and Fall-Related Injury  Outcome: Ongoing, Progressing  Intervention: Identify and Manage Contributors to Fall Injury Risk  Flowsheets (Taken 3/6/2020 0415)  Self-Care Promotion: independence encouraged; BADL personal objects within reach; BADL personal routines maintained  Medication Review/Management: medications reviewed  Intervention: Promote Injury-Free Environment  Flowsheets (Taken 3/6/2020 0415)  Safety Promotion/Fall Prevention: assistive device/personal item within reach; bed alarm set; instructed to call staff for mobility; side rails raised x 3; nonskid shoes/socks when out of bed; Fall Risk reviewed with patient/family  Environmental Safety Modification: assistive device/personal items within reach; clutter free environment maintained; room organization consistent

## 2020-03-06 NOTE — PLAN OF CARE
"EDUCATION:  Mr Cox provided with educational information on MI.  Information reviewed and placed in :My Healthcare Packet" to be brought home for him to use as resource after discharge.  Information included:  signs and symptoms to look for and call the doctor if experiencing, and symptoms that may indicate a medical emergency: CALL 911.      All questions answered.  Teach back method used.    Patient stated, "CP/SOB call 911".       03/06/20 8154   Final Note   Assessment Type Final Discharge Note   Anticipated Discharge Disposition Home   Hospital Follow Up  Appt(s) scheduled? Yes   Discharge plans and expectations educations in teach back method with documentation complete? Yes   Right Care Referral Info   Post Acute Recommendation No Care   Post-Acute Status   Post-Acute Authorization Other   Discharge Delays None known at this time           "

## 2020-03-06 NOTE — PROGRESS NOTES
WRITTEN HEALTHCARE DISCHARGE INFORMATION      Things that YOU are RESPONSIBLE for to Manage Your Care At Home:     1. Getting your prescriptions filled.  2. Taking you medications as directed. DO NOT MISS ANY DOSES!  3. Going to your follow-up doctor appointments. This is important because it allows the doctor to monitor your progress and to determine if any changes need to be made to your treatment plan.     If you are unable to make your follow up appointments, please call the number listed and reschedule this appointment.      ____________HELP AT HOME____________________     Experiencing any SIGNS or SYMPTOMS: YOU CAN     Schedule a same day appopintment with your Primary Care Doctor or  you can call [facility] On [facility] Nurse Care Line for 24/7 assistance at [phone]     If you are experience any signs or symptoms that have become severe, Call 911 and come to your nearest Emergency Room.     Thank you for choosing [facility] and allowing us to care for you.   From your care management team:      You should receive a call from [facility] Discharge Department within 48-72 hours to help manage your care after discharge. Please try to make sure that you answer your phone for this important phone call.      Follow-up Information     Kenny Middleton MD On 3/19/2020.    Specialty:  Cardiology  Why:  Appointment scheduled for March 19th at 2:15pm  Contact information:  [address]  [address]  [place] [address]  [phone]             Amos Tee III, MD.    Specialty:  Internal Medicine  Why:  call to schedule follow up with PCP as needed post hospital discharge.  Contact information:  [address]  [facility]  [place] [address]  [phone]

## 2020-03-06 NOTE — DISCHARGE SUMMARY
[facility]  Cardiology  Discharge Summary      Patient Name: Fredrick Cox  MRN: [id number]  Admission Date: 3/2/2020  Hospital Length of Stay: 4 days  Discharge Date and Time:  03/06/2020 11:42 AM  Attending Physician: George Ho MD    Discharging Provider: Fariba Mcmullen MD  Primary Care Physician: Amos Tee III, MD    HPI:   The patient is a 64-year-old man with known history of coronary artery disease status post proximal and mid LAD stents in the past with known right coronary chronic occlusion and distal circumflex stenosis.  He has an ejection fraction previously measured at 40% in November 2019.  He presents to the emergency room with 1 hr of chest discomfort and found to have anterior ST elevations.  He does report not taking his Brilinta yesterday, but has been otherwise compliant with his medications.  He does smoke cigarettes.  His wife is at the bedside.  The pros and cons of catheterization were discussed with the patient and he agrees to proceed.  Permit was signed.    Procedure(s) (LRB):  Left heart cath (Left)  Percutaneous coronary intervention (N/A)  Stent, Drug Eluting, Multi Vessel, Coronary  Placement, IABP  IVUS, Coronary     Indwelling Lines/Drains at time of discharge:  Lines/Drains/Airways     Peripherally Inserted Central Catheter Line            PICC Double Lumen 03/02/20 2040 right basilic 3 days    PICC Double Lumen 03/02/20 2040 right brachial 3 days                Hospital Course:  3/3/20: Levophed off. IABP 1:1, augmenting 117. EKG ST    3/4/20: No chest pain. No sob. Sitting up in chair. Hemodynamically stable.     03/06/2020:  Patient doing fine.  Denies any chest pain at rest on exertion, orthopnea, PND.  States his back to his baseline.  Discussed importance of compliance with dual antiplatelet therapy.  Patient will be discharged home today he is eager to go home.  Follow-up in Cardiology Clinic with Dr. Ho.    Consults:   Consults (From  admission, onward)        Status Ordering Provider     Inpatient consult to Hospitalist  Once     Provider:  Amos Chen MD    Acknowledged ROSALINE FALCON     Inpatient consult to Social Work  Once     Provider:  (Not yet assigned)    Ordered JOSELINE THOMAS     Inpatient consult to Social Work/Case Management  Once     Provider:  (Not yet assigned)    Acknowledged ROSALINE FALCON          Significant Diagnostic Studies: Labs:   BMP:   Recent Labs   Lab 03/06/20 0621   GLU 72      K 2.9*      CO2 27   BUN 9   CREATININE 0.8   CALCIUM 8.7   , CMP   Recent Labs   Lab 03/06/20 0621      K 2.9*      CO2 27   GLU 72   BUN 9   CREATININE 0.8   CALCIUM 8.7   PROT 6.1   ALBUMIN 2.7*   BILITOT 0.5   ALKPHOS 70   AST 33   ALT 16   ANIONGAP 8   ESTGFRAFRICA >60   EGFRNONAA >60   , CBC   Recent Labs   Lab 03/06/20 0621   WBC 9.67   HGB 12.8*   HCT 41.1      , INR   Lab Results   Component Value Date    INR 1.0 04/12/2019   , Troponin   Recent Labs   Lab 03/02/20 2022   TROPONINI 8.603*   , A1C:   Recent Labs   Lab 03/03/20  0320   HGBA1C 12.7*    and All labs within the past 24 hours have been reviewed    Pending Diagnostic Studies:     None          Final Active Diagnoses:    Diagnosis Date Noted POA    PRINCIPAL PROBLEM:  ST elevation myocardial infarction involving left anterior descending (LAD) coronary artery [I21.02] 03/02/2020 Yes    Type 2 diabetes mellitus, with long-term current use of insulin [E11.9, Z79.4] 04/02/2014 Not Applicable     Chronic    Essential hypertension [I10] 01/17/2014 Yes     Chronic    Chronic combined systolic and diastolic congestive heart failure [I50.42] 01/17/2014 Yes     Chronic    Tobacco abuse [Z72.0] 01/17/2014 Yes     Chronic    HLD (hyperlipidemia) [E78.5] 01/17/2014 Yes     Chronic      Problems Resolved During this Admission:    Diagnosis Date Noted Date Resolved POA    Cardiogenic shock [R57.0] 03/03/2020 03/04/2020 Yes     Chest pain [R07.9] 03/02/2020 03/05/2020 Yes     Chronic combined systolic and diastolic congestive heart failure  Last EF 40%, FU echo    3/4/20: EF roughly 25%. Initiate HF meds as tolerated.     3/5/20: BP still on lower side.      03/06/2020:  Patient continues to have borderline blood pressure.  Although asymptomatic.  Follow-up with Dr. Ho in Cardiology Clinic for titration of heart failure medications.  If EF fails to improve after 3 months of revascularization, consider AICD.  Not on Ace inhibitor or beta-blocker because of borderline blood pressure.  Systolic blood pressure is 90 mm without these medications.        Discharged Condition: good    Disposition: Home or Self Care    Follow Up:  Follow-up Information     George Ho MD.    Specialties:  Cardiology, INTERVENTIONAL CARDIOLOGY  Contact information:  [address] [facility] [address]  [address]  [place] [id number] [phone]                 Patient Instructions:   No discharge procedures on file.  Medications:  Reconciled Home Medications:      Medication List      CONTINUE taking these medications    aspirin 81 MG EC tablet  Commonly known as:  ECOTRIN  Take 1 tablet (81 mg total) by mouth once daily.     blood sugar diagnostic Strp  1 strip by Misc.(Non-Drug; Combo Route) route 2 (two) times daily.     furosemide 40 MG tablet  Commonly known as:  LASIX  Take 1 tablet (40 mg total) by mouth once daily.     insulin aspart U-100 100 unit/mL (3 mL) Inpn pen  Commonly known as:  NovoLOG  Inject 1-10 Units into the skin 3 (three) times daily with meals. Blood Glucose  mg/dL                  0600                0000                               1200                               1800  151-200                2 units             1 unit  201-250                4 units             2 units  251-300                6 units             3 units  301-350                8 units             4 units  >350                      10 units           5 units     insulin  "glargine (TOUJEO) 300 unit/mL (1.5 mL) Inpn pen  Commonly known as:  Toujeo SoloStar U-300 Insulin  Inject 30 Units into the skin every evening.     nitroGLYCERIN 0.4 MG SL tablet  Commonly known as:  NITROSTAT  Place 1 tablet (0.4 mg total) under the tongue every 5 (five) minutes as needed.     pen needle, diabetic 31 gauge x 3/16" Ndle  Commonly known as:  BD Ultra-Fine Mini Pen Needle  USE AS DIRECTED     pen needle, diabetic 31 gauge x 5/16" Ndle  1 each by Misc.(Non-Drug; Combo Route) route 2 (two) times daily.     ranolazine 500 MG Tb12  Commonly known as:  Ranexa  Take 1 tablet (500 mg total) by mouth 2 (two) times daily.     rosuvastatin 40 MG Tab  Commonly known as:  CRESTOR  Take 1 tablet (40 mg total) by mouth once daily.     ticagrelor 90 mg tablet  Commonly known as:  Brilinta  Take 1 tablet (90 mg total) by mouth 2 (two) times daily.            Time spent on the discharge of patient: 40 minutes    Fariba Mcmullen MD  Cardiology  [facility]  "

## 2020-03-06 NOTE — SUBJECTIVE & OBJECTIVE
Review of Systems   Cardiovascular: Negative for chest pain, irregular heartbeat, orthopnea and paroxysmal nocturnal dyspnea.   Respiratory: Negative for shortness of breath.    Gastrointestinal: Positive for nausea and vomiting.     Objective:     Vital Signs (Most Recent):  Temp: 98.9 °F (37.2 °C) (03/05/20 1628)  Pulse: 89 (03/05/20 1628)  Resp: 18 (03/05/20 1628)  BP: 106/64 (03/05/20 1628)  SpO2: 95 % (03/05/20 1628) Vital Signs (24h Range):  Temp:  [98.3 °F (36.8 °C)-99.1 °F (37.3 °C)] 98.9 °F (37.2 °C)  Pulse:  [] 89  Resp:  [17-18] 18  SpO2:  [95 %-97 %] 95 %  BP: ()/(54-71) 106/64     Weight: 66.2 kg (145 lb 15.1 oz)  Body mass index is 24.29 kg/m².     SpO2: 95 %  O2 Device (Oxygen Therapy): room air      Intake/Output Summary (Last 24 hours) at 3/5/2020 2012  Last data filed at 3/5/2020 1630  Gross per 24 hour   Intake 720 ml   Output --   Net 720 ml       Lines/Drains/Airways     Peripherally Inserted Central Catheter Line            PICC Double Lumen 03/02/20 2040 right basilic 2 days          Peripheral Intravenous Line                 Peripheral IV - Single Lumen 03/02/20 1730 20 G Right Forearm 3 days         Peripheral IV - Single Lumen 03/02/20 1800 18 G Left Antecubital 3 days                Physical Exam   Constitutional: He is oriented to person, place, and time. No distress.   Cardiovascular: Normal rate, regular rhythm and intact distal pulses.   Murmur heard.   Holosystolic murmur is present with a grade of 2/6.  Pulmonary/Chest: Effort normal and breath sounds normal.   Abdominal: Soft. Bowel sounds are normal.   Musculoskeletal:        Right lower leg: He exhibits no edema.        Left lower leg: He exhibits no edema.   Neurological: He is alert and oriented to person, place, and time.   Skin: He is not diaphoretic.   Vitals reviewed.      Significant Labs: BMP: No results for input(s): GLU, NA, K, CL, CO2, BUN, CREATININE, CALCIUM, MG in the last 48 hours., CBC No results  for input(s): WBC, HGB, HCT, PLT in the last 48 hours. and Lipid Panel No results for input(s): CHOL, HDL, LDLCALC, TRIG, CHOLHDL in the last 48 hours.    Significant Imaging: Echocardiogram:   Transthoracic echo (TTE) complete (Cupid Only):   Results for orders placed or performed during the hospital encounter of 03/02/20   Echo Color Flow Doppler? Yes   Result Value Ref Range    BSA 1.85 m2    TDI SEPTAL 0.04 m/s    LV LATERAL E/E' RATIO 10.43 m/s    LV SEPTAL E/E' RATIO 18.25 m/s    LA WIDTH 3.90 cm    AORTIC VALVE CUSP SEPERATION 1.66 cm    TDI LATERAL 0.07 m/s    LVIDD 5.43 3.5 - 6.0 cm    IVS 1.14 (A) 0.6 - 1.1 cm    PW 0.97 0.6 - 1.1 cm    Ao root annulus 3.12 cm    LVIDS 3.87 2.1 - 4.0 cm    FS 29 28 - 44 %    LA volume 45.81 cm3    Sinus 3.25 cm    STJ 2.10 cm    Ascending aorta 2.35 cm    LV mass 224.04 g    LA size 2.70 cm    RVDD 3.22 cm    TAPSE 2.15 cm    RV S' 9.53 cm/s    Left Ventricle Relative Wall Thickness 0.36 cm    AV mean gradient 3 mmHg    AV valve area 2.86 cm2    AV Velocity Ratio 0.86     AV index (prosthetic) 0.73     E/A ratio 1.18     Mean e' 0.06 m/s    E wave decelartion time 103.21 msec    IVRT 0.07 msec    LVOT diameter 2.23 cm    LVOT area 3.9 cm2    LVOT peak mike 0.99 m/s    LVOT peak VTI 12.63 cm    Ao peak mike 1.15 m/s    Ao VTI 17.21 cm    LVOT stroke volume 49.30 cm3    AV peak gradient 5 mmHg    E/E' ratio 13.27 m/s    MV Peak E Mike 0.73 m/s    TR Max Mike 1.03 m/s    MV Peak A Mike 0.62 m/s    LV Systolic Volume 64.75 mL    LV Systolic Volume Index 35.5 mL/m2    LV Diastolic Volume 143.24 mL    LV Diastolic Volume Index 78.51 mL/m2    LA Volume Index 25.1 mL/m2    LV Mass Index 123 g/m2    RA Major Axis 4.05 cm    Left Atrium Minor Axis 5.23 cm    Left Atrium Major Axis 5.01 cm    Triscuspid Valve Regurgitation Peak Gradient 4 mmHg    RA Width 3.67 cm    Narrative    · Severely decreased left ventricular systolic function. The estimated   ejection fraction is 25%.  · Eccentric  left ventricular hypertrophy.  · Grade I (mild) left ventricular diastolic dysfunction consistent with   impaired relaxation.  · Local segmental wall motion abnormalities.  · Normal right ventricular systolic function.

## 2020-03-06 NOTE — ASSESSMENT & PLAN NOTE
Hypotension. Off pressors. IABP in place. Wean as tolerated.    3/4/20: add beta blocker if he can tolerate    3/5/20: monitor

## 2020-03-19 ENCOUNTER — HOSPITAL ENCOUNTER (OUTPATIENT)
Facility: HOSPITAL | Age: 65
LOS: 1 days | Discharge: HOME | End: 2020-03-20
Attending: EMERGENCY MEDICINE | Admitting: INTERNAL MEDICINE
Payer: MEDICARE

## 2020-03-19 DIAGNOSIS — E78.2 MIXED HYPERLIPIDEMIA: Chronic | ICD-10-CM

## 2020-03-19 DIAGNOSIS — I21.4 ACUTE NON-ST SEGMENT ELEVATION MYOCARDIAL INFARCTION: Primary | ICD-10-CM

## 2020-03-19 DIAGNOSIS — Z79.4 TYPE 2 DIABETES MELLITUS WITH HYPEROSMOLARITY WITHOUT COMA, WITH LONG-TERM CURRENT USE OF INSULIN: Chronic | ICD-10-CM

## 2020-03-19 DIAGNOSIS — I10 ESSENTIAL HYPERTENSION: Chronic | ICD-10-CM

## 2020-03-19 DIAGNOSIS — R07.9 CHEST PAIN: ICD-10-CM

## 2020-03-19 DIAGNOSIS — E11.00 TYPE 2 DIABETES MELLITUS WITH HYPEROSMOLARITY WITHOUT COMA, WITH LONG-TERM CURRENT USE OF INSULIN: Chronic | ICD-10-CM

## 2020-03-19 DIAGNOSIS — R07.9 ACUTE CHEST PAIN: ICD-10-CM

## 2020-03-19 LAB
ALBUMIN SERPL BCP-MCNC: 3.4 G/DL (ref 3.5–5.2)
ALP SERPL-CCNC: 77 U/L (ref 55–135)
ALT SERPL W/O P-5'-P-CCNC: 15 U/L (ref 10–44)
ANION GAP SERPL CALC-SCNC: 10 MMOL/L (ref 8–16)
AST SERPL-CCNC: 11 U/L (ref 10–40)
BASOPHILS # BLD AUTO: 0.02 K/UL (ref 0–0.2)
BASOPHILS NFR BLD: 0.2 % (ref 0–1.9)
BILIRUB SERPL-MCNC: 0.3 MG/DL (ref 0.1–1)
BUN SERPL-MCNC: 8 MG/DL (ref 8–23)
CALCIUM SERPL-MCNC: 9.2 MG/DL (ref 8.7–10.5)
CHLORIDE SERPL-SCNC: 104 MMOL/L (ref 95–110)
CO2 SERPL-SCNC: 26 MMOL/L (ref 23–29)
CREAT SERPL-MCNC: 1 MG/DL (ref 0.5–1.4)
DIFFERENTIAL METHOD: ABNORMAL
EOSINOPHIL # BLD AUTO: 0.2 K/UL (ref 0–0.5)
EOSINOPHIL NFR BLD: 1.7 % (ref 0–8)
ERYTHROCYTE [DISTWIDTH] IN BLOOD BY AUTOMATED COUNT: 13 % (ref 11.5–14.5)
EST. GFR  (AFRICAN AMERICAN): >60 ML/MIN/1.73 M^2
EST. GFR  (NON AFRICAN AMERICAN): >60 ML/MIN/1.73 M^2
GLUCOSE SERPL-MCNC: 252 MG/DL (ref 70–110)
HCT VFR BLD AUTO: 42.4 % (ref 40–54)
HGB BLD-MCNC: 13.1 G/DL (ref 14–18)
IMM GRANULOCYTES # BLD AUTO: 0.03 K/UL (ref 0–0.04)
IMM GRANULOCYTES NFR BLD AUTO: 0.3 % (ref 0–0.5)
INR PPP: 1 (ref 0.8–1.2)
LYMPHOCYTES # BLD AUTO: 1.6 K/UL (ref 1–4.8)
LYMPHOCYTES NFR BLD: 15.8 % (ref 18–48)
MCH RBC QN AUTO: 26.5 PG (ref 27–31)
MCHC RBC AUTO-ENTMCNC: 30.9 G/DL (ref 32–36)
MCV RBC AUTO: 86 FL (ref 82–98)
MONOCYTES # BLD AUTO: 0.6 K/UL (ref 0.3–1)
MONOCYTES NFR BLD: 5.7 % (ref 4–15)
NEUTROPHILS # BLD AUTO: 7.8 K/UL (ref 1.8–7.7)
NEUTROPHILS NFR BLD: 76.3 % (ref 38–73)
NRBC BLD-RTO: 0 /100 WBC
PLATELET # BLD AUTO: 284 K/UL (ref 150–350)
PMV BLD AUTO: 9 FL (ref 9.2–12.9)
POCT GLUCOSE: 243 MG/DL (ref 70–110)
POTASSIUM SERPL-SCNC: 4 MMOL/L (ref 3.5–5.1)
PROT SERPL-MCNC: 7.4 G/DL (ref 6–8.4)
PROTHROMBIN TIME: 11.3 SEC (ref 9–12.5)
RBC # BLD AUTO: 4.94 M/UL (ref 4.6–6.2)
SODIUM SERPL-SCNC: 140 MMOL/L (ref 136–145)
TROPONIN I SERPL DL<=0.01 NG/ML-MCNC: 0.11 NG/ML (ref 0–0.03)
WBC # BLD AUTO: 10.17 K/UL (ref 3.9–12.7)

## 2020-03-19 PROCEDURE — 85025 COMPLETE CBC W/AUTO DIFF WBC: CPT

## 2020-03-19 PROCEDURE — 80053 COMPREHEN METABOLIC PANEL: CPT

## 2020-03-19 PROCEDURE — 63600175 PHARM REV CODE 636 W HCPCS: Performed by: EMERGENCY MEDICINE

## 2020-03-19 PROCEDURE — 25000003 PHARM REV CODE 250: Performed by: EMERGENCY MEDICINE

## 2020-03-19 PROCEDURE — 93005 ELECTROCARDIOGRAM TRACING: CPT

## 2020-03-19 PROCEDURE — 99291 CRITICAL CARE FIRST HOUR: CPT | Mod: 25

## 2020-03-19 PROCEDURE — 36415 COLL VENOUS BLD VENIPUNCTURE: CPT

## 2020-03-19 PROCEDURE — 84484 ASSAY OF TROPONIN QUANT: CPT

## 2020-03-19 PROCEDURE — 85610 PROTHROMBIN TIME: CPT

## 2020-03-19 PROCEDURE — 21400001 HC TELEMETRY ROOM

## 2020-03-19 PROCEDURE — 93010 ELECTROCARDIOGRAM REPORT: CPT | Mod: ,,, | Performed by: INTERNAL MEDICINE

## 2020-03-19 PROCEDURE — 83880 ASSAY OF NATRIURETIC PEPTIDE: CPT

## 2020-03-19 PROCEDURE — 93010 EKG 12-LEAD: ICD-10-PCS | Mod: ,,, | Performed by: INTERNAL MEDICINE

## 2020-03-19 RX ORDER — POLYETHYLENE GLYCOL 3350 17 G/17G
17 POWDER, FOR SOLUTION ORAL DAILY
Status: DISCONTINUED | OUTPATIENT
Start: 2020-03-20 | End: 2020-03-19

## 2020-03-19 RX ORDER — ASPIRIN 81 MG/1
81 TABLET ORAL DAILY
Status: DISCONTINUED | OUTPATIENT
Start: 2020-03-20 | End: 2020-03-20 | Stop reason: HOSPADM

## 2020-03-19 RX ORDER — MORPHINE SULFATE 10 MG/ML
2 INJECTION INTRAMUSCULAR; INTRAVENOUS; SUBCUTANEOUS EVERY 4 HOURS PRN
Status: DISCONTINUED | OUTPATIENT
Start: 2020-03-19 | End: 2020-03-20 | Stop reason: HOSPADM

## 2020-03-19 RX ORDER — TALC
6 POWDER (GRAM) TOPICAL NIGHTLY PRN
Status: DISCONTINUED | OUTPATIENT
Start: 2020-03-19 | End: 2020-03-20 | Stop reason: HOSPADM

## 2020-03-19 RX ORDER — ASPIRIN 325 MG
325 TABLET ORAL ONCE
Status: DISCONTINUED | OUTPATIENT
Start: 2020-03-19 | End: 2020-03-19

## 2020-03-19 RX ORDER — ROSUVASTATIN CALCIUM 10 MG/1
40 TABLET, COATED ORAL DAILY
Status: DISCONTINUED | OUTPATIENT
Start: 2020-03-20 | End: 2020-03-20 | Stop reason: HOSPADM

## 2020-03-19 RX ORDER — ACETAMINOPHEN 500 MG
500 TABLET ORAL EVERY 6 HOURS PRN
Status: DISCONTINUED | OUTPATIENT
Start: 2020-03-19 | End: 2020-03-20 | Stop reason: HOSPADM

## 2020-03-19 RX ORDER — AMOXICILLIN 250 MG
1 CAPSULE ORAL DAILY PRN
Status: DISCONTINUED | OUTPATIENT
Start: 2020-03-19 | End: 2020-03-20 | Stop reason: HOSPADM

## 2020-03-19 RX ORDER — SODIUM CHLORIDE 0.9 % (FLUSH) 0.9 %
10 SYRINGE (ML) INJECTION
Status: DISCONTINUED | OUTPATIENT
Start: 2020-03-19 | End: 2020-03-20 | Stop reason: HOSPADM

## 2020-03-19 RX ORDER — IBUPROFEN 200 MG
16 TABLET ORAL
Status: DISCONTINUED | OUTPATIENT
Start: 2020-03-19 | End: 2020-03-20 | Stop reason: HOSPADM

## 2020-03-19 RX ORDER — MAG HYDROX/ALUMINUM HYD/SIMETH 200-200-20
60 SUSPENSION, ORAL (FINAL DOSE FORM) ORAL
Status: COMPLETED | OUTPATIENT
Start: 2020-03-19 | End: 2020-03-19

## 2020-03-19 RX ORDER — PANTOPRAZOLE SODIUM 40 MG/1
80 TABLET, DELAYED RELEASE ORAL
Status: COMPLETED | OUTPATIENT
Start: 2020-03-19 | End: 2020-03-19

## 2020-03-19 RX ORDER — FUROSEMIDE 40 MG/1
40 TABLET ORAL DAILY
Status: DISCONTINUED | OUTPATIENT
Start: 2020-03-20 | End: 2020-03-19

## 2020-03-19 RX ORDER — ENOXAPARIN SODIUM 100 MG/ML
70 INJECTION SUBCUTANEOUS ONCE
Status: COMPLETED | OUTPATIENT
Start: 2020-03-19 | End: 2020-03-19

## 2020-03-19 RX ORDER — RANOLAZINE 500 MG/1
500 TABLET, EXTENDED RELEASE ORAL 2 TIMES DAILY
Status: DISCONTINUED | OUTPATIENT
Start: 2020-03-19 | End: 2020-03-20 | Stop reason: HOSPADM

## 2020-03-19 RX ORDER — HYDROMORPHONE HYDROCHLORIDE 2 MG/ML
1 INJECTION, SOLUTION INTRAMUSCULAR; INTRAVENOUS; SUBCUTANEOUS EVERY 4 HOURS PRN
Status: DISCONTINUED | OUTPATIENT
Start: 2020-03-19 | End: 2020-03-19

## 2020-03-19 RX ORDER — DEXTROSE MONOHYDRATE AND SODIUM CHLORIDE 5; .45 G/100ML; G/100ML
INJECTION, SOLUTION INTRAVENOUS CONTINUOUS
Status: DISCONTINUED | OUTPATIENT
Start: 2020-03-19 | End: 2020-03-19

## 2020-03-19 RX ORDER — GLUCAGON 1 MG
1 KIT INJECTION
Status: DISCONTINUED | OUTPATIENT
Start: 2020-03-19 | End: 2020-03-20 | Stop reason: HOSPADM

## 2020-03-19 RX ORDER — NITROGLYCERIN 0.4 MG/1
0.4 TABLET SUBLINGUAL EVERY 5 MIN PRN
Status: DISCONTINUED | OUTPATIENT
Start: 2020-03-19 | End: 2020-03-20 | Stop reason: HOSPADM

## 2020-03-19 RX ORDER — INSULIN ASPART 100 [IU]/ML
0-5 INJECTION, SOLUTION INTRAVENOUS; SUBCUTANEOUS
Status: DISCONTINUED | OUTPATIENT
Start: 2020-03-19 | End: 2020-03-20 | Stop reason: HOSPADM

## 2020-03-19 RX ORDER — IBUPROFEN 200 MG
24 TABLET ORAL
Status: DISCONTINUED | OUTPATIENT
Start: 2020-03-19 | End: 2020-03-20 | Stop reason: HOSPADM

## 2020-03-19 RX ADMIN — ALUMINUM HYDROXIDE, MAGNESIUM HYDROXIDE, AND SIMETHICONE 60 ML: 200; 200; 20 SUSPENSION ORAL at 06:03

## 2020-03-19 RX ADMIN — PANTOPRAZOLE SODIUM 80 MG: 40 TABLET, DELAYED RELEASE ORAL at 06:03

## 2020-03-19 RX ADMIN — TICAGRELOR 90 MG: 90 TABLET ORAL at 10:03

## 2020-03-19 RX ADMIN — ENOXAPARIN SODIUM 70 MG: 80 INJECTION SUBCUTANEOUS at 11:03

## 2020-03-19 RX ADMIN — RANOLAZINE 500 MG: 500 TABLET, FILM COATED, EXTENDED RELEASE ORAL at 10:03

## 2020-03-19 NOTE — ED PROVIDER NOTES
Encounter Date: 3/19/2020    SCRIBE #1 NOTE: I, Viviana Kaur, am scribing for, and in the presence of,  Abram Corbin MD. I have scribed the following portions of the note - Other sections scribed: RONI DALLAS.       History     Chief Complaint   Patient presents with    Chest Pain     Pt reports mid sternal chest pain that has resolved upon arrival to ED. Pt reports recent stent placement. Pt denies shortness of breath or diaphoresis.      The patient is a 64 year old male with PMHx CHF, CAD, MI, HTN, HLD, and DM who presents to ED for emergent evaluation of mid sternal chest pain that started at 3:30-4 o'clock today. Patient reports pain has now resolved after nitroglycerin that was given by EMS. Pain lasted about an hour. Patient had stent placement on 03/02/2020. He also complains of chronic pain to his left lateral forearm, and left shoulder from arthritis. He denies shortness of breath, cough, nausea, vomiting, dysuria, fever, chills, or diaphoresis.           The history is provided by the patient. No  was used.     Review of patient's allergies indicates:  No Known Allergies  Past Medical History:   Diagnosis Date    CHF (congestive heart failure)     Coronary artery disease     Diabetes mellitus     HLD (hyperlipidemia)     HTN (hypertension)     MI (myocardial infarction)     X's 2    Tobacco abuse      Past Surgical History:   Procedure Laterality Date    CARDIAC CATHETERIZATION  2007    x1    CARDIAC CATHETERIZATION  ?    x1    CORONARY ANGIOPLASTY WITH STENT PLACEMENT  2007 and ???    LEFT HEART CATHETERIZATION Left 12/5/2018    Procedure: Left heart cath;  Surgeon: Kenny Middleton MD;  Location: [facility] CATH LAB;  Service: Cardiology;  Laterality: Left;    LEFT HEART CATHETERIZATION Left 3/2/2020    Procedure: Left heart cath;  Surgeon: George Ho MD;  Location: [facility] CATH LAB;  Service: Cardiology;  Laterality: Left;     Family History   Problem Relation Age of Onset     No Known Problems Mother     No Known Problems Father     No Known Problems Sister     No Known Problems Brother     No Known Problems Maternal Aunt     No Known Problems Maternal Uncle     No Known Problems Paternal Aunt     No Known Problems Paternal Uncle     No Known Problems Maternal Grandmother     No Known Problems Maternal Grandfather     No Known Problems Paternal Grandmother     No Known Problems Paternal Grandfather     Amblyopia Neg Hx     Blindness Neg Hx     Cancer Neg Hx     Cataracts Neg Hx     Diabetes Neg Hx     Glaucoma Neg Hx     Hypertension Neg Hx     Macular degeneration Neg Hx     Retinal detachment Neg Hx     Strabismus Neg Hx     Stroke Neg Hx     Thyroid disease Neg Hx      Social History     Tobacco Use    Smoking status: Current Every Day Smoker     Packs/day: 0.50     Years: 35.00     Pack years: 17.50     Types: Cigarettes    Smokeless tobacco: Never Used   Substance Use Topics    Alcohol use: Yes     Alcohol/week: 0.8 standard drinks     Types: 1 Standard drinks or equivalent per week     Comment: social    Drug use: No     Review of Systems   Constitutional: Negative for chills, diaphoresis and fever.   HENT: Negative for ear pain and sore throat.    Eyes: Negative for pain.   Respiratory: Negative for cough and shortness of breath.    Cardiovascular: Positive for chest pain.   Gastrointestinal: Negative for abdominal pain, diarrhea and vomiting.   Genitourinary: Negative for dysuria.   Musculoskeletal: Positive for arthralgias. Negative for back pain.   Skin: Negative for rash.   Neurological: Negative for headaches.       Physical Exam     Initial Vitals   BP Pulse Resp Temp SpO2   03/19/20 1749 03/19/20 1749 03/19/20 1750 03/19/20 1751 03/19/20 1749   121/77 85 18 98 °F (36.7 °C) 100 %      MAP       --                Physical Exam  The patient was examined specifically for the following:   General:No significant distress, Good color, Warm and dry.  Head and neck:Scalp atraumatic, Neck supple. Neurological:Appropriate conversation, Gross motor deficits. Eyes:Conjugate gaze, Clear corneas. ENT: No epistaxis. Cardiac: Regular rate and rhythm, Grossly normal heart tones. Pulmonary: Wheezing, Rales. Gastrointestinal: Abdominal tenderness, Abdominal distention. Musculoskeletal: Extremity deformity, Apparent pain with range of motion of the joints. Skin: Rash.   The findings on examination were normal except for the following:  The lungs are clear.  The heart tones are normal.  The abdomen is soft.  Extremities are nontender there is no pain with range of motion of any joints.  Patient is in no distress.   ED Course   Critical Care  Date/Time: 3/19/2020 8:50 PM  Performed by: Abram Corbin MD  Authorized by: Abram Corbin MD   Direct patient critical care time: 22 minutes  Additional history critical care time: 11 minutes  Ordering / reviewing critical care time: 11 minutes  Documentation critical care time: 11 minutes  Consulting other physicians critical care time: 11 minutes  Total critical care time (exclusive of procedural time) : 66 minutes  Critical care time was exclusive of separately billable procedures and treating other patients and teaching time.  Critical care was necessary to treat or prevent imminent or life-threatening deterioration of the following conditions: CNS failure or compromise.  Critical care was time spent personally by me on the following activities: discussions with consultants, evaluation of patient's response to treatment, obtaining history from patient or surrogate, ordering and review of laboratory studies, pulse oximetry, review of old charts, re-evaluation of patient's condition, ordering and review of radiographic studies, ordering and performing treatments and interventions, examination of patient, discussions with primary provider and development of treatment plan with patient or surrogate.        Labs Reviewed    COMPREHENSIVE METABOLIC PANEL - Abnormal; Notable for the following components:       Result Value    Glucose 252 (*)     Albumin 3.4 (*)     All other components within normal limits   CBC W/ AUTO DIFFERENTIAL - Abnormal; Notable for the following components:    Hemoglobin 13.1 (*)     Mean Corpuscular Hemoglobin 26.5 (*)     Mean Corpuscular Hemoglobin Conc 30.9 (*)     MPV 9.0 (*)     Gran # (ANC) 7.8 (*)     Gran% 76.3 (*)     Lymph% 15.8 (*)     All other components within normal limits   TROPONIN I - Abnormal; Notable for the following components:    Troponin I 0.113 (*)     All other components within normal limits   PROTIME-INR     EKG Readings: (Independently Interpreted)   This patient is in a normal sinus rhythm with a heart rate of 86.  The patient is in a right bundle branch block.  There is poor R-wave progression across the precordium.  There is no evidence of acute myocardial infarction or malignant arrhythmia.  There are nonspecific ST segment and T-wave changes.       Imaging Results          X-Ray Chest AP Portable (Final result)  Result time 03/19/20 18:19:03    Final result by Janae Lemon MD (03/19/20 18:19:03)                 Impression:      No acute intrathoracic abnormality detected.      Electronically signed by: Janae Lemon  Date:    03/19/2020  Time:    18:19             Narrative:    EXAMINATION:  AP PORTABLE CHEST    CLINICAL HISTORY:  chest pain;    TECHNIQUE:  AP portable chest radiograph was submitted.    COMPARISON:  03/03/2020 and 12/04/2017    FINDINGS:  AP portable chest radiograph demonstrates a cardiac silhouette within normal limits.  There is no new focal consolidation, pneumothorax or pleural effusion detected.                              Medical decision making:  Given the above this patient presents to the emergency room after an hour of chest pain that began around 330 this afternoon.  Resolved.  The patient has an elevated troponin.  The EKG does not  reveal significant ST segment changes.  This is a non ST segment elevation myocardial infarction.  I discussed this case with , who recommends treatment with Lovenox aspirin and Plavix.  The patient is on ticagrelor.  He claims compliance with his aspirin and ticagrelor.  The patient will be admitted.  He is chest pain-free at this time.  I will treat with nitropaste.  I will write orders on behalf of Hospital Medicine.   The patient was treated with aspirin on route to the hospital.  I continued the aspirin in his med rec.  The patient is on ticagrelor.  I will continue the to CAD the lower.  I will treat the patient with Lovenox.  I will hold off on beta-blocker until Cardiology sees the patient.  I will wait to start an Ace inhibitor until Cardiology sees the patient.                   Scribe Attestation:   Scribe #1: I performed the above scribed service and the documentation accurately describes the services I performed. I attest to the accuracy of the note.                          Clinical Impression:       ICD-10-CM ICD-9-CM   1. Acute non-ST segment elevation myocardial infarction I21.4 410.70   2. Acute chest pain R07.9 786.50             ED Disposition Condition    Admit                           Abram Corbin MD  03/19/20 2051       Abram Corbin MD  03/19/20 2101       Abram Corbin MD  03/19/20 2108

## 2020-03-19 NOTE — ED TRIAGE NOTES
Pt arrived to the ED via [facility] from [facility] with c/o chest pain. Pt reports having midsternal chest pain that started approx around 2564-5169 on today. Reports shopping when pain started. Denies n/v, SOB, dizziness/weakness, diaphoresis, numbness/tingling. Rates pain 6/10 on pain scale. On admit to ED bed, pt AAOx4, NAD noted, VSS. Pt placed on cardiac monitor, pulse ox and BP cuff. EKG obtained at 1747.

## 2020-03-20 VITALS
HEART RATE: 97 BPM | RESPIRATION RATE: 18 BRPM | HEIGHT: 65 IN | TEMPERATURE: 98 F | SYSTOLIC BLOOD PRESSURE: 102 MMHG | BODY MASS INDEX: 23.73 KG/M2 | OXYGEN SATURATION: 99 % | WEIGHT: 142.44 LBS | DIASTOLIC BLOOD PRESSURE: 63 MMHG

## 2020-03-20 PROBLEM — I21.4 ACUTE NON-ST SEGMENT ELEVATION MYOCARDIAL INFARCTION: Status: ACTIVE | Noted: 2020-03-20

## 2020-03-20 PROBLEM — I21.4 ACUTE NON-ST SEGMENT ELEVATION MYOCARDIAL INFARCTION: Status: RESOLVED | Noted: 2020-03-19 | Resolved: 2020-03-20

## 2020-03-20 LAB
ANION GAP SERPL CALC-SCNC: 6 MMOL/L (ref 8–16)
BNP SERPL-MCNC: 701 PG/ML (ref 0–99)
BUN SERPL-MCNC: 7 MG/DL (ref 8–23)
CALCIUM SERPL-MCNC: 8.6 MG/DL (ref 8.7–10.5)
CHLORIDE SERPL-SCNC: 105 MMOL/L (ref 95–110)
CO2 SERPL-SCNC: 27 MMOL/L (ref 23–29)
CREAT SERPL-MCNC: 0.9 MG/DL (ref 0.5–1.4)
EST. GFR  (AFRICAN AMERICAN): >60 ML/MIN/1.73 M^2
EST. GFR  (NON AFRICAN AMERICAN): >60 ML/MIN/1.73 M^2
GLUCOSE SERPL-MCNC: 237 MG/DL (ref 70–110)
POCT GLUCOSE: 190 MG/DL (ref 70–110)
POCT GLUCOSE: 204 MG/DL (ref 70–110)
POCT GLUCOSE: 276 MG/DL (ref 70–110)
POTASSIUM SERPL-SCNC: 3.7 MMOL/L (ref 3.5–5.1)
SODIUM SERPL-SCNC: 138 MMOL/L (ref 136–145)
TROPONIN I SERPL DL<=0.01 NG/ML-MCNC: 0.1 NG/ML (ref 0–0.03)
TROPONIN I SERPL DL<=0.01 NG/ML-MCNC: 0.1 NG/ML (ref 0–0.03)

## 2020-03-20 PROCEDURE — 25000003 PHARM REV CODE 250: Performed by: EMERGENCY MEDICINE

## 2020-03-20 PROCEDURE — 99223 PR INITIAL HOSPITAL CARE,LEVL III: ICD-10-PCS | Mod: ,,, | Performed by: INTERNAL MEDICINE

## 2020-03-20 PROCEDURE — 84484 ASSAY OF TROPONIN QUANT: CPT

## 2020-03-20 PROCEDURE — 84484 ASSAY OF TROPONIN QUANT: CPT | Mod: 91

## 2020-03-20 PROCEDURE — 36415 COLL VENOUS BLD VENIPUNCTURE: CPT

## 2020-03-20 PROCEDURE — 80048 BASIC METABOLIC PNL TOTAL CA: CPT

## 2020-03-20 PROCEDURE — 99223 1ST HOSP IP/OBS HIGH 75: CPT | Mod: ,,, | Performed by: INTERNAL MEDICINE

## 2020-03-20 PROCEDURE — G0378 HOSPITAL OBSERVATION PER HR: HCPCS

## 2020-03-20 RX ADMIN — RANOLAZINE 500 MG: 500 TABLET, FILM COATED, EXTENDED RELEASE ORAL at 08:03

## 2020-03-20 RX ADMIN — ASPIRIN 81 MG: 81 TABLET, COATED ORAL at 08:03

## 2020-03-20 RX ADMIN — ROSUVASTATIN CALCIUM 40 MG: 10 TABLET, COATED ORAL at 08:03

## 2020-03-20 RX ADMIN — TICAGRELOR 90 MG: 90 TABLET ORAL at 08:03

## 2020-03-20 NOTE — ASSESSMENT & PLAN NOTE
Echo with EF of 25% 2 weeks ago. Appears euvolemic on exam. BNP pending. Will hold home lasix as euvolemic on exam and possible LHC tomorrow. Daily weights. Strict intake and output. Telemetry

## 2020-03-20 NOTE — ASSESSMENT & PLAN NOTE
Lab Results   Component Value Date    LABA1C 11.6 (H) 12/03/2015    HGBA1C 12.7 (H) 03/03/2020       Will start sliding scale inuslin, diabetic diet, goal -180. NPO at midnight

## 2020-03-20 NOTE — HPI
Fredrickanthony Cox 64 y.o. male with CAD, DM2, tobacco abuse, HLD presents to the hospital with a chief complaint of chest pain. He reprots today he experienced a sudden onset sternal chest pain described as sharp without radiation that was relieved with nitro/aspirin. He is chest pain free. He reports it is similar to chest pain he experienced 2 weeks ago with his MI. He has smoked since discharge, but reports is motivated to stop now. He is compliant with aspirin/brilinta. He denies fever, SOB, N/V, abdominal pain, leg swelling, dysuria, dizziness, syncope.     In the ED, EKG without acute ischemia, troponin 0.11, discussed with cardiology recommended NSTEMI treatment, chest x-ray without acute abnormality.

## 2020-03-20 NOTE — ASSESSMENT & PLAN NOTE
Diuresis and afterload reduction as tolerated. Recent echo with severely depressed EF - will need repeat out patient 3 months after PCI - will discuss AICD if EF < 35%

## 2020-03-20 NOTE — PROGRESS NOTES
[facility] WRITTEN HEALTHCARE AND DISCHARGE INFORMATION.  Follow-up Information     Amos Tee III, MD On 3/27/2020.    Specialty:  Internal Medicine  Why:  out patient services: 9:45AM follow up from the hospital  Contact information:  [address]  [facility]  [place] [address]  [phone]             Kenny Middleton MD On 4/1/2020.    Specialty:  Cardiology  Why:  out patient services: 1:45pm follow up from the hospital  Contact information:  [address]  [address]  [place] [address]  [phone]                   Help at Home           [phone]  After discharge for assistance [facility] On Call Nurse Care Line 24/7 Assistance.   Things You are responsible For To Manage Your Care At Home:  1.    Getting your prescriptions filled   2.    Taking your medications as directed, DO NOT MISS ANY DOSES!  3.    Going to your follow-up doctor appointment. This is important because it  allow the doctor to monitor your progress and determine if  any changes need to made to your treatment plan.   Thank you for choosing [facility] for your care. [facility] happy to have the opportunity to serve you.    Sincerely,  Your [facility] Team,  Lesia Culver RN, BSN, [facility]  [phone]  3/20/2020

## 2020-03-20 NOTE — ASSESSMENT & PLAN NOTE
Troponin 0.11 slowly trending down. Suspect this is residual elevation from recent NSTEMI and PCI of LAD/Cx. Discussed with Dr Ho - continue medical Rx for CAD with no repeat LHC at this time. Ok for d/c and OV 2 weeks

## 2020-03-20 NOTE — PLAN OF CARE
03/20/20 1105   Discharge Assessment   Assessment Type Discharge Planning Assessment   Assessment information obtained from? Medical Record   Communicated expected length of stay with patient/caregiver no   Prior to hospitilization cognitive status: Alert/Oriented   Prior to hospitalization functional status: Independent   Current cognitive status: Alert/Oriented   Current Functional Status: Independent   Lives With spouse   Able to Return to Prior Arrangements yes   Is patient able to care for self after discharge? Yes   Who are your caregiver(s) and their phone number(s)?   (Wife Mary- [phone])   Patient's perception of discharge disposition admitted as an inpatient   Patient currently being followed by outpatient case management? No   Patient currently receives any other outside agency services? No   Equipment Currently Used at Home glucometer   Do you have any problems affording any of your prescribed medications? No   Is the patient taking medications as prescribed? yes   Does the patient have transportation home? Yes   Transportation Anticipated family or friend will provide   Does the patient receive services at the [facility]? No   Discharge Plan A Home with family   Discharge Plan B Home with family   DME Needed Upon Discharge  none   Patient/Family in Agreement with Plan other (see comments)   .  [facility] - PATRICK Shane - [address]  [address]  [place] [address]  Phone: [phone] Fax: [phone]    [facility] - PATRICK Villegas - [address]  [address]  [place] [address]  Phone: [phone] Fax: [phone]

## 2020-03-20 NOTE — HOSPITAL COURSE
Patient admitted for suspected NSTEMI due to acute chest pain with slightly elevated troponins. Patient initially started on anti-coagulation, although elevated troponins likely residual elevation from recent NSTEMI 3/2020 where he received PCI of LAD/Cx. Cardiology recommended continuing medical treatment for CAD and no repeat LHC. Patient can be discharged home with outpatient follow up in 2 weeks.

## 2020-03-20 NOTE — DISCHARGE SUMMARY
[facility] Hospital Medicine  Discharge Summary      Patient Name: Fredrick Cox  MRN: [id number]  Admission Date: 3/19/2020  Hospital Length of Stay: 1 days  Discharge Date and Time:  03/20/2020 3:46 PM  Attending Physician: Jak Deutsch MD   Discharging Provider: Jak Deutsch MD  Primary Care Provider: Amos Tee III, MD      HPI:   Fredrick Cox 64 y.o. male with CAD, DM2, tobacco abuse, HLD presents to the hospital with a chief complaint of chest pain. He reprots today he experienced a sudden onset sternal chest pain described as sharp without radiation that was relieved with nitro/aspirin. He is chest pain free. He reports it is similar to chest pain he experienced 2 weeks ago with his MI. He has smoked since discharge, but reports is motivated to stop now. He is compliant with aspirin/brilinta. He denies fever, SOB, N/V, abdominal pain, leg swelling, dysuria, dizziness, syncope.     In the ED, EKG without acute ischemia, troponin 0.11, discussed with cardiology recommended NSTEMI treatment, chest x-ray without acute abnormality.     * No surgery found *      Hospital Course:   Patient admitted for suspected NSTEMI due to acute chest pain with slightly elevated troponins. Patient initially started on anti-coagulation, although elevated troponins likely residual elevation from recent NSTEMI 3/2020 where he received PCI of LAD/Cx. Cardiology recommended continuing medical treatment for CAD and no repeat LHC. Patient can be discharged home with outpatient follow up in 2 weeks.      Consults:   Consults (From admission, onward)        Status Ordering Provider     Inpatient consult to Cardiology  Once     Provider:  George Ho MD    Acknowledged [facility]          No new Assessment & Plan notes have been filed under this hospital service since the last note was generated.  Service: Hospital Medicine    Final Active Diagnoses:    Diagnosis Date Noted POA    Type 2  diabetes mellitus, with long-term current use of insulin [E11.9, Z79.4] 04/02/2014 Not Applicable     Chronic    Chronic combined systolic and diastolic congestive heart failure [I50.42] 01/17/2014 Yes     Chronic    Coronary artery disease of native artery of native heart with stable angina pectoris [I25.118] 01/17/2014 Yes     Chronic    HLD (hyperlipidemia) [E78.5] 01/17/2014 Yes     Chronic    Tobacco abuse [Z72.0] 01/17/2014 Yes     Chronic      Problems Resolved During this Admission:    Diagnosis Date Noted Date Resolved POA    PRINCIPAL PROBLEM:  Acute non-ST segment elevation myocardial infarction [I21.4] 03/19/2020 03/20/2020 Yes       Discharged Condition: stable    Disposition:     Follow Up:  Follow-up Information     Amos Tee III, MD On 3/27/2020.    Specialty:  Internal Medicine  Why:  out patient services: 9:45AM follow up from the hospital  Contact information:  [address]  [facility]  [facility] [address]  [phone]             Kenny Middleton MD On 4/1/2020.    Specialty:  Cardiology  Why:  out patient services: 1:45pm follow up from the hospital  Contact information:  [address]  [address]  [facility] [address]  [phone]                 Patient Instructions:   No discharge procedures on file.    Significant Diagnostic Studies:     Pending Diagnostic Studies:     None         Medications:  Reconciled Home Medications:      Medication List      CONTINUE taking these medications    aspirin 81 MG EC tablet  Commonly known as:  ECOTRIN  Take 1 tablet (81 mg total) by mouth once daily.     blood sugar diagnostic Strp  1 strip by Misc.(Non-Drug; Combo Route) route 2 (two) times daily.     furosemide 40 MG tablet  Commonly known as:  LASIX  Take 1 tablet (40 mg total) by mouth once daily.     insulin aspart U-100 100 unit/mL (3 mL) Inpn pen  Commonly known as:  NovoLOG  Inject 1-10 Units into the skin 3 (three) times daily with meals. Blood Glucose  mg/dL              "     0600                0000                               1200                               1800  151-200                2 units             1 unit  201-250                4 units             2 units  251-300                6 units             3 units  301-350                8 units             4 units  >350                      10 units           5 units     insulin glargine (TOUJEO) 300 unit/mL (1.5 mL) Inpn pen  Commonly known as:  TOUJEO SOLOSTAR U-300 INSULIN  Inject 30 Units into the skin every evening.     pen needle, diabetic 31 gauge x 3/16" Ndle  Commonly known as:  BD ULTRA-FINE MINI PEN NEEDLE  USE AS DIRECTED     pen needle, diabetic 31 gauge x 5/16" Ndle  1 each by Misc.(Non-Drug; Combo Route) route 2 (two) times daily.     ranolazine 500 MG Tb12  Commonly known as:  RANEXA  Take 1 tablet (500 mg total) by mouth 2 (two) times daily.     rosuvastatin 40 MG Tab  Commonly known as:  CRESTOR  Take 1 tablet (40 mg total) by mouth once daily.     ticagrelor 90 mg tablet  Commonly known as:  BRILINTA  Take 1 tablet (90 mg total) by mouth 2 (two) times daily.        ASK your doctor about these medications    nitroGLYCERIN 0.4 MG SL tablet  Commonly known as:  NITROSTAT  Place 1 tablet (0.4 mg total) under the tongue every 5 (five) minutes as needed.            Indwelling Lines/Drains at time of discharge:   Lines/Drains/Airways     None                 Time spent on the discharge of patient: 32 minutes  Patient was seen and examined on the date of discharge and determined to be suitable for discharge.         Jak Deutsch MD  Department of Hospital Medicine  [facility]  "

## 2020-03-20 NOTE — ASSESSMENT & PLAN NOTE
Troponin today of 0.113. Chest pain resolved with nitro and aspirin. He has continued to smoke since discharge.  -conitnue aspirin/plavix. Given lovenox in the ED. Holding starting beta blocker for hypotension. Holding ACE as LHC possible tomorrow  -telemetry  -cardiology consulted  -will hold repeat echo as one 2 weeks ago.   Troponin Trend

## 2020-03-20 NOTE — NURSING
Patient escorted to vehicle via w/c for discharge home. Patient escorted by transport by family. No apparent distress noted.

## 2020-03-20 NOTE — H&P
[facility] Hospital Medicine  History & Physical    Patient Name: Fredrick Cox  MRN: [id number]  Admission Date: 3/19/2020  Attending Physician: Abram Corbin MD   Primary Care Provider: Amos Tee III, MD         Patient information was obtained from patient, past medical records and ER records.     Subjective:     Principal Problem:Acute non-ST segment elevation myocardial infarction    Chief Complaint:   Chief Complaint   Patient presents with    Chest Pain     Pt reports mid sternal chest pain that has resolved upon arrival to ED. Pt reports recent stent placement. Pt denies shortness of breath or diaphoresis.         HPI: Fredrick Cox 64 y.o. male with CAD, DM2, tobacco abuse, HLD presents to the hospital with a chief complaint of chest pain. He reprots today he experienced a sudden onset sternal chest pain described as sharp without radiation that was relieved with nitro/aspirin. He is chest pain free. He reports it is similar to chest pain he experienced 2 weeks ago with his MI. He has smoked since discharge, but reports is motivated to stop now. He is compliant with aspirin/brilinta. He denies fever, SOB, N/V, abdominal pain, leg swelling, dysuria, dizziness, syncope.     In the ED, EKG without acute ischemia, troponin 0.11, discussed with cardiology recommended NSTEMI treatment, chest x-ray without acute abnormality.     Past Medical History:   Diagnosis Date    CHF (congestive heart failure)     Coronary artery disease     Diabetes mellitus     HLD (hyperlipidemia)     HTN (hypertension)     MI (myocardial infarction)     X's 2    Tobacco abuse        Past Surgical History:   Procedure Laterality Date    CARDIAC CATHETERIZATION  2007    x1    CARDIAC CATHETERIZATION  ?    x1    CORONARY ANGIOPLASTY WITH STENT PLACEMENT  2007 and ???    LEFT HEART CATHETERIZATION Left 12/5/2018    Procedure: Left heart cath;  Surgeon: Kenny Middleton MD;  Location: [facility] CATH LAB;   "Service: Cardiology;  Laterality: Left;    LEFT HEART CATHETERIZATION Left 3/2/2020    Procedure: Left heart cath;  Surgeon: George Ho MD;  Location: [facility] CATH LAB;  Service: Cardiology;  Laterality: Left;       Review of patient's allergies indicates:  No Known Allergies    No current facility-administered medications on file prior to encounter.      Current Outpatient Medications on File Prior to Encounter   Medication Sig    aspirin (ECOTRIN) 81 MG EC tablet Take 1 tablet (81 mg total) by mouth once daily.    furosemide (LASIX) 40 MG tablet Take 1 tablet (40 mg total) by mouth once daily.    insulin aspart U-100 (NOVOLOG) 100 unit/mL InPn pen Inject 1-10 Units into the skin 3 (three) times daily with meals. Blood Glucose  mg/dL                  0600                0000                               1200                               1800  151-200                2 units             1 unit  201-250                4 units             2 units  251-300                6 units             3 units  301-350                8 units             4 units  >350                      10 units           5 units    ranolazine (RANEXA) 500 MG Tb12 Take 1 tablet (500 mg total) by mouth 2 (two) times daily.    rosuvastatin (CRESTOR) 40 MG Tab Take 1 tablet (40 mg total) by mouth once daily.    ticagrelor (BRILINTA) 90 mg tablet Take 1 tablet (90 mg total) by mouth 2 (two) times daily.    blood sugar diagnostic Strp 1 strip by Misc.(Non-Drug; Combo Route) route 2 (two) times daily.    insulin glargine, TOUJEO, (TOUJEO SOLOSTAR U-300 INSULIN) 300 unit/mL (1.5 mL) InPn pen Inject 30 Units into the skin every evening.    nitroGLYCERIN (NITROSTAT) 0.4 MG SL tablet Place 1 tablet (0.4 mg total) under the tongue every 5 (five) minutes as needed.    pen needle, diabetic (BD INSULIN PEN NEEDLE UF MINI) 31 gauge x 3/16" Ndle USE AS DIRECTED    pen needle, diabetic 31 gauge x 5/16" Ndle 1 each by Misc.(Non-Drug; Combo " Route) route 2 (two) times daily.     Family History     Problem Relation (Age of Onset)    No Known Problems Mother, Father, Sister, Brother, Maternal Aunt, Maternal Uncle, Paternal Aunt, Paternal Uncle, Maternal Grandmother, Maternal Grandfather, Paternal Grandmother, Paternal Grandfather        Tobacco Use    Smoking status: Current Every Day Smoker     Packs/day: 0.50     Years: 35.00     Pack years: 17.50     Types: Cigarettes    Smokeless tobacco: Never Used   Substance and Sexual Activity    Alcohol use: Yes     Alcohol/week: 0.8 standard drinks     Types: 1 Standard drinks or equivalent per week     Comment: social    Drug use: No    Sexual activity: Not on file     Review of Systems   Constitutional: Negative for chills and fever.   HENT: Negative for nosebleeds and tinnitus.    Eyes: Negative for photophobia and visual disturbance.   Respiratory: Negative for shortness of breath and wheezing.    Cardiovascular: Positive for chest pain. Negative for palpitations and leg swelling.   Gastrointestinal: Negative for abdominal distention, nausea and vomiting.   Genitourinary: Negative for dysuria, flank pain and hematuria.   Musculoskeletal: Negative for gait problem and joint swelling.   Skin: Negative for rash and wound.   Neurological: Negative for seizures and syncope.     Objective:     Vital Signs (Most Recent):  Temp: 98 °F (36.7 °C) (03/19/20 1751)  Pulse: 81 (03/19/20 2131)  Resp: 18 (03/19/20 2131)  BP: 122/77 (03/19/20 2131)  SpO2: 100 % (03/19/20 2131) Vital Signs (24h Range):  Temp:  [98 °F (36.7 °C)] 98 °F (36.7 °C)  Pulse:  [81-99] 81  Resp:  [16-21] 18  SpO2:  [99 %-100 %] 100 %  BP: (100-122)/(57-77) 122/77     Weight: 68 kg (150 lb)  Body mass index is 24.96 kg/m².    Physical Exam   Constitutional: He is oriented to person, place, and time. He appears well-developed and well-nourished. No distress.   HENT:   Head: Normocephalic and atraumatic.   Eyes: Conjunctivae and EOM are normal.  Right eye exhibits no discharge. Left eye exhibits no discharge.   Neck: Normal range of motion. No thyromegaly present.   Cardiovascular: Normal rate and regular rhythm.   No murmur heard.  Pulmonary/Chest: Effort normal and breath sounds normal. No respiratory distress. He exhibits no tenderness.   Abdominal: Soft. Bowel sounds are normal. He exhibits no distension and no mass. There is no tenderness.   Musculoskeletal: He exhibits no edema or deformity.   Neurological: He is alert and oriented to person, place, and time.   Skin: Skin is warm and dry.   Psychiatric: He has a normal mood and affect. His behavior is normal.   Nursing note and vitals reviewed.        CRANIAL NERVES     CN III, IV, VI   Extraocular motions are normal.        Significant Labs:   CBC:   Recent Labs   Lab 03/19/20 1839   WBC 10.17   HGB 13.1*   HCT 42.4        CMP:   Recent Labs   Lab 03/19/20 1839      K 4.0      CO2 26   *   BUN 8   CREATININE 1.0   CALCIUM 9.2   PROT 7.4   ALBUMIN 3.4*   BILITOT 0.3   ALKPHOS 77   AST 11   ALT 15   ANIONGAP 10   EGFRNONAA >60     Cardiac Markers: No results for input(s): CKMB, MYOGLOBIN, BNP, TROPISTAT in the last 48 hours.  Coagulation:   Recent Labs   Lab 03/19/20 1839   INR 1.0     Troponin:   Recent Labs   Lab 03/19/20 1839   TROPONINI 0.113*       Significant Imaging:   Imaging Results          X-Ray Chest AP Portable (Final result)  Result time 03/19/20 18:19:03    Final result by Janae Lemon MD (03/19/20 18:19:03)                 Impression:      No acute intrathoracic abnormality detected.      Electronically signed by: Janae Lemon  Date:    03/19/2020  Time:    18:19             Narrative:    EXAMINATION:  AP PORTABLE CHEST    CLINICAL HISTORY:  chest pain;    TECHNIQUE:  AP portable chest radiograph was submitted.    COMPARISON:  03/03/2020 and 12/04/2017    FINDINGS:  AP portable chest radiograph demonstrates a cardiac silhouette within normal  limits.  There is no new focal consolidation, pneumothorax or pleural effusion detected.                                  Assessment/Plan:     * Acute non-ST segment elevation myocardial infarction  Troponin today of 0.113. Chest pain resolved with nitro and aspirin. He has continued to smoke since discharge.  -conitnue aspirin/plavix. Given lovenox in the ED. Holding starting beta blocker for hypotension. Holding ACE as LHC possible tomorrow  -telemetry  -cardiology consulted  -will hold repeat echo as one 2 weeks ago.   Troponin Trend    Type 2 diabetes mellitus, with long-term current use of insulin  Lab Results   Component Value Date    LABA1C 11.6 (H) 12/03/2015    HGBA1C 12.7 (H) 03/03/2020       Will start sliding scale inuslin, diabetic diet, goal -180. NPO at midnight    HLD (hyperlipidemia)  Continue home crestor      Tobacco abuse  Greater than 3 minutes spent counseling patient on dangers of continued tobacco abuse. Will provide tobacco cessation education      Chronic combined systolic and diastolic congestive heart failure  Echo with EF of 25% 2 weeks ago. Appears euvolemic on exam. BNP pending. Will hold home lasix as euvolemic on exam and possible LHC tomorrow. Daily weights. Strict intake and output. Telemetry      Coronary artery disease of native artery of native heart with stable angina pectoris  History of STEMI 3/2/2020. Compliant with aspirin/statin/brilinta. Cardiology consulted see above      VTE Risk Mitigation (From admission, onward)         Ordered     enoxaparin injection 70 mg  Once      03/19/20 2152     IP VTE HIGH RISK PATIENT  Once      03/19/20 2152              VTE: lovenox  Code: full  Diet: cardiac/diabetic  Dispo: pending cardiology eval  Patient will be admitted to inpatient* with hospital medicine.     Forrest Draper PA-C  Department of Hospital Medicine   [facility]

## 2020-03-20 NOTE — CARE UPDATE
I am in agreement with the Utilization Committee's decision that the patient does not meet requirements for IP.

## 2020-03-20 NOTE — PLAN OF CARE
Problem: Adult Inpatient Plan of Care  Goal: Plan of Care Review  Outcome: Ongoing, Progressing     Problem: Diabetes Comorbidity  Goal: Blood Glucose Level Within Desired Range  Outcome: Ongoing, Progressing     Problem: Pain Acute  Goal: Optimal Pain Control  Outcome: Ongoing, Progressing

## 2020-03-20 NOTE — SUBJECTIVE & OBJECTIVE
Past Medical History:   Diagnosis Date    CHF (congestive heart failure)     Coronary artery disease     Diabetes mellitus     HLD (hyperlipidemia)     HTN (hypertension)     MI (myocardial infarction)     X's 2    Tobacco abuse        Past Surgical History:   Procedure Laterality Date    CARDIAC CATHETERIZATION  2007    x1    CARDIAC CATHETERIZATION  ?    x1    CORONARY ANGIOPLASTY WITH STENT PLACEMENT  2007 and ???    LEFT HEART CATHETERIZATION Left 12/5/2018    Procedure: Left heart cath;  Surgeon: Kenny Middleton MD;  Location: [facility] CATH LAB;  Service: Cardiology;  Laterality: Left;    LEFT HEART CATHETERIZATION Left 3/2/2020    Procedure: Left heart cath;  Surgeon: George Ho MD;  Location: [facility] CATH LAB;  Service: Cardiology;  Laterality: Left;       Review of patient's allergies indicates:  No Known Allergies    No current facility-administered medications on file prior to encounter.      Current Outpatient Medications on File Prior to Encounter   Medication Sig    aspirin (ECOTRIN) 81 MG EC tablet Take 1 tablet (81 mg total) by mouth once daily.    furosemide (LASIX) 40 MG tablet Take 1 tablet (40 mg total) by mouth once daily.    insulin aspart U-100 (NOVOLOG) 100 unit/mL InPn pen Inject 1-10 Units into the skin 3 (three) times daily with meals. Blood Glucose  mg/dL                  0600                0000                               1200                               1800  151-200                2 units             1 unit  201-250                4 units             2 units  251-300                6 units             3 units  301-350                8 units             4 units  >350                      10 units           5 units    ranolazine (RANEXA) 500 MG Tb12 Take 1 tablet (500 mg total) by mouth 2 (two) times daily.    rosuvastatin (CRESTOR) 40 MG Tab Take 1 tablet (40 mg total) by mouth once daily.    ticagrelor (BRILINTA) 90 mg tablet Take 1 tablet (90 mg total) by  "mouth 2 (two) times daily.    blood sugar diagnostic Strp 1 strip by Misc.(Non-Drug; Combo Route) route 2 (two) times daily.    insulin glargine, TOUJEO, (TOUJEO SOLOSTAR U-300 INSULIN) 300 unit/mL (1.5 mL) InPn pen Inject 30 Units into the skin every evening.    nitroGLYCERIN (NITROSTAT) 0.4 MG SL tablet Place 1 tablet (0.4 mg total) under the tongue every 5 (five) minutes as needed.    pen needle, diabetic (BD INSULIN PEN NEEDLE UF MINI) 31 gauge x 3/16" Ndle USE AS DIRECTED    pen needle, diabetic 31 gauge x 5/16" Ndle 1 each by Misc.(Non-Drug; Combo Route) route 2 (two) times daily.     Family History     Problem Relation (Age of Onset)    No Known Problems Mother, Father, Sister, Brother, Maternal Aunt, Maternal Uncle, Paternal Aunt, Paternal Uncle, Maternal Grandmother, Maternal Grandfather, Paternal Grandmother, Paternal Grandfather        Tobacco Use    Smoking status: Current Every Day Smoker     Packs/day: 0.50     Years: 35.00     Pack years: 17.50     Types: Cigarettes    Smokeless tobacco: Never Used   Substance and Sexual Activity    Alcohol use: Yes     Alcohol/week: 0.8 standard drinks     Types: 1 Standard drinks or equivalent per week     Comment: social    Drug use: No    Sexual activity: Not on file     Review of Systems   Constitutional: Negative for chills and fever.   HENT: Negative for nosebleeds and tinnitus.    Eyes: Negative for photophobia and visual disturbance.   Respiratory: Negative for shortness of breath and wheezing.    Cardiovascular: Positive for chest pain. Negative for palpitations and leg swelling.   Gastrointestinal: Negative for abdominal distention, nausea and vomiting.   Genitourinary: Negative for dysuria, flank pain and hematuria.   Musculoskeletal: Negative for gait problem and joint swelling.   Skin: Negative for rash and wound.   Neurological: Negative for seizures and syncope.     Objective:     Vital Signs (Most Recent):  Temp: 98 °F (36.7 °C) (03/19/20 " 1751)  Pulse: 81 (03/19/20 2131)  Resp: 18 (03/19/20 2131)  BP: 122/77 (03/19/20 2131)  SpO2: 100 % (03/19/20 2131) Vital Signs (24h Range):  Temp:  [98 °F (36.7 °C)] 98 °F (36.7 °C)  Pulse:  [81-99] 81  Resp:  [16-21] 18  SpO2:  [99 %-100 %] 100 %  BP: (100-122)/(57-77) 122/77     Weight: 68 kg (150 lb)  Body mass index is 24.96 kg/m².    Physical Exam   Constitutional: He is oriented to person, place, and time. He appears well-developed and well-nourished. No distress.   HENT:   Head: Normocephalic and atraumatic.   Eyes: Conjunctivae and EOM are normal. Right eye exhibits no discharge. Left eye exhibits no discharge.   Neck: Normal range of motion. No thyromegaly present.   Cardiovascular: Normal rate and regular rhythm.   No murmur heard.  Pulmonary/Chest: Effort normal and breath sounds normal. No respiratory distress. He exhibits no tenderness.   Abdominal: Soft. Bowel sounds are normal. He exhibits no distension and no mass. There is no tenderness.   Musculoskeletal: He exhibits no edema or deformity.   Neurological: He is alert and oriented to person, place, and time.   Skin: Skin is warm and dry.   Psychiatric: He has a normal mood and affect. His behavior is normal.   Nursing note and vitals reviewed.        CRANIAL NERVES     CN III, IV, VI   Extraocular motions are normal.        Significant Labs:   CBC:   Recent Labs   Lab 03/19/20 1839   WBC 10.17   HGB 13.1*   HCT 42.4        CMP:   Recent Labs   Lab 03/19/20 1839      K 4.0      CO2 26   *   BUN 8   CREATININE 1.0   CALCIUM 9.2   PROT 7.4   ALBUMIN 3.4*   BILITOT 0.3   ALKPHOS 77   AST 11   ALT 15   ANIONGAP 10   EGFRNONAA >60     Cardiac Markers: No results for input(s): CKMB, MYOGLOBIN, BNP, TROPISTAT in the last 48 hours.  Coagulation:   Recent Labs   Lab 03/19/20 1839   INR 1.0     Troponin:   Recent Labs   Lab 03/19/20  1839   TROPONINI 0.113*       Significant Imaging:   Imaging Results          X-Ray Chest AP  Portable (Final result)  Result time 03/19/20 18:19:03    Final result by Janae Lemon MD (03/19/20 18:19:03)                 Impression:      No acute intrathoracic abnormality detected.      Electronically signed by: Janae Lemon  Date:    03/19/2020  Time:    18:19             Narrative:    EXAMINATION:  AP PORTABLE CHEST    CLINICAL HISTORY:  chest pain;    TECHNIQUE:  AP portable chest radiograph was submitted.    COMPARISON:  03/03/2020 and 12/04/2017    FINDINGS:  AP portable chest radiograph demonstrates a cardiac silhouette within normal limits.  There is no new focal consolidation, pneumothorax or pleural effusion detected.

## 2020-03-20 NOTE — CONSULTS
[facility]  Cardiology  Consult Note    Patient Name: Fredrick Cox  MRN: [id number]  Admission Date: 3/19/2020  Hospital Length of Stay: 1 days  Code Status: Full Code   Attending Provider: Jak Deutsch MD   Consulting Provider: Kenny Middleton MD  Primary Care Physician: Amos Tee III, MD  Principal Problem:Acute non-ST segment elevation myocardial infarction    Patient information was obtained from patient and ER records.     Consults  Subjective:     Chief Complaint:  CP, CAD        HPI: Fredrick Cox 64 y.o. male with CAD, DM2, tobacco abuse, HLD presents to the hospital with a chief complaint of chest pain. He reprots today he experienced a sudden onset sternal chest pain described as sharp without radiation that was relieved with nitro/aspirin. He is chest pain free. He reports it is similar to chest pain he experienced 2 weeks ago with his MI. He has smoked since discharge, but reports is motivated to stop now. He is compliant with aspirin/brilinta. He denies fever, SOB, N/V, abdominal pain, leg swelling, dysuria, dizziness, syncope.      In the ED, EKG without acute ischemia, troponin 0.11, discussed with cardiology recommended NSTEMI treatment, chest x-ray without acute abnormality.     Currently denies CP or SOB  Troponin 0.11 slowly trending down  EKG NSR RBBB - no acute STT changes    Echo 3/3/20  · Severely decreased left ventricular systolic function. The estimated ejection fraction is 25%.  · Eccentric left ventricular hypertrophy.  · Grade I (mild) left ventricular diastolic dysfunction consistent with impaired relaxation.  · Local segmental wall motion abnormalities.  · Normal right ventricular systolic function.    [facility] 3/2/20  Description of the findings of the procedure: LHC/cor angio/PCI LAD late stent thrombosis MICH x1/PCI dist LCx MICH x1/IVUS LM/IABP placement RFA.     Findings/Key Components:  LVEDP: 35mmHg  LVEF: echo ordered     Dominance: Right  LM: prox  40-50%, MLA 7.1mm2, no significant MLA difference vs distal vessel  LAD: prox stent thrombosed, after PCI dist/transapical LAD occluded (small caliber)  LCx: dist 95%  RCA: not injected, known severe diffuse prox-mid disease with dist  and L-R collaterals     PCI prox LAD stent thrombosis:  Preop ASA/ticagrelor/heparin, intraop aggrastat  Predil 2.5x12  Stent 2.75x26 Resolute [place] MICH 16 patricia  De romy thrombus formed aftre PCI->POBA 2.5x12 at 20 patricia with aggrastat  Excellent result, T3 flow, 0% residual stenosis     PCI dist LCx:  Predil 2.5x12  Stent 2.5x22 Resolute [place] MICH 14 patricia  Excellent result, T3 flow 0% residual stenosis     Hemostasis:  R Radial band  RFA IABP sheath in place     Impression:  STEMI/AWMI  CGS on arrival to lab  3V CAD  Late stent thrombosis of prox LAD-> 2.75x26 Resolute [place] MICH  De-romy dist LCx-> 2.5x22 Resolute [place] MICH  RCA , collateralized  IABP placed for hemodynamic support via RFA, sutured in place  R rad vasband for hemostasis     Plan:  Admit to ICU  ASA 81mg qd indefinitely  Ticagrelor 90mg bid for 1 year minimum (thru 3/2021), consider indefinite rx  Rosuva 40mg qd  IABP @ 1:1  Aggrastat gtt  Eventual outpat card rehab referral    Known to me - last seen 4/5/19  CAD - PCI of LAD 2014 after STEMI - %/Cx distal 90% - medical Rx, HTN, HLD, DM, tobacco abuse        Admitted 12/4/18  63 y.o. male with CAD s/p PCI, HTN, HLD, tobacco abuse, DM2, and chronic combined heart failure presents with a complaint of chest pain.  Pain is acute onset this morning while working on his car, located sternal, does not radiate, severity 10/10, described as similar to MI 4 years ago, relieved by 1 SL NTG at home PTA.  Denies fever, chills, cough, SOB, palpitations, orthopnea, PND, dizziness, syncope, N/V/D, abdominal pain, bloody or black stools, or dysuria.  Treated for STEMI at [facility] in 2014 with stents placed by Dr. Stuart.  Has followed up intermittently with Dr. Cesar since that  time, but has been poorly adherent to outpatient follow up and treatment regimen.  Echo and stress in Sep 2017 show EF 40% + DD and moderate ischemia, but he has not followed up since undergoing testing.  He continues to smoke.  EKG NSR without evidence of acute ischemia, initial troponin negative, chest xray and routine labs unremarkable.  Placed in observation for ACS rule out.     Troponin continued to elevate, consistent with NSTEMI. Repeat echo pending. Cardiology took the patient for C which showed LM osteal 20%, LAD prox stent patent, Cx distal 90% moderate diffuse disease, % prox - some left to right collaterals. Cardiology recommended medical management. He is unlikely to be compliant with Plavix in the event that he required stent. He needs to stop smoking, take his medications, and follow up with cardiology. He was counseled on all these things and explained the risk of sudden cardiac death in the event he continues to be non-compliant.      12/5/18 [facility] - EDP 5, LM osteal 20%, LAD prox stent patent, Cx distal 90% moderate diffuse disease, % prox - some left to right collaterals     Reviewed with Dr Cesar - medical Rx recommended     Echo 12/5/18  · Normal left ventricular systolic function. The estimated ejection fraction is 55%  · Normal LV diastolic function.  · Concentric left ventricular hypertrophy.  · Normal right ventricular systolic function.  · Mild left atrial enlargement.  · Trace aortic regurgitation.  · Trace tricuspid regurgitation.     12/14/18 Denies CP since discharge  Discussed cardiac rehab - he wants to exercise on his own  If angina worsens could consider PCI of Cx     4/5/19 Needs clearance for  surgery and coloniscopy  Denies significant angina  Mild stable CHAN  EKG NSR LAD PRWP            Past Medical History:   Diagnosis Date    CHF (congestive heart failure)     Coronary artery disease     Diabetes mellitus     HLD (hyperlipidemia)     HTN (hypertension)      MI (myocardial infarction)     X's 2    Tobacco abuse        Past Surgical History:   Procedure Laterality Date    CARDIAC CATHETERIZATION  2007    x1    CARDIAC CATHETERIZATION  ?    x1    CORONARY ANGIOPLASTY WITH STENT PLACEMENT  2007 and ???    LEFT HEART CATHETERIZATION Left 12/5/2018    Procedure: Left heart cath;  Surgeon: Kenny Middleton MD;  Location: [facility] CATH LAB;  Service: Cardiology;  Laterality: Left;    LEFT HEART CATHETERIZATION Left 3/2/2020    Procedure: Left heart cath;  Surgeon: George Ho MD;  Location: [facility] CATH LAB;  Service: Cardiology;  Laterality: Left;       Review of patient's allergies indicates:  No Known Allergies    No current facility-administered medications on file prior to encounter.      Current Outpatient Medications on File Prior to Encounter   Medication Sig    aspirin (ECOTRIN) 81 MG EC tablet Take 1 tablet (81 mg total) by mouth once daily.    furosemide (LASIX) 40 MG tablet Take 1 tablet (40 mg total) by mouth once daily.    insulin aspart U-100 (NOVOLOG) 100 unit/mL InPn pen Inject 1-10 Units into the skin 3 (three) times daily with meals. Blood Glucose  mg/dL                  0600                0000                               1200                               1800  151-200                2 units             1 unit  201-250                4 units             2 units  251-300                6 units             3 units  301-350                8 units             4 units  >350                      10 units           5 units    ranolazine (RANEXA) 500 MG Tb12 Take 1 tablet (500 mg total) by mouth 2 (two) times daily.    rosuvastatin (CRESTOR) 40 MG Tab Take 1 tablet (40 mg total) by mouth once daily.    ticagrelor (BRILINTA) 90 mg tablet Take 1 tablet (90 mg total) by mouth 2 (two) times daily.    blood sugar diagnostic Strp 1 strip by Misc.(Non-Drug; Combo Route) route 2 (two) times daily.    insulin glargine, TOUJEO, (TOUJEO SOLOSTAR U-300  "INSULIN) 300 unit/mL (1.5 mL) InPn pen Inject 30 Units into the skin every evening.    nitroGLYCERIN (NITROSTAT) 0.4 MG SL tablet Place 1 tablet (0.4 mg total) under the tongue every 5 (five) minutes as needed.    pen needle, diabetic (BD INSULIN PEN NEEDLE UF MINI) 31 gauge x 3/16" Ndle USE AS DIRECTED    pen needle, diabetic 31 gauge x 5/16" Ndle 1 each by Misc.(Non-Drug; Combo Route) route 2 (two) times daily.     Family History     Problem Relation (Age of Onset)    No Known Problems Mother, Father, Sister, Brother, Maternal Aunt, Maternal Uncle, Paternal Aunt, Paternal Uncle, Maternal Grandmother, Maternal Grandfather, Paternal Grandmother, Paternal Grandfather        Tobacco Use    Smoking status: Current Every Day Smoker     Packs/day: 0.50     Years: 35.00     Pack years: 17.50     Types: Cigarettes    Smokeless tobacco: Never Used   Substance and Sexual Activity    Alcohol use: Yes     Alcohol/week: 0.8 standard drinks     Types: 1 Standard drinks or equivalent per week     Comment: social    Drug use: No    Sexual activity: Not on file     Review of Systems   Constitution: Negative for decreased appetite.   HENT: Negative for ear discharge.    Eyes: Negative for blurred vision.   Endocrine: Negative for polyphagia.   Skin: Negative for nail changes.   Genitourinary: Negative for bladder incontinence.   Neurological: Negative for aphonia.   Psychiatric/Behavioral: Negative for hallucinations.   Allergic/Immunologic: Negative for hives.     Objective:     Vital Signs (Most Recent):  Temp: 97.8 °F (36.6 °C) (03/20/20 0731)  Pulse: 85 (03/20/20 0731)  Resp: 18 (03/20/20 0731)  BP: 117/72 (03/20/20 0731)  SpO2: (!) 93 % (03/20/20 0731) Vital Signs (24h Range):  Temp:  [97.5 °F (36.4 °C)-98.3 °F (36.8 °C)] 97.8 °F (36.6 °C)  Pulse:  [80-99] 85  Resp:  [16-21] 18  SpO2:  [93 %-100 %] 93 %  BP: ()/(55-77) 117/72     Weight: 64.6 kg (142 lb 6.6 oz)  Body mass index is 23.7 kg/m².    SpO2: (!) 93 %  O2 " Device (Oxygen Therapy): nasal cannula      Intake/Output Summary (Last 24 hours) at 3/20/2020 1005  Last data filed at 3/20/2020 0558  Gross per 24 hour   Intake 120 ml   Output 100 ml   Net 20 ml       Lines/Drains/Airways     Peripheral Intravenous Line                 Peripheral IV - Single Lumen 03/19/20 20 G Right Antecubital 1 day         Peripheral IV - Single Lumen 03/19/20 22 G Right Forearm 1 day                Physical Exam   Constitutional: He is oriented to person, place, and time. He appears well-developed and well-nourished.   HENT:   Head: Normocephalic and atraumatic.   Eyes: Pupils are equal, round, and reactive to light. Conjunctivae are normal.   Neck: Normal range of motion. Neck supple.   Cardiovascular: Normal rate, normal heart sounds and intact distal pulses.   Pulmonary/Chest: Effort normal and breath sounds normal.   Abdominal: Soft. Bowel sounds are normal.   Musculoskeletal: Normal range of motion.   Neurological: He is alert and oriented to person, place, and time.   Skin: Skin is warm and dry.       Significant Labs: All pertinent lab results from the last 24 hours have been reviewed.    Significant Imaging: Echocardiogram:   2D echo with color flow doppler:   Results for orders placed or performed during the hospital encounter of 09/12/17   2D echo with color flow doppler   Result Value Ref Range    QEF 40 (A) 55 - 65    Diastolic Dysfunction Yes (A)     Aortic Valve Regurgitation TRIVIAL     Est. PA Systolic Pressure 24.72     Pericardial Effusion NONE     Tricuspid Valve Regurgitation TRIVIAL TO MILD     Narrative    Date of Procedure: 09/12/2017        TEST DESCRIPTION   Technical Quality: This is a technically adequate study.     Aorta: The aortic root is normal in size, measuring 2.4 cm at sinotubular junction and 3.2 cm at Sinuses of Valsalva. The proximal ascending aorta is normal in size, measuring 2.6 cm across.     Left Atrium: The left atrial volume index is normal,  measuring 25.25 cc/m2.     Left Ventricle: The left ventricle is normal in size, with an end-diastolic diameter of 5.2 cm, and an end-systolic diameter of 3.4 cm. LV wall thickness is normal, with the septum measuring 0.9 cm and the posterior wall measuring 1.0 cm across. Relative   wall thickness was normal at 0.38, and the LV mass index was 113.6 g/m2 consistent with normal left ventricular mass. The anterior septum is hypokinetic. Left ventricular systolic function appears mildly to moderately depressed. Visually estimated   ejection fraction is 40-45%. The LV Doppler derived stroke volume equals 61.0 ccs.     Diastolic indices: E wave velocity 0.9 m/s, E/A ratio 1.1,  msec., E/e' ratio(avg) 17. There is diastolic dysfunction secondary to relaxation abnormality.     Right Atrium: The right atrium is normal in size, measuring 4.6 cm in length and 3.4 cm in width in the apical view.     Right Ventricle: The right ventricle is normal in size measuring 3.8 cm at the base in the apical right ventricle-focused view. Global right ventricular systolic function appears normal. Tricuspid annular plane systolic excursion (TAPSE) is 2.2 cm.   Tissue Doppler-derived tricuspid annular peak systolic velocity (S prime) is 11.7 cm/s. The estimated PA systolic pressure is 25 mmHg.     Aortic Valve:  The aortic valve is tri-leaflet in structure. Additionally, there is trivial aortic regurgitation.     Tricuspid Valve:  There is trivial to mild tricuspid regurgitation.     Pericardium: There is no evidence of pericardial effusion.      IVC: IVC is normal in size and collapses > 50% with a sniff, suggesting normal right atrial pressure of 3 mmHg.     Intracavitary: There is no evidence of intracavity mass, thrombi, or vegetation.         CONCLUSIONS     1 - Mildly to moderately depressed left ventricular systolic function (EF 40-45%).     2 - Wall motion abnormalities.     3 - Impaired LV relaxation, normal LAP (grade 1  diastolic dysfunction).             This document has been electronically    SIGNED BY: Stevie Cesar MD On: 09/13/2017 07:46    This document was originally electronically signed on: 09/13/2017 07:37     Assessment and Plan:     Type 2 diabetes mellitus, with long-term current use of insulin  Per primary    HLD (hyperlipidemia)  On statin    Tobacco abuse  counseled    Chronic combined systolic and diastolic congestive heart failure  Diuresis and afterload reduction as tolerated. Recent echo with severely depressed EF - will need repeat out patient 3 months after PCI - will discuss AICD if EF < 35%    Coronary artery disease of native artery of native heart with stable angina pectoris  Troponin 0.11 slowly trending down. Suspect this is residual elevation from recent NSTEMI and PCI of LAD/Cx. Discussed with Dr Ho - continue medical Rx for CAD with no repeat LHC at this time. Ok for d/c and OV 2 weeks        VTE Risk Mitigation (From admission, onward)         Ordered     IP VTE HIGH RISK PATIENT  Once      03/19/20 3982                Thank you for your consult. I will sign off. Please contact us if you have any additional questions.    Kenny Middleton MD  Cardiology   [facility] [place]

## 2020-03-20 NOTE — ED NOTES
Cardiac monitor placed on patient for transport. Patient updated on plan of care and room status.

## 2020-03-20 NOTE — HPI
HPI: Fredrick Cox 64 y.o. male with CAD, DM2, tobacco abuse, HLD presents to the hospital with a chief complaint of chest pain. He reprots today he experienced a sudden onset sternal chest pain described as sharp without radiation that was relieved with nitro/aspirin. He is chest pain free. He reports it is similar to chest pain he experienced 2 weeks ago with his MI. He has smoked since discharge, but reports is motivated to stop now. He is compliant with aspirin/brilinta. He denies fever, SOB, N/V, abdominal pain, leg swelling, dysuria, dizziness, syncope.      In the ED, EKG without acute ischemia, troponin 0.11, discussed with cardiology recommended NSTEMI treatment, chest x-ray without acute abnormality.     Currently denies CP or SOB  Troponin 0.11 slowly trending down  EKG NSR RBBB - no acute STT changes    Echo 3/3/20  · Severely decreased left ventricular systolic function. The estimated ejection fraction is 25%.  · Eccentric left ventricular hypertrophy.  · Grade I (mild) left ventricular diastolic dysfunction consistent with impaired relaxation.  · Local segmental wall motion abnormalities.  · Normal right ventricular systolic function.    [facility] 3/2/20  Description of the findings of the procedure: LHC/cor angio/PCI LAD late stent thrombosis MICH x1/PCI dist LCx MICH x1/IVUS LM/IABP placement RFA.     Findings/Key Components:  LVEDP: 35mmHg  LVEF: echo ordered     Dominance: Right  LM: prox 40-50%, MLA 7.1mm2, no significant MLA difference vs distal vessel  LAD: prox stent thrombosed, after PCI dist/transapical LAD occluded (small caliber)  LCx: dist 95%  RCA: not injected, known severe diffuse prox-mid disease with dist  and L-R collaterals     PCI prox LAD stent thrombosis:  Preop ASA/ticagrelor/heparin, intraop aggrastat  Predil 2.5x12  Stent 2.75x26 Resolute Tony MICH 16 patricia  De romy thrombus formed aftre PCI->POBA 2.5x12 at 20 patricia with aggrastat  Excellent result, T3 flow, 0% residual  stenosis     PCI dist LCx:  Predil 2.5x12  Stent 2.5x22 Resolute Tony MICH 14 patricia  Excellent result, T3 flow 0% residual stenosis     Hemostasis:  R Radial band  RFA IABP sheath in place     Impression:  STEMI/AWMI  CGS on arrival to lab  3V CAD  Late stent thrombosis of prox LAD-> 2.75x26 Resolute Tony MICH  De-romy dist LCx-> 2.5x22 Resolute Tony MICH  RCA , collateralized  IABP placed for hemodynamic support via RFA, sutured in place  R rad vasband for hemostasis     Plan:  Admit to ICU  ASA 81mg qd indefinitely  Ticagrelor 90mg bid for 1 year minimum (thru 3/2021), consider indefinite rx  Rosuva 40mg qd  IABP @ 1:1  Aggrastat gtt  Eventual outpat card rehab referral    Known to me - last seen 4/5/19  CAD - PCI of LAD 2014 after STEMI - %/Cx distal 90% - medical Rx, HTN, HLD, DM, tobacco abuse        Admitted 12/4/18  63 y.o. male with CAD s/p PCI, HTN, HLD, tobacco abuse, DM2, and chronic combined heart failure presents with a complaint of chest pain.  Pain is acute onset this morning while working on his car, located sternal, does not radiate, severity 10/10, described as similar to MI 4 years ago, relieved by 1 SL NTG at home PTA.  Denies fever, chills, cough, SOB, palpitations, orthopnea, PND, dizziness, syncope, N/V/D, abdominal pain, bloody or black stools, or dysuria.  Treated for STEMI at [facility] in 2014 with stents placed by Dr. Stuart.  Has followed up intermittently with Dr. Cesar since that time, but has been poorly adherent to outpatient follow up and treatment regimen.  Echo and stress in Sep 2017 show EF 40% + DD and moderate ischemia, but he has not followed up since undergoing testing.  He continues to smoke.  EKG NSR without evidence of acute ischemia, initial troponin negative, chest xray and routine labs unremarkable.  Placed in observation for ACS rule out.     Troponin continued to elevate, consistent with NSTEMI. Repeat echo pending. Cardiology took the patient for [facility] which showed  LM osteal 20%, LAD prox stent patent, Cx distal 90% moderate diffuse disease, % prox - some left to right collaterals. Cardiology recommended medical management. He is unlikely to be compliant with Plavix in the event that he required stent. He needs to stop smoking, take his medications, and follow up with cardiology. He was counseled on all these things and explained the risk of sudden cardiac death in the event he continues to be non-compliant.      12/5/18 [facility] - EDP 5, LM osteal 20%, LAD prox stent patent, Cx distal 90% moderate diffuse disease, % prox - some left to right collaterals     Reviewed with Dr Cesar - medical Rx recommended     Echo 12/5/18  · Normal left ventricular systolic function. The estimated ejection fraction is 55%  · Normal LV diastolic function.  · Concentric left ventricular hypertrophy.  · Normal right ventricular systolic function.  · Mild left atrial enlargement.  · Trace aortic regurgitation.  · Trace tricuspid regurgitation.     12/14/18 Denies CP since discharge  Discussed cardiac rehab - he wants to exercise on his own  If angina worsens could consider PCI of Cx     4/5/19 Needs clearance for  surgery and coloniscopy  Denies significant angina  Mild stable CHAN  EKG NSR LAD PRWP

## 2020-03-20 NOTE — ASSESSMENT & PLAN NOTE
Greater than 3 minutes spent counseling patient on dangers of continued tobacco abuse. Will provide tobacco cessation education

## 2020-03-20 NOTE — SUBJECTIVE & OBJECTIVE
Past Medical History:   Diagnosis Date    CHF (congestive heart failure)     Coronary artery disease     Diabetes mellitus     HLD (hyperlipidemia)     HTN (hypertension)     MI (myocardial infarction)     X's 2    Tobacco abuse        Past Surgical History:   Procedure Laterality Date    CARDIAC CATHETERIZATION  2007    x1    CARDIAC CATHETERIZATION  ?    x1    CORONARY ANGIOPLASTY WITH STENT PLACEMENT  2007 and ???    LEFT HEART CATHETERIZATION Left 12/5/2018    Procedure: Left heart cath;  Surgeon: Kenny Middleton MD;  Location: [facility] CATH LAB;  Service: Cardiology;  Laterality: Left;    LEFT HEART CATHETERIZATION Left 3/2/2020    Procedure: Left heart cath;  Surgeon: George Ho MD;  Location: [facility] CATH LAB;  Service: Cardiology;  Laterality: Left;       Review of patient's allergies indicates:  No Known Allergies    No current facility-administered medications on file prior to encounter.      Current Outpatient Medications on File Prior to Encounter   Medication Sig    aspirin (ECOTRIN) 81 MG EC tablet Take 1 tablet (81 mg total) by mouth once daily.    furosemide (LASIX) 40 MG tablet Take 1 tablet (40 mg total) by mouth once daily.    insulin aspart U-100 (NOVOLOG) 100 unit/mL InPn pen Inject 1-10 Units into the skin 3 (three) times daily with meals. Blood Glucose  mg/dL                  0600                0000                               1200                               1800  151-200                2 units             1 unit  201-250                4 units             2 units  251-300                6 units             3 units  301-350                8 units             4 units  >350                      10 units           5 units    ranolazine (RANEXA) 500 MG Tb12 Take 1 tablet (500 mg total) by mouth 2 (two) times daily.    rosuvastatin (CRESTOR) 40 MG Tab Take 1 tablet (40 mg total) by mouth once daily.    ticagrelor (BRILINTA) 90 mg tablet Take 1 tablet (90 mg total) by  "mouth 2 (two) times daily.    blood sugar diagnostic Strp 1 strip by Misc.(Non-Drug; Combo Route) route 2 (two) times daily.    insulin glargine, TOUJEO, (TOUJEO SOLOSTAR U-300 INSULIN) 300 unit/mL (1.5 mL) InPn pen Inject 30 Units into the skin every evening.    nitroGLYCERIN (NITROSTAT) 0.4 MG SL tablet Place 1 tablet (0.4 mg total) under the tongue every 5 (five) minutes as needed.    pen needle, diabetic (BD INSULIN PEN NEEDLE UF MINI) 31 gauge x 3/16" Ndle USE AS DIRECTED    pen needle, diabetic 31 gauge x 5/16" Ndle 1 each by Misc.(Non-Drug; Combo Route) route 2 (two) times daily.     Family History     Problem Relation (Age of Onset)    No Known Problems Mother, Father, Sister, Brother, Maternal Aunt, Maternal Uncle, Paternal Aunt, Paternal Uncle, Maternal Grandmother, Maternal Grandfather, Paternal Grandmother, Paternal Grandfather        Tobacco Use    Smoking status: Current Every Day Smoker     Packs/day: 0.50     Years: 35.00     Pack years: 17.50     Types: Cigarettes    Smokeless tobacco: Never Used   Substance and Sexual Activity    Alcohol use: Yes     Alcohol/week: 0.8 standard drinks     Types: 1 Standard drinks or equivalent per week     Comment: social    Drug use: No    Sexual activity: Not on file     Review of Systems   Constitution: Negative for decreased appetite.   HENT: Negative for ear discharge.    Eyes: Negative for blurred vision.   Endocrine: Negative for polyphagia.   Skin: Negative for nail changes.   Genitourinary: Negative for bladder incontinence.   Neurological: Negative for aphonia.   Psychiatric/Behavioral: Negative for hallucinations.   Allergic/Immunologic: Negative for hives.     Objective:     Vital Signs (Most Recent):  Temp: 97.8 °F (36.6 °C) (03/20/20 0731)  Pulse: 85 (03/20/20 0731)  Resp: 18 (03/20/20 0731)  BP: 117/72 (03/20/20 0731)  SpO2: (!) 93 % (03/20/20 0731) Vital Signs (24h Range):  Temp:  [97.5 °F (36.4 °C)-98.3 °F (36.8 °C)] 97.8 °F (36.6 " °C)  Pulse:  [80-99] 85  Resp:  [16-21] 18  SpO2:  [93 %-100 %] 93 %  BP: ()/(55-77) 117/72     Weight: 64.6 kg (142 lb 6.6 oz)  Body mass index is 23.7 kg/m².    SpO2: (!) 93 %  O2 Device (Oxygen Therapy): nasal cannula      Intake/Output Summary (Last 24 hours) at 3/20/2020 1005  Last data filed at 3/20/2020 0558  Gross per 24 hour   Intake 120 ml   Output 100 ml   Net 20 ml       Lines/Drains/Airways     Peripheral Intravenous Line                 Peripheral IV - Single Lumen 03/19/20 20 G Right Antecubital 1 day         Peripheral IV - Single Lumen 03/19/20 22 G Right Forearm 1 day                Physical Exam   Constitutional: He is oriented to person, place, and time. He appears well-developed and well-nourished.   HENT:   Head: Normocephalic and atraumatic.   Eyes: Pupils are equal, round, and reactive to light. Conjunctivae are normal.   Neck: Normal range of motion. Neck supple.   Cardiovascular: Normal rate, normal heart sounds and intact distal pulses.   Pulmonary/Chest: Effort normal and breath sounds normal.   Abdominal: Soft. Bowel sounds are normal.   Musculoskeletal: Normal range of motion.   Neurological: He is alert and oriented to person, place, and time.   Skin: Skin is warm and dry.       Significant Labs: All pertinent lab results from the last 24 hours have been reviewed.    Significant Imaging: Echocardiogram:   2D echo with color flow doppler:   Results for orders placed or performed during the hospital encounter of 09/12/17   2D echo with color flow doppler   Result Value Ref Range    QEF 40 (A) 55 - 65    Diastolic Dysfunction Yes (A)     Aortic Valve Regurgitation TRIVIAL     Est. PA Systolic Pressure 24.72     Pericardial Effusion NONE     Tricuspid Valve Regurgitation TRIVIAL TO MILD     Narrative    Date of Procedure: 09/12/2017        TEST DESCRIPTION   Technical Quality: This is a technically adequate study.     Aorta: The aortic root is normal in size, measuring 2.4 cm at  sinotubular junction and 3.2 cm at Sinuses of Valsalva. The proximal ascending aorta is normal in size, measuring 2.6 cm across.     Left Atrium: The left atrial volume index is normal, measuring 25.25 cc/m2.     Left Ventricle: The left ventricle is normal in size, with an end-diastolic diameter of 5.2 cm, and an end-systolic diameter of 3.4 cm. LV wall thickness is normal, with the septum measuring 0.9 cm and the posterior wall measuring 1.0 cm across. Relative   wall thickness was normal at 0.38, and the LV mass index was 113.6 g/m2 consistent with normal left ventricular mass. The anterior septum is hypokinetic. Left ventricular systolic function appears mildly to moderately depressed. Visually estimated   ejection fraction is 40-45%. The LV Doppler derived stroke volume equals 61.0 ccs.     Diastolic indices: E wave velocity 0.9 m/s, E/A ratio 1.1,  msec., E/e' ratio(avg) 17. There is diastolic dysfunction secondary to relaxation abnormality.     Right Atrium: The right atrium is normal in size, measuring 4.6 cm in length and 3.4 cm in width in the apical view.     Right Ventricle: The right ventricle is normal in size measuring 3.8 cm at the base in the apical right ventricle-focused view. Global right ventricular systolic function appears normal. Tricuspid annular plane systolic excursion (TAPSE) is 2.2 cm.   Tissue Doppler-derived tricuspid annular peak systolic velocity (S prime) is 11.7 cm/s. The estimated PA systolic pressure is 25 mmHg.     Aortic Valve:  The aortic valve is tri-leaflet in structure. Additionally, there is trivial aortic regurgitation.     Tricuspid Valve:  There is trivial to mild tricuspid regurgitation.     Pericardium: There is no evidence of pericardial effusion.      IVC: IVC is normal in size and collapses > 50% with a sniff, suggesting normal right atrial pressure of 3 mmHg.     Intracavitary: There is no evidence of intracavity mass, thrombi, or vegetation.          CONCLUSIONS     1 - Mildly to moderately depressed left ventricular systolic function (EF 40-45%).     2 - Wall motion abnormalities.     3 - Impaired LV relaxation, normal LAP (grade 1 diastolic dysfunction).             This document has been electronically    SIGNED BY: Stevie Cesar MD On: 09/13/2017 07:46    This document was originally electronically signed on: 09/13/2017 07:37

## 2020-03-20 NOTE — ASSESSMENT & PLAN NOTE
History of STEMI 3/2/2020. Compliant with aspirin/statin/brilinta. Cardiology consulted see above

## 2020-04-05 NOTE — ASSESSMENT & PLAN NOTE
Start Novolog with meals and Levemir at night.  Resume home regimen upon discharge.  Uncontrolled with hyperglycemia with HgA1c of 12.7  Needs much better control.  Diabetic diet with insulin sliding scale.     05-Apr-2020 14:51

## 2020-04-25 ENCOUNTER — HOSPITAL ENCOUNTER (OUTPATIENT)
Facility: HOSPITAL | Age: 65
Discharge: HOME | End: 2020-04-26
Attending: EMERGENCY MEDICINE | Admitting: HOSPITALIST
Payer: MEDICARE

## 2020-04-25 DIAGNOSIS — R06.02 SOB (SHORTNESS OF BREATH): ICD-10-CM

## 2020-04-25 DIAGNOSIS — I50.9 CHF (CONGESTIVE HEART FAILURE): ICD-10-CM

## 2020-04-25 DIAGNOSIS — I25.118 CORONARY ARTERY DISEASE OF NATIVE ARTERY OF NATIVE HEART WITH STABLE ANGINA PECTORIS: Chronic | ICD-10-CM

## 2020-04-25 DIAGNOSIS — E11.00 TYPE 2 DIABETES MELLITUS WITH HYPEROSMOLARITY WITHOUT COMA, WITH LONG-TERM CURRENT USE OF INSULIN: Chronic | ICD-10-CM

## 2020-04-25 DIAGNOSIS — E78.2 MIXED HYPERLIPIDEMIA: Chronic | ICD-10-CM

## 2020-04-25 DIAGNOSIS — I50.9 CONGESTIVE HEART FAILURE, UNSPECIFIED HF CHRONICITY, UNSPECIFIED HEART FAILURE TYPE: Primary | ICD-10-CM

## 2020-04-25 DIAGNOSIS — Z72.0 TOBACCO ABUSE: Chronic | ICD-10-CM

## 2020-04-25 DIAGNOSIS — R79.89 ELEVATED TROPONIN: ICD-10-CM

## 2020-04-25 DIAGNOSIS — I50.9 CONGESTIVE HEART FAILURE: ICD-10-CM

## 2020-04-25 DIAGNOSIS — R07.9 CHEST PAIN: ICD-10-CM

## 2020-04-25 DIAGNOSIS — Z79.4 TYPE 2 DIABETES MELLITUS WITH HYPEROSMOLARITY WITHOUT COMA, WITH LONG-TERM CURRENT USE OF INSULIN: Chronic | ICD-10-CM

## 2020-04-25 DIAGNOSIS — I10 ESSENTIAL HYPERTENSION: Chronic | ICD-10-CM

## 2020-04-25 LAB
ALBUMIN SERPL BCP-MCNC: 3.9 G/DL (ref 3.5–5.2)
ALP SERPL-CCNC: 75 U/L (ref 55–135)
ALT SERPL W/O P-5'-P-CCNC: 8 U/L (ref 10–44)
ANION GAP SERPL CALC-SCNC: 12 MMOL/L (ref 8–16)
AST SERPL-CCNC: 11 U/L (ref 10–40)
BASOPHILS # BLD AUTO: 0.02 K/UL (ref 0–0.2)
BASOPHILS NFR BLD: 0.2 % (ref 0–1.9)
BILIRUB SERPL-MCNC: 0.6 MG/DL (ref 0.1–1)
BNP SERPL-MCNC: 899 PG/ML (ref 0–99)
BUN SERPL-MCNC: 8 MG/DL (ref 8–23)
CALCIUM SERPL-MCNC: 9.6 MG/DL (ref 8.7–10.5)
CHLORIDE SERPL-SCNC: 105 MMOL/L (ref 95–110)
CO2 SERPL-SCNC: 22 MMOL/L (ref 23–29)
CREAT SERPL-MCNC: 1.1 MG/DL (ref 0.5–1.4)
CRP SERPL-MCNC: 18.7 MG/L (ref 0–8.2)
DIFFERENTIAL METHOD: ABNORMAL
EOSINOPHIL # BLD AUTO: 0.1 K/UL (ref 0–0.5)
EOSINOPHIL NFR BLD: 1.3 % (ref 0–8)
ERYTHROCYTE [DISTWIDTH] IN BLOOD BY AUTOMATED COUNT: 13.2 % (ref 11.5–14.5)
ERYTHROCYTE [SEDIMENTATION RATE] IN BLOOD BY WESTERGREN METHOD: 10 MM/HR (ref 0–10)
EST. GFR  (AFRICAN AMERICAN): >60 ML/MIN/1.73 M^2
EST. GFR  (NON AFRICAN AMERICAN): >60 ML/MIN/1.73 M^2
FERRITIN SERPL-MCNC: 266 NG/ML (ref 20–300)
GLUCOSE SERPL-MCNC: 232 MG/DL (ref 70–110)
HCT VFR BLD AUTO: 44.9 % (ref 40–54)
HGB BLD-MCNC: 13.7 G/DL (ref 14–18)
IMM GRANULOCYTES # BLD AUTO: 0.02 K/UL (ref 0–0.04)
IMM GRANULOCYTES NFR BLD AUTO: 0.2 % (ref 0–0.5)
LDH SERPL L TO P-CCNC: 173 U/L (ref 110–260)
LYMPHOCYTES # BLD AUTO: 2.1 K/UL (ref 1–4.8)
LYMPHOCYTES NFR BLD: 20.3 % (ref 18–48)
MCH RBC QN AUTO: 26.5 PG (ref 27–31)
MCHC RBC AUTO-ENTMCNC: 30.5 G/DL (ref 32–36)
MCV RBC AUTO: 87 FL (ref 82–98)
MONOCYTES # BLD AUTO: 0.5 K/UL (ref 0.3–1)
MONOCYTES NFR BLD: 5.1 % (ref 4–15)
NEUTROPHILS # BLD AUTO: 7.6 K/UL (ref 1.8–7.7)
NEUTROPHILS NFR BLD: 72.9 % (ref 38–73)
NRBC BLD-RTO: 0 /100 WBC
PLATELET # BLD AUTO: 189 K/UL (ref 150–350)
PMV BLD AUTO: 10 FL (ref 9.2–12.9)
POTASSIUM SERPL-SCNC: 3.9 MMOL/L (ref 3.5–5.1)
PROCALCITONIN SERPL IA-MCNC: <0.02 NG/ML
PROT SERPL-MCNC: 7.8 G/DL (ref 6–8.4)
RBC # BLD AUTO: 5.17 M/UL (ref 4.6–6.2)
SARS-COV-2 RDRP RESP QL NAA+PROBE: NEGATIVE
SODIUM SERPL-SCNC: 139 MMOL/L (ref 136–145)
TROPONIN I SERPL DL<=0.01 NG/ML-MCNC: 0.02 NG/ML (ref 0–0.03)
TROPONIN I SERPL DL<=0.01 NG/ML-MCNC: 0.03 NG/ML (ref 0–0.03)
TROPONIN I SERPL DL<=0.01 NG/ML-MCNC: 0.03 NG/ML (ref 0–0.03)
TROPONIN I SERPL DL<=0.01 NG/ML-MCNC: 0.04 NG/ML (ref 0–0.03)
WBC # BLD AUTO: 10.41 K/UL (ref 3.9–12.7)

## 2020-04-25 PROCEDURE — 63600175 PHARM REV CODE 636 W HCPCS: Performed by: EMERGENCY MEDICINE

## 2020-04-25 PROCEDURE — 36415 COLL VENOUS BLD VENIPUNCTURE: CPT

## 2020-04-25 PROCEDURE — G0378 HOSPITAL OBSERVATION PER HR: HCPCS

## 2020-04-25 PROCEDURE — 25000003 PHARM REV CODE 250: Performed by: EMERGENCY MEDICINE

## 2020-04-25 PROCEDURE — 93010 ELECTROCARDIOGRAM REPORT: CPT | Mod: ,,, | Performed by: INTERNAL MEDICINE

## 2020-04-25 PROCEDURE — 93005 ELECTROCARDIOGRAM TRACING: CPT

## 2020-04-25 PROCEDURE — 96376 TX/PRO/DX INJ SAME DRUG ADON: CPT

## 2020-04-25 PROCEDURE — 84484 ASSAY OF TROPONIN QUANT: CPT | Mod: 91

## 2020-04-25 PROCEDURE — 99285 EMERGENCY DEPT VISIT HI MDM: CPT | Mod: 25

## 2020-04-25 PROCEDURE — 84145 PROCALCITONIN (PCT): CPT

## 2020-04-25 PROCEDURE — 82728 ASSAY OF FERRITIN: CPT

## 2020-04-25 PROCEDURE — 85025 COMPLETE CBC W/AUTO DIFF WBC: CPT

## 2020-04-25 PROCEDURE — U0002 COVID-19 LAB TEST NON-CDC: HCPCS

## 2020-04-25 PROCEDURE — 25000003 PHARM REV CODE 250: Performed by: NURSE PRACTITIONER

## 2020-04-25 PROCEDURE — 85652 RBC SED RATE AUTOMATED: CPT

## 2020-04-25 PROCEDURE — 63600175 PHARM REV CODE 636 W HCPCS: Performed by: NURSE PRACTITIONER

## 2020-04-25 PROCEDURE — 93010 EKG 12-LEAD: ICD-10-PCS | Mod: ,,, | Performed by: INTERNAL MEDICINE

## 2020-04-25 PROCEDURE — 80053 COMPREHEN METABOLIC PANEL: CPT

## 2020-04-25 PROCEDURE — 83615 LACTATE (LD) (LDH) ENZYME: CPT

## 2020-04-25 PROCEDURE — 96372 THER/PROPH/DIAG INJ SC/IM: CPT | Mod: 59

## 2020-04-25 PROCEDURE — 83880 ASSAY OF NATRIURETIC PEPTIDE: CPT

## 2020-04-25 PROCEDURE — C9399 UNCLASSIFIED DRUGS OR BIOLOG: HCPCS | Performed by: NURSE PRACTITIONER

## 2020-04-25 PROCEDURE — 96374 THER/PROPH/DIAG INJ IV PUSH: CPT

## 2020-04-25 PROCEDURE — 86140 C-REACTIVE PROTEIN: CPT

## 2020-04-25 RX ORDER — FUROSEMIDE 10 MG/ML
60 INJECTION INTRAMUSCULAR; INTRAVENOUS
Status: COMPLETED | OUTPATIENT
Start: 2020-04-25 | End: 2020-04-25

## 2020-04-25 RX ORDER — INSULIN ASPART 100 [IU]/ML
1-10 INJECTION, SOLUTION INTRAVENOUS; SUBCUTANEOUS
Status: DISCONTINUED | OUTPATIENT
Start: 2020-04-25 | End: 2020-04-26 | Stop reason: HOSPADM

## 2020-04-25 RX ORDER — ASPIRIN 325 MG
325 TABLET ORAL
Status: COMPLETED | OUTPATIENT
Start: 2020-04-25 | End: 2020-04-25

## 2020-04-25 RX ORDER — GLUCAGON 1 MG
1 KIT INJECTION
Status: DISCONTINUED | OUTPATIENT
Start: 2020-04-25 | End: 2020-04-25

## 2020-04-25 RX ORDER — INSULIN ASPART 100 [IU]/ML
0-5 INJECTION, SOLUTION INTRAVENOUS; SUBCUTANEOUS EVERY 6 HOURS PRN
Status: DISCONTINUED | OUTPATIENT
Start: 2020-04-25 | End: 2020-04-26 | Stop reason: HOSPADM

## 2020-04-25 RX ORDER — FUROSEMIDE 10 MG/ML
40 INJECTION INTRAMUSCULAR; INTRAVENOUS 2 TIMES DAILY
Status: DISCONTINUED | OUTPATIENT
Start: 2020-04-25 | End: 2020-04-25

## 2020-04-25 RX ORDER — ASPIRIN 81 MG/1
81 TABLET ORAL DAILY
Status: DISCONTINUED | OUTPATIENT
Start: 2020-04-26 | End: 2020-04-26 | Stop reason: HOSPADM

## 2020-04-25 RX ORDER — SODIUM CHLORIDE 0.9 % (FLUSH) 0.9 %
3 SYRINGE (ML) INJECTION
Status: DISCONTINUED | OUTPATIENT
Start: 2020-04-25 | End: 2020-04-26 | Stop reason: HOSPADM

## 2020-04-25 RX ORDER — RANOLAZINE 500 MG/1
500 TABLET, EXTENDED RELEASE ORAL 2 TIMES DAILY
Status: DISCONTINUED | OUTPATIENT
Start: 2020-04-25 | End: 2020-04-26 | Stop reason: HOSPADM

## 2020-04-25 RX ORDER — IBUPROFEN 200 MG
16 TABLET ORAL
Status: DISCONTINUED | OUTPATIENT
Start: 2020-04-25 | End: 2020-04-26 | Stop reason: HOSPADM

## 2020-04-25 RX ORDER — IBUPROFEN 200 MG
24 TABLET ORAL
Status: DISCONTINUED | OUTPATIENT
Start: 2020-04-25 | End: 2020-04-26 | Stop reason: HOSPADM

## 2020-04-25 RX ORDER — ENOXAPARIN SODIUM 100 MG/ML
40 INJECTION SUBCUTANEOUS EVERY 24 HOURS
Status: DISCONTINUED | OUTPATIENT
Start: 2020-04-25 | End: 2020-04-26 | Stop reason: HOSPADM

## 2020-04-25 RX ORDER — GLUCAGON 1 MG
1 KIT INJECTION
Status: DISCONTINUED | OUTPATIENT
Start: 2020-04-25 | End: 2020-04-26 | Stop reason: HOSPADM

## 2020-04-25 RX ORDER — FUROSEMIDE 40 MG/1
40 TABLET ORAL DAILY
Status: DISCONTINUED | OUTPATIENT
Start: 2020-04-25 | End: 2020-04-25

## 2020-04-25 RX ORDER — ROSUVASTATIN CALCIUM 10 MG/1
40 TABLET, COATED ORAL DAILY
Status: DISCONTINUED | OUTPATIENT
Start: 2020-04-26 | End: 2020-04-26 | Stop reason: HOSPADM

## 2020-04-25 RX ORDER — FUROSEMIDE 10 MG/ML
40 INJECTION INTRAMUSCULAR; INTRAVENOUS
Status: DISCONTINUED | OUTPATIENT
Start: 2020-04-25 | End: 2020-04-26

## 2020-04-25 RX ADMIN — FUROSEMIDE 40 MG: 10 INJECTION, SOLUTION INTRAMUSCULAR; INTRAVENOUS at 08:04

## 2020-04-25 RX ADMIN — RANOLAZINE 500 MG: 500 TABLET, FILM COATED, EXTENDED RELEASE ORAL at 08:04

## 2020-04-25 RX ADMIN — ENOXAPARIN SODIUM 40 MG: 100 INJECTION SUBCUTANEOUS at 06:04

## 2020-04-25 RX ADMIN — FUROSEMIDE 60 MG: 10 INJECTION, SOLUTION INTRAMUSCULAR; INTRAVENOUS at 12:04

## 2020-04-25 RX ADMIN — INSULIN DETEMIR 18 UNITS: 100 INJECTION, SOLUTION SUBCUTANEOUS at 08:04

## 2020-04-25 RX ADMIN — TICAGRELOR 90 MG: 90 TABLET ORAL at 08:04

## 2020-04-25 RX ADMIN — ASPIRIN 325 MG ORAL TABLET 325 MG: 325 PILL ORAL at 09:04

## 2020-04-25 NOTE — HPI
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 Patient presented one day prior for SOB and found to be mildly hypoxic but recovered with oxygen and was discharged. He re-presents for evaluation of similar symptoms including cough X 3 days, SOB and orthopnea. According to the ED doctor's report he has not been taking lasix, states, "they took me off". He tells me that no-one took him off, he states that he ran out but when asked about how long ago he states, "A long time" states his doctor never mentioned reordering the medication, therefore, he didn't worry about it. He denies recent long trips or trauma. Denies fever, chills, HA, sore throat, post nasal drip. He endorses smoking cigarettes 0.5 ppd for 50 years. Last seen by cardiology 3/5/20 with stent ---     In ED rapid Cov-2 -- Negative; CXR from 1 day prior showed findings suggestive of fibrosis or intrstitial congestion. Last Last 2D echo 3/3/20 - was administered lasix 60 mg and is now more comfortable sat 96%. Placed in observation for continued diuresis  "

## 2020-04-25 NOTE — H&P
"[facility] Medicine  History & Physical    Patient Name: Fredrick Cox  MRN: [id number]  Admission Date: 4/25/2020  Attending Physician: Tracie Alfredo MD   Primary Care Provider: Amos Tee III, MD         Patient information was obtained from patient and ER records.     Subjective:     Principal Problem:<principal problem not specified>    Chief Complaint:   Chief Complaint   Patient presents with    Shortness of Breath     Pt c/o SOB, cough x3 days. Denies fever. Denies pain at this time        HPI: Fredrick Cox is 64 y.o. is an AAM with PMHx as listed below including but not limited to CHF, CAD, DM, HLD, HTN and tobacco abuse. Patient presented one day prior for SOB and found to be mildly hypoxic but recovered with oxygen and was discharged. He re-presents for evaluation of similar symptoms including cough X 3 days, SOB and orthopnea. According to the ED doctor's report he has not been taking lasix, states, "they took me off". He tells me that no-one took him off, he states that he ran out but when asked about how long ago he states, "A long time" states his doctor never mentioned reordering the medication, therefore, he didn't worry about it. He denies recent long trips or trauma. Denies fever, chills, HA, sore throat, post nasal drip. He endorses smoking cigarettes 0.5 ppd for 50 years. Last seen by cardiology 3/5/20 with stent ---     In ED rapid Cov-2 -- Negative; CXR from 1 day prior showed findings suggestive of fibrosis or intrstitial congestion. Last Last 2D echo 3/3/20 - was administered lasix 60 mg and is now more comfortable sat 96%. Placed in observation for continued diuresis    Past Medical History:   Diagnosis Date    CHF (congestive heart failure)     Coronary artery disease     Diabetes mellitus     HLD (hyperlipidemia)     HTN (hypertension)     MI (myocardial infarction)     X's 2    Tobacco abuse        Past Surgical History:   Procedure Laterality " "Date    CARDIAC CATHETERIZATION  2007    x1    CARDIAC CATHETERIZATION  ?    x1    CORONARY ANGIOPLASTY WITH STENT PLACEMENT  2007 and ???    LEFT HEART CATHETERIZATION Left 12/5/2018    Procedure: Left heart cath;  Surgeon: Kenny Middleton MD;  Location: [facility] CATH LAB;  Service: Cardiology;  Laterality: Left;    LEFT HEART CATHETERIZATION Left 3/2/2020    Procedure: Left heart cath;  Surgeon: George Ho MD;  Location: [facility] CATH LAB;  Service: Cardiology;  Laterality: Left;       Review of patient's allergies indicates:  No Known Allergies    No current facility-administered medications on file prior to encounter.      Current Outpatient Medications on File Prior to Encounter   Medication Sig    aspirin (ECOTRIN) 81 MG EC tablet Take 1 tablet (81 mg total) by mouth once daily.    blood sugar diagnostic Strp 1 strip by Misc.(Non-Drug; Combo Route) route 2 (two) times daily.    furosemide (LASIX) 40 MG tablet Take 1 tablet (40 mg total) by mouth once daily.    insulin aspart U-100 (NOVOLOG) 100 unit/mL InPn pen Inject 1-10 Units into the skin 3 (three) times daily with meals. Blood Glucose  mg/dL                  0600                0000                               1200                               1800  151-200                2 units             1 unit  201-250                4 units             2 units  251-300                6 units             3 units  301-350                8 units             4 units  >350                      10 units           5 units    insulin glargine, TOUJEO, (TOUJEO SOLOSTAR U-300 INSULIN) 300 unit/mL (1.5 mL) InPn pen Inject 30 Units into the skin every evening.    nitroGLYCERIN (NITROSTAT) 0.4 MG SL tablet Place 1 tablet (0.4 mg total) under the tongue every 5 (five) minutes as needed.    pen needle, diabetic (BD INSULIN PEN NEEDLE UF MINI) 31 gauge x 3/16" Ndle USE AS DIRECTED    pen needle, diabetic 31 gauge x 5/16" Ndle 1 each by Misc.(Non-Drug; Combo Route) " route 2 (two) times daily.    ranolazine (RANEXA) 500 MG Tb12 Take 1 tablet (500 mg total) by mouth 2 (two) times daily.    rosuvastatin (CRESTOR) 40 MG Tab Take 1 tablet (40 mg total) by mouth once daily.    ticagrelor (BRILINTA) 90 mg tablet Take 1 tablet (90 mg total) by mouth 2 (two) times daily.     Family History     Problem Relation (Age of Onset)    No Known Problems Mother, Father, Sister, Brother, Maternal Aunt, Maternal Uncle, Paternal Aunt, Paternal Uncle, Maternal Grandmother, Maternal Grandfather, Paternal Grandmother, Paternal Grandfather        Tobacco Use    Smoking status: Current Every Day Smoker     Packs/day: 0.50     Years: 35.00     Pack years: 17.50     Types: Cigarettes    Smokeless tobacco: Never Used   Substance and Sexual Activity    Alcohol use: Yes     Alcohol/week: 0.8 standard drinks     Types: 1 Standard drinks or equivalent per week     Comment: social    Drug use: No    Sexual activity: Not on file     Review of Systems   Constitutional: Positive for appetite change. Negative for chills, diaphoresis and fever.   HENT: Negative for congestion, hearing loss, sore throat, tinnitus and trouble swallowing.    Eyes: Negative for photophobia, discharge, itching and visual disturbance.   Respiratory: Negative for apnea, cough, wheezing and stridor.    Cardiovascular: Negative for chest pain, palpitations and leg swelling.   Gastrointestinal: Positive for nausea and vomiting. Negative for abdominal distention, abdominal pain, blood in stool, constipation and diarrhea.   Endocrine: Negative for polydipsia, polyphagia and polyuria.   Genitourinary: Negative for difficulty urinating, dysuria, flank pain and frequency.   Musculoskeletal: Negative for arthralgias, joint swelling and neck stiffness.        L foot pain   Skin: Negative for color change, rash and wound.   Neurological: Positive for weakness. Negative for dizziness, tremors, seizures, light-headedness, numbness and  headaches.   Hematological: Negative for adenopathy.   Psychiatric/Behavioral: Negative for hallucinations and self-injury.     Objective:     Vital Signs (Most Recent):  Temp: 98.5 °F (36.9 °C) (04/25/20 0913)  Pulse: 94 (04/25/20 1302)  Resp: 18 (04/25/20 1302)  BP: 136/84 (04/25/20 1302)  SpO2: 97 % (04/25/20 1302) Vital Signs (24h Range):  Temp:  [98.5 °F (36.9 °C)] 98.5 °F (36.9 °C)  Pulse:  [] 94  Resp:  [18-20] 18  SpO2:  [95 %-98 %] 97 %  BP: (125-136)/(72-92) 136/84     Weight: 72.6 kg (160 lb)  Body mass index is 26.63 kg/m².    Physical Exam   Constitutional: He is oriented to person, place, and time. He is cooperative.   Body mass index is 26.63 kg/m².; small framed   HENT:   Head: Normocephalic and atraumatic.   Eyes: Pupils are equal, round, and reactive to light. Conjunctivae, EOM and lids are normal.   Neck: Normal range of motion and full passive range of motion without pain. Neck supple. No JVD present. No edema present. No thyroid mass present.   Cardiovascular: S1 normal, S2 normal and intact distal pulses.   No murmur heard.  Abdominal: Soft. Bowel sounds are normal. He exhibits no distension and no abdominal bruit. There is no splenomegaly or hepatomegaly. There is no tenderness. There is no guarding and no CVA tenderness.   Musculoskeletal:   Lt foot dressing: seems to move foot with ease   Lymphadenopathy:     He has no cervical adenopathy.     He has no axillary adenopathy.   Neurological: He is alert and oriented to person, place, and time. He has normal reflexes. He displays no tremor. He displays no seizure activity.   Skin: Skin is warm, dry and intact.   Psychiatric: He has a normal mood and affect. His speech is normal. Thought content normal. Cognition and memory are normal.         CRANIAL NERVES     CN III, IV, VI   Pupils are equal, round, and reactive to light.  Extraocular motions are normal.        Significant Labs:   CBC:   Recent Labs   Lab 04/24/20  0252 04/25/20  1008    WBC 7.70 10.41   HGB 12.7* 13.7*   HCT 39.7* 44.9    189     CMP:   Recent Labs   Lab 04/24/20  0252 04/25/20  1003    139   K 3.7 3.9    105   CO2 26 22*   * 232*   BUN 12 8   CREATININE 1.1 1.1   CALCIUM 9.7 9.6   PROT 7.2 7.8   ALBUMIN 3.6 3.9   BILITOT 0.5 0.6   ALKPHOS 68 75   AST 14* 11   ALT 11* 8*   ANIONGAP 12 12   EGFRNONAA >60.0 >60     Lactic Acid: No results for input(s): LACTATE in the last 48 hours.  Lipase: No results for input(s): LIPASE in the last 48 hours.  Lipid Panel: No results for input(s): CHOL, HDL, LDLCALC, TRIG, CHOLHDL in the last 48 hours.  Troponin:   Recent Labs   Lab 04/24/20  0252 04/25/20  1003   TROPONINI 0.03 0.030*       Significant Imaging:   Imaging Results          X-Ray Chest AP Portable (In process)               CXR 4/24/20  Persistent interstitial markings in the mid and lower lung zones which may represent interstitial fibrosis or interstitial congestion.    CXR 4/25/20  Results pending    Assessment/Plan:     Acute on chronic combined systolic and diastolic congestive heart failure  No LE edema noted although his CXR a few days ago suggest pulmonary congestion - Last 2D echo 3/3/20, LVEF 25% + G1 DD + hypertrophy,  - hasn't been taking his lasix at home - restarted; last seen by Dr. Pathak [facility]iology In March 2020 who noted below:  3/3/20:   PCI prox LAD stent thrombosis:  Stent 2.75x26 Resolute Tony MICH 16 patricia  De romy thrombus formed aftre PCI->POBA 2.5x12 at 20 patricia with aggrastat  Excellent result, T3 flow, 0% residual stenosis     PCI dist LCx:  Predil 2.5x12  Stent 2.5x22 Resolute Tony MICH 14 patricia  Excellent result, T3 flow 0% residual stenosis.       - IV lasix  - strict I&Os, daily weight, FR 1.5L  -covid rapid negative - repeat PCR + LD, crp, sed, pro calcitonin, ferritin  -afebrile without leukocytosis  -Consult to cards      Type 2 diabetes mellitus, with long-term current use of insulin  Hold home hyperglycemics toujeo- while  hospitalized will use combined insulin therapy with basal and prandial insulin coverage, POCT glucose checks, hypoglycemic protocol and correction scale - HgA1c = 12.7 (3/2020)        HLD (hyperlipidemia)  4/12/20 LDL = 147 - continue high intensity crestor home dose      Tobacco abuse  Patient counseled for 5 minutes on the dangers of unhealthy lifestyle choices including smoking and unhealthy eating habits, long term risk of inappropriate choices, as well as advantages to behavior modification. Offered nicotine patch while in the hospital - Continue counseling per nursing  -Smoking cessation        Essential hypertension  Decent control - monitor closely      Coronary artery disease of native artery of native heart with stable angina pectoris  Continue home asa, brillita, statin, and ranexa  Denies cp      VTE Risk Mitigation (From admission, onward)         Ordered     enoxaparin injection 40 mg  Daily      04/25/20 4454                 SHAYLA Graves, FNP-C  Hospitalist - Department of Hospital Medicine  [address] [place] [address]  Office [phone]; Pager [phone]

## 2020-04-25 NOTE — ASSESSMENT & PLAN NOTE
Hold home hyperglycemics toujeo- while hospitalized will use combined insulin therapy with basal and prandial insulin coverage, POCT glucose checks, hypoglycemic protocol and correction scale - HgA1c = 12.7 (3/2020)

## 2020-04-25 NOTE — ASSESSMENT & PLAN NOTE
No LE edema noted although his CXR a few days ago suggest pulmonary congestion - Last 2D echo 3/3/20, LVEF 25% + G1 DD + hypertrophy,  - hasn't been taking his lasix at home - restarted; last seen by Dr. Pathak Cardiology In March 2020 who noted below:  3/3/20:   PCI prox LAD stent thrombosis:  Stent 2.75x26 Resolute Tony MICH 16 patricia  De romy thrombus formed aftre PCI->POBA 2.5x12 at 20 patricia with aggrastat  Excellent result, T3 flow, 0% residual stenosis     PCI dist LCx:  Predil 2.5x12  Stent 2.5x22 Resolute Tony MICH 14 patricia  Excellent result, T3 flow 0% residual stenosis.       - IV lasix  - strict I&Os, daily weight, FR 1.5L  -covid rapid negative - repeat PCR + LD, crp, sed, pro calcitonin, ferritin  -afebrile without leukocytosis  -Consult to cards

## 2020-04-25 NOTE — NURSING
Received report from ABBY Pelayo. Pt arrived from ED via w/c. Telemetry monitor on. No signs of distress noted.

## 2020-04-25 NOTE — ED PROVIDER NOTES
Encounter Date: 4/25/2020    SCRIBE #1 NOTE: I, Jonnie Alamo, am scribing for, and in the presence of,  Yamilet Andrade MD. I have scribed the following portions of the note - Other sections scribed: HPI, ROS, PE.       History     Chief Complaint   Patient presents with    Shortness of Breath     Pt c/o SOB, cough x3 days. Denies fever. Denies pain at this time     Fredrick Cox is a 64 y.o. male with past medical history of CAD with stents, HTN, HLD, CHF (EF 25% on 3/03/2020), who presents with shortness of breath for 3 days. Patient also reports dry cough for 3 days. Patient had orthopnea last night needing 3 pillows to sleep and also had some PND. Denies sore throat, nausea, vomiting, diarrhea, sick contacts, chest pain, nasal congestion. Denies dyspnea on exertion. He states that he feels fine as long as he is not laying flat. Patient denies taking any fluid pills although he is prescribed lasix according to his chart. He denies any salty meals that he knows of. He states that he quit smoking a few days ago because of the shortness of breath, but had been smoking about 0.5 ppd for 50 years.     Per patient's ED note from yesterday, patient had an oxygen level of 89% per EMS on room air and was put on nonrebreather and came to 100%. He was given aspirin and NTG by EMS. By the time he got to the ER he was at 97% on room air and did not require any oxygen. They did a workup on him with a negative troponin and BNP of 400. Patient felt improved and was discharged home.       The history is provided by the patient.     Review of patient's allergies indicates:  No Known Allergies  Past Medical History:   Diagnosis Date    CHF (congestive heart failure)     Coronary artery disease     Diabetes mellitus     HLD (hyperlipidemia)     HTN (hypertension)     MI (myocardial infarction)     X's 2    Tobacco abuse      Past Surgical History:   Procedure Laterality Date    CARDIAC CATHETERIZATION  2007    x1    CARDIAC  CATHETERIZATION  ?    x1    CORONARY ANGIOPLASTY WITH STENT PLACEMENT  2007 and ???    LEFT HEART CATHETERIZATION Left 12/5/2018    Procedure: Left heart cath;  Surgeon: Kenny Middleton MD;  Location: [facility] CATH LAB;  Service: Cardiology;  Laterality: Left;    LEFT HEART CATHETERIZATION Left 3/2/2020    Procedure: Left heart cath;  Surgeon: George Ho MD;  Location: [facility] CATH LAB;  Service: Cardiology;  Laterality: Left;     Family History   Problem Relation Age of Onset    No Known Problems Mother     No Known Problems Father     No Known Problems Sister     No Known Problems Brother     No Known Problems Maternal Aunt     No Known Problems Maternal Uncle     No Known Problems Paternal Aunt     No Known Problems Paternal Uncle     No Known Problems Maternal Grandmother     No Known Problems Maternal Grandfather     No Known Problems Paternal Grandmother     No Known Problems Paternal Grandfather     Amblyopia Neg Hx     Blindness Neg Hx     Cancer Neg Hx     Cataracts Neg Hx     Diabetes Neg Hx     Glaucoma Neg Hx     Hypertension Neg Hx     Macular degeneration Neg Hx     Retinal detachment Neg Hx     Strabismus Neg Hx     Stroke Neg Hx     Thyroid disease Neg Hx      Social History     Tobacco Use    Smoking status: Current Every Day Smoker     Packs/day: 0.50     Years: 35.00     Pack years: 17.50     Types: Cigarettes    Smokeless tobacco: Never Used   Substance Use Topics    Alcohol use: Yes     Alcohol/week: 0.8 standard drinks     Types: 1 Standard drinks or equivalent per week     Comment: social    Drug use: No     Review of Systems   Constitutional: Negative for chills, diaphoresis and fever.   HENT: Negative for congestion and sore throat.    Eyes: Negative for photophobia and visual disturbance.   Respiratory: Positive for cough (non-productive) and shortness of breath.         Patient complains of orthopnea.    Cardiovascular: Negative for chest pain and leg  swelling.   Gastrointestinal: Negative for abdominal pain, blood in stool, constipation, diarrhea, nausea and vomiting.   Genitourinary: Negative for dysuria, frequency, hematuria and urgency.   Musculoskeletal: Negative for neck pain and neck stiffness.   Skin: Negative for rash and wound.   Neurological: Negative for weakness, light-headedness, numbness and headaches.   Hematological: Does not bruise/bleed easily.   Psychiatric/Behavioral: Negative for confusion and suicidal ideas.   All other systems reviewed and are negative.      Physical Exam     Initial Vitals [04/25/20 0913]   BP Pulse Resp Temp SpO2   130/72 98 20 98.5 °F (36.9 °C) 98 %      MAP       --         Physical Exam    Nursing note and vitals reviewed.  Constitutional: He appears well-developed and well-nourished. He is not diaphoretic. No distress.   HENT:   Head: Normocephalic and atraumatic.   Right Ear: External ear normal.   Left Ear: External ear normal.   Mouth/Throat: Oropharynx is clear and moist. No oropharyngeal exudate.   Eyes: Conjunctivae and EOM are normal. Pupils are equal, round, and reactive to light. Right eye exhibits no discharge. Left eye exhibits no discharge.   Neck: Normal range of motion. Neck supple. JVD (mild) present.   Cardiovascular: Normal rate, regular rhythm, normal heart sounds and intact distal pulses. Exam reveals no gallop and no friction rub.    No murmur heard.  Pulmonary/Chest: Breath sounds normal. No respiratory distress. He has no wheezes. He has no rhonchi. He has no rales.   Mild tachypnea, increase tachypnea and tachycardia with laying flat in bed, not able to tolerate > 3 minutes    Abdominal: Soft. Bowel sounds are normal. He exhibits no distension. There is no tenderness. There is no rebound and no guarding.   Musculoskeletal: Normal range of motion. He exhibits no edema or tenderness.   Lymphadenopathy:     He has no cervical adenopathy.   Neurological: He is alert and oriented to person, place,  and time. No cranial nerve deficit.   Skin: Skin is warm and dry. Capillary refill takes less than 2 seconds.   Psychiatric: He has a normal mood and affect. Thought content normal.         ED Course   Procedures  Labs Reviewed   CBC W/ AUTO DIFFERENTIAL - Abnormal; Notable for the following components:       Result Value    Hemoglobin 13.7 (*)     Mean Corpuscular Hemoglobin 26.5 (*)     Mean Corpuscular Hemoglobin Conc 30.5 (*)     All other components within normal limits   COMPREHENSIVE METABOLIC PANEL - Abnormal; Notable for the following components:    CO2 22 (*)     Glucose 232 (*)     ALT 8 (*)     All other components within normal limits   TROPONIN I - Abnormal; Notable for the following components:    Troponin I 0.030 (*)     All other components within normal limits   B-TYPE NATRIURETIC PEPTIDE - Abnormal; Notable for the following components:     (*)     All other components within normal limits   TROPONIN I - Abnormal; Notable for the following components:    Troponin I 0.027 (*)     All other components within normal limits    Narrative:     Recoll. [id number] by JOSEPHINE at 04/25/2020 13:43, reason: Specimen   hemolyzed 04/25/2020  13:43   C-REACTIVE PROTEIN - Abnormal; Notable for the following components:    CRP 18.7 (*)     All other components within normal limits    Narrative:     Recoll. [id number] by JOSEPHINE at 04/25/2020 13:43, reason: Specimen   hemolyzed 04/25/2020  13:43   SARS-COV-2 RNA AMPLIFICATION, QUAL    Narrative:     What symptom criteria does the patient meet?->Difficulty  breathing   PROCALCITONIN   LACTATE DEHYDROGENASE    Narrative:     Recoll. [id number] by JOSEPHINE at 04/25/2020 13:57, reason: Specimen   hemolyzed   SEDIMENTATION RATE   POCT GLUCOSE MONITORING CONTINUOUS     EKG Readings: (Independently Interpreted)   Sinus rhythm with occasional PVCs at 96.  He has a right bundle-branch block.  No ST elevation or depression.  Q-waves anteroseptally unchanged from 03/19/2020.   "EKG similar to 03/19/2020.  .       Imaging Results          X-Ray Chest AP Portable (In process)             Unable to view CXR 2/2 system down, report with "mild irregular opacities in medial right lower lung and in right perihilar upper lung for clinical correlation with pneumonia."     Medical Decision Making:   History:   Old Medical Records: I decided to obtain old medical records.  Old Records Summarized: records from clinic visits.       <> Summary of Records: Patient was seen at [facility] ER yesterday (4/24/2020) for shortness of breath. They did a workup on him with a negative troponin and BNP of 400. Patient felt improved and was discharged home.   Clinical Tests:   Lab Tests: Ordered and Reviewed  Radiological Study: Ordered and Reviewed  Medical Tests: Ordered and Reviewed    MDM  Pt presenting 2/2 fluid overload and shortness of breath and orthopnea consistent with CHF exacerbation.  Patient is given IV Lasix.  Considered nitroglycerin.  If worsening respiratory status will consider BiPAP and/or intubation.    Also considered but less likely:  Acs: ekg doesn't show stemi. Troponin ordered and mildly elevated, EKG not ischemia, will trend troponins  Pna: symptoms bilaterally and no fevers. No leukocytosis, CXR with possible PNA, unable to visualize CXR at this time. Will order procal and monitor for fever or symptoms of PNA, hold off on antibiotics pending procal, monitoring   Bronchitis: considered but hpi most c/w CHF  COPD:  Considered but less likely  Pneumothorax: bilateral breath sounds    BNP doubled from 400 to 900 since yesterday. Unable to compare trop as different assay however mildly elevated at 0.03 (no CP currently). Discussed with Marion with observation, patient reports not taking Lasix currently. 2 ED visits in 2 days for SOB, will diuresis, trend trop and continue to monitor. She request isolation status maintained until PCR covid returns (rapid negative in ED).  "               Scribe Attestation:   Scribe #1: I performed the above scribed service and the documentation accurately describes the services I performed. I attest to the accuracy of the note.                          Clinical Impression:       ICD-10-CM ICD-9-CM   1. Congestive heart failure, unspecified HF chronicity, unspecified heart failure type I50.9 428.0   2. Chest pain R07.9 786.50   3. SOB (shortness of breath) R06.02 786.05   4. Elevated troponin R79.89 790.6   5. CHF (congestive heart failure) I50.9 428.0             ED Disposition Condition    Observation                       I, Yamilet Andrade, personally performed the services described in this documentation. All medical record entries made by the scribe were at my direction and in my presence.  I have reviewed the chart and agree that the record reflects my personal performance and is accurate and complete.       Yamilet Andrade MD  04/25/20 1320       Yamilet Andrade MD  04/25/20 1321

## 2020-04-25 NOTE — SUBJECTIVE & OBJECTIVE
Past Medical History:   Diagnosis Date    CHF (congestive heart failure)     Coronary artery disease     Diabetes mellitus     HLD (hyperlipidemia)     HTN (hypertension)     MI (myocardial infarction)     X's 2    Tobacco abuse        Past Surgical History:   Procedure Laterality Date    CARDIAC CATHETERIZATION  2007    x1    CARDIAC CATHETERIZATION  ?    x1    CORONARY ANGIOPLASTY WITH STENT PLACEMENT  2007 and ???    LEFT HEART CATHETERIZATION Left 12/5/2018    Procedure: Left heart cath;  Surgeon: Kenny Middleton MD;  Location: [facility] CATH LAB;  Service: Cardiology;  Laterality: Left;    LEFT HEART CATHETERIZATION Left 3/2/2020    Procedure: Left heart cath;  Surgeon: George Ho MD;  Location: [facility] CATH LAB;  Service: Cardiology;  Laterality: Left;       Review of patient's allergies indicates:  No Known Allergies    No current facility-administered medications on file prior to encounter.      Current Outpatient Medications on File Prior to Encounter   Medication Sig    aspirin (ECOTRIN) 81 MG EC tablet Take 1 tablet (81 mg total) by mouth once daily.    blood sugar diagnostic Strp 1 strip by Misc.(Non-Drug; Combo Route) route 2 (two) times daily.    furosemide (LASIX) 40 MG tablet Take 1 tablet (40 mg total) by mouth once daily.    insulin aspart U-100 (NOVOLOG) 100 unit/mL InPn pen Inject 1-10 Units into the skin 3 (three) times daily with meals. Blood Glucose  mg/dL                  0600                0000                               1200                               1800  151-200                2 units             1 unit  201-250                4 units             2 units  251-300                6 units             3 units  301-350                8 units             4 units  >350                      10 units           5 units    insulin glargine, TOUJEO, (TOUJEO SOLOSTAR U-300 INSULIN) 300 unit/mL (1.5 mL) InPn pen Inject 30 Units into the skin every evening.    nitroGLYCERIN  "(NITROSTAT) 0.4 MG SL tablet Place 1 tablet (0.4 mg total) under the tongue every 5 (five) minutes as needed.    pen needle, diabetic (BD INSULIN PEN NEEDLE UF MINI) 31 gauge x 3/16" Ndle USE AS DIRECTED    pen needle, diabetic 31 gauge x 5/16" Ndle 1 each by Misc.(Non-Drug; Combo Route) route 2 (two) times daily.    ranolazine (RANEXA) 500 MG Tb12 Take 1 tablet (500 mg total) by mouth 2 (two) times daily.    rosuvastatin (CRESTOR) 40 MG Tab Take 1 tablet (40 mg total) by mouth once daily.    ticagrelor (BRILINTA) 90 mg tablet Take 1 tablet (90 mg total) by mouth 2 (two) times daily.     Family History     Problem Relation (Age of Onset)    No Known Problems Mother, Father, Sister, Brother, Maternal Aunt, Maternal Uncle, Paternal Aunt, Paternal Uncle, Maternal Grandmother, Maternal Grandfather, Paternal Grandmother, Paternal Grandfather        Tobacco Use    Smoking status: Current Every Day Smoker     Packs/day: 0.50     Years: 35.00     Pack years: 17.50     Types: Cigarettes    Smokeless tobacco: Never Used   Substance and Sexual Activity    Alcohol use: Yes     Alcohol/week: 0.8 standard drinks     Types: 1 Standard drinks or equivalent per week     Comment: social    Drug use: No    Sexual activity: Not on file     Review of Systems   Constitutional: Positive for appetite change. Negative for chills, diaphoresis and fever.   HENT: Negative for congestion, hearing loss, sore throat, tinnitus and trouble swallowing.    Eyes: Negative for photophobia, discharge, itching and visual disturbance.   Respiratory: Negative for apnea, cough, wheezing and stridor.    Cardiovascular: Negative for chest pain, palpitations and leg swelling.   Gastrointestinal: Positive for nausea and vomiting. Negative for abdominal distention, abdominal pain, blood in stool, constipation and diarrhea.   Endocrine: Negative for polydipsia, polyphagia and polyuria.   Genitourinary: Negative for difficulty urinating, dysuria, flank " pain and frequency.   Musculoskeletal: Negative for arthralgias, joint swelling and neck stiffness.        L foot pain   Skin: Negative for color change, rash and wound.   Neurological: Positive for weakness. Negative for dizziness, tremors, seizures, light-headedness, numbness and headaches.   Hematological: Negative for adenopathy.   Psychiatric/Behavioral: Negative for hallucinations and self-injury.     Objective:     Vital Signs (Most Recent):  Temp: 98.5 °F (36.9 °C) (04/25/20 0913)  Pulse: 94 (04/25/20 1302)  Resp: 18 (04/25/20 1302)  BP: 136/84 (04/25/20 1302)  SpO2: 97 % (04/25/20 1302) Vital Signs (24h Range):  Temp:  [98.5 °F (36.9 °C)] 98.5 °F (36.9 °C)  Pulse:  [] 94  Resp:  [18-20] 18  SpO2:  [95 %-98 %] 97 %  BP: (125-136)/(72-92) 136/84     Weight: 72.6 kg (160 lb)  Body mass index is 26.63 kg/m².    Physical Exam   Constitutional: He is oriented to person, place, and time. He is cooperative.   Body mass index is 26.63 kg/m².; small framed   HENT:   Head: Normocephalic and atraumatic.   Eyes: Pupils are equal, round, and reactive to light. Conjunctivae, EOM and lids are normal.   Neck: Normal range of motion and full passive range of motion without pain. Neck supple. No JVD present. No edema present. No thyroid mass present.   Cardiovascular: S1 normal, S2 normal and intact distal pulses.   No murmur heard.  Abdominal: Soft. Bowel sounds are normal. He exhibits no distension and no abdominal bruit. There is no splenomegaly or hepatomegaly. There is no tenderness. There is no guarding and no CVA tenderness.   Musculoskeletal:   Lt foot dressing: seems to move foot with ease   Lymphadenopathy:     He has no cervical adenopathy.     He has no axillary adenopathy.   Neurological: He is alert and oriented to person, place, and time. He has normal reflexes. He displays no tremor. He displays no seizure activity.   Skin: Skin is warm, dry and intact.   Psychiatric: He has a normal mood and affect. His  speech is normal. Thought content normal. Cognition and memory are normal.         CRANIAL NERVES     CN III, IV, VI   Pupils are equal, round, and reactive to light.  Extraocular motions are normal.        Significant Labs:   CBC:   Recent Labs   Lab 04/24/20  0252 04/25/20  1003   WBC 7.70 10.41   HGB 12.7* 13.7*   HCT 39.7* 44.9    189     CMP:   Recent Labs   Lab 04/24/20  0252 04/25/20  1003    139   K 3.7 3.9    105   CO2 26 22*   * 232*   BUN 12 8   CREATININE 1.1 1.1   CALCIUM 9.7 9.6   PROT 7.2 7.8   ALBUMIN 3.6 3.9   BILITOT 0.5 0.6   ALKPHOS 68 75   AST 14* 11   ALT 11* 8*   ANIONGAP 12 12   EGFRNONAA >60.0 >60     Lactic Acid: No results for input(s): LACTATE in the last 48 hours.  Lipase: No results for input(s): LIPASE in the last 48 hours.  Lipid Panel: No results for input(s): CHOL, HDL, LDLCALC, TRIG, CHOLHDL in the last 48 hours.  Troponin:   Recent Labs   Lab 04/24/20  0252 04/25/20  1003   TROPONINI 0.03 0.030*       Significant Imaging:   Imaging Results          X-Ray Chest AP Portable (In process)               CXR 4/24/20  Persistent interstitial markings in the mid and lower lung zones which may represent interstitial fibrosis or interstitial congestion.    CXR 4/25/20  Results pending

## 2020-04-25 NOTE — ASSESSMENT & PLAN NOTE
Patient counseled for 5 minutes on the dangers of unhealthy lifestyle choices including smoking and unhealthy eating habits, long term risk of inappropriate choices, as well as advantages to behavior modification. Offered nicotine patch while in the hospital - Continue counseling per nursing  -Smoking cessation

## 2020-04-26 VITALS
TEMPERATURE: 99 F | HEIGHT: 65 IN | RESPIRATION RATE: 17 BRPM | WEIGHT: 143 LBS | DIASTOLIC BLOOD PRESSURE: 60 MMHG | SYSTOLIC BLOOD PRESSURE: 101 MMHG | BODY MASS INDEX: 23.82 KG/M2 | OXYGEN SATURATION: 99 % | HEART RATE: 84 BPM

## 2020-04-26 LAB
AORTIC ROOT ANNULUS: 3.19 CM
AORTIC VALVE CUSP SEPERATION: 1.92 CM
AV INDEX (PROSTH): 0.5
AV MEAN GRADIENT: 4 MMHG
AV PEAK GRADIENT: 5 MMHG
AV VALVE AREA: 2.11 CM2
AV VELOCITY RATIO: 0.55
BASOPHILS # BLD AUTO: 0.02 K/UL (ref 0–0.2)
BASOPHILS # BLD AUTO: 0.02 K/UL (ref 0–0.2)
BASOPHILS NFR BLD: 0.2 % (ref 0–1.9)
BASOPHILS NFR BLD: 0.2 % (ref 0–1.9)
BSA FOR ECHO PROCEDURE: 1.72 M2
CV ECHO LV RWT: 0.4 CM
DIFFERENTIAL METHOD: ABNORMAL
DIFFERENTIAL METHOD: ABNORMAL
DOP CALC AO PEAK VEL: 1.11 M/S
DOP CALC AO VTI: 22.42 CM
DOP CALC LVOT AREA: 4.2 CM2
DOP CALC LVOT DIAMETER: 2.32 CM
DOP CALC LVOT PEAK VEL: 0.61 M/S
DOP CALC LVOT STROKE VOLUME: 47.41 CM3
DOP CALCLVOT PEAK VEL VTI: 11.22 CM
E WAVE DECELERATION TIME: 171.31 MSEC
E/A RATIO: 0.68
E/E' RATIO: 10.22 M/S
ECHO LV POSTERIOR WALL: 0.94 CM (ref 0.6–1.1)
EOSINOPHIL # BLD AUTO: 0.2 K/UL (ref 0–0.5)
EOSINOPHIL # BLD AUTO: 0.2 K/UL (ref 0–0.5)
EOSINOPHIL NFR BLD: 2.2 % (ref 0–8)
EOSINOPHIL NFR BLD: 2.2 % (ref 0–8)
ERYTHROCYTE [DISTWIDTH] IN BLOOD BY AUTOMATED COUNT: 13.2 % (ref 11.5–14.5)
ERYTHROCYTE [DISTWIDTH] IN BLOOD BY AUTOMATED COUNT: 13.2 % (ref 11.5–14.5)
FRACTIONAL SHORTENING: 10 % (ref 28–44)
HCT VFR BLD AUTO: 43.2 % (ref 40–54)
HCT VFR BLD AUTO: 43.2 % (ref 40–54)
HGB BLD-MCNC: 13.5 G/DL (ref 14–18)
HGB BLD-MCNC: 13.5 G/DL (ref 14–18)
IMM GRANULOCYTES # BLD AUTO: 0.01 K/UL (ref 0–0.04)
IMM GRANULOCYTES # BLD AUTO: 0.01 K/UL (ref 0–0.04)
IMM GRANULOCYTES NFR BLD AUTO: 0.1 % (ref 0–0.5)
IMM GRANULOCYTES NFR BLD AUTO: 0.1 % (ref 0–0.5)
INTERVENTRICULAR SEPTUM: 0.9 CM (ref 0.6–1.1)
IVRT: 134.95 MSEC
LA MAJOR: 5.34 CM
LA MINOR: 4.52 CM
LA WIDTH: 4.08 CM
LEFT ATRIUM SIZE: 3.35 CM
LEFT ATRIUM VOLUME INDEX: 33.2 ML/M2
LEFT ATRIUM VOLUME: 56.88 CM3
LEFT INTERNAL DIMENSION IN SYSTOLE: 4.27 CM (ref 2.1–4)
LEFT VENTRICLE DIASTOLIC VOLUME INDEX: 60.56 ML/M2
LEFT VENTRICLE DIASTOLIC VOLUME: 103.87 ML
LEFT VENTRICLE MASS INDEX: 87 G/M2
LEFT VENTRICLE SYSTOLIC VOLUME INDEX: 47.6 ML/M2
LEFT VENTRICLE SYSTOLIC VOLUME: 81.61 ML
LEFT VENTRICULAR INTERNAL DIMENSION IN DIASTOLE: 4.73 CM (ref 3.5–6)
LEFT VENTRICULAR MASS: 148.5 G
LV LATERAL E/E' RATIO: 7.67 M/S
LV SEPTAL E/E' RATIO: 15.33 M/S
LYMPHOCYTES # BLD AUTO: 2.4 K/UL (ref 1–4.8)
LYMPHOCYTES # BLD AUTO: 2.4 K/UL (ref 1–4.8)
LYMPHOCYTES NFR BLD: 27.2 % (ref 18–48)
LYMPHOCYTES NFR BLD: 27.2 % (ref 18–48)
MAGNESIUM SERPL-MCNC: 1.8 MG/DL (ref 1.6–2.6)
MCH RBC QN AUTO: 26.6 PG (ref 27–31)
MCH RBC QN AUTO: 26.6 PG (ref 27–31)
MCHC RBC AUTO-ENTMCNC: 31.3 G/DL (ref 32–36)
MCHC RBC AUTO-ENTMCNC: 31.3 G/DL (ref 32–36)
MCV RBC AUTO: 85 FL (ref 82–98)
MCV RBC AUTO: 85 FL (ref 82–98)
MONOCYTES # BLD AUTO: 0.6 K/UL (ref 0.3–1)
MONOCYTES # BLD AUTO: 0.6 K/UL (ref 0.3–1)
MONOCYTES NFR BLD: 6.5 % (ref 4–15)
MONOCYTES NFR BLD: 6.5 % (ref 4–15)
MV PEAK A VEL: 0.68 M/S
MV PEAK E VEL: 0.46 M/S
NEUTROPHILS # BLD AUTO: 5.5 K/UL (ref 1.8–7.7)
NEUTROPHILS # BLD AUTO: 5.5 K/UL (ref 1.8–7.7)
NEUTROPHILS NFR BLD: 63.8 % (ref 38–73)
NEUTROPHILS NFR BLD: 63.8 % (ref 38–73)
NRBC BLD-RTO: 0 /100 WBC
NRBC BLD-RTO: 0 /100 WBC
PHOSPHATE SERPL-MCNC: 3.5 MG/DL (ref 2.7–4.5)
PISA TR MAX VEL: 2.21 M/S
PLATELET # BLD AUTO: 177 K/UL (ref 150–350)
PLATELET # BLD AUTO: 177 K/UL (ref 150–350)
PMV BLD AUTO: 10.4 FL (ref 9.2–12.9)
PMV BLD AUTO: 10.4 FL (ref 9.2–12.9)
POCT GLUCOSE: 145 MG/DL (ref 70–110)
POCT GLUCOSE: 290 MG/DL (ref 70–110)
PULM VEIN S/D RATIO: 1.55
PV PEAK D VEL: 0.33 M/S
PV PEAK S VEL: 0.51 M/S
PV PEAK VELOCITY: 0.97 CM/S
RA MAJOR: 4.19 CM
RA PRESSURE: 8 MMHG
RA WIDTH: 2.92 CM
RBC # BLD AUTO: 5.07 M/UL (ref 4.6–6.2)
RBC # BLD AUTO: 5.07 M/UL (ref 4.6–6.2)
RIGHT VENTRICULAR END-DIASTOLIC DIMENSION: 3.52 CM
RV TISSUE DOPPLER FREE WALL SYSTOLIC VELOCITY 1 (APICAL 4 CHAMBER VIEW): 12.27 CM/S
SINUS: 3.44 CM
STJ: 3.01 CM
TDI LATERAL: 0.06 M/S
TDI SEPTAL: 0.03 M/S
TDI: 0.05 M/S
TR MAX PG: 20 MMHG
TRICUSPID ANNULAR PLANE SYSTOLIC EXCURSION: 1.04 CM
TROPONIN I SERPL DL<=0.01 NG/ML-MCNC: 0.02 NG/ML (ref 0–0.03)
TV REST PULMONARY ARTERY PRESSURE: 28 MMHG
WBC # BLD AUTO: 8.68 K/UL (ref 3.9–12.7)
WBC # BLD AUTO: 8.68 K/UL (ref 3.9–12.7)

## 2020-04-26 PROCEDURE — 85025 COMPLETE CBC W/AUTO DIFF WBC: CPT

## 2020-04-26 PROCEDURE — 84484 ASSAY OF TROPONIN QUANT: CPT

## 2020-04-26 PROCEDURE — 25000003 PHARM REV CODE 250: Performed by: NURSE PRACTITIONER

## 2020-04-26 PROCEDURE — 96376 TX/PRO/DX INJ SAME DRUG ADON: CPT | Mod: 59

## 2020-04-26 PROCEDURE — 63600175 PHARM REV CODE 636 W HCPCS: Performed by: EMERGENCY MEDICINE

## 2020-04-26 PROCEDURE — 25000003 PHARM REV CODE 250: Performed by: EMERGENCY MEDICINE

## 2020-04-26 PROCEDURE — 99220 PR INITIAL OBSERVATION CARE,LEVL III: CPT | Mod: ,,, | Performed by: INTERNAL MEDICINE

## 2020-04-26 PROCEDURE — 36415 COLL VENOUS BLD VENIPUNCTURE: CPT

## 2020-04-26 PROCEDURE — 84100 ASSAY OF PHOSPHORUS: CPT

## 2020-04-26 PROCEDURE — 99220 PR INITIAL OBSERVATION CARE,LEVL III: ICD-10-PCS | Mod: ,,, | Performed by: INTERNAL MEDICINE

## 2020-04-26 PROCEDURE — 83735 ASSAY OF MAGNESIUM: CPT

## 2020-04-26 PROCEDURE — G0378 HOSPITAL OBSERVATION PER HR: HCPCS

## 2020-04-26 RX ORDER — FUROSEMIDE 40 MG/1
40 TABLET ORAL DAILY
Status: DISCONTINUED | OUTPATIENT
Start: 2020-04-26 | End: 2020-04-26 | Stop reason: HOSPADM

## 2020-04-26 RX ORDER — FUROSEMIDE 40 MG/1
40 TABLET ORAL DAILY
Qty: 90 TABLET | Refills: 3 | Status: SHIPPED | OUTPATIENT
Start: 2020-04-26 | End: 2020-12-01 | Stop reason: SDUPTHER

## 2020-04-26 RX ADMIN — ASPIRIN 81 MG: 81 TABLET, COATED ORAL at 08:04

## 2020-04-26 RX ADMIN — TICAGRELOR 90 MG: 90 TABLET ORAL at 09:04

## 2020-04-26 RX ADMIN — FUROSEMIDE 40 MG: 10 INJECTION, SOLUTION INTRAMUSCULAR; INTRAVENOUS at 08:04

## 2020-04-26 RX ADMIN — RANOLAZINE 500 MG: 500 TABLET, FILM COATED, EXTENDED RELEASE ORAL at 08:04

## 2020-04-26 RX ADMIN — ROSUVASTATIN CALCIUM 40 MG: 10 TABLET, COATED ORAL at 08:04

## 2020-04-26 NOTE — HPI
"64 y.o. is an AAM with PMHx as listed below including but not limited to CHF, CAD, DM, HLD, HTN and tobacco abuse. Patient presented one day prior for SOB and found to be mildly hypoxic but recovered with oxygen and was discharged. He re-presents for evaluation of similar symptoms including cough X 3 days, SOB and orthopnea. According to the ED doctor's report he has not been taking lasix, states, "they took me off". He tells me that no-one took him off, he states that he ran out but when asked about how long ago he states, "A long time" states his doctor never mentioned reordering the medication, therefore, he didn't worry about it. He denies recent long trips or trauma. Denies fever, chills, HA, sore throat, post nasal drip. He endorses smoking cigarettes 0.5 ppd for 50 years. Last seen by cardiology 3/5/20 with stent --- In ED rapid Cov-2 -- Negative; CXR from 1 day prior showed findings suggestive of fibrosis or intrstitial congestion. Last Last 2D echo 3/3/20 - was administered lasix 60 mg and is now more comfortable sat 96%. Placed in observation for continued diuresis.    Pt sell known to me with recent STEMI s/p LAD PCI late stent thrombosis and subsequent severe LV dysfxn.  He presents with complaints of SOB now resolved after IV lasix.  No anginal sxs.  Denies med nonadherence and has been taking his ASA/brilinta.  Does not appear to be overloaded on my exam this am.  No palps/LH/loc.  No LE edema/orthop/PND.  Minimal flat trop, doubt ACS.  EKG unchanged.  No fever/chills/cough.  "

## 2020-04-26 NOTE — CONSULTS
"[facility]  Cardiology  Consult Note    Patient Name: Fredrick Cox  MRN: [id number]  Admission Date: 4/25/2020  Hospital Length of Stay: 0 days  Code Status: Full Code   Attending Provider: Tracie Alfredo MD   Consulting Provider: George Ho MD  Primary Care Physician: Amos Tee III, MD  Principal Problem:Acute on chronic combined systolic and diastolic congestive heart failure    Patient information was obtained from patient and ER records.     Inpatient consult to Cardiology  Consult performed by: George Ho MD  Consult ordered by: TYSON Graves  Reason for consult: CHF/ICM        Subjective:     Chief Complaint:  SOB     HPI:   64 y.o. is an AAM with PMHx as listed below including but not limited to CHF, CAD, DM, HLD, HTN and tobacco abuse. Patient presented one day prior for SOB and found to be mildly hypoxic but recovered with oxygen and was discharged. He re-presents for evaluation of similar symptoms including cough X 3 days, SOB and orthopnea. According to the ED doctor's report he has not been taking lasix, states, "they took me off". He tells me that no-one took him off, he states that he ran out but when asked about how long ago he states, "A long time" states his doctor never mentioned reordering the medication, therefore, he didn't worry about it. He denies recent long trips or trauma. Denies fever, chills, HA, sore throat, post nasal drip. He endorses smoking cigarettes 0.5 ppd for 50 years. Last seen by cardiology 3/5/20 with stent --- In ED rapid Cov-2 -- Negative; CXR from 1 day prior showed findings suggestive of fibrosis or intrstitial congestion. Last Last 2D echo 3/3/20 - was administered lasix 60 mg and is now more comfortable sat 96%. Placed in observation for continued diuresis.    Pt sell known to me with recent STEMI s/p LAD PCI late stent thrombosis and subsequent severe LV dysfxn.  He presents with complaints of SOB now resolved after IV " lasix.  No anginal sxs.  Denies med nonadherence and has been taking his ASA/brilinta.  Does not appear to be overloaded on my exam this am.  No palps/LH/loc.  No LE edema/orthop/PND.  Minimal flat trop, doubt ACS.  EKG unchanged.  No fever/chills/cough.    Past Medical History:   Diagnosis Date    CHF (congestive heart failure)     Coronary artery disease     Diabetes mellitus     HLD (hyperlipidemia)     HTN (hypertension)     MI (myocardial infarction)     X's 2    Tobacco abuse        Past Surgical History:   Procedure Laterality Date    CARDIAC CATHETERIZATION  2007    x1    CARDIAC CATHETERIZATION  ?    x1    CORONARY ANGIOPLASTY WITH STENT PLACEMENT  2007 and ???    LEFT HEART CATHETERIZATION Left 12/5/2018    Procedure: Left heart cath;  Surgeon: Kenny Middleton MD;  Location: [facility] CATH LAB;  Service: Cardiology;  Laterality: Left;    LEFT HEART CATHETERIZATION Left 3/2/2020    Procedure: Left heart cath;  Surgeon: George Ho MD;  Location: [facility] CATH LAB;  Service: Cardiology;  Laterality: Left;       Review of patient's allergies indicates:  No Known Allergies    No current facility-administered medications on file prior to encounter.      Current Outpatient Medications on File Prior to Encounter   Medication Sig    aspirin (ECOTRIN) 81 MG EC tablet Take 1 tablet (81 mg total) by mouth once daily.    blood sugar diagnostic Strp 1 strip by Misc.(Non-Drug; Combo Route) route 2 (two) times daily.    furosemide (LASIX) 40 MG tablet Take 1 tablet (40 mg total) by mouth once daily.    insulin aspart U-100 (NOVOLOG) 100 unit/mL InPn pen Inject 1-10 Units into the skin 3 (three) times daily with meals. Blood Glucose  mg/dL                  0600                0000                               1200                               1800  151-200                2 units             1 unit  201-250                4 units             2 units  251-300                6 units             3  "units  301-350                8 units             4 units  >350                      10 units           5 units    insulin glargine, TOUJEO, (TOUJEO SOLOSTAR U-300 INSULIN) 300 unit/mL (1.5 mL) InPn pen Inject 30 Units into the skin every evening.    nitroGLYCERIN (NITROSTAT) 0.4 MG SL tablet Place 1 tablet (0.4 mg total) under the tongue every 5 (five) minutes as needed.    pen needle, diabetic (BD INSULIN PEN NEEDLE UF MINI) 31 gauge x 3/16" Ndle USE AS DIRECTED    pen needle, diabetic 31 gauge x 5/16" Ndle 1 each by Misc.(Non-Drug; Combo Route) route 2 (two) times daily.    ranolazine (RANEXA) 500 MG Tb12 Take 1 tablet (500 mg total) by mouth 2 (two) times daily.    rosuvastatin (CRESTOR) 40 MG Tab Take 1 tablet (40 mg total) by mouth once daily.    ticagrelor (BRILINTA) 90 mg tablet Take 1 tablet (90 mg total) by mouth 2 (two) times daily.     Family History     Problem Relation (Age of Onset)    No Known Problems Mother, Father, Sister, Brother, Maternal Aunt, Maternal Uncle, Paternal Aunt, Paternal Uncle, Maternal Grandmother, Maternal Grandfather, Paternal Grandmother, Paternal Grandfather        Tobacco Use    Smoking status: Current Every Day Smoker     Packs/day: 0.50     Years: 35.00     Pack years: 17.50     Types: Cigarettes    Smokeless tobacco: Never Used   Substance and Sexual Activity    Alcohol use: Yes     Alcohol/week: 0.8 standard drinks     Types: 1 Standard drinks or equivalent per week     Comment: social    Drug use: No    Sexual activity: Not on file     Review of Systems   Constitution: Negative for chills, diaphoresis, fever and malaise/fatigue.   HENT: Negative for nosebleeds.    Eyes: Negative for blurred vision and double vision.   Cardiovascular: Negative for chest pain, claudication, cyanosis, dyspnea on exertion, leg swelling, orthopnea, palpitations, paroxysmal nocturnal dyspnea and syncope.   Respiratory: Negative for cough, shortness of breath and wheezing.    Skin: " Negative for dry skin and poor wound healing.   Musculoskeletal: Negative for back pain, joint swelling and myalgias.   Gastrointestinal: Negative for abdominal pain, nausea and vomiting.   Genitourinary: Negative for hematuria.   Neurological: Negative for dizziness, headaches, numbness, seizures and weakness.   Psychiatric/Behavioral: Negative for altered mental status and depression.     Objective:     Vital Signs (Most Recent):  Temp: 98.5 °F (36.9 °C) (04/26/20 0756)  Pulse: 83 (04/26/20 0756)  Resp: 17 (04/26/20 0756)  BP: 108/72 (04/26/20 0756)  SpO2: 98 % (04/26/20 0756) Vital Signs (24h Range):  Temp:  [98 °F (36.7 °C)-99 °F (37.2 °C)] 98.5 °F (36.9 °C)  Pulse:  [] 83  Resp:  [17-20] 17  SpO2:  [95 %-99 %] 98 %  BP: ()/(58-92) 108/72     Weight: 64.9 kg (143 lb)  Body mass index is 23.8 kg/m².    SpO2: 98 %  O2 Device (Oxygen Therapy): room air      Intake/Output Summary (Last 24 hours) at 4/26/2020 1018  Last data filed at 4/26/2020 0400  Gross per 24 hour   Intake 0 ml   Output 600 ml   Net -600 ml       Lines/Drains/Airways     Peripheral Intravenous Line                 Peripheral IV - Single Lumen 04/25/20 1003 20 G Right Forearm 1 day                Physical Exam   Constitutional: He is oriented to person, place, and time. He appears well-developed and well-nourished.   HENT:   Head: Normocephalic and atraumatic.   Eyes: Pupils are equal, round, and reactive to light. Conjunctivae and EOM are normal. No scleral icterus.   Neck: Normal range of motion. Neck supple. No JVD present. No tracheal deviation present. No thyromegaly present.   Cardiovascular: Normal rate, regular rhythm, S1 normal and S2 normal. Exam reveals no gallop and no friction rub.   No murmur heard.  Pulmonary/Chest: Effort normal and breath sounds normal. No respiratory distress. He has no wheezes. He has no rales. He exhibits no tenderness.   Abdominal: Soft. He exhibits no distension.   Musculoskeletal: He exhibits no  edema.   Neurological: He is alert and oriented to person, place, and time. He has normal strength. No cranial nerve deficit.   Skin: Skin is warm and dry. No rash noted.   Psychiatric: He has a normal mood and affect. His behavior is normal.       Current Medications:   aspirin  81 mg Oral Daily    enoxaparin  40 mg Subcutaneous Daily    furosemide (LASIX) IV  40 mg Intravenous Q12H    insulin detemir U-100  18 Units Subcutaneous QHS    ranolazine  500 mg Oral BID    rosuvastatin  40 mg Oral Daily    ticagrelor  90 mg Oral BID       dextrose 50%, dextrose 50%, glucagon (human recombinant), glucose, glucose, insulin aspart U-100, insulin aspart U-100, sodium chloride 0.9%    Laboratory (all labs reviewed):  CBC:  Recent Labs   Lab 03/06/20  0621 03/19/20  1839 04/24/20  0252 04/25/20  1003 04/26/20  0406   WBC 9.67 10.17 7.70 10.41 8.68  8.68   Hemoglobin 12.8 L 13.1 L 12.7 L 13.7 L 13.5 L  13.5 L   Hematocrit 41.1 42.4 39.7 L 44.9 43.2  43.2   Platelets 201 284 189 189 177  177       CHEMISTRIES:  Recent Labs   Lab 03/06/20  0621 03/19/20  1839 03/20/20  0357 04/24/20  0252 04/25/20  1003 04/26/20  0406   Glucose 72 252 H 237 H 225 H 232 H  --    Sodium 138 140 138 142 139  --    Potassium 2.9 L 4.0 3.7 3.7 3.9  --    BUN, Bld 9 8 7 L 12 8  --    Creatinine 0.8 1.0 0.9 1.1 1.1  --    eGFR if  >60 >60 >60 >60.0 >60  --    eGFR if non African American >60 >60 >60 >60.0 >60  --    Calcium 8.7 9.2 8.6 L 9.7 9.6  --    Magnesium  --   --   --   --   --  1.8       CARDIAC BIOMARKERS:  Recent Labs   Lab 04/25/20  1003 04/25/20  1413 04/25/20  1743 04/25/20  2100 04/26/20  0116   Troponin I 0.030 H 0.027 H 0.037 H 0.017 0.023       COAGS:  Recent Labs   Lab 04/12/19  1758 03/19/20  1839   INR 1.0 1.0       LIPIDS/LFTS:  Recent Labs   Lab 08/24/17  0755  12/05/18  0448 04/12/19  1758 03/02/20  1730 03/06/20  0621 03/19/20  1839 04/24/20  0252 04/25/20  1003   Cholesterol 217 H  --  235 H 235 H  --    --   --   --   --    Triglycerides 163 H  --  111 197 H  --   --   --   --   --    HDL 54  --  51 48  --   --   --   --   --    LDL Cholesterol 130.4  --  161.8 H 147.6  --   --   --   --   --    Non-HDL Cholesterol 163  --  184 187  --   --   --   --   --    AST 16   < >  --  14 12 33 11 14 L 11   ALT 20   < >  --  11 10 16 15 11 L 8 L    < > = values in this interval not displayed.       BNP:  Recent Labs   Lab 04/12/19  1758 03/02/20  1730 03/19/20  2250 04/24/20  0252 04/25/20  1003   BNP 14 46 701 H 427 H 899 H       TSH:  Recent Labs   Lab 12/04/18  1621 04/12/19  1758   TSH 0.064 L 0.699       Free T4:  Recent Labs   Lab 12/04/18  1621 04/12/19  1758   Free T4 1.22 1.17       Diagnostic Results:  ECG (personally reviewed and interpreted tracing(s)):  4/24/20 0252 SR 99, RBBB, AWMI-age indet, similar to 3/19/20    Chest X-Ray (personally reviewed and interpreted image(s)): 4/25/20 mild CHF    Echo: 3/3/20  · Severely decreased left ventricular systolic function. The estimated ejection fraction is 25%.  · Eccentric left ventricular hypertrophy.  · Grade I (mild) left ventricular diastolic dysfunction consistent with impaired relaxation.  · Local segmental wall motion abnormalities.  · Normal right ventricular systolic function.    Cath: 3/2/20 (Vic)  Description of the findings of the procedure: LHC/cor angio/PCI LAD late stent thrombosis MICH x1/PCI dist LCx MICH x1/IVUS LM/IABP placement RFA.  Findings/Key Components:  LVEDP: 35mmHg  LVEF: echo ordered  Dominance: Right  LM: prox 40-50%, MLA 7.1mm2, no significant MLA difference vs distal vessel  LAD: prox stent thrombosed, after PCI dist/transapical LAD occluded (small caliber)  LCx: dist 95%  RCA: not injected, known severe diffuse prox-mid disease with dist  and L-R collaterals  PCI prox LAD stent thrombosis:  Preop ASA/ticagrelor/heparin, intraop aggrastat  Predil 2.5x12  Stent 2.75x26 Resolute [place] MICH 16 patricia  De romy thrombus formed aftre  PCI->POBA 2.5x12 at 20 patricia with aggrastat  Excellent result, T3 flow, 0% residual stenosis  PCI dist LCx:  Predil 2.5x12  Stent 2.5x22 Resolute Tony MICH 14 patricia  Excellent result, T3 flow 0% residual stenosis  Hemostasis:  R Radial band  RFA IABP sheath in place  Impression:  STEMI/AWMI  CGS on arrival to lab  3V CAD  Late stent thrombosis of prox LAD-> 2.75x26 Resolute Tony MICH  De-romy dist LCx-> 2.5x22 Resolute Tony MICH  RCA , collateralized  IABP placed for hemodynamic support via RFA, sutured in place  R rad vasband for hemostasis  Plan:  Admit to ICU  ASA 81mg qd indefinitely  Ticagrelor 90mg bid for 1 year minimum (thru 3/2021), consider indefinite rx  Rosuva 40mg qd  IABP @ 1:1  Aggrastat gtt  Eventual outpat card rehab referral    Aortic US 11/13/19:  There is atherosclerotic disease of the abdominal aorta without evidence of aneurysmal dilatation.    Assessment and Plan:     * Acute on chronic combined systolic and diastolic congestive heart failure  Sxs resolved with IV lasix  Has known severe ICM s/p LAD late stent thrombosis in March 2020  Cont ASA/ticagrelor/statin  Add low dose coreg/lisinopril as outpat (BP too soft at present)  Cont PO lasix at home, pt instructed to take additional dose for weight gain  No plan for repeat isch eval at this juncture  OK for discharge home today and follow up with me in 1-2 weeks  Plan repeat echo June 2020 for reassessment of LVEF and need for ICD    Coronary artery disease of native artery of native heart with stable angina pectoris  Cont med rx as above    Essential hypertension  BP too soft for addition of GDMT at present    Tobacco abuse  Smoking cessation advised    HLD (hyperlipidemia)  Cont statin    Type 2 diabetes mellitus, with long-term current use of insulin  Per IM        VTE Risk Mitigation (From admission, onward)         Ordered     enoxaparin injection 40 mg  Daily      04/25/20 1349              Dispo planning appropriate  Pt to follow up with me  in the office in 1-2 weeks.    Thank you for your consult. I will follow-up with patient. Please contact us if you have any additional questions.    George Ho MD  Cardiology   [facility]

## 2020-04-26 NOTE — PROGRESS NOTES
WRITTEN DISCHARGE INSTRUCTIONS  Follow-up Information     Amos Tee III, MD In 2 weeks.    Specialty:  Internal Medicine  Why:  Call the office to schedule appointment, For outpatient follow-up/post hospitalizaton  Contact information:  [address]  [facility]  [place] [address]  [phone]             George Ho MD On 5/4/2020.    Specialties:  Cardiology, INTERVENTIONAL CARDIOLOGY  Why:  @ 10:20am  Cardiology follow-up/post hospitalizaton with Dr. Ho  Contact information:  [address]  [address]  [place] [address]  [phone]               HELP AT HOME: Ensure that you have someone to Help you manage your care at home after discharge.  Healthy Living Instructions to HELP MANAGE YOUR CARE AT HOME:  Things that You are responsible for:  1.  Getting your prescriptions filled   2.  Taking your medications as directed, DO NOT MISS ANY DOSES!  3.  Following the diet and exercise recommended by your doctor  4.  Going to your follow-up doctor appointment. This is important because it allows the doctor to monitor your   progress and determine if any changes need to made to your treatment plan.  5.  If you have any questions about MANAGING YOUR CARE AT HOME, call the Nurse Care Line for 24/7 Assistance [phone]

## 2020-04-26 NOTE — ASSESSMENT & PLAN NOTE
Sxs resolved with IV lasix  Has known severe ICM s/p LAD late stent thrombosis in March 2020  Cont ASA/ticagrelor/statin  Add low dose coreg/lisinopril as outpat (BP too soft at present)  Cont PO lasix at home, pt instructed to take additional dose for weight gain  No plan for repeat isch eval at this juncture  OK for discharge home today and follow up with me in 1-2 weeks  Plan repeat echo June 2020 for reassessment of LVEF and need for ICD

## 2020-04-26 NOTE — PLAN OF CARE
04/26/20 1133   Final Note   Assessment Type Final Discharge Note   Anticipated Discharge Disposition Home   What phone number can be called within the next 1-3 days to see how you are doing after discharge?   ([phone] )   Hospital Follow Up  Appt(s) scheduled? Yes   Discharge plans and expectations educations in teach back method with documentation complete? Yes   Right Care Referral Info   Post Acute Recommendation No Care   Post-Acute Status   Post-Acute Authorization Other  (Home with cardiology follow-up)   Other Status No Post-Acute Service Needs   Discharge Delays None known at this time

## 2020-04-26 NOTE — DISCHARGE INSTRUCTIONS
Take an extra lasix (furosemide) pill every day for 3 more days (Mon, Tuesday, & Wed - 4/27/20, 4/28/20, & 4/29/20)    Complete medications as ordered  Follow all discharge instructions.  Please schedule follow up appointments as necessary      When to Call Your Doctor  Call your doctor immediately if you have any of the following:  · Severe headache  · Severe dizziness, or fainting  · Nausea or vomiting  · Fast heartbeat (higher than 100 beats per minute)  · Fever or chills  · Swollen ankles  · Weakness  · Chest Pain attacks that last longer, occur more often, or are more severe than in the past

## 2020-04-26 NOTE — PLAN OF CARE
04/26/20 1133   Post-Acute Status   Post-Acute Authorization Other  (Home with cardiology follow-up)   Other Status No Post-Acute Service Needs   Discharge Delays None known at this time   Discharge Plan   Discharge Plan A Home with family   Discharge Plan B Home with family

## 2020-04-26 NOTE — CONSULTS
Patient to discharge home with family and cardiology hospital follow-up with Dr. Ho on 05/04/2020 at 10:20am

## 2020-04-26 NOTE — HOSPITAL COURSE
"Placed in observation for diuresing after found to have  and LVEF 25%. He responded quickly/well to lV lasix overnight. He endorses that he has thrown some urine away that was not documented, however, there is no LE edema, patient is resting comfortably in bed on room air without issue, and BP controlled. He tells me that he feels, "much better". His symptoms thought to be related to his non-adherance to lasix. Restarted at discharge. Instructed by Cards to take an additional dose X 3 days. He will follow up with cardiology in clinic within the next week. CM to arrange FU - vitals stable, no CP or SOB - stable for discharge to home.  "

## 2020-04-26 NOTE — DISCHARGE SUMMARY
"[facility]  Discharge Summary      Patient Name: Fredrick Cox  MRN: [id number]  Admission Date: 4/25/2020  Hospital Length of Stay: 0 days  Discharge Date and Time:  04/26/2020 11:28 AM  Attending Physician: Tracie Alfredo MD   Discharging Provider: TYSON Pugh  Primary Care Provider: Amos Tee III, MD      HPI:   Fredrick Cox is 64 y.o. is an AAM with PMHx as listed below including but not limited to CHF, CAD, DM, HLD, HTN and tobacco abuse. Patient presented one day prior for SOB and found to be mildly hypoxic but recovered with oxygen and was discharged. He re-presents for evaluation of similar symptoms including cough X 3 days, SOB and orthopnea. According to the ED doctor's report he has not been taking lasix, states, "they took me off". He tells me that no-one took him off, he states that he ran out but when asked about how long ago he states, "A long time" states his doctor never mentioned reordering the medication, therefore, he didn't worry about it. He denies recent long trips or trauma. Denies fever, chills, HA, sore throat, post nasal drip. He endorses smoking cigarettes 0.5 ppd for 50 years. Last seen by cardiology 3/5/20 with stent ---     In ED rapid Cov-2 -- Negative; CXR from 1 day prior showed findings suggestive of fibrosis or intrstitial congestion. Last Last 2D echo 3/3/20 - was administered lasix 60 mg and is now more comfortable sat 96%. Placed in observation for continued diuresis    * No surgery found *      Hospital Course:   Placed in observation for diuresing after found to have  and LVEF 25%. He responded quickly/well to lV lasix overnight. He endorses that he has thrown some urine away that was not documented, however, there is no LE edema, patient is resting comfortably in bed on room air without issue, and BP controlled. He tells me that he feels, "much better". His symptoms thought to be related to his non-adherance to " lasix. Restarted at discharge. Instructed by Cards to take an additional dose X 3 days. He will follow up with cardiology in clinic within the next week. CM to arrange FU - vitals stable, no CP or SOB - stable for discharge to home.     Consults:   Consults (From admission, onward)        Status Ordering Provider     Inpatient consult to Cardiology  Once     Provider:  (Not yet assigned)    Completed ESTHELA HOLDER     Inpatient consult to Social Work  Once     Provider:  (Not yet assigned)    GEORGE Zambrano          No new Assessment & Plan notes have been filed under this hospital service since the last note was generated.  Service: Hospital Medicine    Final Active Diagnoses:    Diagnosis Date Noted POA    PRINCIPAL PROBLEM:  Acute on chronic combined systolic and diastolic congestive heart failure [I50.43] 01/17/2014 Yes    Type 2 diabetes mellitus, with long-term current use of insulin [E11.9, Z79.4] 04/02/2014 Not Applicable     Chronic    Coronary artery disease of native artery of native heart with stable angina pectoris [I25.118] 01/17/2014 Yes     Chronic    Tobacco abuse [Z72.0] 01/17/2014 Yes     Chronic    HLD (hyperlipidemia) [E78.5] 01/17/2014 Yes     Chronic    Essential hypertension [I10] 01/17/2014 Yes     Chronic      Problems Resolved During this Admission:       Discharged Condition: stable    Disposition: Home or Self Care    Follow Up:  Follow-up Information     Amos Tee III, MD In 2 weeks.    Specialty:  Internal Medicine  Why:  Call the office to schedule appointment, For outpatient follow-up/post hospitalizaton  Contact information:  [address]  [facility]  [place] [address]  [phone]             George Ho MD.    Specialties:  Cardiology, INTERVENTIONAL CARDIOLOGY  Why:  Call the office to schedule appointment, For outpatient follow-up/post hospitalizaton  Contact information:  [address]  [address]  [facility]  [address]  [phone]                 Patient Instructions:      Diet Cardiac     Activity as tolerated       Significant Diagnostic Studies: Labs:   CMP   Recent Labs   Lab 04/25/20  1003      K 3.9      CO2 22*   *   BUN 8   CREATININE 1.1   CALCIUM 9.6   PROT 7.8   ALBUMIN 3.9   BILITOT 0.6   ALKPHOS 75   AST 11   ALT 8*   ANIONGAP 12   ESTGFRAFRICA >60   EGFRNONAA >60   , CBC   Recent Labs   Lab 04/25/20  1003 04/26/20  0406   WBC 10.41 8.68  8.68   HGB 13.7* 13.5*  13.5*   HCT 44.9 43.2  43.2    177  177   , INR   Lab Results   Component Value Date    INR 1.0 03/19/2020    INR 1.0 04/12/2019   , Lipid Panel   Lab Results   Component Value Date    CHOL 235 (H) 04/12/2019    HDL 48 04/12/2019    LDLCALC 147.6 04/12/2019    TRIG 197 (H) 04/12/2019    CHOLHDL 20.4 04/12/2019    and Troponin   Recent Labs   Lab 04/26/20  0116   TROPONINI 0.023       2D echo  · Severely decreased left ventricular systolic function. The estimated ejection fraction is 25%.  · Akinesis of mid-distal anteroapical wall extending to apical lateral and inferior walls c/w prior extensive LAD territory MI.  · Grade I (mild) left ventricular diastolic dysfunction consistent with impaired relaxation.  · Normal right ventricular systolic function.  · The estimated PA systolic pressure is 28 mmHg.        Pending Diagnostic Studies:     Procedure Component Value Units Date/Time    X-Ray Chest AP Portable [[id number]] Resulted:  04/25/20 1018    Order Status:  Sent Lab Status:  In process Updated:  04/25/20 1020         Medications:  Reconciled Home Medications:      Medication List      CONTINUE taking these medications    aspirin 81 MG EC tablet  Commonly known as:  ECOTRIN  Take 1 tablet (81 mg total) by mouth once daily.     blood sugar diagnostic Strp  1 strip by Misc.(Non-Drug; Combo Route) route 2 (two) times daily.     furosemide 40 MG tablet  Commonly known as:  LASIX  Take 1 tablet (40 mg total) by mouth once  "daily.     insulin aspart U-100 100 unit/mL (3 mL) Inpn pen  Commonly known as:  NovoLOG  Inject 1-10 Units into the skin 3 (three) times daily with meals. Blood Glucose  mg/dL                  0600                0000                               1200                               1800  151-200                2 units             1 unit  201-250                4 units             2 units  251-300                6 units             3 units  301-350                8 units             4 units  >350                      10 units           5 units     insulin glargine (TOUJEO) 300 unit/mL (1.5 mL) Inpn pen  Commonly known as:  TOUJEO SOLOSTAR U-300 INSULIN  Inject 30 Units into the skin every evening.     nitroGLYCERIN 0.4 MG SL tablet  Commonly known as:  NITROSTAT  Place 1 tablet (0.4 mg total) under the tongue every 5 (five) minutes as needed.     pen needle, diabetic 31 gauge x 3/16" Ndle  Commonly known as:  BD ULTRA-FINE MINI PEN NEEDLE  USE AS DIRECTED     pen needle, diabetic 31 gauge x 5/16" Ndle  1 each by Misc.(Non-Drug; Combo Route) route 2 (two) times daily.     ranolazine 500 MG Tb12  Commonly known as:  RANEXA  Take 1 tablet (500 mg total) by mouth 2 (two) times daily.     rosuvastatin 40 MG Tab  Commonly known as:  CRESTOR  Take 1 tablet (40 mg total) by mouth once daily.     ticagrelor 90 mg tablet  Commonly known as:  BRILINTA  Take 1 tablet (90 mg total) by mouth 2 (two) times daily.            Indwelling Lines/Drains at time of discharge:   Lines/Drains/Airways     None                 Time spent on the discharge of patient: 30 minutes  Patient was seen and examined on the date of discharge and determined to be suitable for discharge.       SHAYLA Graves, FNP-C  Hospitalist - Department of Hospital Medicine  [address], [place] [address]  Office [phone]; Pager [phone]    "

## 2020-04-26 NOTE — NURSING
DC inst given voiced understanding, waiting for ride to arrive, amb without problem, iv dc'd without incident

## 2020-04-26 NOTE — SUBJECTIVE & OBJECTIVE
Past Medical History:   Diagnosis Date    CHF (congestive heart failure)     Coronary artery disease     Diabetes mellitus     HLD (hyperlipidemia)     HTN (hypertension)     MI (myocardial infarction)     X's 2    Tobacco abuse        Past Surgical History:   Procedure Laterality Date    CARDIAC CATHETERIZATION  2007    x1    CARDIAC CATHETERIZATION  ?    x1    CORONARY ANGIOPLASTY WITH STENT PLACEMENT  2007 and ???    LEFT HEART CATHETERIZATION Left 12/5/2018    Procedure: Left heart cath;  Surgeon: Kenny Middleton MD;  Location: [facility] CATH LAB;  Service: Cardiology;  Laterality: Left;    LEFT HEART CATHETERIZATION Left 3/2/2020    Procedure: Left heart cath;  Surgeon: George Ho MD;  Location: [facility] CATH LAB;  Service: Cardiology;  Laterality: Left;       Review of patient's allergies indicates:  No Known Allergies    No current facility-administered medications on file prior to encounter.      Current Outpatient Medications on File Prior to Encounter   Medication Sig    aspirin (ECOTRIN) 81 MG EC tablet Take 1 tablet (81 mg total) by mouth once daily.    blood sugar diagnostic Strp 1 strip by Misc.(Non-Drug; Combo Route) route 2 (two) times daily.    furosemide (LASIX) 40 MG tablet Take 1 tablet (40 mg total) by mouth once daily.    insulin aspart U-100 (NOVOLOG) 100 unit/mL InPn pen Inject 1-10 Units into the skin 3 (three) times daily with meals. Blood Glucose  mg/dL                  0600                0000                               1200                               1800  151-200                2 units             1 unit  201-250                4 units             2 units  251-300                6 units             3 units  301-350                8 units             4 units  >350                      10 units           5 units    insulin glargine, TOUJEO, (TOUJEO SOLOSTAR U-300 INSULIN) 300 unit/mL (1.5 mL) InPn pen Inject 30 Units into the skin every evening.    nitroGLYCERIN  "(NITROSTAT) 0.4 MG SL tablet Place 1 tablet (0.4 mg total) under the tongue every 5 (five) minutes as needed.    pen needle, diabetic (BD INSULIN PEN NEEDLE UF MINI) 31 gauge x 3/16" Ndle USE AS DIRECTED    pen needle, diabetic 31 gauge x 5/16" Ndle 1 each by Misc.(Non-Drug; Combo Route) route 2 (two) times daily.    ranolazine (RANEXA) 500 MG Tb12 Take 1 tablet (500 mg total) by mouth 2 (two) times daily.    rosuvastatin (CRESTOR) 40 MG Tab Take 1 tablet (40 mg total) by mouth once daily.    ticagrelor (BRILINTA) 90 mg tablet Take 1 tablet (90 mg total) by mouth 2 (two) times daily.     Family History     Problem Relation (Age of Onset)    No Known Problems Mother, Father, Sister, Brother, Maternal Aunt, Maternal Uncle, Paternal Aunt, Paternal Uncle, Maternal Grandmother, Maternal Grandfather, Paternal Grandmother, Paternal Grandfather        Tobacco Use    Smoking status: Current Every Day Smoker     Packs/day: 0.50     Years: 35.00     Pack years: 17.50     Types: Cigarettes    Smokeless tobacco: Never Used   Substance and Sexual Activity    Alcohol use: Yes     Alcohol/week: 0.8 standard drinks     Types: 1 Standard drinks or equivalent per week     Comment: social    Drug use: No    Sexual activity: Not on file     Review of Systems   Constitution: Negative for chills, diaphoresis, fever and malaise/fatigue.   HENT: Negative for nosebleeds.    Eyes: Negative for blurred vision and double vision.   Cardiovascular: Negative for chest pain, claudication, cyanosis, dyspnea on exertion, leg swelling, orthopnea, palpitations, paroxysmal nocturnal dyspnea and syncope.   Respiratory: Negative for cough, shortness of breath and wheezing.    Skin: Negative for dry skin and poor wound healing.   Musculoskeletal: Negative for back pain, joint swelling and myalgias.   Gastrointestinal: Negative for abdominal pain, nausea and vomiting.   Genitourinary: Negative for hematuria.   Neurological: Negative for dizziness, " headaches, numbness, seizures and weakness.   Psychiatric/Behavioral: Negative for altered mental status and depression.     Objective:     Vital Signs (Most Recent):  Temp: 98.5 °F (36.9 °C) (04/26/20 0756)  Pulse: 83 (04/26/20 0756)  Resp: 17 (04/26/20 0756)  BP: 108/72 (04/26/20 0756)  SpO2: 98 % (04/26/20 0756) Vital Signs (24h Range):  Temp:  [98 °F (36.7 °C)-99 °F (37.2 °C)] 98.5 °F (36.9 °C)  Pulse:  [] 83  Resp:  [17-20] 17  SpO2:  [95 %-99 %] 98 %  BP: ()/(58-92) 108/72     Weight: 64.9 kg (143 lb)  Body mass index is 23.8 kg/m².    SpO2: 98 %  O2 Device (Oxygen Therapy): room air      Intake/Output Summary (Last 24 hours) at 4/26/2020 1018  Last data filed at 4/26/2020 0400  Gross per 24 hour   Intake 0 ml   Output 600 ml   Net -600 ml       Lines/Drains/Airways     Peripheral Intravenous Line                 Peripheral IV - Single Lumen 04/25/20 1003 20 G Right Forearm 1 day                Physical Exam   Constitutional: He is oriented to person, place, and time. He appears well-developed and well-nourished.   HENT:   Head: Normocephalic and atraumatic.   Eyes: Pupils are equal, round, and reactive to light. Conjunctivae and EOM are normal. No scleral icterus.   Neck: Normal range of motion. Neck supple. No JVD present. No tracheal deviation present. No thyromegaly present.   Cardiovascular: Normal rate, regular rhythm, S1 normal and S2 normal. Exam reveals no gallop and no friction rub.   No murmur heard.  Pulmonary/Chest: Effort normal and breath sounds normal. No respiratory distress. He has no wheezes. He has no rales. He exhibits no tenderness.   Abdominal: Soft. He exhibits no distension.   Musculoskeletal: He exhibits no edema.   Neurological: He is alert and oriented to person, place, and time. He has normal strength. No cranial nerve deficit.   Skin: Skin is warm and dry. No rash noted.   Psychiatric: He has a normal mood and affect. His behavior is normal.       Current  Medications:   aspirin  81 mg Oral Daily    enoxaparin  40 mg Subcutaneous Daily    furosemide (LASIX) IV  40 mg Intravenous Q12H    insulin detemir U-100  18 Units Subcutaneous QHS    ranolazine  500 mg Oral BID    rosuvastatin  40 mg Oral Daily    ticagrelor  90 mg Oral BID       dextrose 50%, dextrose 50%, glucagon (human recombinant), glucose, glucose, insulin aspart U-100, insulin aspart U-100, sodium chloride 0.9%    Laboratory (all labs reviewed):  CBC:  Recent Labs   Lab 03/06/20  0621 03/19/20  1839 04/24/20  0252 04/25/20  1003 04/26/20  0406   WBC 9.67 10.17 7.70 10.41 8.68  8.68   Hemoglobin 12.8 L 13.1 L 12.7 L 13.7 L 13.5 L  13.5 L   Hematocrit 41.1 42.4 39.7 L 44.9 43.2  43.2   Platelets 201 284 189 189 177  177       CHEMISTRIES:  Recent Labs   Lab 03/06/20  0621 03/19/20  1839 03/20/20  0357 04/24/20  0252 04/25/20  1003 04/26/20  0406   Glucose 72 252 H 237 H 225 H 232 H  --    Sodium 138 140 138 142 139  --    Potassium 2.9 L 4.0 3.7 3.7 3.9  --    BUN, Bld 9 8 7 L 12 8  --    Creatinine 0.8 1.0 0.9 1.1 1.1  --    eGFR if  >60 >60 >60 >60.0 >60  --    eGFR if non African American >60 >60 >60 >60.0 >60  --    Calcium 8.7 9.2 8.6 L 9.7 9.6  --    Magnesium  --   --   --   --   --  1.8       CARDIAC BIOMARKERS:  Recent Labs   Lab 04/25/20  1003 04/25/20  1413 04/25/20  1743 04/25/20  2100 04/26/20  0116   Troponin I 0.030 H 0.027 H 0.037 H 0.017 0.023       COAGS:  Recent Labs   Lab 04/12/19  1758 03/19/20  1839   INR 1.0 1.0       LIPIDS/LFTS:  Recent Labs   Lab 08/24/17  0755  12/05/18  0448 04/12/19  1758 03/02/20  1730 03/06/20  0621 03/19/20  1839 04/24/20  0252 04/25/20  1003   Cholesterol 217 H  --  235 H 235 H  --   --   --   --   --    Triglycerides 163 H  --  111 197 H  --   --   --   --   --    HDL 54  --  51 48  --   --   --   --   --    LDL Cholesterol 130.4  --  161.8 H 147.6  --   --   --   --   --    Non-HDL Cholesterol 163  --  184 187  --   --   --   --    --    AST 16   < >  --  14 12 33 11 14 L 11   ALT 20   < >  --  11 10 16 15 11 L 8 L    < > = values in this interval not displayed.       BNP:  Recent Labs   Lab 04/12/19  1758 03/02/20  1730 03/19/20  2250 04/24/20  0252 04/25/20  1003   BNP 14 46 701 H 427 H 899 H       TSH:  Recent Labs   Lab 12/04/18  1621 04/12/19  1758   TSH 0.064 L 0.699       Free T4:  Recent Labs   Lab 12/04/18  1621 04/12/19  1758   Free T4 1.22 1.17       Diagnostic Results:  ECG (personally reviewed and interpreted tracing(s)):  4/24/20 0252 SR 99, RBBB, AWMI-age indet, similar to 3/19/20    Chest X-Ray (personally reviewed and interpreted image(s)): 4/25/20 mild CHF    Echo: 3/3/20  · Severely decreased left ventricular systolic function. The estimated ejection fraction is 25%.  · Eccentric left ventricular hypertrophy.  · Grade I (mild) left ventricular diastolic dysfunction consistent with impaired relaxation.  · Local segmental wall motion abnormalities.  · Normal right ventricular systolic function.    Cath: 3/2/20 ([place])  Description of the findings of the procedure: LHC/cor angio/PCI LAD late stent thrombosis MICH x1/PCI dist LCx MICH x1/IVUS LM/IABP placement RFA.  Findings/Key Components:  LVEDP: 35mmHg  LVEF: echo ordered  Dominance: Right  LM: prox 40-50%, MLA 7.1mm2, no significant MLA difference vs distal vessel  LAD: prox stent thrombosed, after PCI dist/transapical LAD occluded (small caliber)  LCx: dist 95%  RCA: not injected, known severe diffuse prox-mid disease with dist  and L-R collaterals  PCI prox LAD stent thrombosis:  Preop ASA/ticagrelor/heparin, intraop aggrastat  Predil 2.5x12  Stent 2.75x26 Resolute [place] MICH 16 patricia  De romy thrombus formed aftre PCI->POBA 2.5x12 at 20 patricia with aggrastat  Excellent result, T3 flow, 0% residual stenosis  PCI dist LCx:  Predil 2.5x12  Stent 2.5x22 Resolute [place] MICH 14 patricia  Excellent result, T3 flow 0% residual stenosis  Hemostasis:  R Radial band  RFA IABP sheath in  place  Impression:  STEMI/AWMI  CGS on arrival to lab  3V CAD  Late stent thrombosis of prox LAD-> 2.75x26 Resolute [place] MICH  De-romy dist LCx-> 2.5x22 Resolute [place] MICH  RCA , collateralized  IABP placed for hemodynamic support via RFA, sutured in place  R rad vasband for hemostasis  Plan:  Admit to ICU  ASA 81mg qd indefinitely  Ticagrelor 90mg bid for 1 year minimum (thru 3/2021), consider indefinite rx  Rosuva 40mg qd  IABP @ 1:1  Aggrastat gtt  Eventual outpat card rehab referral    Aortic US 11/13/19:  There is atherosclerotic disease of the abdominal aorta without evidence of aneurysmal dilatation.

## 2020-04-26 NOTE — PROGRESS NOTES
Patient is ready for discharge from CM perspective; cardiology follow-up scheduled; patient will schedule PCP follow-up

## 2020-05-08 ENCOUNTER — HOSPITAL ENCOUNTER (EMERGENCY)
Facility: HOSPITAL | Age: 65
Discharge: HOME | End: 2020-05-08
Attending: EMERGENCY MEDICINE
Payer: MEDICARE

## 2020-05-08 VITALS
RESPIRATION RATE: 19 BRPM | BODY MASS INDEX: 23.82 KG/M2 | WEIGHT: 143 LBS | TEMPERATURE: 98 F | OXYGEN SATURATION: 98 % | HEIGHT: 65 IN | HEART RATE: 83 BPM | SYSTOLIC BLOOD PRESSURE: 112 MMHG | DIASTOLIC BLOOD PRESSURE: 68 MMHG

## 2020-05-08 DIAGNOSIS — R07.9 CHEST PAIN: ICD-10-CM

## 2020-05-08 DIAGNOSIS — R06.00 DYSPNEA, UNSPECIFIED TYPE: ICD-10-CM

## 2020-05-08 DIAGNOSIS — R06.01 ORTHOPNEA: ICD-10-CM

## 2020-05-08 DIAGNOSIS — R06.02 SOB (SHORTNESS OF BREATH): Primary | ICD-10-CM

## 2020-05-08 LAB
ALBUMIN SERPL BCP-MCNC: 3.5 G/DL (ref 3.5–5.2)
ALP SERPL-CCNC: 74 U/L (ref 55–135)
ALT SERPL W/O P-5'-P-CCNC: 10 U/L (ref 10–44)
ANION GAP SERPL CALC-SCNC: 11 MMOL/L (ref 8–16)
AST SERPL-CCNC: 11 U/L (ref 10–40)
BASOPHILS # BLD AUTO: 0.03 K/UL (ref 0–0.2)
BASOPHILS NFR BLD: 0.3 % (ref 0–1.9)
BILIRUB SERPL-MCNC: 0.3 MG/DL (ref 0.1–1)
BNP SERPL-MCNC: 627 PG/ML (ref 0–99)
BUN SERPL-MCNC: 8 MG/DL (ref 8–23)
CALCIUM SERPL-MCNC: 9.3 MG/DL (ref 8.7–10.5)
CHLORIDE SERPL-SCNC: 103 MMOL/L (ref 95–110)
CO2 SERPL-SCNC: 25 MMOL/L (ref 23–29)
CREAT SERPL-MCNC: 1.1 MG/DL (ref 0.5–1.4)
DIFFERENTIAL METHOD: ABNORMAL
EOSINOPHIL # BLD AUTO: 0.2 K/UL (ref 0–0.5)
EOSINOPHIL NFR BLD: 2.8 % (ref 0–8)
ERYTHROCYTE [DISTWIDTH] IN BLOOD BY AUTOMATED COUNT: 13.3 % (ref 11.5–14.5)
EST. GFR  (AFRICAN AMERICAN): >60 ML/MIN/1.73 M^2
EST. GFR  (NON AFRICAN AMERICAN): >60 ML/MIN/1.73 M^2
GLUCOSE SERPL-MCNC: 221 MG/DL (ref 70–110)
HCT VFR BLD AUTO: 42.5 % (ref 40–54)
HGB BLD-MCNC: 13.1 G/DL (ref 14–18)
IMM GRANULOCYTES # BLD AUTO: 0.02 K/UL (ref 0–0.04)
IMM GRANULOCYTES NFR BLD AUTO: 0.2 % (ref 0–0.5)
LYMPHOCYTES # BLD AUTO: 1.5 K/UL (ref 1–4.8)
LYMPHOCYTES NFR BLD: 17.1 % (ref 18–48)
MCH RBC QN AUTO: 27.2 PG (ref 27–31)
MCHC RBC AUTO-ENTMCNC: 30.8 G/DL (ref 32–36)
MCV RBC AUTO: 88 FL (ref 82–98)
MONOCYTES # BLD AUTO: 0.5 K/UL (ref 0.3–1)
MONOCYTES NFR BLD: 5.8 % (ref 4–15)
NEUTROPHILS # BLD AUTO: 6.4 K/UL (ref 1.8–7.7)
NEUTROPHILS NFR BLD: 73.8 % (ref 38–73)
NRBC BLD-RTO: 0 /100 WBC
PLATELET # BLD AUTO: 272 K/UL (ref 150–350)
PMV BLD AUTO: 9.7 FL (ref 9.2–12.9)
POCT GLUCOSE: 245 MG/DL (ref 70–110)
POTASSIUM SERPL-SCNC: 3.7 MMOL/L (ref 3.5–5.1)
PROT SERPL-MCNC: 7.4 G/DL (ref 6–8.4)
RBC # BLD AUTO: 4.81 M/UL (ref 4.6–6.2)
SARS-COV-2 RDRP RESP QL NAA+PROBE: NEGATIVE
SODIUM SERPL-SCNC: 139 MMOL/L (ref 136–145)
TROPONIN I SERPL DL<=0.01 NG/ML-MCNC: 0.03 NG/ML (ref 0–0.03)
TROPONIN I SERPL DL<=0.01 NG/ML-MCNC: 0.04 NG/ML (ref 0–0.03)
WBC # BLD AUTO: 8.62 K/UL (ref 3.9–12.7)

## 2020-05-08 PROCEDURE — 99285 EMERGENCY DEPT VISIT HI MDM: CPT | Mod: 25

## 2020-05-08 PROCEDURE — 93005 ELECTROCARDIOGRAM TRACING: CPT

## 2020-05-08 PROCEDURE — 93010 ELECTROCARDIOGRAM REPORT: CPT | Mod: ,,, | Performed by: INTERNAL MEDICINE

## 2020-05-08 PROCEDURE — 80053 COMPREHEN METABOLIC PANEL: CPT

## 2020-05-08 PROCEDURE — 96374 THER/PROPH/DIAG INJ IV PUSH: CPT

## 2020-05-08 PROCEDURE — 63600175 PHARM REV CODE 636 W HCPCS: Performed by: EMERGENCY MEDICINE

## 2020-05-08 PROCEDURE — 93010 EKG 12-LEAD: ICD-10-PCS | Mod: ,,, | Performed by: INTERNAL MEDICINE

## 2020-05-08 PROCEDURE — 82962 GLUCOSE BLOOD TEST: CPT

## 2020-05-08 PROCEDURE — 84484 ASSAY OF TROPONIN QUANT: CPT | Mod: 91

## 2020-05-08 PROCEDURE — 83880 ASSAY OF NATRIURETIC PEPTIDE: CPT

## 2020-05-08 PROCEDURE — 85025 COMPLETE CBC W/AUTO DIFF WBC: CPT

## 2020-05-08 PROCEDURE — U0002 COVID-19 LAB TEST NON-CDC: HCPCS

## 2020-05-08 RX ORDER — METFORMIN HYDROCHLORIDE 500 MG/1
500 TABLET ORAL 2 TIMES DAILY WITH MEALS
COMMUNITY
End: 2020-09-13 | Stop reason: CLARIF

## 2020-05-08 RX ORDER — FUROSEMIDE 10 MG/ML
40 INJECTION INTRAMUSCULAR; INTRAVENOUS
Status: COMPLETED | OUTPATIENT
Start: 2020-05-08 | End: 2020-05-08

## 2020-05-08 RX ADMIN — FUROSEMIDE 40 MG: 10 INJECTION, SOLUTION INTRAVENOUS at 04:05

## 2020-05-08 NOTE — ED TRIAGE NOTES
Pt c/o SOB starting when he tried to lay down to go to sleep approx 1245am, pt was 93% RA on scene, EMS placed pt on NRB and O2 went to 100%, pt states relief of SOB upon arrival. NAD

## 2020-05-08 NOTE — ED PROVIDER NOTES
Encounter Date: 5/8/2020    SCRIBE #1 NOTE: I, Wendy Tee, am scribing for, and in the presence of,  Dr. Vince Cedillo. I have scribed the following portions of the note - Other sections scribed: HPI, ROS, PE.       History     Chief Complaint   Patient presents with    Shortness of Breath     Pt c/o SOB while trying to sleep tonight approx 1245am, 93% RA on scene, 100% upon arrival to ED on NRB.  Hx CHF, MI, DM     CC: SOB     Fredrick Cox is a 64 y.o. male with PMHx of CAD, HTN, HLD, CHF, MI and DM who presents to the ED via EMS complaining of acute SOB x today (12:45am) while laying down for bed. Per EMS, patient was 93% on RA on scene and 100% upon arrival to ED on NRB. Per medical record, patient had his second left heart catheterization on 03/02/2020. Notes compliance with all Rx medications. Endorses chronic cough. Admits SHx of smoking and occasional alcohol, but denies street drugs. Denies fever, abdominal pain, nausea, vomiting and hematemesis. Denies CP and leg swelling. Denies difficulty urinating, dysuria and hematuria. Denies PMHx of blood clots. Denies known (+) sick contacts.     The history is provided by the patient and the EMS personnel. No  was used.     Review of patient's allergies indicates:  No Known Allergies  Past Medical History:   Diagnosis Date    CHF (congestive heart failure)     Coronary artery disease     Diabetes mellitus     HLD (hyperlipidemia)     HTN (hypertension)     MI (myocardial infarction)     X's 2    Tobacco abuse      Past Surgical History:   Procedure Laterality Date    CARDIAC CATHETERIZATION  2007    x1    CARDIAC CATHETERIZATION  ?    x1    CORONARY ANGIOPLASTY WITH STENT PLACEMENT  2007 and ???    LEFT HEART CATHETERIZATION Left 12/5/2018    Procedure: Left heart cath;  Surgeon: Kenny Middleton MD;  Location: [facility] CATH LAB;  Service: Cardiology;  Laterality: Left;    LEFT HEART CATHETERIZATION Left 3/2/2020    Procedure:  Left heart cath;  Surgeon: George Ho MD;  Location: [facility] CATH LAB;  Service: Cardiology;  Laterality: Left;     Family History   Problem Relation Age of Onset    No Known Problems Mother     No Known Problems Father     No Known Problems Sister     No Known Problems Brother     No Known Problems Maternal Aunt     No Known Problems Maternal Uncle     No Known Problems Paternal Aunt     No Known Problems Paternal Uncle     No Known Problems Maternal Grandmother     No Known Problems Maternal Grandfather     No Known Problems Paternal Grandmother     No Known Problems Paternal Grandfather     Amblyopia Neg Hx     Blindness Neg Hx     Cancer Neg Hx     Cataracts Neg Hx     Diabetes Neg Hx     Glaucoma Neg Hx     Hypertension Neg Hx     Macular degeneration Neg Hx     Retinal detachment Neg Hx     Strabismus Neg Hx     Stroke Neg Hx     Thyroid disease Neg Hx      Social History     Tobacco Use    Smoking status: Current Every Day Smoker     Packs/day: 0.50     Years: 35.00     Pack years: 17.50     Types: Cigarettes    Smokeless tobacco: Never Used   Substance Use Topics    Alcohol use: Yes     Alcohol/week: 0.8 standard drinks     Types: 1 Standard drinks or equivalent per week     Comment: social    Drug use: No     Review of Systems   Constitutional: Negative for chills and fever.   HENT: Negative for congestion, nosebleeds, postnasal drip and sore throat.    Eyes: Negative for redness.   Respiratory: Positive for cough (chronic) and shortness of breath.    Cardiovascular: Negative for chest pain and leg swelling.   Gastrointestinal: Negative for abdominal pain, constipation, diarrhea, nausea and vomiting.   Genitourinary: Negative for difficulty urinating, dysuria and hematuria.   Musculoskeletal: Negative for myalgias.   Skin: Negative for rash.   Neurological: Negative for headaches.       Physical Exam     Initial Vitals [05/08/20 0250]   BP Pulse Resp Temp SpO2   128/66 96  20 98.3 °F (36.8 °C) 100 %      MAP       --         Physical Exam    Nursing note and vitals reviewed.  Constitutional: He appears well-developed and well-nourished. No distress.   HENT:   Head: Normocephalic and atraumatic.   Eyes: Conjunctivae are normal.   Neck: Normal range of motion. Neck supple.   Cardiovascular: Normal rate, regular rhythm, normal heart sounds and intact distal pulses. Exam reveals no gallop and no friction rub.    No murmur heard.  Pulmonary/Chest: Breath sounds normal. No respiratory distress. He has no wheezes. He has no rhonchi. He has no rales.   Abdominal: Soft. He exhibits no distension. There is no tenderness. There is no rigidity, no rebound, no guarding and no CVA tenderness.   Musculoskeletal: Normal range of motion. He exhibits no edema or tenderness.   Neurological: He is alert and oriented to person, place, and time. GCS score is 15. GCS eye subscore is 4. GCS verbal subscore is 5. GCS motor subscore is 6.   Skin: Skin is warm and dry. No rash noted.   Psychiatric: He has a normal mood and affect. His behavior is normal.         ED Course   Procedures  Labs Reviewed   CBC W/ AUTO DIFFERENTIAL - Abnormal; Notable for the following components:       Result Value    Hemoglobin 13.1 (*)     Mean Corpuscular Hemoglobin Conc 30.8 (*)     Gran% 73.8 (*)     Lymph% 17.1 (*)     All other components within normal limits   COMPREHENSIVE METABOLIC PANEL - Abnormal; Notable for the following components:    Glucose 221 (*)     All other components within normal limits   TROPONIN I - Abnormal; Notable for the following components:    Troponin I 0.043 (*)     All other components within normal limits   B-TYPE NATRIURETIC PEPTIDE   SARS-COV-2 RNA AMPLIFICATION, QUAL     EKG Readings: (Independently Interpreted)   Initial Reading: No STEMI. Rhythm: Normal Sinus Rhythm. Heart Rate: 101. Conduction: RBBB.   No ST segment elevation or depression concerning for acute ischemia.          Imaging  Results          X-Ray Chest AP Portable (Final result)  Result time 05/08/20 04:00:01    Final result by Luca Torres MD (05/08/20 04:00:01)                 Impression:      No significant detrimental interval change compared to 04/25/2020.      Electronically signed by: Luca Torres MD  Date:    05/08/2020  Time:    04:00             Narrative:    EXAMINATION:  XR CHEST AP PORTABLE    CLINICAL HISTORY:  Chest Pain;    TECHNIQUE:  Single frontal view of the chest was performed.    COMPARISON:  Chest radiograph 04/25/2020, 04/24/2020    FINDINGS:  Cardiac monitoring leads overlie the chest.  Cardiomediastinal silhouette is stable.  Lungs are symmetrically expanded with chronic coarse interstitial markings bilaterally, most pronounced within the mid and lower lung zones.  This appears similar to prior study.  No new large confluent airspace consolidation identified.  No significant pleural effusion or pneumothorax identified.  Osseous structures demonstrate degenerative changes.                                 Medical Decision Making:   History:   Old Medical Records: I decided to obtain old medical records.  Initial Assessment:   64-year-old male with history of MI and EF 25% presenting with shortness of breath after lying flat.  Denies chest pain, nausea, vomiting, diaphoresis.  On exam well appearing no acute distress.  EMS reports 93% on room air.  98% on room air resting comfortably during my exam.  No tachypnea, respiratory distress.  Patient denies URI symptoms.  Afebrile.  Chest x-ray unchanged from prior 4/25.  Symptoms have since resolved.  No definite evidence of volume overload, no lower extremity edema or crackles heard on exam.  Differential includes CHF, pneumonia, effusion.  Less likely ACS.  We will get labs, chest x-ray, give IV Lasix, and reassess.  Independently Interpreted Test(s):   I have ordered and independently interpreted X-rays - see prior notes.  I have ordered and independently  interpreted EKG Reading(s) - see prior notes  Clinical Tests:   Lab Tests: Ordered and Reviewed  Radiological Study: Ordered and Reviewed  Medical Tests: Ordered and Reviewed  ED Management:  Labs reveal minimally elevated troponin.  Consistent with prior elevations.  Chest x-ray relatively unchanged.  Labs otherwise unremarkable or at baseline.  EKG nonischemic.  Very similar presentation previously.  Felt to be a volume overloaded at that time.  I suspect this is likely the case today.  We will get a 2nd troponin.  If unchanged, and the patient reports feeling better after IV Lasix, I believe he will be safe for discharge home.  Patient being signed out to Dr. Son pending repeat troponin and re-evaluation.            Scribe Attestation:   Scribe #1: I performed the above scribed service and the documentation accurately describes the services I performed. I attest to the accuracy of the note.    Update:  2nd troponin returned unremarkable.  Pt well appearing and non-toxic with no findings to warrant further evaluation at this time.  Pt discharged and counseled on the need to return to the nearest emergency room if they experience any other concerning symptoms.  Pt counseled to F/U outpatient with a PCP over the next two to three days.    Fahad Son MD                            Clinical Impression:     1. SOB (shortness of breath)    2. Chest pain    3. Dyspnea, unspecified type    4. Orthopnea            Disposition:   Disposition: Discharged  Condition: Stable           I, Vince Cedillo, personally performed the services described in this documentation. All medical record entries made by the scribe were at my direction and in my presence. I have reviewed the chart and agree that the record reflects my personal performance and is accurate and complete.       I, Fahad Son, personally performed the services described in this documentation. All medical record entries made by the scribe were  at my direction and in my presence.  I have reviewed the chart and agree that the record reflects my personal performance and is accurate and complete.         Vince Cedillo MD  05/08/20 0404       Fahad Son MD  05/08/20 0619

## 2020-05-08 NOTE — ED TRIAGE NOTES
64 year old M arrives to the ED via EMS due to SOB that started tonight when laying down in bed. Pt reports relief after oxygen via EMS. History of CHF, MI, and DM.

## 2020-05-08 NOTE — DISCHARGE INSTRUCTIONS
You were seen in the emergency department for shortness of breath.  Your EKG, labs, exam, are all reassuring.  We are not sure what the cause of your shortness of breath is however we do not believe it is anything immediately dangerous.  Please follow-up with your primary care provider early this week.  Please return for any new or worsening chest pain, nausea, vomiting, difficulty breathing, coughing up blood, profuse sweating, dizziness, lightheadedness, numbness, weakness, or any other new or worsening concerns.

## 2020-05-13 ENCOUNTER — OFFICE VISIT (OUTPATIENT)
Dept: CARDIOLOGY | Facility: CLINIC | Age: 65
End: 2020-05-13
Payer: MEDICARE

## 2020-05-13 VITALS
HEIGHT: 65 IN | BODY MASS INDEX: 23.83 KG/M2 | WEIGHT: 143.06 LBS | DIASTOLIC BLOOD PRESSURE: 80 MMHG | RESPIRATION RATE: 15 BRPM | OXYGEN SATURATION: 99 % | HEART RATE: 82 BPM | SYSTOLIC BLOOD PRESSURE: 134 MMHG

## 2020-05-13 DIAGNOSIS — Z79.4 TYPE 2 DIABETES MELLITUS WITH HYPEROSMOLARITY WITHOUT COMA, WITH LONG-TERM CURRENT USE OF INSULIN: Chronic | ICD-10-CM

## 2020-05-13 DIAGNOSIS — I25.5 ISCHEMIC CARDIOMYOPATHY: ICD-10-CM

## 2020-05-13 DIAGNOSIS — I25.10 CORONARY ARTERY DISEASE INVOLVING NATIVE CORONARY ARTERY OF NATIVE HEART WITHOUT ANGINA PECTORIS: Primary | ICD-10-CM

## 2020-05-13 DIAGNOSIS — I10 ESSENTIAL HYPERTENSION: Chronic | ICD-10-CM

## 2020-05-13 DIAGNOSIS — E11.00 TYPE 2 DIABETES MELLITUS WITH HYPEROSMOLARITY WITHOUT COMA, WITH LONG-TERM CURRENT USE OF INSULIN: Chronic | ICD-10-CM

## 2020-05-13 PROCEDURE — 99999 PR PBB SHADOW E&M-EST. PATIENT-LVL III: CPT | Mod: PBBFAC,,, | Performed by: INTERNAL MEDICINE

## 2020-05-13 PROCEDURE — 99213 OFFICE O/P EST LOW 20 MIN: CPT | Mod: PBBFAC | Performed by: INTERNAL MEDICINE

## 2020-05-13 PROCEDURE — 99214 PR OFFICE/OUTPT VISIT, EST, LEVL IV, 30-39 MIN: ICD-10-PCS | Mod: S$PBB,,, | Performed by: INTERNAL MEDICINE

## 2020-05-13 PROCEDURE — 99999 PR PBB SHADOW E&M-EST. PATIENT-LVL III: ICD-10-PCS | Mod: PBBFAC,,, | Performed by: INTERNAL MEDICINE

## 2020-05-13 PROCEDURE — 99214 OFFICE O/P EST MOD 30 MIN: CPT | Mod: S$PBB,,, | Performed by: INTERNAL MEDICINE

## 2020-05-13 RX ORDER — METOPROLOL SUCCINATE 25 MG/1
25 TABLET, EXTENDED RELEASE ORAL DAILY
Qty: 90 TABLET | Refills: 3 | Status: SHIPPED | OUTPATIENT
Start: 2020-05-13 | End: 2020-12-01 | Stop reason: SDUPTHER

## 2020-05-13 NOTE — PROGRESS NOTES
CARDIOVASCULAR PROGRESS NOTE    REASON FOR CONSULT:   Fredrick Cox is a 64 y.o. male who presents for follow up of CAD/ICM/PCI.    PCP: Randal  HISTORY OF PRESENT ILLNESS:   Patient comes in for follow-up of his hospitalizations, most recently in late April for heart failure status post PCI of late stent thrombosis back in early March 2020.  He reports the resolution of his dyspnea since initiation of Lasix.  He has had no angina, palpitations, or syncope.  There has been no PND, orthopnea, or lower extremity edema.  Denies any melena, hematuria, or claudicant symptoms.  He appears to be tolerating his dual antiplatelet therapy with aspirin and Brilinta without complications.    Seen at [facility] 4/26/20  Pt well known to me with recent STEMI s/p LAD PCI late stent thrombosis and subsequent severe LV dysfxn.  He presents with complaints of SOB now resolved after IV lasix.  No anginal sxs.  Denies med nonadherence and has been taking his ASA/brilinta.  Does not appear to be overloaded on my exam this am.  No palps/LH/loc.  No LE edema/orthop/PND.  Minimal flat trop, doubt ACS.  EKG unchanged.  No fever/chills/cough.   CARDIOVASCULAR HISTORY:   CAD 3/2/20 STEMI s/p PCI LAD late stent thrombosis MICH x1/PCI dist LCx MICH x1/IVUS LM/IABP placement RFA.    PAST MEDICAL HISTORY:     Past Medical History:   Diagnosis Date    CHF (congestive heart failure)     Coronary artery disease     Diabetes mellitus     HLD (hyperlipidemia)     HTN (hypertension)     MI (myocardial infarction)     X's 2    Tobacco abuse        PAST SURGICAL HISTORY:     Past Surgical History:   Procedure Laterality Date    CARDIAC CATHETERIZATION  2007    x1    CARDIAC CATHETERIZATION  ?    x1    CORONARY ANGIOPLASTY WITH STENT PLACEMENT  2007 and ???    LEFT HEART CATHETERIZATION Left 12/5/2018    Procedure: Left heart cath;  Surgeon: Kenny Middleton MD;  Location: [facility] CATH LAB;  Service: Cardiology;  Laterality: Left;    LEFT HEART  "CATHETERIZATION Left 3/2/2020    Procedure: Left heart cath;  Surgeon: George Ho MD;  Location: [facility] CATH LAB;  Service: Cardiology;  Laterality: Left;       ALLERGIES AND MEDICATION:   Review of patient's allergies indicates:  No Known Allergies     Medication List           Accurate as of May 13, 2020  1:18 PM. If you have any questions, ask your nurse or doctor.               CONTINUE taking these medications    aspirin 81 MG EC tablet  Commonly known as:  ECOTRIN  Take 1 tablet (81 mg total) by mouth once daily.     blood sugar diagnostic Strp  1 strip by Misc.(Non-Drug; Combo Route) route 2 (two) times daily.     furosemide 40 MG tablet  Commonly known as:  LASIX  Take 1 tablet (40 mg total) by mouth once daily.     insulin aspart U-100 100 unit/mL (3 mL) Inpn pen  Commonly known as:  NovoLOG  Inject 1-10 Units into the skin 3 (three) times daily with meals. Blood Glucose  mg/dL                  0600                0000                               1200                               1800  151-200                2 units             1 unit  201-250                4 units             2 units  251-300                6 units             3 units  301-350                8 units             4 units  >350                      10 units           5 units     insulin glargine (TOUJEO) 300 unit/mL (1.5 mL) Inpn pen  Commonly known as:  TOUJEO SOLOSTAR U-300 INSULIN  Inject 30 Units into the skin every evening.     metFORMIN 500 MG tablet  Commonly known as:  GLUCOPHAGE     metoprolol succinate 12.5 MG 24 hr split tablet  Commonly known as:  TOPROL-XL     nitroGLYCERIN 0.4 MG SL tablet  Commonly known as:  NITROSTAT  Place 1 tablet (0.4 mg total) under the tongue every 5 (five) minutes as needed.     pen needle, diabetic 31 gauge x 3/16" Ndle  Commonly known as:  BD ULTRA-FINE MINI PEN NEEDLE  USE AS DIRECTED     pen needle, diabetic 31 gauge x 5/16" Ndle  1 each by Misc.(Non-Drug; Combo Route) route 2 (two) times " daily.     ranolazine 500 MG Tb12  Commonly known as:  RANEXA  Take 1 tablet (500 mg total) by mouth 2 (two) times daily.     rosuvastatin 40 MG Tab  Commonly known as:  CRESTOR  Take 1 tablet (40 mg total) by mouth once daily.     ticagrelor 90 mg tablet  Commonly known as:  BRILINTA  Take 1 tablet (90 mg total) by mouth 2 (two) times daily.            SOCIAL HISTORY:     Social History     Socioeconomic History    Marital status:      Spouse name: Not on file    Number of children: Not on file    Years of education: Not on file    Highest education level: Not on file   Occupational History    Not on file   Social Needs    Financial resource strain: Not on file    Food insecurity:     Worry: Not on file     Inability: Not on file    Transportation needs:     Medical: Not on file     Non-medical: Not on file   Tobacco Use    Smoking status: Current Every Day Smoker     Packs/day: 0.50     Years: 35.00     Pack years: 17.50     Types: Cigarettes    Smokeless tobacco: Never Used   Substance and Sexual Activity    Alcohol use: Yes     Alcohol/week: 0.8 standard drinks     Types: 1 Standard drinks or equivalent per week     Comment: social    Drug use: No    Sexual activity: Not on file   Lifestyle    Physical activity:     Days per week: Not on file     Minutes per session: Not on file    Stress: Not on file   Relationships    Social connections:     Talks on phone: Not on file     Gets together: Not on file     Attends Protestant service: Not on file     Active member of club or organization: Not on file     Attends meetings of clubs or organizations: Not on file     Relationship status: Not on file   Other Topics Concern    Not on file   Social History Narrative    Not on file       FAMILY HISTORY:     Family History   Problem Relation Age of Onset    No Known Problems Mother     No Known Problems Father     No Known Problems Sister     No Known Problems Brother     No Known Problems  "Maternal Aunt     No Known Problems Maternal Uncle     No Known Problems Paternal Aunt     No Known Problems Paternal Uncle     No Known Problems Maternal Grandmother     No Known Problems Maternal Grandfather     No Known Problems Paternal Grandmother     No Known Problems Paternal Grandfather     Amblyopia Neg Hx     Blindness Neg Hx     Cancer Neg Hx     Cataracts Neg Hx     Diabetes Neg Hx     Glaucoma Neg Hx     Hypertension Neg Hx     Macular degeneration Neg Hx     Retinal detachment Neg Hx     Strabismus Neg Hx     Stroke Neg Hx     Thyroid disease Neg Hx        REVIEW OF SYSTEMS:   Review of Systems   Constitutional: Negative for chills, diaphoresis and fever.   HENT: Negative for nosebleeds.    Eyes: Negative for blurred vision, double vision and photophobia.   Respiratory: Negative for hemoptysis, shortness of breath and wheezing.    Cardiovascular: Negative for chest pain, palpitations, orthopnea, claudication, leg swelling and PND.   Gastrointestinal: Negative for abdominal pain, blood in stool, heartburn, melena, nausea and vomiting.   Genitourinary: Negative for flank pain and hematuria.   Musculoskeletal: Negative for falls, myalgias and neck pain.   Skin: Negative for rash.   Neurological: Negative for dizziness, seizures, loss of consciousness, weakness and headaches.   Endo/Heme/Allergies: Negative for polydipsia. Does not bruise/bleed easily.   Psychiatric/Behavioral: Negative for depression and memory loss. The patient is not nervous/anxious.        PHYSICAL EXAM:     Vitals:    05/13/20 1309   BP: 134/80   Pulse: 82   Resp: 15    Body mass index is 23.81 kg/m².  Weight: 64.9 kg (143 lb 1.3 oz)   Height: 5' 5" (165.1 cm)     Physical Exam   Constitutional: He is oriented to person, place, and time. He appears well-developed and well-nourished. He is cooperative.  Non-toxic appearance. No distress.   HENT:   Head: Normocephalic and atraumatic.   Eyes: Pupils are equal, round, " and reactive to light. Conjunctivae and EOM are normal. No scleral icterus.   Neck: Trachea normal and normal range of motion. Neck supple. Normal carotid pulses and no JVD present. Carotid bruit is not present. No neck rigidity. No tracheal deviation and no edema present. No thyromegaly present.   Cardiovascular: Normal rate, regular rhythm, S1 normal and S2 normal. PMI is not displaced. Exam reveals no gallop and no friction rub.   No murmur heard.  Pulses:       Carotid pulses are 2+ on the right side, and 2+ on the left side.  Pulmonary/Chest: Effort normal and breath sounds normal. No stridor. No respiratory distress. He has no wheezes. He has no rales. He exhibits no tenderness.   Abdominal: Soft. He exhibits no distension. There is no hepatosplenomegaly.   Musculoskeletal: Normal range of motion. He exhibits no edema or tenderness.   Feet:   Right Foot:   Skin Integrity: Negative for ulcer.   Left Foot:   Skin Integrity: Negative for ulcer.   Neurological: He is alert and oriented to person, place, and time. No cranial nerve deficit.   Skin: Skin is warm and dry. No rash noted. No erythema.   Psychiatric: He has a normal mood and affect. His speech is normal and behavior is normal.   Vitals reviewed.      DATA:   EKG: (personally reviewed tracing)  4/24/20 SR 99, RBBB, AWMI-age indet, similar to 3/19/20    Laboratory:  CBC:  Recent Labs   Lab 04/25/20  1003 04/26/20  0406 05/08/20  0304   WBC 10.41 8.68  8.68 8.62   Hemoglobin 13.7 L 13.5 L  13.5 L 13.1 L   Hematocrit 44.9 43.2  43.2 42.5   Platelets 189 177  177 272       CHEMISTRIES:  Recent Labs   Lab 04/24/20  0252 04/25/20  1003 04/26/20  0406 05/08/20  0304   Glucose 225 H 232 H  --  221 H   Sodium 142 139  --  139   Potassium 3.7 3.9  --  3.7   BUN, Bld 12 8  --  8   Creatinine 1.1 1.1  --  1.1   eGFR if African American >60.0 >60  --  >60   eGFR if non  >60.0 >60  --  >60   Calcium 9.7 9.6  --  9.3   Magnesium  --   --  1.8  --         CARDIAC BIOMARKERS:  Recent Labs   Lab 04/26/20  0116 05/08/20  0304 05/08/20  0540   Troponin I 0.023 0.043 H 0.031 H       COAGS:  Recent Labs   Lab 04/12/19  1758 03/19/20  1839   INR 1.0 1.0       LIPIDS/LFTS:  Recent Labs   Lab 08/24/17  0755  12/05/18  0448 04/12/19  1758  04/24/20  0252 04/25/20  1003 05/08/20  0304   Cholesterol 217 H  --  235 H 235 H  --   --   --   --    Triglycerides 163 H  --  111 197 H  --   --   --   --    HDL 54  --  51 48  --   --   --   --    LDL Cholesterol 130.4  --  161.8 H 147.6  --   --   --   --    Non-HDL Cholesterol 163  --  184 187  --   --   --   --    AST 16   < >  --  14   < > 14 L 11 11   ALT 20   < >  --  11   < > 11 L 8 L 10    < > = values in this interval not displayed.       Cardiovascular Testing:  Echo 4/26/20  · Severely decreased left ventricular systolic function. The estimated ejection fraction is 25%.  · Akinesis of mid-distal anteroapical wall extending to apical lateral and inferior walls c/w prior extensive LAD territory MI.  · Grade I (mild) left ventricular diastolic dysfunction consistent with impaired relaxation.  · Normal right ventricular systolic function.  · The estimated PA systolic pressure is 28 mmHg.    Cath 3/2/20  LVEDP: 35mmHg  LVEF: echo ordered  Dominance: Right  LM: prox 40-50%, MLA 7.1mm2, no significant MLA difference vs distal vessel  LAD: prox stent thrombosed, after PCI dist/transapical LAD occluded (small caliber)  LCx: dist 95%  RCA: not injected, known severe diffuse prox-mid disease with dist  and L-R collaterals  PCI prox LAD stent thrombosis:  Preop ASA/ticagrelor/heparin, intraop aggrastat  Predil 2.5x12  Stent 2.75x26 Resolute Tony MICH 16 patricia  De romy thrombus formed aftre PCI->POBA 2.5x12 at 20 patricia with aggrastat  Excellent result, T3 flow, 0% residual stenosis  PCI dist LCx:  Predil 2.5x12  Stent 2.5x22 Resolute [place] MICH 14 patricia  Excellent result, T3 flow 0% residual stenosis  Hemostasis:  R Radial band  RFA IABP  sheath in place  Impression:  STEMI/AWMI  CGS on arrival to lab  3V CAD  Late stent thrombosis of prox LAD-> 2.75x26 Resolute Tony MICH  De-romy dist LCx-> 2.5x22 Resolute Tony MICH  RCA , collateralized  IABP placed for hemodynamic support via RFA, sutured in place  R rad vasband for hemostasis  Plan:  Admit to ICU  ASA 81mg qd indefinitely  Ticagrelor 90mg bid for 1 year minimum (thru 3/2021), consider indefinite rx  Rosuva 40mg qd  IABP @ 1:1  Aggrastat gtt  Eventual outpat card rehab referral     Aortic US 11/13/19:  There is atherosclerotic disease of the abdominal aorta without evidence of aneurysmal dilatation.      ASSESSMENT:   # CAD/STEMI 3/2/20 s/p PCI LAD late stent thrombosis MICH x1/PCI dist LCx MICH x1/IVUS LM/IABP placement RFA.  # ICM, EF 25%, appears euvolemic  # HLP on rosuva 40mg  # HTN, controlled  # DM    PLAN:   Cont med rx  ASA 81mg qd indefinitely  Brilinta 90mg bid thru 3/2021, consider indefinite DAPT rx  Inc toprol 12.5->25mg qd  Lifevest ordered  RTC 1 month, consider addition of ACEi at that time  Repeat echo 2 months (early July 2020) for reassessment of LVEF and need for ICD      George Ho MD, FACC

## 2020-09-13 ENCOUNTER — HOSPITAL ENCOUNTER (EMERGENCY)
Facility: HOSPITAL | Age: 65
Discharge: HOME | End: 2020-09-13
Attending: EMERGENCY MEDICINE
Payer: MEDICARE

## 2020-09-13 VITALS
WEIGHT: 144 LBS | HEART RATE: 99 BPM | BODY MASS INDEX: 23.99 KG/M2 | SYSTOLIC BLOOD PRESSURE: 98 MMHG | DIASTOLIC BLOOD PRESSURE: 58 MMHG | HEIGHT: 65 IN | OXYGEN SATURATION: 98 % | TEMPERATURE: 99 F | RESPIRATION RATE: 18 BRPM

## 2020-09-13 DIAGNOSIS — R07.9 CHEST PAIN: ICD-10-CM

## 2020-09-13 DIAGNOSIS — R07.89 CHEST WALL PAIN: Primary | ICD-10-CM

## 2020-09-13 LAB
ALBUMIN SERPL BCP-MCNC: 3.7 G/DL (ref 3.5–5.2)
ALP SERPL-CCNC: 79 U/L (ref 55–135)
ALT SERPL W/O P-5'-P-CCNC: 13 U/L (ref 10–44)
ANION GAP SERPL CALC-SCNC: 8 MMOL/L (ref 8–16)
AST SERPL-CCNC: 10 U/L (ref 10–40)
BASOPHILS # BLD AUTO: 0.02 K/UL (ref 0–0.2)
BASOPHILS NFR BLD: 0.2 % (ref 0–1.9)
BILIRUB SERPL-MCNC: 0.6 MG/DL (ref 0.1–1)
BNP SERPL-MCNC: 861 PG/ML (ref 0–99)
BUN SERPL-MCNC: 16 MG/DL (ref 8–23)
CALCIUM SERPL-MCNC: 9.8 MG/DL (ref 8.7–10.5)
CHLORIDE SERPL-SCNC: 103 MMOL/L (ref 95–110)
CO2 SERPL-SCNC: 28 MMOL/L (ref 23–29)
CREAT SERPL-MCNC: 1.2 MG/DL (ref 0.5–1.4)
CTP QC/QA: YES
DIFFERENTIAL METHOD: ABNORMAL
EOSINOPHIL # BLD AUTO: 0.3 K/UL (ref 0–0.5)
EOSINOPHIL NFR BLD: 3.4 % (ref 0–8)
ERYTHROCYTE [DISTWIDTH] IN BLOOD BY AUTOMATED COUNT: 13.7 % (ref 11.5–14.5)
EST. GFR  (AFRICAN AMERICAN): >60 ML/MIN/1.73 M^2
EST. GFR  (NON AFRICAN AMERICAN): >60 ML/MIN/1.73 M^2
GLUCOSE SERPL-MCNC: 189 MG/DL (ref 70–110)
HCT VFR BLD AUTO: 39.1 % (ref 40–54)
HGB BLD-MCNC: 11.9 G/DL (ref 14–18)
IMM GRANULOCYTES # BLD AUTO: 0.02 K/UL (ref 0–0.04)
IMM GRANULOCYTES NFR BLD AUTO: 0.2 % (ref 0–0.5)
LYMPHOCYTES # BLD AUTO: 2 K/UL (ref 1–4.8)
LYMPHOCYTES NFR BLD: 22.4 % (ref 18–48)
MCH RBC QN AUTO: 26.6 PG (ref 27–31)
MCHC RBC AUTO-ENTMCNC: 30.4 G/DL (ref 32–36)
MCV RBC AUTO: 88 FL (ref 82–98)
MONOCYTES # BLD AUTO: 0.6 K/UL (ref 0.3–1)
MONOCYTES NFR BLD: 6.4 % (ref 4–15)
NEUTROPHILS # BLD AUTO: 5.9 K/UL (ref 1.8–7.7)
NEUTROPHILS NFR BLD: 67.4 % (ref 38–73)
NRBC BLD-RTO: 0 /100 WBC
PLATELET # BLD AUTO: 196 K/UL (ref 150–350)
PMV BLD AUTO: 10.2 FL (ref 9.2–12.9)
POTASSIUM SERPL-SCNC: 3.8 MMOL/L (ref 3.5–5.1)
PROT SERPL-MCNC: 7.4 G/DL (ref 6–8.4)
RBC # BLD AUTO: 4.47 M/UL (ref 4.6–6.2)
SARS-COV-2 RDRP RESP QL NAA+PROBE: NEGATIVE
SODIUM SERPL-SCNC: 139 MMOL/L (ref 136–145)
TROPONIN I SERPL DL<=0.01 NG/ML-MCNC: 0.01 NG/ML (ref 0–0.03)
TROPONIN I SERPL DL<=0.01 NG/ML-MCNC: 0.01 NG/ML (ref 0–0.03)
WBC # BLD AUTO: 8.78 K/UL (ref 3.9–12.7)

## 2020-09-13 PROCEDURE — 99285 EMERGENCY DEPT VISIT HI MDM: CPT | Mod: 25

## 2020-09-13 PROCEDURE — 93005 ELECTROCARDIOGRAM TRACING: CPT

## 2020-09-13 PROCEDURE — 80053 COMPREHEN METABOLIC PANEL: CPT

## 2020-09-13 PROCEDURE — 93010 ELECTROCARDIOGRAM REPORT: CPT | Mod: ,,, | Performed by: INTERNAL MEDICINE

## 2020-09-13 PROCEDURE — 96374 THER/PROPH/DIAG INJ IV PUSH: CPT

## 2020-09-13 PROCEDURE — 25500020 PHARM REV CODE 255: Performed by: EMERGENCY MEDICINE

## 2020-09-13 PROCEDURE — 83880 ASSAY OF NATRIURETIC PEPTIDE: CPT

## 2020-09-13 PROCEDURE — 25000003 PHARM REV CODE 250: Performed by: EMERGENCY MEDICINE

## 2020-09-13 PROCEDURE — 85025 COMPLETE CBC W/AUTO DIFF WBC: CPT

## 2020-09-13 PROCEDURE — 84484 ASSAY OF TROPONIN QUANT: CPT

## 2020-09-13 PROCEDURE — U0002 COVID-19 LAB TEST NON-CDC: HCPCS | Performed by: EMERGENCY MEDICINE

## 2020-09-13 PROCEDURE — 93010 EKG 12-LEAD: ICD-10-PCS | Mod: ,,, | Performed by: INTERNAL MEDICINE

## 2020-09-13 PROCEDURE — 63600175 PHARM REV CODE 636 W HCPCS: Performed by: EMERGENCY MEDICINE

## 2020-09-13 RX ORDER — LEVOTHYROXINE SODIUM 100 UG/1
100 TABLET ORAL
COMMUNITY
End: 2020-09-13 | Stop reason: CLARIF

## 2020-09-13 RX ORDER — DICYCLOMINE HYDROCHLORIDE 10 MG/1
10 CAPSULE ORAL
Status: COMPLETED | OUTPATIENT
Start: 2020-09-13 | End: 2020-09-13

## 2020-09-13 RX ORDER — LIDOCAINE HYDROCHLORIDE 20 MG/ML
5 SOLUTION OROPHARYNGEAL
Status: COMPLETED | OUTPATIENT
Start: 2020-09-13 | End: 2020-09-13

## 2020-09-13 RX ORDER — ASPIRIN 325 MG
325 TABLET, DELAYED RELEASE (ENTERIC COATED) ORAL
Status: COMPLETED | OUTPATIENT
Start: 2020-09-13 | End: 2020-09-13

## 2020-09-13 RX ORDER — ACETAMINOPHEN 325 MG/1
650 TABLET ORAL
Status: COMPLETED | OUTPATIENT
Start: 2020-09-13 | End: 2020-09-13

## 2020-09-13 RX ORDER — ASPIRIN 325 MG
325 TABLET, DELAYED RELEASE (ENTERIC COATED) ORAL
Status: DISCONTINUED | OUTPATIENT
Start: 2020-09-13 | End: 2020-09-13

## 2020-09-13 RX ORDER — METOPROLOL TARTRATE 25 MG/1
25 TABLET, FILM COATED ORAL
Status: COMPLETED | OUTPATIENT
Start: 2020-09-13 | End: 2020-09-13

## 2020-09-13 RX ORDER — AMLODIPINE BESYLATE 2.5 MG/1
2.5 TABLET ORAL DAILY
COMMUNITY
End: 2020-09-13 | Stop reason: CLARIF

## 2020-09-13 RX ORDER — MAG HYDROX/ALUMINUM HYD/SIMETH 200-200-20
10 SUSPENSION, ORAL (FINAL DOSE FORM) ORAL EVERY 6 HOURS PRN
Status: DISCONTINUED | OUTPATIENT
Start: 2020-09-13 | End: 2020-09-13 | Stop reason: HOSPADM

## 2020-09-13 RX ORDER — FUROSEMIDE 10 MG/ML
20 INJECTION INTRAMUSCULAR; INTRAVENOUS
Status: COMPLETED | OUTPATIENT
Start: 2020-09-13 | End: 2020-09-13

## 2020-09-13 RX ORDER — GABAPENTIN 100 MG/1
100 CAPSULE ORAL 3 TIMES DAILY
COMMUNITY
End: 2020-09-13 | Stop reason: CLARIF

## 2020-09-13 RX ORDER — ACETAMINOPHEN 325 MG/1
650 TABLET ORAL EVERY 6 HOURS PRN
Qty: 13 TABLET | Refills: 0 | Status: ON HOLD | OUTPATIENT
Start: 2020-09-13 | End: 2020-10-25 | Stop reason: HOSPADM

## 2020-09-13 RX ADMIN — IOHEXOL 75 ML: 350 INJECTION, SOLUTION INTRAVENOUS at 07:09

## 2020-09-13 RX ADMIN — METOPROLOL TARTRATE 25 MG: 25 TABLET, FILM COATED ORAL at 07:09

## 2020-09-13 RX ADMIN — ALUMINUM HYDROXIDE, MAGNESIUM HYDROXIDE, AND SIMETHICONE 10 ML: 200; 200; 20 SUSPENSION ORAL at 06:09

## 2020-09-13 RX ADMIN — DICYCLOMINE HYDROCHLORIDE 10 MG: 10 CAPSULE ORAL at 06:09

## 2020-09-13 RX ADMIN — ACETAMINOPHEN 650 MG: 325 TABLET ORAL at 06:09

## 2020-09-13 RX ADMIN — FUROSEMIDE 20 MG: 10 INJECTION, SOLUTION INTRAMUSCULAR; INTRAVENOUS at 06:09

## 2020-09-13 RX ADMIN — LIDOCAINE HYDROCHLORIDE 5 ML: 20 SOLUTION ORAL; TOPICAL at 06:09

## 2020-09-13 RX ADMIN — ASPIRIN 325 MG: 325 TABLET, COATED ORAL at 06:09

## 2020-09-13 NOTE — ED TRIAGE NOTES
Constant midsternal chest pain since yesterday. Pt is also reporting intermittent SOB. Denies nvd, fever, dizziness

## 2020-09-13 NOTE — ED PROVIDER NOTES
Encounter Date: 9/13/2020    SCRIBE #1 NOTE: I, Rowdy Bliss, am scribing for, and in the presence of, Raza Bueno MD.       History     Chief Complaint   Patient presents with    Chest Pain     Pt c/o mid sternal chest pain, SOB that started yesterday. Denies dizziness, N/V. Pt reports hx of CHF and 3 heart attacks. Pain is 5/10     Fredrick Cox is a 65 year old male who presents to the ED with an onset of lower mid sternal chest pain since yesterday. The patent describes this as constant and unchanged with exertion or positioning. He cannot recall any recent strenuous activity, lifting, or trauma. Denies any chills, fever, shortness of breath, leg swelling, abdominal pain, or myalgias. Patient reports beginning a regimen of fluid pills four months ago. Social history of smoking, current. Denies alcohol or other drug use.    The history is provided by the patient.     Review of patient's allergies indicates:  No Known Allergies  Past Medical History:   Diagnosis Date    CHF (congestive heart failure)     Coronary artery disease     Diabetes mellitus     HLD (hyperlipidemia)     HTN (hypertension)     MI (myocardial infarction)     X's 2    Tobacco abuse      Past Surgical History:   Procedure Laterality Date    CARDIAC CATHETERIZATION  2007    x1    CARDIAC CATHETERIZATION  ?    x1    CORONARY ANGIOPLASTY WITH STENT PLACEMENT  2007 and ???    LEFT HEART CATHETERIZATION Left 12/5/2018    Procedure: Left heart cath;  Surgeon: Kenny Middleton MD;  Location: [facility] CATH LAB;  Service: Cardiology;  Laterality: Left;    LEFT HEART CATHETERIZATION Left 3/2/2020    Procedure: Left heart cath;  Surgeon: George Ho MD;  Location: [facility] CATH LAB;  Service: Cardiology;  Laterality: Left;     Family History   Problem Relation Age of Onset    No Known Problems Mother     No Known Problems Father     No Known Problems Sister     No Known Problems Brother     No Known Problems Maternal Aunt     No  Known Problems Maternal Uncle     No Known Problems Paternal Aunt     No Known Problems Paternal Uncle     No Known Problems Maternal Grandmother     No Known Problems Maternal Grandfather     No Known Problems Paternal Grandmother     No Known Problems Paternal Grandfather     Amblyopia Neg Hx     Blindness Neg Hx     Cancer Neg Hx     Cataracts Neg Hx     Diabetes Neg Hx     Glaucoma Neg Hx     Hypertension Neg Hx     Macular degeneration Neg Hx     Retinal detachment Neg Hx     Strabismus Neg Hx     Stroke Neg Hx     Thyroid disease Neg Hx      Social History     Tobacco Use    Smoking status: Current Every Day Smoker     Packs/day: 0.50     Years: 35.00     Pack years: 17.50     Types: Cigarettes    Smokeless tobacco: Never Used   Substance Use Topics    Alcohol use: Yes     Alcohol/week: 0.8 standard drinks     Types: 1 Standard drinks or equivalent per week     Comment: social    Drug use: No     Review of Systems   Constitutional: Negative for chills and fever.   Respiratory: Negative for shortness of breath.    Cardiovascular: Positive for chest pain. Negative for leg swelling.   Gastrointestinal: Negative for abdominal pain.   Genitourinary: Negative for dysuria.   Musculoskeletal: Negative for myalgias.   All other systems reviewed and are negative.      Physical Exam     Initial Vitals [09/13/20 1617]   BP Pulse Resp Temp SpO2   93/62 98 18 98.5 °F (36.9 °C) 100 %      MAP       --         Physical Exam    Nursing note and vitals reviewed.  Constitutional: He appears well-developed and well-nourished. He is not diaphoretic. He appears distressed.   HENT:   Head: Normocephalic and atraumatic.   Eyes: Conjunctivae and EOM are normal. No scleral icterus.   Neck: Normal range of motion. Neck supple. No JVD present.   Cardiovascular: Normal rate, regular rhythm, normal heart sounds and intact distal pulses. Exam reveals no gallop and no friction rub.    No murmur heard.  Pulmonary/Chest:  Breath sounds normal. No respiratory distress. He has no wheezes. He has no rhonchi. He has no rales. He exhibits tenderness. He exhibits no bony tenderness and no crepitus.   Abdominal: Soft. He exhibits no distension. There is no abdominal tenderness. There is no rebound and no guarding.   Musculoskeletal: Normal range of motion. No tenderness or edema.   Neurological: He is alert and oriented to person, place, and time. GCS score is 15. GCS eye subscore is 4. GCS verbal subscore is 5. GCS motor subscore is 6.   Skin: Skin is warm and dry. Capillary refill takes less than 2 seconds.         ED Course   Procedures  Labs Reviewed   CBC W/ AUTO DIFFERENTIAL - Abnormal; Notable for the following components:       Result Value    RBC 4.47 (*)     Hemoglobin 11.9 (*)     Hematocrit 39.1 (*)     Mean Corpuscular Hemoglobin 26.6 (*)     Mean Corpuscular Hemoglobin Conc 30.4 (*)     All other components within normal limits   COMPREHENSIVE METABOLIC PANEL - Abnormal; Notable for the following components:    Glucose 189 (*)     All other components within normal limits   B-TYPE NATRIURETIC PEPTIDE - Abnormal; Notable for the following components:     (*)     All other components within normal limits   TROPONIN I   TROPONIN I   SARS-COV-2 RDRP GENE     EKG Readings: (Independently Interpreted)   EKG done 16 15 showed normal sinus rhythm left atrial enlargement and right bundle branch block.  No ST elevation or major T-wave abnormalities.  P.r. is multi for a QRS of 140.  Abnormal EKG but compared to previous and unchanged.    Repeat EKG at 6:17 p.m. showing sinus tachycardia with left atriall enlargement right bundle branch block with no major ST elevation or T-wave abnormalities.  QRS is 138 QTC is 497.  Abnormal EKG.  Now tachycardic.       Imaging Results           CTA Chest Non-Coronary - PE Study (Final result)  Result time 09/13/20 20:01:58    Final result by Magdalena Angelo MD (09/13/20 20:01:58)                  Impression:      1. No evidence of PE.  2. Mild bronchiectasis and bronchial wall thickening, more pronounced within the lower lobes.  3. Mild interlobular septal thickening within the lower lobes which could reflect mild interstitial edema with trace pleural fluid seen.  4. Left upper lobe 7 mm nodule versus scarring along the left major fissure.  For a solid nodule 6-8 mm, Fleischner Society 2017 guidelines recommend follow up with non-contrast chest CT at 6-12 months and 18-24 months after discovery.  This report was flagged in Epic as abnormal.      Electronically signed by: Magdalena Angelo MD  Date:    09/13/2020  Time:    20:01             Narrative:    EXAMINATION:  CTA CHEST NON CORONARY    CLINICAL HISTORY:  PE suspected, high pretest prob;    TECHNIQUE:  Low dose axial images, sagittal and coronal reformations were obtained from the thoracic inlet to the lung bases following the IV administration of 75 mL of Omnipaque 350.  Contrast timing was optimized to evaluate the pulmonary arteries.  MIP images were performed.    COMPARISON:  None    FINDINGS:  Structures at the base of the neck are unremarkable.  Aorta is non-aneurysmal.  The heart is normal in size without pericardial effusion.  No intraluminal filling defects within the pulmonary arteries to suggest pulmonary thromboembolism.   Mild prominent mediastinal and hilar lymph nodes are seen which do not meet CT criteria for enlargement.  The esophagus is unremarkable along its course.    The trachea and bronchi are patent.  The lungs are symmetrically expanded.  There is bronchiectasis and mild bronchial wall thickening, more pronounced within the lower lobes.  Mild interlobular septal thickening is also seen within the lower lobes.  Trace bilateral pleural fluid is seen.  No focal consolidation or mass seen.  There is mild bilateral apical scarring.  Small 2-3 mm nodule is seen along the pleural surface of the lateral aspect of the right upper lobe.   There is 7 mm nodule versus scarring seen within the lingula of the left upper lobe along the left major fissure.    The visualized abdominal structures are unremarkable.  Osseous structures demonstrate degenerative change.  Extrathoracic soft tissues are unremarkable.                               X-Ray Chest AP Portable (Final result)  Result time 09/13/20 16:43:22    Final result by Hiren Bolton MD (09/13/20 16:43:22)                 Impression:      Stable chest.  No active disease      Electronically signed by: Hiren Bolton MD  Date:    09/13/2020  Time:    16:43             Narrative:    EXAMINATION:  XR CHEST AP PORTABLE    CLINICAL HISTORY:  Chest Pain;    TECHNIQUE:  Single frontal view of the chest was performed.    COMPARISON:  Chest x-ray from 05/08/2020    FINDINGS:  Bibasilar scarring or atelectasis with mild interstitial coarsening.  No confluent airspace disease.  Cardiomediastinal silhouette is stable in size and configuration.  Degenerative changes of the spine and shoulders with osteopenia.                                 Medical Decision Making:   History:   Old Medical Records: I decided to obtain old medical records.  Initial Assessment:   Per chart review, patient was recently admitted for treatment of a STEMI on 3/2 and later an NSTEMI on 3/19. Most recent echo done on 4/26 showed severely depressed left ventricle systolic function and an EF of 25%. Started on ASA 81 mg at the time. Last follow-up with cardiology on 5/13.     65 year old male presents with lower mid-sternal chest pain.  It is reproducible.  Will patient is a high risk factors story and also exam is less than system with acute ACS.  Patient is not tachycardic on arrival and no unilateral leg swelling.  Less likely DVT or PE in the patient.  Patient may become tachycardic later secondary fact he takes his medications at night.  Less likely dissection or esophageal rupture pneumonia or pneumothorax or arrhythmia.    First  troponins negative.  EKG is unchanged.  Still reproducible patient given Tylenol, GI cocktail, aspirin Bentyl.  Pain is minimal after that.  Patient tachycardia us which is likely secondary to his not having his metoprolol yet.  Given his night metoprolol.  CT PE was ordered on the patient.  Repeat troponins negative.    CT PE showed nothing acute.  Mild edema.  Nodules that I instructed patient please follow-up with primary care.  He feels comfortable plan.  Cardiology follow-up.    HPI and exam and labs and imaging are not consistent with acute intrathoracic process.  Patient to follow-up with Cardiology and primary care.  Tachycardia came down with his metoprolol. I discussed with the patient/family the diagnosis, treatment plan, indications for return to the emergency department, and for expected follow-up. The patient/family verbalized an understanding. The patient/family is asked if there are any questions or concerns. We discuss the case, until all issues are addressed to the patient/family's satisfaction. Patient/family understands and is agreeable to the plan.  Discharged with Tylenol.  Raza Bueno      Clinical Tests:   Lab Tests: Reviewed and Ordered  Radiological Study: Reviewed and Ordered  Medical Tests: Reviewed and Ordered            Scribe Attestation:   Scribe #1: I performed the above scribed service and the documentation accurately describes the services I performed. I attest to the accuracy of the note.                      Clinical Impression:       ICD-10-CM ICD-9-CM   1. Chest wall pain  R07.89 786.52   2. Chest pain  R07.9 786.50             ED Disposition Condition    Discharge Stable        ED Prescriptions     Medication Sig Dispense Start Date End Date Auth. Provider    acetaminophen (TYLENOL) 325 MG tablet Take 2 tablets (650 mg total) by mouth every 6 (six) hours as needed. 13 tablet 9/13/2020  Raza Bueno MD        Follow-up Information     Follow up With Specialties Details Why  Contact Info    Amos Tee III, MD Internal Medicine Schedule an appointment as soon as possible for a visit in 2 days  [address]  [facility]  [place] [address]  [phone]      George Ho MD Cardiology, INTERVENTIONAL CARDIOLOGY Schedule an appointment as soon as possible for a visit in 2 days  [address]  [address]  [facility] [address]  [phone]                        I, raza bueno, personally performed the services described in this documentation. All medical record entries made by the scribe were at my direction and in my presence. I have reviewed the chart and agree that the record reflects my personal performance and is accurate and complete.                 Raza Bueno MD  09/13/20 2009

## 2020-09-14 NOTE — DISCHARGE INSTRUCTIONS
Thank you for coming to our Emergency Department today. It is important to remember that some problems are difficult to diagnose and may not be found during your first visit. Be sure to follow up with your primary care doctor and review any labs/imaging that was performed with them. If you do not have a primary care doctor, you may contact the one listed on your discharge paperwork or you may also call the [facility] Appointment Desk at [phone] to schedule an appointment with one.     All medications may potentially have side effects and it is impossible to predict which medications may give you side effects. If you feel that you are having a negative effect of any medication you should immediately stop taking them and seek medical attention.    Return to the ER with any questions/concerns, new/concerning symptoms, worsening or failure to improve. Do not drive or make any important decisions for 24 hours if you have received any pain medications, sedatives or mood altering drugs during your ER visit.

## 2020-10-23 ENCOUNTER — HOSPITAL ENCOUNTER (INPATIENT)
Facility: HOSPITAL | Age: 65
LOS: 2 days | Discharge: HOME | DRG: 250 | End: 2020-10-25
Attending: EMERGENCY MEDICINE | Admitting: INTERNAL MEDICINE
Payer: MEDICARE

## 2020-10-23 DIAGNOSIS — I21.02 STEMI INVOLVING LEFT ANTERIOR DESCENDING CORONARY ARTERY: ICD-10-CM

## 2020-10-23 DIAGNOSIS — I63.512 ACUTE ISCHEMIC LEFT MCA STROKE: ICD-10-CM

## 2020-10-23 DIAGNOSIS — I21.3 STEMI (ST ELEVATION MYOCARDIAL INFARCTION): ICD-10-CM

## 2020-10-23 DIAGNOSIS — I21.4 ACUTE NON-ST SEGMENT ELEVATION MYOCARDIAL INFARCTION: ICD-10-CM

## 2020-10-23 DIAGNOSIS — R07.9 CHEST PAIN: ICD-10-CM

## 2020-10-23 PROBLEM — I50.9 ACC/AHA STAGE B CONGESTIVE HEART FAILURE DUE TO ISCHEMIC CARDIOMYOPATHY: Status: ACTIVE | Noted: 2020-10-23

## 2020-10-23 PROBLEM — I25.5 ACC/AHA STAGE B CONGESTIVE HEART FAILURE DUE TO ISCHEMIC CARDIOMYOPATHY: Status: ACTIVE | Noted: 2020-10-23

## 2020-10-23 LAB
ALBUMIN SERPL BCP-MCNC: 3.8 G/DL (ref 3.5–5.2)
ALP SERPL-CCNC: 123 U/L (ref 55–135)
ALT SERPL W/O P-5'-P-CCNC: 35 U/L (ref 10–44)
ANION GAP SERPL CALC-SCNC: 10 MMOL/L (ref 8–16)
APTT BLDCRRT: 22.9 SEC (ref 21–32)
APTT BLDCRRT: 51.3 SEC (ref 21–32)
AST SERPL-CCNC: 29 U/L (ref 10–40)
BASOPHILS # BLD AUTO: 0.03 K/UL (ref 0–0.2)
BASOPHILS NFR BLD: 0.4 % (ref 0–1.9)
BILIRUB SERPL-MCNC: 0.8 MG/DL (ref 0.1–1)
BNP SERPL-MCNC: 1642 PG/ML (ref 0–99)
BUN SERPL-MCNC: 16 MG/DL (ref 8–23)
CALCIUM SERPL-MCNC: 9.3 MG/DL (ref 8.7–10.5)
CHLORIDE SERPL-SCNC: 104 MMOL/L (ref 95–110)
CK MB SERPL-MCNC: 20.9 NG/ML (ref 0.1–6.5)
CK MB SERPL-RTO: 4.5 % (ref 0–5)
CK SERPL-CCNC: 463 U/L (ref 20–200)
CO2 SERPL-SCNC: 24 MMOL/L (ref 23–29)
CREAT SERPL-MCNC: 1.3 MG/DL (ref 0.5–1.4)
CTP QC/QA: YES
DIFFERENTIAL METHOD: ABNORMAL
EOSINOPHIL # BLD AUTO: 0.1 K/UL (ref 0–0.5)
EOSINOPHIL NFR BLD: 1.4 % (ref 0–8)
ERYTHROCYTE [DISTWIDTH] IN BLOOD BY AUTOMATED COUNT: 14.5 % (ref 11.5–14.5)
EST. GFR  (AFRICAN AMERICAN): >60 ML/MIN/1.73 M^2
EST. GFR  (NON AFRICAN AMERICAN): 57 ML/MIN/1.73 M^2
GLUCOSE SERPL-MCNC: 166 MG/DL (ref 70–110)
GLUCOSE SERPL-MCNC: 217 MG/DL (ref 70–110)
HCT VFR BLD AUTO: 37.6 % (ref 40–54)
HGB BLD-MCNC: 12.1 G/DL (ref 14–18)
IMM GRANULOCYTES # BLD AUTO: 0.02 K/UL (ref 0–0.04)
IMM GRANULOCYTES NFR BLD AUTO: 0.3 % (ref 0–0.5)
INR PPP: 1.1 (ref 0.8–1.2)
LYMPHOCYTES # BLD AUTO: 1.4 K/UL (ref 1–4.8)
LYMPHOCYTES NFR BLD: 18 % (ref 18–48)
MCH RBC QN AUTO: 26.7 PG (ref 27–31)
MCHC RBC AUTO-ENTMCNC: 32.2 G/DL (ref 32–36)
MCV RBC AUTO: 83 FL (ref 82–98)
MONOCYTES # BLD AUTO: 0.4 K/UL (ref 0.3–1)
MONOCYTES NFR BLD: 5.2 % (ref 4–15)
NEUTROPHILS # BLD AUTO: 5.7 K/UL (ref 1.8–7.7)
NEUTROPHILS NFR BLD: 74.7 % (ref 38–73)
NRBC BLD-RTO: 0 /100 WBC
PLATELET # BLD AUTO: 241 K/UL (ref 150–350)
PMV BLD AUTO: 9.9 FL (ref 9.2–12.9)
POC ACTIVATED CLOTTING TIME K: 180 SEC (ref 74–137)
POCT GLUCOSE: 166 MG/DL (ref 70–110)
POCT GLUCOSE: 338 MG/DL (ref 70–110)
POTASSIUM SERPL-SCNC: 3.8 MMOL/L (ref 3.5–5.1)
PROT SERPL-MCNC: 7.6 G/DL (ref 6–8.4)
PROTHROMBIN TIME: 11.9 SEC (ref 9–12.5)
RBC # BLD AUTO: 4.54 M/UL (ref 4.6–6.2)
SAMPLE: ABNORMAL
SARS-COV-2 RDRP RESP QL NAA+PROBE: NEGATIVE
SODIUM SERPL-SCNC: 138 MMOL/L (ref 136–145)
TROPONIN I SERPL DL<=0.01 NG/ML-MCNC: 0.03 NG/ML (ref 0–0.03)
WBC # BLD AUTO: 7.62 K/UL (ref 3.9–12.7)

## 2020-10-23 PROCEDURE — 25500020 PHARM REV CODE 255: Performed by: INTERNAL MEDICINE

## 2020-10-23 PROCEDURE — 85730 THROMBOPLASTIN TIME PARTIAL: CPT | Mod: 91

## 2020-10-23 PROCEDURE — 20000000 HC ICU ROOM

## 2020-10-23 PROCEDURE — 93005 ELECTROCARDIOGRAM TRACING: CPT

## 2020-10-23 PROCEDURE — 25000003 PHARM REV CODE 250: Performed by: EMERGENCY MEDICINE

## 2020-10-23 PROCEDURE — 82962 GLUCOSE BLOOD TEST: CPT

## 2020-10-23 PROCEDURE — C1894 INTRO/SHEATH, NON-LASER: HCPCS | Performed by: INTERNAL MEDICINE

## 2020-10-23 PROCEDURE — 25000003 PHARM REV CODE 250: Performed by: INTERNAL MEDICINE

## 2020-10-23 PROCEDURE — 92920 PR PTCA: ICD-10-PCS | Mod: LD,,, | Performed by: INTERNAL MEDICINE

## 2020-10-23 PROCEDURE — 85347 COAGULATION TIME ACTIVATED: CPT | Performed by: INTERNAL MEDICINE

## 2020-10-23 PROCEDURE — 80053 COMPREHEN METABOLIC PANEL: CPT

## 2020-10-23 PROCEDURE — 92920 PRQ TRLUML C ANGIOP 1ART&/BR: CPT | Mod: LD | Performed by: INTERNAL MEDICINE

## 2020-10-23 PROCEDURE — 99152 MOD SED SAME PHYS/QHP 5/>YRS: CPT | Performed by: INTERNAL MEDICINE

## 2020-10-23 PROCEDURE — C1769 GUIDE WIRE: HCPCS | Performed by: INTERNAL MEDICINE

## 2020-10-23 PROCEDURE — 92920 PRQ TRLUML C ANGIOP 1ART&/BR: CPT | Mod: LD,,, | Performed by: INTERNAL MEDICINE

## 2020-10-23 PROCEDURE — 83880 ASSAY OF NATRIURETIC PEPTIDE: CPT

## 2020-10-23 PROCEDURE — 63600175 PHARM REV CODE 636 W HCPCS: Performed by: INTERNAL MEDICINE

## 2020-10-23 PROCEDURE — 99152 MOD SED SAME PHYS/QHP 5/>YRS: CPT | Mod: ,,, | Performed by: INTERNAL MEDICINE

## 2020-10-23 PROCEDURE — 93454 CORONARY ARTERY ANGIO S&I: CPT | Mod: 59 | Performed by: INTERNAL MEDICINE

## 2020-10-23 PROCEDURE — 93010 ELECTROCARDIOGRAM REPORT: CPT | Mod: 59,,, | Performed by: INTERNAL MEDICINE

## 2020-10-23 PROCEDURE — 93010 EKG 12-LEAD: ICD-10-PCS | Mod: 59,,, | Performed by: INTERNAL MEDICINE

## 2020-10-23 PROCEDURE — 25000242 PHARM REV CODE 250 ALT 637 W/ HCPCS: Performed by: EMERGENCY MEDICINE

## 2020-10-23 PROCEDURE — 99222 PR INITIAL HOSPITAL CARE,LEVL II: ICD-10-PCS | Mod: AI,25,, | Performed by: INTERNAL MEDICINE

## 2020-10-23 PROCEDURE — 36415 COLL VENOUS BLD VENIPUNCTURE: CPT

## 2020-10-23 PROCEDURE — 99153 MOD SED SAME PHYS/QHP EA: CPT | Performed by: INTERNAL MEDICINE

## 2020-10-23 PROCEDURE — 85610 PROTHROMBIN TIME: CPT

## 2020-10-23 PROCEDURE — C1725 CATH, TRANSLUMIN NON-LASER: HCPCS | Performed by: INTERNAL MEDICINE

## 2020-10-23 PROCEDURE — 99152 PR MOD CONSCIOUS SEDATION, SAME PHYS, 5+ YRS, FIRST 15 MIN: ICD-10-PCS | Mod: ,,, | Performed by: INTERNAL MEDICINE

## 2020-10-23 PROCEDURE — 85730 THROMBOPLASTIN TIME PARTIAL: CPT

## 2020-10-23 PROCEDURE — 93454 CORONARY ARTERY ANGIO S&I: CPT | Mod: 26,59,51, | Performed by: INTERNAL MEDICINE

## 2020-10-23 PROCEDURE — 82550 ASSAY OF CK (CPK): CPT

## 2020-10-23 PROCEDURE — 82553 CREATINE MB FRACTION: CPT

## 2020-10-23 PROCEDURE — 99291 CRITICAL CARE FIRST HOUR: CPT | Mod: 25

## 2020-10-23 PROCEDURE — 84484 ASSAY OF TROPONIN QUANT: CPT

## 2020-10-23 PROCEDURE — 83036 HEMOGLOBIN GLYCOSYLATED A1C: CPT

## 2020-10-23 PROCEDURE — 85025 COMPLETE CBC W/AUTO DIFF WBC: CPT

## 2020-10-23 PROCEDURE — 99222 1ST HOSP IP/OBS MODERATE 55: CPT | Mod: AI,25,, | Performed by: INTERNAL MEDICINE

## 2020-10-23 PROCEDURE — C1887 CATHETER, GUIDING: HCPCS | Performed by: INTERNAL MEDICINE

## 2020-10-23 PROCEDURE — 93010 ELECTROCARDIOGRAM REPORT: CPT | Mod: 59,76,, | Performed by: INTERNAL MEDICINE

## 2020-10-23 PROCEDURE — 93454 PR CATH PLACE/CORONARY ANGIO, IMG SUPER/INTERP: ICD-10-PCS | Mod: 26,59,51, | Performed by: INTERNAL MEDICINE

## 2020-10-23 PROCEDURE — U0002 COVID-19 LAB TEST NON-CDC: HCPCS | Performed by: EMERGENCY MEDICINE

## 2020-10-23 PROCEDURE — 36000 PLACE NEEDLE IN VEIN: CPT

## 2020-10-23 RX ORDER — METOPROLOL SUCCINATE 25 MG/1
25 TABLET, EXTENDED RELEASE ORAL DAILY
Status: DISCONTINUED | OUTPATIENT
Start: 2020-10-24 | End: 2020-10-25 | Stop reason: HOSPADM

## 2020-10-23 RX ORDER — FUROSEMIDE 40 MG/1
40 TABLET ORAL DAILY
Status: DISCONTINUED | OUTPATIENT
Start: 2020-10-24 | End: 2020-10-25 | Stop reason: HOSPADM

## 2020-10-23 RX ORDER — NITROGLYCERIN 0.4 MG/1
0.4 TABLET SUBLINGUAL EVERY 5 MIN PRN
Status: DISCONTINUED | OUTPATIENT
Start: 2020-10-23 | End: 2020-10-25 | Stop reason: HOSPADM

## 2020-10-23 RX ORDER — ASPIRIN 325 MG
325 TABLET ORAL
Status: COMPLETED | OUTPATIENT
Start: 2020-10-23 | End: 2020-10-23

## 2020-10-23 RX ORDER — IBUPROFEN 200 MG
24 TABLET ORAL
Status: DISCONTINUED | OUTPATIENT
Start: 2020-10-23 | End: 2020-10-25 | Stop reason: HOSPADM

## 2020-10-23 RX ORDER — NITROGLYCERIN 20 MG/100ML
INJECTION INTRAVENOUS
Status: DISCONTINUED | OUTPATIENT
Start: 2020-10-23 | End: 2020-10-23 | Stop reason: HOSPADM

## 2020-10-23 RX ORDER — ASPIRIN 81 MG/1
81 TABLET ORAL DAILY
Status: DISCONTINUED | OUTPATIENT
Start: 2020-10-24 | End: 2020-10-25 | Stop reason: HOSPADM

## 2020-10-23 RX ORDER — IBUPROFEN 200 MG
16 TABLET ORAL
Status: DISCONTINUED | OUTPATIENT
Start: 2020-10-23 | End: 2020-10-25 | Stop reason: HOSPADM

## 2020-10-23 RX ORDER — SODIUM CHLORIDE 9 MG/ML
20 INJECTION, SOLUTION INTRAVENOUS CONTINUOUS
Status: ACTIVE | OUTPATIENT
Start: 2020-10-23 | End: 2020-10-23

## 2020-10-23 RX ORDER — GLUCAGON 1 MG
1 KIT INJECTION
Status: DISCONTINUED | OUTPATIENT
Start: 2020-10-23 | End: 2020-10-25 | Stop reason: HOSPADM

## 2020-10-23 RX ORDER — NAPROXEN SODIUM 220 MG/1
TABLET, FILM COATED ORAL
Status: DISCONTINUED
Start: 2020-10-23 | End: 2020-10-23 | Stop reason: WASHOUT

## 2020-10-23 RX ORDER — MIDAZOLAM HYDROCHLORIDE 1 MG/ML
INJECTION, SOLUTION INTRAMUSCULAR; INTRAVENOUS
Status: DISCONTINUED | OUTPATIENT
Start: 2020-10-23 | End: 2020-10-23 | Stop reason: HOSPADM

## 2020-10-23 RX ORDER — HEPARIN SODIUM,PORCINE/D5W 25000/250
12 INTRAVENOUS SOLUTION INTRAVENOUS CONTINUOUS
Status: DISCONTINUED | OUTPATIENT
Start: 2020-10-23 | End: 2020-10-23

## 2020-10-23 RX ORDER — VERAPAMIL HYDROCHLORIDE 2.5 MG/ML
INJECTION, SOLUTION INTRAVENOUS
Status: DISCONTINUED | OUTPATIENT
Start: 2020-10-23 | End: 2020-10-23 | Stop reason: HOSPADM

## 2020-10-23 RX ORDER — INSULIN ASPART 100 [IU]/ML
0-5 INJECTION, SOLUTION INTRAVENOUS; SUBCUTANEOUS
Status: DISCONTINUED | OUTPATIENT
Start: 2020-10-23 | End: 2020-10-25 | Stop reason: HOSPADM

## 2020-10-23 RX ORDER — ROSUVASTATIN CALCIUM 10 MG/1
40 TABLET, COATED ORAL DAILY
Status: DISCONTINUED | OUTPATIENT
Start: 2020-10-24 | End: 2020-10-25 | Stop reason: HOSPADM

## 2020-10-23 RX ORDER — METFORMIN HYDROCHLORIDE 500 MG/1
500 TABLET ORAL 2 TIMES DAILY WITH MEALS
COMMUNITY
End: 2021-03-29 | Stop reason: SDUPTHER

## 2020-10-23 RX ORDER — HEPARIN SODIUM 1000 [USP'U]/ML
INJECTION, SOLUTION INTRAVENOUS; SUBCUTANEOUS
Status: DISCONTINUED | OUTPATIENT
Start: 2020-10-23 | End: 2020-10-23 | Stop reason: HOSPADM

## 2020-10-23 RX ORDER — ACETAMINOPHEN 325 MG/1
650 TABLET ORAL EVERY 6 HOURS PRN
Status: DISCONTINUED | OUTPATIENT
Start: 2020-10-23 | End: 2020-10-25 | Stop reason: HOSPADM

## 2020-10-23 RX ORDER — FENTANYL CITRATE 50 UG/ML
INJECTION, SOLUTION INTRAMUSCULAR; INTRAVENOUS
Status: DISCONTINUED | OUTPATIENT
Start: 2020-10-23 | End: 2020-10-23 | Stop reason: HOSPADM

## 2020-10-23 RX ORDER — NITROGLYCERIN 0.4 MG/1
0.4 TABLET SUBLINGUAL
Status: COMPLETED | OUTPATIENT
Start: 2020-10-23 | End: 2020-10-23

## 2020-10-23 RX ADMIN — NITROGLYCERIN 0.4 MG: 0.4 TABLET SUBLINGUAL at 04:10

## 2020-10-23 RX ADMIN — SODIUM CHLORIDE 1000 ML: 0.9 INJECTION, SOLUTION INTRAVENOUS at 06:10

## 2020-10-23 RX ADMIN — INSULIN ASPART 2 UNITS: 100 INJECTION, SOLUTION INTRAVENOUS; SUBCUTANEOUS at 09:10

## 2020-10-23 RX ADMIN — ASPIRIN 325 MG ORAL TABLET 325 MG: 325 PILL ORAL at 04:10

## 2020-10-23 NOTE — ED TRIAGE NOTES
Pt presents to the ED via EMS reporting that he feels like he is having an MI. Pt has a hx of three MI's in the past and states that this feels the same. Pt c/o chest pain mid-sternal and left anterior chest. Pt denies any other symptoms. Pt took nitro at home before calling EMS and pt got nitro and aspirin via EMS prior to arrival.

## 2020-10-23 NOTE — NURSING
Patient arrived to ICU from Cath Lab, Vas Band in place. Air to be released starting @ 1930 per ABBY Barreto. Patient has no complaints and  is eating. Dr. Pathak at bedside to reassess patient and stated Heparin was not needed and he D/C it.

## 2020-10-23 NOTE — H&P
[facility]  Cardiology  History and Physical     Patient Name: Fredrick Cox  MRN: [id number]  Admission Date: 10/23/2020  Code Status: Prior   Attending Provider: Archie Pathak MD   Primary Care Physician: Amos Tee III, MD  Principal Problem:STEMI (ST elevation myocardial infarction)    Patient information was obtained from patient, past medical records and ER records.     Subjective:     Chief Complaint:  Chest pain    HPI:     Fredrick Cox is a 65 year old male who presents to the ED with onset of mid-sternal chest pain lasting one hour. Occurred at rest. Patient sitting in kitchen. Patient initially states that his pain was roughly it an 8/10.  Given nitroglycerin and an aspirin with relief in decrease of his pain to roughly 4/10.  He has a history of coronary artery disease.  Ischemic cardiomyopathy with an ejection fraction of 25%.  STEMI in April of this year secondary to late stent thrombosis.  PCI of LAD with 2.75 x 26 mm MICH.  PCI of circumflex with a 2.5 by 22 mm MICH.  Patient was brought emergently to the cardiac catheterization laboratory.  See report for full details.  Occluded LAD stent.  Balloon angioplasty followed by angio sculpting.  LILIAN 0 flow improved to LILIAN 3 flow.  Hemodynamically stable.  Patient states noncompliance with anti-platelet therapy.  Continues to smoke.        Prior clinic note by Dr. Ho.  Seen at [facility] 4/26/20  Pt well known to me with recent STEMI s/p LAD PCI late stent thrombosis and subsequent severe LV dysfxn.  He presents with complaints of SOB now resolved after IV lasix.  No anginal sxs.  Denies med nonadherence and has been taking his ASA/brilinta.  Does not appear to be overloaded on my exam this am.  No palps/LH/loc.  No LE edema/orthop/PND.  Minimal flat trop, doubt ACS.  EKG unchanged.  No fever/chills/cough.       Echo 4/26/20  · Severely decreased left ventricular systolic function. The estimated ejection fraction is  25%.  · Akinesis of mid-distal anteroapical wall extending to apical lateral and inferior walls c/w prior extensive LAD territory MI.  · Grade I (mild) left ventricular diastolic dysfunction consistent with impaired relaxation.  · Normal right ventricular systolic function.  · The estimated PA systolic pressure is 28 mmHg.     Cath 3/2/20  LVEDP: 35mmHg  LVEF: echo ordered  Dominance: Right  LM: prox 40-50%, MLA 7.1mm2, no significant MLA difference vs distal vessel  LAD: prox stent thrombosed, after PCI dist/transapical LAD occluded (small caliber)  LCx: dist 95%  RCA: not injected, known severe diffuse prox-mid disease with dist  and L-R collaterals  PCI prox LAD stent thrombosis:  Preop ASA/ticagrelor/heparin, intraop aggrastat  Predil 2.5x12  Stent 2.75x26 Resolute Tony MICH 16 patricia  De romy thrombus formed aftre PCI->POBA 2.5x12 at 20 patricia with aggrastat  Excellent result, T3 flow, 0% residual stenosis  PCI dist LCx:  Predil 2.5x12  Stent 2.5x22 Resolute Tony MICH 14 patricia  Excellent result, T3 flow 0% residual stenosis  Hemostasis:  R Radial band  RFA IABP sheath in place  Impression:  STEMI/AWMI  CGS on arrival to lab  3V CAD  Late stent thrombosis of prox LAD-> 2.75x26 Resolute Tony MICH  De-romy dist LCx-> 2.5x22 Resolute [place] MICH  RCA , collateralized  IABP placed for hemodynamic support via RFA, sutured in place  R rad vasband for hemostasis  Plan:  Admit to ICU  ASA 81mg qd indefinitely  Ticagrelor 90mg bid for 1 year minimum (thru 3/2021), consider indefinite rx  Rosuva 40mg qd  IABP @ 1:1  Aggrastat gtt  Eventual outpat card rehab referral     Aortic US 11/13/19:  There is atherosclerotic disease of the abdominal aorta without evidence of aneurysmal dilatation.    Past Medical History:   Diagnosis Date    CHF (congestive heart failure)     Coronary artery disease     Diabetes mellitus     HLD (hyperlipidemia)     HTN (hypertension)     MI (myocardial infarction)     X's 2    Tobacco abuse         Past Surgical History:   Procedure Laterality Date    CARDIAC CATHETERIZATION  2007    x1    CARDIAC CATHETERIZATION  ?    x1    CORONARY ANGIOPLASTY WITH STENT PLACEMENT  2007 and ???    LEFT HEART CATHETERIZATION Left 12/5/2018    Procedure: Left heart cath;  Surgeon: Kenny Middleton MD;  Location: [facility] CATH LAB;  Service: Cardiology;  Laterality: Left;    LEFT HEART CATHETERIZATION Left 3/2/2020    Procedure: Left heart cath;  Surgeon: George Ho MD;  Location: [facility] CATH LAB;  Service: Cardiology;  Laterality: Left;       Review of patient's allergies indicates:  No Known Allergies    No current facility-administered medications on file prior to encounter.      Current Outpatient Medications on File Prior to Encounter   Medication Sig    aspirin (ECOTRIN) 81 MG EC tablet Take 1 tablet (81 mg total) by mouth once daily.    furosemide (LASIX) 40 MG tablet Take 1 tablet (40 mg total) by mouth once daily.    metFORMIN (GLUCOPHAGE) 500 MG tablet Take 500 mg by mouth 2 (two) times daily with meals.    metoprolol succinate (TOPROL-XL) 25 MG 24 hr tablet Take 1 tablet (25 mg total) by mouth once daily.    nitroGLYCERIN (NITROSTAT) 0.4 MG SL tablet Place 1 tablet (0.4 mg total) under the tongue every 5 (five) minutes as needed.    rosuvastatin (CRESTOR) 40 MG Tab Take 1 tablet (40 mg total) by mouth once daily.    ticagrelor (BRILINTA) 90 mg tablet Take 1 tablet (90 mg total) by mouth 2 (two) times daily.    acetaminophen (TYLENOL) 325 MG tablet Take 2 tablets (650 mg total) by mouth every 6 (six) hours as needed.    blood sugar diagnostic Strp 1 strip by Misc.(Non-Drug; Combo Route) route 2 (two) times daily.    insulin aspart U-100 (NOVOLOG) 100 unit/mL InPn pen Inject 1-10 Units into the skin 3 (three) times daily with meals. Blood Glucose  mg/dL                  0600                0000                               1200                               1800  151-200                2 units  "            1 unit  201-250                4 units             2 units  251-300                6 units             3 units  301-350                8 units             4 units  >350                      10 units           5 units    insulin glargine, TOUJEO, (TOUJEO SOLOSTAR U-300 INSULIN) 300 unit/mL (1.5 mL) InPn pen Inject 30 Units into the skin every evening.    pen needle, diabetic (BD INSULIN PEN NEEDLE UF MINI) 31 gauge x 3/16" Ndle USE AS DIRECTED    pen needle, diabetic 31 gauge x 5/16" Ndle 1 each by Misc.(Non-Drug; Combo Route) route 2 (two) times daily.    ranolazine (RANEXA) 500 MG Tb12 Take 1 tablet (500 mg total) by mouth 2 (two) times daily.     Family History     Problem Relation (Age of Onset)    No Known Problems Mother, Father, Sister, Brother, Maternal Aunt, Maternal Uncle, Paternal Aunt, Paternal Uncle, Maternal Grandmother, Maternal Grandfather, Paternal Grandmother, Paternal Grandfather        Tobacco Use    Smoking status: Current Every Day Smoker     Packs/day: 0.50     Years: 35.00     Pack years: 17.50     Types: Cigarettes    Smokeless tobacco: Never Used   Substance and Sexual Activity    Alcohol use: Yes     Alcohol/week: 0.8 standard drinks     Types: 1 Standard drinks or equivalent per week     Comment: social    Drug use: No    Sexual activity: Not on file     Review of Systems   Cardiovascular: Positive for chest pain. Negative for dyspnea on exertion, irregular heartbeat, leg swelling, near-syncope, orthopnea, paroxysmal nocturnal dyspnea and syncope.   Respiratory: Negative for shortness of breath, sputum production and wheezing.      Objective:     Vital Signs (Most Recent):  Temp: 98.4 °F (36.9 °C) (10/23/20 1555)  Pulse: 96 (10/23/20 1601)  Resp: 18 (10/23/20 1601)  BP: 116/82 (10/23/20 1601)  SpO2: 100 % (10/23/20 1601) Vital Signs (24h Range):  Temp:  [98.4 °F (36.9 °C)] 98.4 °F (36.9 °C)  Pulse:  [96-97] 96  Resp:  [18] 18  SpO2:  [99 %-100 %] 100 %  BP: " (110-116)/(80-82) 116/82     Weight: 66.8 kg (147 lb 4.3 oz)  Body mass index is 24.51 kg/m².    SpO2: 100 %  O2 Device (Oxygen Therapy): nasal cannula    No intake or output data in the 24 hours ending 10/23/20 1844    Lines/Drains/Airways     Peripheral Intravenous Line                 Peripheral IV - Single Lumen 10/23/20 1612 20 G Right Antecubital less than 1 day         Peripheral IV - Single Lumen 10/23/20 18 G Left Antecubital less than 1 day                Physical Exam   Constitutional: He is oriented to person, place, and time. No distress.   Neck: No JVD present. Carotid bruit is not present.   Cardiovascular: Normal rate, regular rhythm and normal pulses.   Murmur heard.   Systolic murmur is present with a grade of 2/6.  Pulmonary/Chest: Effort normal and breath sounds normal.   Abdominal: Soft. Bowel sounds are normal. There is no abdominal tenderness.   Musculoskeletal:      Right lower leg: No edema.      Left lower leg: No edema.   Neurological: He is alert and oriented to person, place, and time.   Skin: He is not diaphoretic.   Psychiatric: He has a normal mood and affect. His speech is normal and behavior is normal.   Vitals reviewed.      Significant Labs: CMP   Recent Labs   Lab 10/23/20  1612      K 3.8      CO2 24   *   BUN 16   CREATININE 1.3   CALCIUM 9.3   PROT 7.6   ALBUMIN 3.8   BILITOT 0.8   ALKPHOS 123   AST 29   ALT 35   ANIONGAP 10   ESTGFRAFRICA >60   EGFRNONAA 57*   , CBC   Recent Labs   Lab 10/23/20  1612   WBC 7.62   HGB 12.1*   HCT 37.6*      , Lipid Panel No results for input(s): CHOL, HDL, LDLCALC, TRIG, CHOLHDL in the last 48 hours. and Troponin   Recent Labs   Lab 10/23/20  1612   TROPONINI 0.030*       Significant Imaging: Echocardiogram:   Transthoracic echo (TTE) complete (Cupid Only):   Results for orders placed or performed during the hospital encounter of 04/25/20   Echo Color Flow Doppler? Yes   Result Value Ref Range    BSA 1.72 m2    TDI  SEPTAL 0.03 m/s    LV LATERAL E/E' RATIO 7.67 m/s    LV SEPTAL E/E' RATIO 15.33 m/s    LA WIDTH 4.08 cm    AORTIC VALVE CUSP SEPERATION 1.92 cm    TDI LATERAL 0.06 m/s    PV PEAK VELOCITY 0.97 cm/s    LVIDd 4.73 3.5 - 6.0 cm    IVS 0.90 0.6 - 1.1 cm    Posterior Wall 0.94 0.6 - 1.1 cm    Ao root annulus 3.19 cm    LVIDs 4.27 (A) 2.1 - 4.0 cm    FS 10 28 - 44 %    LA volume 56.88 cm3    Sinus 3.44 cm    STJ 3.01 cm    LV mass 148.50 g    LA size 3.35 cm    RVDD 3.52 cm    TAPSE 1.04 cm    RV S' 12.27 cm/s    Left Ventricle Relative Wall Thickness 0.40 cm    AV mean gradient 4 mmHg    AV valve area 2.11 cm2    AV Velocity Ratio 0.55     AV index (prosthetic) 0.50     E/A ratio 0.68     Mean e' 0.05 m/s    E wave decelartion time 171.31 msec    IVRT 134.95 msec    Pulm vein S/D ratio 1.55     LVOT diameter 2.32 cm    LVOT area 4.2 cm2    LVOT peak mike 0.61 m/s    LVOT peak VTI 11.22 cm    Ao peak mike 1.11 m/s    Ao VTI 22.42 cm    LVOT stroke volume 47.41 cm3    AV peak gradient 5 mmHg    E/E' ratio 10.22 m/s    MV Peak E Mike 0.46 m/s    TR Max Mike 2.21 m/s    MV Peak A Mike 0.68 m/s    PV Peak S Mike 0.51 m/s    PV Peak D Mike 0.33 m/s    LV Systolic Volume 81.61 mL    LV Systolic Volume Index 47.6 mL/m2    LV Diastolic Volume 103.87 mL    LV Diastolic Volume Index 60.56 mL/m2    LA Volume Index 33.2 mL/m2    LV Mass Index 87 g/m2    RA Major Axis 4.19 cm    Left Atrium Minor Axis 4.52 cm    Left Atrium Major Axis 5.34 cm    Triscuspid Valve Regurgitation Peak Gradient 20 mmHg    RA Width 2.92 cm    Right Atrial Pressure (from IVC) 8 mmHg    TV rest pulmonary artery pressure 28 mmHg    Narrative    · Severely decreased left ventricular systolic function. The estimated   ejection fraction is 25%.  · Akinesis of mid-distal anteroapical wall extending to apical lateral and   inferior walls c/w prior extensive LAD territory MI.  · Grade I (mild) left ventricular diastolic dysfunction consistent with   impaired relaxation.  ·  Normal right ventricular systolic function.  · The estimated PA systolic pressure is 28 mmHg.        Assessment and Plan:     Active Diagnoses:    Diagnosis Date Noted POA    ACC/AHA stage B congestive heart failure due to ischemic cardiomyopathy [I50.9, I25.5] 10/23/2020 Unknown    ST elevation myocardial infarction involving left anterior descending (LAD) coronary artery [I21.02] 03/02/2020 Yes    Type 2 diabetes mellitus, with long-term current use of insulin [E11.9, Z79.4] 04/02/2014 Not Applicable     Chronic    Essential hypertension [I10] 01/17/2014 Yes     Chronic    Mixed hyperlipidemia [E78.2] 01/17/2014 Yes    Tobacco abuse [Z72.0] 01/17/2014 Yes     Chronic      Problems Resolved During this Admission:    Diagnosis Date Noted Date Resolved POA    PRINCIPAL PROBLEM:  STEMI (ST elevation myocardial infarction) [I21.3] 10/23/2020 10/23/2020 Yes    Chest pain [R07.9] 10/23/2020 10/23/2020 Yes       VTE Risk Mitigation (From admission, onward)         Ordered     heparin 25,000 units in dextrose 5% (100 units/ml) IV bolus from bag INITIAL BOLUS (max bolus 4000 units)  Once     Question:  Heparin Infusion Adjustment (DO NOT MODIFY ANSWER)  Answer:  \\[email or web address]\epic\Images\[email or web address]\HeparinInfusions\heparin LOW INTENSITY nomogram for [facility] [email or web address].pdf    10/23/20 1606                Archie Pathak MD  Cardiology   [facility]

## 2020-10-23 NOTE — Clinical Note
Radial band applied to the right radial artery. 14 cc's of air were inserted into the closure device.

## 2020-10-23 NOTE — Clinical Note
34 ml injected throughout the case. 116 mL total wasted during the case. 150 mL total used in the case.

## 2020-10-23 NOTE — Clinical Note
Catheter is inserted into the ostium   left anterior descending. Angiography performed of the left coronary arteries in multiple views.

## 2020-10-23 NOTE — ED PROVIDER NOTES
Encounter Date: 10/23/2020    SCRIBE #1 NOTE: I, Rowdy Bliss, am scribing for, and in the presence of, Yamilet Andrade MD.       History     Chief Complaint   Patient presents with    Chest Pain     pt comes in via EMS from home with c/o chest pain x1 hr. with SOB. states hx of 3 MI's with 3 stents, COPD, smoker, and DM. denies n/v, dizziness, diaphoresis.      Fredrick Cox is a 65 year old male who presents to the ED via EMS with an onset of mid-sternal chest pain lasting one hour. This began whil he was sitting in his kitchen. Patient states that his pain improved after being given one dose of nitroglycerin and 325 mg of Aspirin. He rates his pain at 3/10. Patient also reports experiencing diaphoresis, shortness of breath, and light-headedness. Denies any cough, nausea, vomiting, or back pain. Past medical history of CAD, CHF, diabetes, hyperlipidemia, hypertension, and myocardial infarction with multiple stents in the past. Patient takes Brilinta, Lasix, and metoprolol. Past surgical history of multiple cardiac catheterizations. Social history of smoking, 0.5 packs/day. Family history of cardiac problems at a similar age. Denies any alcohol or other drug use. Reports he took his Brilinta this morning.     He has a history of coronary artery disease.  Ischemic cardiomyopathy with an ejection fraction of 25%.  STEMI in April of this year secondary to late stent thrombosis.  PCI of LAD with 2.75 x 26 mm MICH.  PCI of circumflex with a 2.5 by 22 mm MICH    The history is provided by the patient.     Review of patient's allergies indicates:  No Known Allergies  Past Medical History:   Diagnosis Date    CHF (congestive heart failure)     Coronary artery disease     Diabetes mellitus     HLD (hyperlipidemia)     HTN (hypertension)     MI (myocardial infarction)     X's 2    Tobacco abuse      Past Surgical History:   Procedure Laterality Date    CARDIAC CATHETERIZATION  2007    x1    CARDIAC CATHETERIZATION  ?     x1    CORONARY ANGIOPLASTY WITH STENT PLACEMENT  2007 and ???    LEFT HEART CATHETERIZATION Left 12/5/2018    Procedure: Left heart cath;  Surgeon: Kenny Middleton MD;  Location: [facility] CATH LAB;  Service: Cardiology;  Laterality: Left;    LEFT HEART CATHETERIZATION Left 3/2/2020    Procedure: Left heart cath;  Surgeon: George Ho MD;  Location: [facility] CATH LAB;  Service: Cardiology;  Laterality: Left;     Family History   Problem Relation Age of Onset    No Known Problems Mother     No Known Problems Father     No Known Problems Sister     No Known Problems Brother     No Known Problems Maternal Aunt     No Known Problems Maternal Uncle     No Known Problems Paternal Aunt     No Known Problems Paternal Uncle     No Known Problems Maternal Grandmother     No Known Problems Maternal Grandfather     No Known Problems Paternal Grandmother     No Known Problems Paternal Grandfather     Amblyopia Neg Hx     Blindness Neg Hx     Cancer Neg Hx     Cataracts Neg Hx     Diabetes Neg Hx     Glaucoma Neg Hx     Hypertension Neg Hx     Macular degeneration Neg Hx     Retinal detachment Neg Hx     Strabismus Neg Hx     Stroke Neg Hx     Thyroid disease Neg Hx      Social History     Tobacco Use    Smoking status: Current Every Day Smoker     Packs/day: 0.50     Years: 35.00     Pack years: 17.50     Types: Cigarettes    Smokeless tobacco: Never Used   Substance Use Topics    Alcohol use: Yes     Alcohol/week: 0.8 standard drinks     Types: 1 Standard drinks or equivalent per week     Comment: social    Drug use: No     Review of Systems   Constitutional: Positive for diaphoresis. Negative for chills and fever.   HENT: Negative for sore throat.    Eyes: Negative for photophobia and visual disturbance.   Respiratory: Positive for shortness of breath. Negative for cough.    Cardiovascular: Positive for chest pain. Negative for leg swelling.   Gastrointestinal: Negative for abdominal pain,  blood in stool, constipation, diarrhea, nausea and vomiting.   Genitourinary: Negative for dysuria, frequency, hematuria and urgency.   Musculoskeletal: Negative for back pain, neck pain and neck stiffness.   Skin: Negative for rash and wound.   Neurological: Positive for light-headedness. Negative for weakness, numbness and headaches.   Hematological: Does not bruise/bleed easily.   Psychiatric/Behavioral: Negative for confusion and suicidal ideas.   All other systems reviewed and are negative.      Physical Exam     Initial Vitals [10/23/20 1555]   BP Pulse Resp Temp SpO2   110/80 97 18 98.4 °F (36.9 °C) 100 %      MAP       --         Physical Exam    Nursing note and vitals reviewed.  Constitutional: He appears well-developed and well-nourished. He is diaphoretic. No distress.   HENT:   Head: Normocephalic and atraumatic.   Right Ear: External ear normal.   Left Ear: External ear normal.   Mouth/Throat: Oropharynx is clear and moist. No oropharyngeal exudate.   Eyes: Conjunctivae and EOM are normal. Pupils are equal, round, and reactive to light. Right eye exhibits no discharge. Left eye exhibits no discharge.   Neck: Normal range of motion. Neck supple. No JVD present.   Cardiovascular: Normal rate, regular rhythm, normal heart sounds and intact distal pulses. Exam reveals no gallop and no friction rub.    No murmur heard.  Pulmonary/Chest: Breath sounds normal. No respiratory distress. He has no wheezes. He has no rhonchi. He has no rales. He exhibits no tenderness.   Abdominal: Soft. Bowel sounds are normal. He exhibits no distension. There is no abdominal tenderness. There is no rebound and no guarding.   Musculoskeletal: Normal range of motion. No tenderness or edema.   Lymphadenopathy:     He has no cervical adenopathy.   Neurological: He is alert and oriented to person, place, and time. No cranial nerve deficit or sensory deficit.   Skin: Skin is warm. Capillary refill takes less than 2 seconds.    Psychiatric: He has a normal mood and affect. His behavior is normal. Thought content normal.         ED Course   Critical Care    Date/Time: 10/23/2020 7:15 PM  Performed by: Yamilet Andrade MD  Authorized by: Archie Pathak MD   Direct patient critical care time: 25 minutes  Additional history critical care time: 10 minutes  Ordering / reviewing critical care time: 5 minutes  Documentation critical care time: 5 minutes  Consulting other physicians critical care time: 5 minutes  Total critical care time (exclusive of procedural time) : 50 minutes  Critical care was necessary to treat or prevent imminent or life-threatening deterioration of the following conditions: cardiac failure.  Critical care was time spent personally by me on the following activities: development of treatment plan with patient or surrogate, discussions with consultants, evaluation of patient's response to treatment, examination of patient, obtaining history from patient or surrogate, ordering and performing treatments and interventions, ordering and review of laboratory studies, pulse oximetry, re-evaluation of patient's condition and review of old charts.        Labs Reviewed   POCT GLUCOSE - Abnormal; Notable for the following components:       Result Value    POCT Glucose 166 (*)     All other components within normal limits   SARS-COV-2 RDRP GENE     EKG Readings: (Independently Interpreted)   Normal sinus rhythm at 98.  ST elevation in V2 V3 consistent with STEMI.  Right bundle-branch block.  Left axis deviation.  QTC is 528.       Imaging Results    None          Medical Decision Making:   History:   Old Medical Records: I decided to obtain old medical records.  Independently Interpreted Test(s):   I have ordered and independently interpreted EKG Reading(s) - see prior notes  Clinical Tests:   Lab Tests: Ordered and Reviewed  Radiological Study: Ordered and Reviewed  Medical Tests: Ordered and Reviewed  MDM  65 year old patient with  hx of coronary artery disease, ischemic cardiomyopathy with an ejection fraction of 25%.  STEMI in April of this year secondary to late stent thrombosis.  PCI of LAD with 2.75 x 26 mm MICH.  PCI of circumflex with a 2.5 by 22 mm MICH presenting with chest pain that started 1 hour PTA. Patient has a STEMI on ekg.  Patient was given an aspirin and started on a heparin drip. Patient was discussed with cardiologist Dr Pathak and transferred to the cath lab. Patient received nitroglycerin for pain control with improvement.     Also considered but less likely:     PE: normal rate  Pneumonia: chest xray negative. No fever or leukocytosis. No cough  STEMI: STEMI on EKG  Dissection: equal pulses bilaterally and no ripping chest pain to the back  Esophageal rupture: no dysphagia or vomiting and chest xray negative for mediastinal air  Arrhythmia: no arrhythmia on ekg  CHF: no fluid overload on Cxr and physical exam  Pneumothorax: bilateral breath sounds and no signs of pneumothorax on chest xray              Scribe Attestation:   Scribe #1: I performed the above scribed service and the documentation accurately describes the services I performed. I attest to the accuracy of the note.                      Clinical Impression:     ICD-10-CM ICD-9-CM   1. Chest pain  R07.9 786.50   2. STEMI (ST elevation myocardial infarction)  I21.3 410.90   3. Acute ischemic left MCA stroke  I63.512 434.91   4. Acute non-ST segment elevation myocardial infarction  I21.4 410.70                      Disposition:   Disposition: Admitted         I, Yamilet Andrade, personally performed the services described in this documentation. All medical record entries made by the scribe were at my direction and in my presence. I have reviewed the chart and agree that the record reflects my personal performance and is accurate and complete.'                 Yamilet Andrade MD  10/23/20 1919

## 2020-10-23 NOTE — ASSESSMENT & PLAN NOTE
Patient brought emergently to cardiac catheterization laboratory.  Risks, benefits and alternatives of the catheterization procedure were discussed with the patient.The risks of coronary angiography include but are not limited to: bleeding, infection, death, heart attack, arrhythmia, kidney injury or failure, potential need for dialysis, allergic reactions, stroke, need for emergency surgery, hematoma, pseudoaneurysm etc.  Should stenting be indicated, the patient has agreed to dual anti-platelet therapy for 1-consecutive year with a drug-eluting stent and a minimum of 1-month with the use of a bare metal stent. Additionally, pt is aware that non-compliance is likely to result in stent clotting with heart attack, heart failure, and/or death  The risks of moderate sedation include hypotension, respiratory depression, arrhythmias, bronchospasm, and death. Informed consent was obtained and the  patient is agreeable to proceed with the procedure. Consent was placed on the chart.    Status post balloon angioplasty followed by angio sculpting of occluded left anterior descending artery previously placed stent.  See report for full details.  Aspirin/Brilinta indefinitely.    10/24/2020-continue aspirin/Brilinta.  Follow-up echocardiogram.  Cardiovascular stable.  Transfer to telemetry.

## 2020-10-24 PROBLEM — R07.9 CHEST PAIN: Status: ACTIVE | Noted: 2020-10-24

## 2020-10-24 LAB
ANION GAP SERPL CALC-SCNC: 9 MMOL/L (ref 8–16)
AORTIC ROOT ANNULUS: 3.43 CM
AORTIC VALVE CUSP SEPERATION: 1.41 CM
APTT BLDCRRT: 24.8 SEC (ref 21–32)
AV INDEX (PROSTH): 0.43
AV MEAN GRADIENT: 2 MMHG
AV PEAK GRADIENT: 4 MMHG
AV VALVE AREA: 1.83 CM2
AV VELOCITY RATIO: 0.51
BASOPHILS # BLD AUTO: 0.04 K/UL (ref 0–0.2)
BASOPHILS # BLD AUTO: 0.04 K/UL (ref 0–0.2)
BASOPHILS NFR BLD: 0.5 % (ref 0–1.9)
BASOPHILS NFR BLD: 0.5 % (ref 0–1.9)
BSA FOR ECHO PROCEDURE: 1.75 M2
BUN SERPL-MCNC: 16 MG/DL (ref 8–23)
CALCIUM SERPL-MCNC: 8.4 MG/DL (ref 8.7–10.5)
CHLORIDE SERPL-SCNC: 106 MMOL/L (ref 95–110)
CHOLEST SERPL-MCNC: 116 MG/DL (ref 120–199)
CHOLEST/HDLC SERPL: 2.8 {RATIO} (ref 2–5)
CK MB SERPL-MCNC: 41.5 NG/ML (ref 0.1–6.5)
CK MB SERPL-RTO: 5.3 % (ref 0–5)
CK SERPL-CCNC: 787 U/L (ref 20–200)
CO2 SERPL-SCNC: 24 MMOL/L (ref 23–29)
CREAT SERPL-MCNC: 1.1 MG/DL (ref 0.5–1.4)
CV ECHO LV RWT: 0.34 CM
DIFFERENTIAL METHOD: ABNORMAL
DIFFERENTIAL METHOD: ABNORMAL
DOP CALC AO PEAK VEL: 1.03 M/S
DOP CALC AO VTI: 14.05 CM
DOP CALC LVOT AREA: 4.2 CM2
DOP CALC LVOT DIAMETER: 2.32 CM
DOP CALC LVOT PEAK VEL: 0.53 M/S
DOP CALC LVOT STROKE VOLUME: 25.69 CM3
DOP CALCLVOT PEAK VEL VTI: 6.08 CM
E WAVE DECELERATION TIME: 163.68 MSEC
E/A RATIO: 2.75
ECHO LV POSTERIOR WALL: 0.91 CM (ref 0.6–1.1)
EOSINOPHIL # BLD AUTO: 0.1 K/UL (ref 0–0.5)
EOSINOPHIL # BLD AUTO: 0.2 K/UL (ref 0–0.5)
EOSINOPHIL NFR BLD: 1.6 % (ref 0–8)
EOSINOPHIL NFR BLD: 1.8 % (ref 0–8)
ERYTHROCYTE [DISTWIDTH] IN BLOOD BY AUTOMATED COUNT: 14.6 % (ref 11.5–14.5)
ERYTHROCYTE [DISTWIDTH] IN BLOOD BY AUTOMATED COUNT: 14.6 % (ref 11.5–14.5)
EST. GFR  (AFRICAN AMERICAN): >60 ML/MIN/1.73 M^2
EST. GFR  (NON AFRICAN AMERICAN): >60 ML/MIN/1.73 M^2
ESTIMATED AVG GLUCOSE: 200 MG/DL (ref 68–131)
FRACTIONAL SHORTENING: 11 % (ref 28–44)
GLUCOSE SERPL-MCNC: 183 MG/DL (ref 70–110)
HBA1C MFR BLD HPLC: 8.6 % (ref 4–5.6)
HCT VFR BLD AUTO: 35.7 % (ref 40–54)
HCT VFR BLD AUTO: 39.1 % (ref 40–54)
HDLC SERPL-MCNC: 41 MG/DL (ref 40–75)
HDLC SERPL: 35.3 % (ref 20–50)
HGB BLD-MCNC: 11.3 G/DL (ref 14–18)
HGB BLD-MCNC: 12 G/DL (ref 14–18)
IMM GRANULOCYTES # BLD AUTO: 0.03 K/UL (ref 0–0.04)
IMM GRANULOCYTES # BLD AUTO: 0.03 K/UL (ref 0–0.04)
IMM GRANULOCYTES NFR BLD AUTO: 0.4 % (ref 0–0.5)
IMM GRANULOCYTES NFR BLD AUTO: 0.4 % (ref 0–0.5)
INTERVENTRICULAR SEPTUM: 0.69 CM (ref 0.6–1.1)
IVRT: 79.92 MSEC
LA MAJOR: 5.31 CM
LA MINOR: 5.49 CM
LA WIDTH: 3.78 CM
LDLC SERPL CALC-MCNC: 62.6 MG/DL (ref 63–159)
LEFT ATRIUM SIZE: 3.81 CM
LEFT ATRIUM VOLUME INDEX: 38 ML/M2
LEFT ATRIUM VOLUME: 66.09 CM3
LEFT INTERNAL DIMENSION IN SYSTOLE: 4.8 CM (ref 2.1–4)
LEFT VENTRICLE DIASTOLIC VOLUME INDEX: 81.59 ML/M2
LEFT VENTRICLE DIASTOLIC VOLUME: 141.71 ML
LEFT VENTRICLE MASS INDEX: 90 G/M2
LEFT VENTRICLE SYSTOLIC VOLUME INDEX: 61.8 ML/M2
LEFT VENTRICLE SYSTOLIC VOLUME: 107.27 ML
LEFT VENTRICULAR INTERNAL DIMENSION IN DIASTOLE: 5.41 CM (ref 3.5–6)
LEFT VENTRICULAR MASS: 155.46 G
LYMPHOCYTES # BLD AUTO: 1.8 K/UL (ref 1–4.8)
LYMPHOCYTES # BLD AUTO: 2.1 K/UL (ref 1–4.8)
LYMPHOCYTES NFR BLD: 23.7 % (ref 18–48)
LYMPHOCYTES NFR BLD: 25.6 % (ref 18–48)
MCH RBC QN AUTO: 26.5 PG (ref 27–31)
MCH RBC QN AUTO: 26.8 PG (ref 27–31)
MCHC RBC AUTO-ENTMCNC: 30.7 G/DL (ref 32–36)
MCHC RBC AUTO-ENTMCNC: 31.7 G/DL (ref 32–36)
MCV RBC AUTO: 84 FL (ref 82–98)
MCV RBC AUTO: 88 FL (ref 82–98)
MONOCYTES # BLD AUTO: 0.6 K/UL (ref 0.3–1)
MONOCYTES # BLD AUTO: 0.7 K/UL (ref 0.3–1)
MONOCYTES NFR BLD: 7.8 % (ref 4–15)
MONOCYTES NFR BLD: 8.4 % (ref 4–15)
MV PEAK A VEL: 0.24 M/S
MV PEAK E VEL: 0.66 M/S
MV STENOSIS PRESSURE HALF TIME: 64.46 MS
MV VALVE AREA P 1/2 METHOD: 3.41 CM2
NEUTROPHILS # BLD AUTO: 4.9 K/UL (ref 1.8–7.7)
NEUTROPHILS # BLD AUTO: 5.2 K/UL (ref 1.8–7.7)
NEUTROPHILS NFR BLD: 63.3 % (ref 38–73)
NEUTROPHILS NFR BLD: 66 % (ref 38–73)
NONHDLC SERPL-MCNC: 75 MG/DL
NRBC BLD-RTO: 0 /100 WBC
NRBC BLD-RTO: 0 /100 WBC
PISA TR MAX VEL: 3.42 M/S
PLATELET # BLD AUTO: 224 K/UL (ref 150–350)
PLATELET # BLD AUTO: 233 K/UL (ref 150–350)
PMV BLD AUTO: 9.7 FL (ref 9.2–12.9)
PMV BLD AUTO: 9.9 FL (ref 9.2–12.9)
POCT GLUCOSE: 246 MG/DL (ref 70–110)
POCT GLUCOSE: 251 MG/DL (ref 70–110)
POTASSIUM SERPL-SCNC: 4 MMOL/L (ref 3.5–5.1)
PV PEAK VELOCITY: 0.39 CM/S
RA MAJOR: 4.41 CM
RA PRESSURE: 8 MMHG
RA WIDTH: 3.71 CM
RBC # BLD AUTO: 4.26 M/UL (ref 4.6–6.2)
RBC # BLD AUTO: 4.47 M/UL (ref 4.6–6.2)
RIGHT VENTRICULAR END-DIASTOLIC DIMENSION: 3.42 CM
SODIUM SERPL-SCNC: 139 MMOL/L (ref 136–145)
STJ: 2.57 CM
TR MAX PG: 47 MMHG
TRICUSPID ANNULAR PLANE SYSTOLIC EXCURSION: 1.17 CM
TRIGL SERPL-MCNC: 62 MG/DL (ref 30–150)
TROPONIN I SERPL DL<=0.01 NG/ML-MCNC: 22.19 NG/ML (ref 0–0.03)
TROPONIN I SERPL DL<=0.01 NG/ML-MCNC: 23.25 NG/ML (ref 0–0.03)
TV REST PULMONARY ARTERY PRESSURE: 55 MMHG
WBC # BLD AUTO: 7.44 K/UL (ref 3.9–12.7)
WBC # BLD AUTO: 8.18 K/UL (ref 3.9–12.7)

## 2020-10-24 PROCEDURE — 93005 ELECTROCARDIOGRAM TRACING: CPT

## 2020-10-24 PROCEDURE — 25000003 PHARM REV CODE 250: Performed by: INTERNAL MEDICINE

## 2020-10-24 PROCEDURE — 99232 PR SUBSEQUENT HOSPITAL CARE,LEVL II: ICD-10-PCS | Mod: ,,, | Performed by: INTERNAL MEDICINE

## 2020-10-24 PROCEDURE — 36415 COLL VENOUS BLD VENIPUNCTURE: CPT

## 2020-10-24 PROCEDURE — 99232 SBSQ HOSP IP/OBS MODERATE 35: CPT | Mod: ,,, | Performed by: INTERNAL MEDICINE

## 2020-10-24 PROCEDURE — 85025 COMPLETE CBC W/AUTO DIFF WBC: CPT | Mod: 91

## 2020-10-24 PROCEDURE — 85730 THROMBOPLASTIN TIME PARTIAL: CPT

## 2020-10-24 PROCEDURE — 82553 CREATINE MB FRACTION: CPT

## 2020-10-24 PROCEDURE — 80048 BASIC METABOLIC PNL TOTAL CA: CPT

## 2020-10-24 PROCEDURE — 80061 LIPID PANEL: CPT

## 2020-10-24 PROCEDURE — 84484 ASSAY OF TROPONIN QUANT: CPT | Mod: 91

## 2020-10-24 PROCEDURE — 63600175 PHARM REV CODE 636 W HCPCS: Performed by: INTERNAL MEDICINE

## 2020-10-24 PROCEDURE — 93010 ELECTROCARDIOGRAM REPORT: CPT | Mod: ,,, | Performed by: INTERNAL MEDICINE

## 2020-10-24 PROCEDURE — 84484 ASSAY OF TROPONIN QUANT: CPT

## 2020-10-24 PROCEDURE — 82550 ASSAY OF CK (CPK): CPT

## 2020-10-24 PROCEDURE — 21400001 HC TELEMETRY ROOM

## 2020-10-24 PROCEDURE — 93010 EKG 12-LEAD: ICD-10-PCS | Mod: ,,, | Performed by: INTERNAL MEDICINE

## 2020-10-24 RX ORDER — PANTOPRAZOLE SODIUM 40 MG/1
40 TABLET, DELAYED RELEASE ORAL DAILY
Status: DISCONTINUED | OUTPATIENT
Start: 2020-10-24 | End: 2020-10-25 | Stop reason: HOSPADM

## 2020-10-24 RX ORDER — MAG HYDROX/ALUMINUM HYD/SIMETH 200-200-20
30 SUSPENSION, ORAL (FINAL DOSE FORM) ORAL EVERY 6 HOURS PRN
Status: DISCONTINUED | OUTPATIENT
Start: 2020-10-24 | End: 2020-10-25 | Stop reason: HOSPADM

## 2020-10-24 RX ADMIN — INSULIN ASPART 3 UNITS: 100 INJECTION, SOLUTION INTRAVENOUS; SUBCUTANEOUS at 08:10

## 2020-10-24 RX ADMIN — INSULIN ASPART 4 UNITS: 100 INJECTION, SOLUTION INTRAVENOUS; SUBCUTANEOUS at 07:10

## 2020-10-24 RX ADMIN — ALUMINUM HYDROXIDE, MAGNESIUM HYDROXIDE, AND SIMETHICONE 30 ML: 200; 200; 20 SUSPENSION ORAL at 05:10

## 2020-10-24 RX ADMIN — FUROSEMIDE 40 MG: 40 TABLET ORAL at 08:10

## 2020-10-24 RX ADMIN — TICAGRELOR 90 MG: 90 TABLET ORAL at 09:10

## 2020-10-24 RX ADMIN — TICAGRELOR 90 MG: 90 TABLET ORAL at 08:10

## 2020-10-24 RX ADMIN — PANTOPRAZOLE SODIUM 40 MG: 40 TABLET, DELAYED RELEASE ORAL at 08:10

## 2020-10-24 RX ADMIN — METOPROLOL SUCCINATE 25 MG: 25 TABLET, EXTENDED RELEASE ORAL at 08:10

## 2020-10-24 RX ADMIN — ASPIRIN 81 MG: 81 TABLET, COATED ORAL at 08:10

## 2020-10-24 RX ADMIN — ROSUVASTATIN CALCIUM 40 MG: 10 TABLET, COATED ORAL at 08:10

## 2020-10-24 NOTE — NURSING
[facility]  ICU Shift Summary  Date: 10/24/2020      COVID Test: (--)  Isolation: No active isolations     Prehospitalization: Home  Admit Date / LOS : 10/23/2020/ 1 days    Diagnosis: STEMI (ST elevation myocardial infarction)    Consults:        Active: Cardio       Needed: N/A     Code Status: Prior   Advanced Directive: <no information>    LDA: PIV       Central Lines/Site/Justification:Patient Does Not Have Central Line       Urinary Cath/Order/Justification:Patient Does Not Have Urinary Catheter    Vasopressors/Infusions:        GOALS: Volume/ Hemodynamic: N/A                     RASS: 0  alert and calm    Pain Management: PO       Pain Controlled: yes     Rhythm: NSR    Respiratory Device: Nasal Cannula                  Most Recent SBT/ SAT: N/A       MOVE Screen: PASS    VTE Prophylaxis: Mechanical  Mobility: Ambulatory  Stress Ulcer Prophylaxis: Yes    Dietary: PO  Tolerance: yes  /  Advancement: @ goal    I & O (24h):    Intake/Output Summary (Last 24 hours) at 10/24/2020 0540  Last data filed at 10/24/2020 0100  Gross per 24 hour   Intake 350 ml   Output 250 ml   Net 100 ml        Restraints: No    Significant Dates:  Post Op Date: N/A  Rescue Date: N/A  Imaging/ Diagnostics: N/A    Noteworthy Labs:  Troponin    CBC/Anemia Labs: Coags:    Recent Labs   Lab 10/23/20  1612 10/24/20  0022   WBC 7.62 7.44   HGB 12.1* 11.3*   HCT 37.6* 35.7*    233   MCV 83 84   RDW 14.5 14.6*    Recent Labs   Lab 10/23/20  1612 10/23/20  1945   INR 1.1  --    APTT 22.9 51.3*        Chemistries:   Recent Labs   Lab 10/23/20  1612 10/24/20  0022    139   K 3.8 4.0    106   CO2 24 24   BUN 16 16   CREATININE 1.3 1.1   CALCIUM 9.3 8.4*   PROT 7.6  --    BILITOT 0.8  --    ALKPHOS 123  --    ALT 35  --    AST 29  --         Cardiac Enzymes: Ejection Fractions:    Recent Labs     10/23/20  1612 10/23/20  1813 10/24/20  0022   CPK  --  463*  --    CPKMB  --  20.9*  --    MB  --  4.5  --     TROPONINI 0.030*  --  22.191*    No results found for: EF     POCT Glucose: HbA1c:    Recent Labs   Lab 10/23/20  1642 10/23/20  2135   POCTGLUCOSE 166* 338*    Hemoglobin A1C   Date Value Ref Range Status   03/03/2020 12.7 (H) 4.0 - 5.6 % Final     Comment:     ADA Screening Guidelines:  5.7-6.4%  Consistent with prediabetes  >or=6.5%  Consistent with diabetes  High levels of fetal hemoglobin interfere with the HbA1C  assay. Heterozygous hemoglobin variants (HbS, HgC, etc)do  not significantly interfere with this assay.   However, presence of multiple variants may affect accuracy.             ICU LOS 12h  Level of Care: OK to Transfer    Shift Summary/Plan for the shift: Was able to remove Vas band off cath lab puncture site, with no s/s of bleeding throughout night. Pt VSS. Complaints of indigestion this morning with PRN ordered and given, see EMAR. Pt states PRN Maalox did resolve issue. Otherwise, no complaints overnight of chest pain or any issues otherwise. Will continue to monitor.

## 2020-10-24 NOTE — PLAN OF CARE
10/24/20 1552   Discharge Assessment   Assessment Type Discharge Planning Assessment   Confirmed/corrected address and phone number on facesheet? Yes   Assessment information obtained from? Patient;Medical Record   Expected Length of Stay (days)   ([phone])   Communicated expected length of stay with patient/caregiver no   Prior to hospitilization cognitive status: Alert/Oriented   Prior to hospitalization functional status: Independent   Current cognitive status: Alert/Oriented   Current Functional Status: Independent   Facility Arrived From: Home   Lives With child(melissa), adult   Able to Return to Prior Arrangements yes   Is patient able to care for self after discharge? Yes   Who are your caregiver(s) and their phone number(s)? Marlen-daughter: [phone]; Mary-spouse: [phone]   Patient's perception of discharge disposition home or selfcare   Readmission Within the Last 30 Days no previous admission in last 30 days   Patient currently being followed by outpatient case management? No   Patient currently receives any other outside agency services? No   Equipment Currently Used at Home none   Do you have any problems affording any of your prescribed medications? No   Is the patient taking medications as prescribed? yes   Does the patient have transportation home? Yes   Transportation Anticipated car, drives self;family or friend will provide   Does the patient receive services at the [facility]madin Clinic? No   Discharge Plan A Home with family   Discharge Plan B Other  (TBD)   DME Needed Upon Discharge  other (see comments)  (TBD)   Patient/Family in Agreement with Plan yes   SW Role explained to patient; two patient identifiers recognized; SW contact information placed on Communication board. Discussed patient managing health care at home; determined who would be helping patient at home with recovery: Vijayadaughter will help with recovery     PCP: Amos Tee III, MD Prefers morning  appointments    Extended Emergency Contact Information  Primary Emergency Contact: Mary Cox   [place]  Mobile Phone: [phone]  Relation: Spouse  Secondary Emergency Contact: Marlen Cox  Mobile Phone: [phone]  Relation: Daughter   needed? No     [facility] [place] - [address]  [address]  [place] [address]  Phone: [phone] Fax: [phone]    [facility] [employer] [id number] - [place] - [address] AT [place] [address] [facility]  [place] [address]  Phone: [phone] Fax: [phone]     Payor: MEDICARE / Plan: MEDICARE PART A & B / Product Type: Government /

## 2020-10-24 NOTE — NURSING TRANSFER
Nursing Transfer Note      10/24/2020     Transfer To: Room 337    Transfer via in arms    Transfer with 2L NC to O2, cardiac monitoring    Transported by Nurse/Transport    Medicines sent: Yes    Chart send with patient: Yes    Notified: N/A    Patient reassessed at:  (10-24-20,1030)     Upon arrival to floor: cardiac monitor applied, patient oriented to room, call bell in reach and bed in lowest position    Patient is awake alert and oriented. No signs of distress, vitals stable. Nurse at bedside

## 2020-10-24 NOTE — PROGRESS NOTES
[facility]  Cardiology  Progress Note    Patient Name: Fredrick Cox  MRN: [id number]  Admission Date: 10/23/2020  Hospital Length of Stay: 1 days  Code Status: Prior   Attending Physician: Archie Pathak MD   Primary Care Physician: Amos Tee III, MD  Expected Discharge Date: 10/25/2020  Principal Problem:ST elevation myocardial infarction involving left anterior descending (LAD) coronary artery    Subjective:     Hospital Course:   No notes on file        Review of Systems   Cardiovascular: Negative for chest pain, irregular heartbeat, leg swelling, orthopnea and palpitations.   Respiratory: Negative for shortness of breath.      Objective:     Vital Signs (Most Recent):  Temp: 98 °F (36.7 °C) (10/24/20 0715)  Pulse: 94 (10/24/20 0830)  Resp: 19 (10/24/20 0830)  BP: (!) 161/95 (10/24/20 0800)  SpO2: 98 % (10/24/20 0830) Vital Signs (24h Range):  Temp:  [97.6 °F (36.4 °C)-98.4 °F (36.9 °C)] 98 °F (36.7 °C)  Pulse:  [83-97] 94  Resp:  [10-39] 19  SpO2:  [90 %-100 %] 98 %  BP: ()/() 161/95     Weight: 66.8 kg (147 lb 4.3 oz)  Body mass index is 24.51 kg/m².     SpO2: 98 %  O2 Device (Oxygen Therapy): nasal cannula      Intake/Output Summary (Last 24 hours) at 10/24/2020 0930  Last data filed at 10/24/2020 0100  Gross per 24 hour   Intake 350 ml   Output 250 ml   Net 100 ml       Lines/Drains/Airways     Peripheral Intravenous Line                 Peripheral IV - Single Lumen 10/23/20 18 G Left Antecubital 1 day         Peripheral IV - Single Lumen 10/23/20 1612 20 G Right Antecubital less than 1 day                Physical Exam   Constitutional: He is oriented to person, place, and time. No distress.   Neck: No JVD present. Carotid bruit is not present.   Cardiovascular: Normal rate, regular rhythm and normal pulses.   Murmur heard.   Systolic murmur is present with a grade of 2/6.  Pulmonary/Chest: Effort normal and breath sounds normal.   Abdominal: Soft. Bowel sounds are  normal. There is no abdominal tenderness.   Musculoskeletal:      Right lower leg: No edema.      Left lower leg: No edema.   Neurological: He is alert and oriented to person, place, and time.   Skin: He is not diaphoretic.   Psychiatric: He has a normal mood and affect. His speech is normal and behavior is normal.   Vitals reviewed.      Significant Labs:   BMP:   Recent Labs   Lab 10/23/20  1612 10/24/20  0022   * 183*    139   K 3.8 4.0    106   CO2 24 24   BUN 16 16   CREATININE 1.3 1.1   CALCIUM 9.3 8.4*   , CBC   Recent Labs   Lab 10/23/20  1612 10/24/20  0022 10/24/20  0550   WBC 7.62 7.44 8.18   HGB 12.1* 11.3* 12.0*   HCT 37.6* 35.7* 39.1*    233 224   , Lipid Panel   Recent Labs   Lab 10/24/20  0022   CHOL 116*   HDL 41   LDLCALC 62.6*   TRIG 62   CHOLHDL 35.3    and Troponin   Recent Labs   Lab 10/23/20  1612 10/24/20  0022 10/24/20  0550   TROPONINI 0.030* 22.191* 23.252*       Significant Imaging: Cardiac Cath: Status post balloon angioplasty, angio sculpting of proximal left anterior descending artery InStent restenosis.    Assessment and Plan:         * ST elevation myocardial infarction involving left anterior descending (LAD) coronary artery  Patient brought emergently to cardiac catheterization laboratory.  Risks, benefits and alternatives of the catheterization procedure were discussed with the patient.The risks of coronary angiography include but are not limited to: bleeding, infection, death, heart attack, arrhythmia, kidney injury or failure, potential need for dialysis, allergic reactions, stroke, need for emergency surgery, hematoma, pseudoaneurysm etc.  Should stenting be indicated, the patient has agreed to dual anti-platelet therapy for 1-consecutive year with a drug-eluting stent and a minimum of 1-month with the use of a bare metal stent. Additionally, pt is aware that non-compliance is likely to result in stent clotting with heart attack, heart failure, and/or  death  The risks of moderate sedation include hypotension, respiratory depression, arrhythmias, bronchospasm, and death. Informed consent was obtained and the  patient is agreeable to proceed with the procedure. Consent was placed on the chart.    Status post balloon angioplasty followed by angio sculpting of occluded left anterior descending artery previously placed stent.  See report for full details.  Aspirin/Brilinta indefinitely.    10/24/2020-continue aspirin/Brilinta.  Follow-up echocardiogram.  Cardiovascular stable.  Transfer to telemetry.    ACC/AHA stage B congestive heart failure due to ischemic cardiomyopathy  Follow-up echocardiogram.    Type 2 diabetes mellitus, with long-term current use of insulin  Will place on sliding scale for now.    Mixed hyperlipidemia  Continue statin.    Tobacco abuse  Encouraged cessation.    Essential hypertension  Monitor.        VTE Risk Mitigation (From admission, onward)         Ordered     heparin 25,000 units in dextrose 5% (100 units/ml) IV bolus from bag INITIAL BOLUS (max bolus 4000 units)  Once     Question:  Heparin Infusion Adjustment (DO NOT MODIFY ANSWER)  Answer:  [email or web address]\Images\[email or web address]\HeparinInfusions\heparin LOW INTENSITY nomogram for [facility] [id number]    10/23/20 1606                Archie Pathak MD  Cardiology  [facility]

## 2020-10-24 NOTE — NURSING
"Transfer from ICU to Telemetry:  Note that patient accompanied by ICU RN for transport to Telemetry.   Awake, alert and fully oriented;  On 2L NC. With SPO2@96%.  C/o, "I'm cold."  Oral temp:98.0F.  V/s stable. Denies pain at this time.     Meds placed in bin in Med Room, and Valuables receipt placed in chart at nursing station.   Continued on telemetry.   Adjusted room temperature.   Given blankets.     ECHO completed at bedside.  Reviewed and released orders.     Will continue to f/u.   "

## 2020-10-24 NOTE — SUBJECTIVE & OBJECTIVE
Review of Systems   Cardiovascular: Negative for chest pain, irregular heartbeat, leg swelling, orthopnea and palpitations.   Respiratory: Negative for shortness of breath.      Objective:     Vital Signs (Most Recent):  Temp: 98 °F (36.7 °C) (10/24/20 0715)  Pulse: 94 (10/24/20 0830)  Resp: 19 (10/24/20 0830)  BP: (!) 161/95 (10/24/20 0800)  SpO2: 98 % (10/24/20 0830) Vital Signs (24h Range):  Temp:  [97.6 °F (36.4 °C)-98.4 °F (36.9 °C)] 98 °F (36.7 °C)  Pulse:  [83-97] 94  Resp:  [10-39] 19  SpO2:  [90 %-100 %] 98 %  BP: ()/() 161/95     Weight: 66.8 kg (147 lb 4.3 oz)  Body mass index is 24.51 kg/m².     SpO2: 98 %  O2 Device (Oxygen Therapy): nasal cannula      Intake/Output Summary (Last 24 hours) at 10/24/2020 0930  Last data filed at 10/24/2020 0100  Gross per 24 hour   Intake 350 ml   Output 250 ml   Net 100 ml       Lines/Drains/Airways     Peripheral Intravenous Line                 Peripheral IV - Single Lumen 10/23/20 18 G Left Antecubital 1 day         Peripheral IV - Single Lumen 10/23/20 1612 20 G Right Antecubital less than 1 day                Physical Exam   Constitutional: He is oriented to person, place, and time. No distress.   Neck: No JVD present. Carotid bruit is not present.   Cardiovascular: Normal rate, regular rhythm and normal pulses.   Murmur heard.   Systolic murmur is present with a grade of 2/6.  Pulmonary/Chest: Effort normal and breath sounds normal.   Abdominal: Soft. Bowel sounds are normal. There is no abdominal tenderness.   Musculoskeletal:      Right lower leg: No edema.      Left lower leg: No edema.   Neurological: He is alert and oriented to person, place, and time.   Skin: He is not diaphoretic.   Psychiatric: He has a normal mood and affect. His speech is normal and behavior is normal.   Vitals reviewed.      Significant Labs:   BMP:   Recent Labs   Lab 10/23/20  1612 10/24/20  0022   * 183*    139   K 3.8 4.0    106   CO2 24 24   BUN  16 16   CREATININE 1.3 1.1   CALCIUM 9.3 8.4*   , CBC   Recent Labs   Lab 10/23/20  1612 10/24/20  0022 10/24/20  0550   WBC 7.62 7.44 8.18   HGB 12.1* 11.3* 12.0*   HCT 37.6* 35.7* 39.1*    233 224   , Lipid Panel   Recent Labs   Lab 10/24/20  0022   CHOL 116*   HDL 41   LDLCALC 62.6*   TRIG 62   CHOLHDL 35.3    and Troponin   Recent Labs   Lab 10/23/20  1612 10/24/20  0022 10/24/20  0550   TROPONINI 0.030* 22.191* 23.252*       Significant Imaging: Cardiac Cath: Status post balloon angioplasty, angio sculpting of proximal left anterior descending artery InStent restenosis.

## 2020-10-24 NOTE — PLAN OF CARE
Was able to remove Vas band off cath lab puncture site, with no s/s of bleeding throughout night. Pt VSS. Complaints of indigestion this morning with PRN ordered and given, see EMAR. Pt states PRN Maalox did resolve issue. Otherwise, no complaints overnight of chest pain or any issues otherwise. Will continue to monitor.    Problem: Adult Inpatient Plan of Care  Goal: Plan of Care Review  Outcome: Ongoing, Progressing  Goal: Patient-Specific Goal (Individualization)  Outcome: Ongoing, Progressing  Goal: Absence of Hospital-Acquired Illness or Injury  Outcome: Ongoing, Progressing  Goal: Optimal Comfort and Wellbeing  Outcome: Ongoing, Progressing  Goal: Readiness for Transition of Care  Outcome: Ongoing, Progressing  Goal: Rounds/Family Conference  Outcome: Ongoing, Progressing     Problem: Diabetes Comorbidity  Goal: Blood Glucose Level Within Desired Range  Outcome: Ongoing, Progressing     Problem: Fall Injury Risk  Goal: Absence of Fall and Fall-Related Injury  Outcome: Ongoing, Progressing     Problem: Adult Inpatient Plan of Care  Goal: Plan of Care Review  Outcome: Ongoing, Progressing  Goal: Patient-Specific Goal (Individualization)  Outcome: Ongoing, Progressing  Goal: Absence of Hospital-Acquired Illness or Injury  Outcome: Ongoing, Progressing  Goal: Optimal Comfort and Wellbeing  Outcome: Ongoing, Progressing  Goal: Readiness for Transition of Care  Outcome: Ongoing, Progressing  Goal: Rounds/Family Conference  Outcome: Ongoing, Progressing     Problem: Diabetes Comorbidity  Goal: Blood Glucose Level Within Desired Range  Outcome: Ongoing, Progressing     Problem: Fall Injury Risk  Goal: Absence of Fall and Fall-Related Injury  Outcome: Ongoing, Progressing

## 2020-10-24 NOTE — NURSING
Vas band air released 3cc q 15 mins with no s/s of bleeding. Air completely removed at 2030. Gauze and transparent dressing placed per order. Will remove that dressing in place for bandaid per order at 0000, 3.5 hours after gauze and transparent dressing is placed. Will monitor for further s/s of bleeding.

## 2020-10-25 VITALS
RESPIRATION RATE: 18 BRPM | HEART RATE: 93 BPM | HEIGHT: 65 IN | WEIGHT: 148.38 LBS | TEMPERATURE: 98 F | SYSTOLIC BLOOD PRESSURE: 116 MMHG | BODY MASS INDEX: 24.72 KG/M2 | DIASTOLIC BLOOD PRESSURE: 74 MMHG | OXYGEN SATURATION: 100 %

## 2020-10-25 PROBLEM — R07.9 CHEST PAIN: Status: RESOLVED | Noted: 2020-10-24 | Resolved: 2020-10-25

## 2020-10-25 PROBLEM — I21.02 ST ELEVATION MYOCARDIAL INFARCTION INVOLVING LEFT ANTERIOR DESCENDING (LAD) CORONARY ARTERY: Status: RESOLVED | Noted: 2020-03-02 | Resolved: 2020-10-25

## 2020-10-25 LAB
POCT GLUCOSE: 169 MG/DL (ref 70–110)
POCT GLUCOSE: 280 MG/DL (ref 70–110)

## 2020-10-25 PROCEDURE — 99239 PR HOSPITAL DISCHARGE DAY,>30 MIN: ICD-10-PCS | Mod: ,,, | Performed by: INTERNAL MEDICINE

## 2020-10-25 PROCEDURE — 25000003 PHARM REV CODE 250: Performed by: INTERNAL MEDICINE

## 2020-10-25 PROCEDURE — 99239 HOSP IP/OBS DSCHRG MGMT >30: CPT | Mod: ,,, | Performed by: INTERNAL MEDICINE

## 2020-10-25 RX ADMIN — PANTOPRAZOLE SODIUM 40 MG: 40 TABLET, DELAYED RELEASE ORAL at 08:10

## 2020-10-25 RX ADMIN — TICAGRELOR 90 MG: 90 TABLET ORAL at 08:10

## 2020-10-25 RX ADMIN — ASPIRIN 81 MG: 81 TABLET, COATED ORAL at 08:10

## 2020-10-25 RX ADMIN — ROSUVASTATIN CALCIUM 40 MG: 10 TABLET, COATED ORAL at 08:10

## 2020-10-25 RX ADMIN — METOPROLOL SUCCINATE 25 MG: 25 TABLET, EXTENDED RELEASE ORAL at 08:10

## 2020-10-25 RX ADMIN — FUROSEMIDE 40 MG: 40 TABLET ORAL at 08:10

## 2020-10-25 NOTE — PLAN OF CARE
10/25/20 0829   Final Note   Assessment Type Final Discharge Note   Anticipated Discharge Disposition Home   What phone number can be called within the next 1-3 days to see how you are doing after discharge?   ([phone])   Hospital Follow Up  Appt(s) scheduled? Yes   Discharge plans and expectations educations in teach back method with documentation complete? Yes   Right Care Referral Info   Post Acute Recommendation No Care   Post-Acute Status   Post-Acute Authorization Other  (Home with family with Cardiology Follow-up)   Other Status No Post-Acute Service Needs   Discharge Delays None known at this time

## 2020-10-25 NOTE — NURSING
Report Given to oncoming shift nurse ABBY Dowell. No s/s of acute distress noted. Safety intact. Call light in reach.

## 2020-10-25 NOTE — PROGRESS NOTES
[facility] CASE MANAGEMENT: WRITTEN DISCHARGE INFORMATION      APPOINTMENTS & RESOURCES TO HELP YOU MANAGE YOUR CARE AT HOME BASED ON YOUR PREFERENCES:  (If an appointment is not scheduled for you when you leave the hospital, call your doctor to schedule a follow up visit within a week)     Follow-Up Information:   Follow-up Information     George Ho MD. Go on 10/29/2020.    Specialties: Cardiology, INTERVENTIONAL CARDIOLOGY  Why: @ 2:20pm Out-Patient Cardiology Hospital Follow-Up with Dr. Ho scheduled for you  Contact information:  [address] [facility] [address]  [address]  [place] [address]  [phone]               Healthy Living Instructions to HELP MANAGE YOUR CARE AT HOME:  Things You are responsible for:  1.  Getting your prescriptions filled   2.  Taking your medications as directed, DO NOT MISS ANY DOSES!  3.  Following the diet and exercise recommended by your doctor  4.  Going to your follow-up doctor appointment. This is important because it allows the doctor to monitor your progress and determine if any changes need to made to your treatment plan.  5.  If you have any questions about MANAGING YOUR CARE AT HOME Call the Nurse Care Line for 24/7 Assistance:              [phone].      Please answer any calls you may receive from [facility]. We want to continue to support you as you manage your healthcare needs. [facility] is happy to have the opportunity to serve you.      Thank you for choosing [facility] for your healthcare needs!  Your [facility] Case Management Team

## 2020-10-25 NOTE — PLAN OF CARE
10/25/20 0828   Post-Acute Status   Post-Acute Authorization Other  (Home with family with cardiology follow-up)   Other Status No Post-Acute Service Needs   Discharge Delays None known at this time   Discharge Plan   Discharge Plan A Home with family  (Out-Patient Cardiology follow-up)   Discharge Plan B Home with family  (Out-Patient cardiology follow-up)

## 2020-10-25 NOTE — NURSING
Patient with c/o sob with minimal exertion..check 02 sat and it was 100% on R/A. States he doesn't have o2 at home and uses a fan when he feels sob

## 2020-10-25 NOTE — PLAN OF CARE
Problem: Diabetes Comorbidity  Goal: Blood Glucose Level Within Desired Range  Outcome: Ongoing, Progressing  Intervention: Maintain Glycemic Control  Flowsheets (Taken 10/24/2020 2029)  Glycemic Management:   blood glucose monitoring   supplemental insulin given

## 2020-10-25 NOTE — DISCHARGE SUMMARY
[facility]  Cardiology  Discharge Summary      Patient Name: Fredrick Cox  MRN: [id number]  Admission Date: 10/23/2020  Hospital Length of Stay: 2 days  Discharge Date and Time:  10/25/2020 7:56 AM  Attending Physician: Archie Pathak MD  Discharging Provider: Archie Pathak MD  Primary Care Physician: Amos Tee III, MD    HPI: Fredrick Cox is a 65 year old male who presented to the ED with onset of mid-sternal chest pain.Initially states that his pain was roughly it an 8/10.  Given nitroglycerin and an aspirin with relief in decrease of his pain to roughly 4/10.  EKG showed a change anteriorly, ST-elevation when compared to last EKG.   He has a history of coronary artery disease.  Ischemic cardiomyopathy with an ejection fraction of 25%.  STEMI in April of this year secondary to late stent thrombosis.  PCI of LAD with 2.75 x 26 mm MICH.  PCI of circumflex with a 2.5 by 22 mm MICH.  Patient was brought emergently to the cardiac catheterization laboratory.  Occluded LAD stent.  Balloon angioplasty followed by angio sculpting.  LILIAN 0 flow improved to LILIAN 3 flow.          Procedure(s) (LRB):  Left heart cath (Left)  Percutaneous coronary intervention (N/A)     Cardiac catheterization 10/23/2020:    1. Stent thrombosis of a previously placed left anterior descending artery drug-eluting stent secondary to non adherence.  2.  Plain old balloon angioplasty followed by angio sculpting with a 3 x 10 balloon  3.  Patent previously placed stents in the mid left anterior descending artery and mid left circumflex artery.  4.  Known RCA chronic total occlusion.  5.  Known distal LAD chronic total occlusion.    Echocardiogram 10/24/2020:    · The left ventricle is mildly enlarged with moderately decreased systolic function. The estimated ejection fraction is 30%.  · There are segmental left ventricular wall motion abnormalities.  · Mild left atrial enlargement.  · Grade III diastolic  dysfunction.  · Normal right ventricular systolic function.  · Mild aortic regurgitation.  · Mild mitral regurgitation.  · There is moderate pulmonary hypertension.  · Intermediate central venous pressure (8 mmHg).  · The estimated PA systolic pressure is 55 mmHg.    Indwelling Lines/Drains at time of discharge:  Lines/Drains/Airways     None                 Hospital Course (synopsis of major diagnoses, care, treatment, and services provided during the course of the hospital stay): Hospital course unremarkable.  Patient denies chest pain.  Discussed importance of adherence to medicine regimen.  Specifically his anti-platelet agents.  Encourage patient to quit smoking.  His vitals have been stable.  Plans for discharge home today.  H&H stable.  Creatinine stable.    Consults:   Consults (From admission, onward)        Status Ordering Provider     Inpatient consult to Cardiology  Once     Provider:  Archie Falcon MD    Completed ARCHIE FALCON          Significant Diagnostic Studies: Angiography:  Balloon angioplasty followed by angio sculpting of occluded proximal left anterior descending artery stent.    Pending Diagnostic Studies:     Procedure Component Value Units Date/Time    EKG 12-LEAD on arrival to floor [[id number]]     Order Status: Sent Lab Status: No result     EKG 12-LEAD starting tomorrow [[id number]]     Order Status: Sent Lab Status: No result           Final Active Diagnoses:    Diagnosis Date Noted POA    ACC/AHA stage B congestive heart failure due to ischemic cardiomyopathy [I50.9, I25.5] 10/23/2020 Yes    Type 2 diabetes mellitus, with long-term current use of insulin [E11.9, Z79.4] 04/02/2014 Not Applicable     Chronic    Essential hypertension [I10] 01/17/2014 Yes     Chronic    Mixed hyperlipidemia [E78.2] 01/17/2014 Yes    Tobacco abuse [Z72.0] 01/17/2014 Yes     Chronic      Problems Resolved During this Admission:    Diagnosis Date Noted Date Resolved POA    PRINCIPAL PROBLEM:   ST elevation myocardial infarction involving left anterior descending (LAD) coronary artery [I21.02] 03/02/2020 10/25/2020 Yes    Chest pain [R07.9] 10/24/2020 10/25/2020 Yes    STEMI (ST elevation myocardial infarction) [I21.3] 10/23/2020 10/23/2020 Yes    Chest pain [R07.9] 10/23/2020 10/23/2020 Yes       Discharged Condition: good    Follow Up:  Follow-up Information     George Ho MD In 2 weeks.    Specialties: Cardiology, INTERVENTIONAL CARDIOLOGY  Contact information:  [address] [facility] [address]  [address]  [facility] [address]  [phone]                 Patient Instructions:   No discharge procedures on file.  Medications:  Reconciled Home Medications:      Medication List      CONTINUE taking these medications    aspirin 81 MG EC tablet  Commonly known as: ECOTRIN  Take 1 tablet (81 mg total) by mouth once daily.     blood sugar diagnostic Strp  1 strip by Misc.(Non-Drug; Combo Route) route 2 (two) times daily.     furosemide 40 MG tablet  Commonly known as: LASIX  Take 1 tablet (40 mg total) by mouth once daily.     insulin aspart U-100 100 unit/mL (3 mL) Inpn pen  Commonly known as: NovoLOG  Inject 1-10 Units into the skin 3 (three) times daily with meals. Blood Glucose  mg/dL                  0600                0000                               1200                               1800  151-200                2 units             1 unit  201-250                4 units             2 units  251-300                6 units             3 units  301-350                8 units             4 units  >350                      10 units           5 units     insulin glargine (TOUJEO) 300 unit/mL (1.5 mL) Inpn pen  Commonly known as: TOUJEO SOLOSTAR U-300 INSULIN  Inject 30 Units into the skin every evening.     metFORMIN 500 MG tablet  Commonly known as: GLUCOPHAGE  Take 500 mg by mouth 2 (two) times daily with meals.     metoprolol succinate 25 MG 24 hr tablet  Commonly known as: TOPROL-XL  Take 1 tablet (25 mg total)  "by mouth once daily.     nitroGLYCERIN 0.4 MG SL tablet  Commonly known as: NITROSTAT  Place 1 tablet (0.4 mg total) under the tongue every 5 (five) minutes as needed.     pen needle, diabetic 31 gauge x 3/16" Ndle  Commonly known as: BD ULTRA-FINE MINI PEN NEEDLE  USE AS DIRECTED     pen needle, diabetic 31 gauge x 5/16" Ndle  1 each by Misc.(Non-Drug; Combo Route) route 2 (two) times daily.     ranolazine 500 MG Tb12  Commonly known as: RANEXA  Take 1 tablet (500 mg total) by mouth 2 (two) times daily.     rosuvastatin 40 MG Tab  Commonly known as: CRESTOR  Take 1 tablet (40 mg total) by mouth once daily.     ticagrelor 90 mg tablet  Commonly known as: BRILINTA  Take 1 tablet (90 mg total) by mouth 2 (two) times daily.        STOP taking these medications    acetaminophen 325 MG tablet  Commonly known as: TYLENOL            Time spent on the discharge of patient: 30 minutes    Archie Pathak MD  Cardiology  [facility]  "

## 2020-10-25 NOTE — PROGRESS NOTES
Patient is ready and clear for discharge from CM perspective; out-patient cardiology follow-up scheduled; informed patient's nurse, Delmer.

## 2020-10-27 ENCOUNTER — PATIENT OUTREACH (OUTPATIENT)
Dept: ADMINISTRATIVE | Facility: CLINIC | Age: 65
End: 2020-10-27

## 2020-10-27 NOTE — PROGRESS NOTES
C3 nurse attempted to contact patient. No answer. The following message was left for the patient to return the call:  Good morning, I am a nurse calling on behalf of [facility] from the [facility]ination [facility].  This is a Transitional Care Call for Fredrick Cox. When you have a moment please contact us at [phone] or [phone] Monday through Friday, between the hours of 8 am to 4 pm. We look forward to speaking with you. On behalf of [facility] have a nice day.    The patient does not have a scheduled HOSFU appointment within 7-14 days post hospital discharge date 10/25.

## 2020-10-28 NOTE — PATIENT INSTRUCTIONS
Discharge Instructions for Heart Attack  You have had a heart attack (acute myocardial infarction). A heart attack occurs when a vessel that sends blood to your heart suddenly becomes blocked. This causes your heart not to work as well as it should. Follow these guidelines for home care and lifestyle changes.  Home care  · Take your medicines exactly as directed. Dont skip doses. Talk with your healthcare provider if your medicines aren't working for you. Together you can come up with another treatment plan.  · Remember that recovery after a heart attack takes time. Plan to rest for at least 4 to 8 weeks while you recover. Then return to normal activity when your doctor says its OK.  · Ask your doctor about joining a heart rehabilitation program. This can help strengthen your heart and lungs and give you more energy and confidence.  · Tell your doctor if you are feeling depressed. Feelings of sadness are common after a heart attack. But it is important to speak to someone or seek counseling if you are feeling overwhelmed by these feelings.  · Call 911 right away if you have chest pain or pain that goes to your shoulder, neck, or back. Don't drive yourself to the hospital.  · Ask your family members to learn CPR. This is an important skill that can save lives when it's needed.  · Learn to take your own blood pressure and pulse. Keep a record of your results. Ask your doctor when you should seek emergency medical attention. He or she will tell you which blood pressure reading is dangerous.  Lifestyle changes  Your heart attack might have been caused by cardiovascular disease. Your healthcare provider will work with you to make changes to your lifestyle. This will help the heart disease from getting worse. These changes will most likely be a combination of diet and exercise.  Diet  Your healthcare provider will tell you what changes you need to make to your diet. You may need to see a registered dietitian for help  with these diet changes. These changes may include:  · Cutting back on how much fat and cholesterol you eat  · Cutting back on how much salt (sodium) you eat, especially if you have high blood pressure  · Eating more fresh vegetables and fruits  · Eating lean proteins such as fish, poultry, beans, and peas, and eating less red meat and processed meats  · Using low-fat dairy products  · Using vegetable and nut oils in limited amounts  · Limiting how many sweets and processed foods such as chips, cookies, and baked goods you eat  · Limiting how often you eat out. And when you do eat out, making better food choices.  · Not eating fried or greasy foods, or foods high in saturated fat  Exercise  Your healthcare provider may tell you to get more exercise if you haven't been physically active. Depending on your case, your provider may recommend that you get moderate to vigorous physical activity for at least 40 minutes each day, and for at least 3 to 4 days each week. A few examples of moderate to vigorous activity include:  · Walking at a brisk pace, about 3 to 4 miles per hour  · Jogging or running  · Swimming or water aerobics  · Hiking  · Dancing  · Martial arts  · Tennis  · Riding a bicycle or stationary bike  Other changes  Your healthcare provider may also recommend that you:  · Lose weight. If you are overweight or obese, your provider will work with you to lose extra pounds. Making diet changes and getting more exercise can help. A good goal is to lose your 10% of your body weight in one year.  · Stop smoking. Sign up for a stop-smoking program to make it more likely for you to quit for good. You can join a stop-smoking support group. Or ask your doctor about nicotine replacement products.  · Learn to manage stress. Stress management techniques to help you deal with stress in your home and work life. This will help you feel better emotionally and ease the strain on your heart.  Follow-up  Make a follow-up  appointment as directed by our staff.     When to seek medical advice  Call 911 right away if you have:  · Chest pain that goes to your neck, jaw, back, or shoulder  · Shortness of breath  Otherwise, call your doctor immediately if you have:  · Lightheadedness, dizziness, or fainting  · Feeling of irregular heartbeat or fast pulse.   Date Last Reviewed: 10/1/2016  © 1375-9434 [employer]. [address] [place] [address], [place] [address]. All rights reserved. This information is not intended as a substitute for professional medical care. Always follow your healthcare professional's instructions.

## 2020-10-28 NOTE — PHYSICIAN QUERY
"PT Name: Fredrick Cox  MR #: [id number]    Physician Query Form - Heart  Condition Clarification     CDS/: Shelley Brito               Contact information: [email or web address]    This form is a permanent document in the medical record.     Query Date: October 28, 2020    By submitting this query, we are merely seeking further clarification of documentation. Please utilize your independent clinical judgment when addressing the question(s) below.    The medical record contains the following   Indicators     Supporting Clinical Findings Location in Medical Record   x BNP    10/23/2020 16:12   BNP 1,642 (H)      Labs    x EF · The estimated ejection fraction is 30%.   Echo 10/24     Radiology findings     x Echo Results · The left ventricle is mildly enlarged with moderately decreased systolic function. The estimated ejection fraction is 30%.  · There are segmental left ventricular wall motion abnormalities.  · Mild left atrial enlargement.  · Grade III diastolic dysfunction.  · Normal right ventricular systolic function.  · Mild aortic regurgitation.  · Mild mitral regurgitation.  · There is moderate pulmonary hypertension.  · Intermediate central venous pressure (8 mmHg).  · The estimated PA systolic pressure is 55 mmHg. Echo 10/24     "Ascites" documented      "SOB" or "CHAN" documented Cardiovascular: Positive for chest pain. Negative for dyspnea on exertion, irregular heartbeat, leg swelling, near-syncope, orthopnea, paroxysmal nocturnal dyspnea and syncope H&P     "Hypoxia" documented      Heart Failure documented CHF (congestive heart failure)    ACC/AHA stage B congestive heart failure due to ischemic cardiomyopathy  H&P - past medical history     H&P Cards note 10/24       "Edema" documented Musculoskeletal:      Right lower leg: No edema.      Left lower leg: No edema. H&P    x Diuretics/Meds furosemide tablet 40 mg   Dose: 40 mg  Freq: Daily Route: Oral  Start: 10/24/20 0900 End: 10/25/20 1502 MAR     " Treatment:      Other:      Heart failure (HF) can be acute, chronic or both. It is generally further specificed as systolic, diastolic, or combined. Lastly, it is important to identify an underlying etiology if known or suspected.     Common clues to acute exacerbation:  Rapidly progressive symptoms (w/in 2 weeks of presentation), using IV diuretics to treat, using supplemental O2, pulmonary edema on Xray, MI w/in 4 weeks, and/or BNP >500    Systolic Heart Failure: is defined as chart documentation of a left ventricular ejection fraction (LVEF) less than 40%     Diastolic Heart Failure: is defined as a left ventricular ejection fraction (LVEF) greater than 40%   +      Evidence of diastolic dysfunction on echocardiography OR    Right heart catheterization wedge pressure above 12 mm Hg OR    Left heart catheterization left ventricular end diastolic pressure 18 mm Hg or above.    References: *American Heart Association    The clinical guidelines noted below are only system guidelines, and do not replace the providers clinical judgment.     Provider, please specify the diagnosis associated with above clinical findings  [   ] Chronic Systolic Heart Failure - Pre-existing systolic HF diagnosis.  EF < 40%  without  acute HF symptoms documented    [ x  ] Chronic Combined Systolic and Diastolic Heart Failure   [   ] Other Type of Heart Failure (please specify type):   [   ] Other (please specify):   [  ] Clinically Undetermined                           Please document in your progress notes daily for the duration of treatment until resolved and include in your discharge summary.

## 2020-11-19 ENCOUNTER — HOSPITAL ENCOUNTER (INPATIENT)
Facility: HOSPITAL | Age: 65
LOS: 2 days | Discharge: HOME HEALTH | DRG: 280 | End: 2020-11-21
Attending: EMERGENCY MEDICINE | Admitting: INTERNAL MEDICINE
Payer: MEDICARE

## 2020-11-19 DIAGNOSIS — I50.43 ACUTE ON CHRONIC COMBINED SYSTOLIC AND DIASTOLIC CONGESTIVE HEART FAILURE: ICD-10-CM

## 2020-11-19 DIAGNOSIS — R53.1 GENERALIZED WEAKNESS: ICD-10-CM

## 2020-11-19 DIAGNOSIS — I21.4 NSTEMI (NON-ST ELEVATED MYOCARDIAL INFARCTION): ICD-10-CM

## 2020-11-19 DIAGNOSIS — R06.02 SHORTNESS OF BREATH: ICD-10-CM

## 2020-11-19 DIAGNOSIS — R07.9 CHEST PAIN: ICD-10-CM

## 2020-11-19 DIAGNOSIS — I50.20 SYSTOLIC CONGESTIVE HEART FAILURE, UNSPECIFIED HF CHRONICITY: Primary | ICD-10-CM

## 2020-11-19 PROBLEM — I26.99 ACUTE PULMONARY EMBOLISM: Status: ACTIVE | Noted: 2020-11-19

## 2020-11-19 PROBLEM — I50.9 CONGESTIVE HEART FAILURE: Status: RESOLVED | Noted: 2020-04-25 | Resolved: 2020-11-19

## 2020-11-19 LAB
ALBUMIN SERPL BCP-MCNC: 3.6 G/DL (ref 3.5–5.2)
ALP SERPL-CCNC: 84 U/L (ref 55–135)
ALT SERPL W/O P-5'-P-CCNC: 17 U/L (ref 10–44)
ANION GAP SERPL CALC-SCNC: 12 MMOL/L (ref 8–16)
AST SERPL-CCNC: 19 U/L (ref 10–40)
BASOPHILS # BLD AUTO: 0.03 K/UL (ref 0–0.2)
BASOPHILS NFR BLD: 0.4 % (ref 0–1.9)
BILIRUB SERPL-MCNC: 1.4 MG/DL (ref 0.1–1)
BNP SERPL-MCNC: 2242 PG/ML (ref 0–99)
BUN SERPL-MCNC: 19 MG/DL (ref 8–23)
CALCIUM SERPL-MCNC: 9.3 MG/DL (ref 8.7–10.5)
CHLORIDE SERPL-SCNC: 99 MMOL/L (ref 95–110)
CO2 SERPL-SCNC: 27 MMOL/L (ref 23–29)
CREAT SERPL-MCNC: 1.3 MG/DL (ref 0.5–1.4)
D DIMER PPP IA.FEU-MCNC: 2.02 UG/ML FEU
DIFFERENTIAL METHOD: ABNORMAL
EOSINOPHIL # BLD AUTO: 0.2 K/UL (ref 0–0.5)
EOSINOPHIL NFR BLD: 2.5 % (ref 0–8)
ERYTHROCYTE [DISTWIDTH] IN BLOOD BY AUTOMATED COUNT: 14.4 % (ref 11.5–14.5)
EST. GFR  (AFRICAN AMERICAN): >60 ML/MIN/1.73 M^2
EST. GFR  (NON AFRICAN AMERICAN): 56.9 ML/MIN/1.73 M^2
GLUCOSE SERPL-MCNC: 123 MG/DL (ref 70–110)
GLUCOSE SERPL-MCNC: 189 MG/DL (ref 70–110)
GLUCOSE SERPL-MCNC: 206 MG/DL (ref 70–110)
GLUCOSE SERPL-MCNC: 224 MG/DL (ref 70–110)
GLUCOSE SERPL-MCNC: 313 MG/DL (ref 70–110)
HCT VFR BLD AUTO: 42 % (ref 40–54)
HGB BLD-MCNC: 12.9 G/DL (ref 14–18)
IMM GRANULOCYTES # BLD AUTO: 0.02 K/UL (ref 0–0.04)
IMM GRANULOCYTES NFR BLD AUTO: 0.2 % (ref 0–0.5)
INR PPP: 1.4
LYMPHOCYTES # BLD AUTO: 1.6 K/UL (ref 1–4.8)
LYMPHOCYTES NFR BLD: 19.5 % (ref 18–48)
MAGNESIUM SERPL-MCNC: 2.1 MG/DL (ref 1.6–2.6)
MCH RBC QN AUTO: 25.3 PG (ref 27–31)
MCHC RBC AUTO-ENTMCNC: 30.7 G/DL (ref 32–36)
MCV RBC AUTO: 83 FL (ref 82–98)
MONOCYTES # BLD AUTO: 0.3 K/UL (ref 0.3–1)
MONOCYTES NFR BLD: 3.4 % (ref 4–15)
NEUTROPHILS # BLD AUTO: 6.2 K/UL (ref 1.8–7.7)
NEUTROPHILS NFR BLD: 74 % (ref 38–73)
NRBC BLD-RTO: 0 /100 WBC
PLATELET # BLD AUTO: 197 K/UL (ref 150–350)
PMV BLD AUTO: 9.8 FL (ref 9.2–12.9)
POTASSIUM SERPL-SCNC: 4 MMOL/L (ref 3.5–5.1)
PROT SERPL-MCNC: 7.3 G/DL (ref 6–8.4)
PROTHROMBIN TIME: 16.1 SEC (ref 10.6–14.8)
RBC # BLD AUTO: 5.09 M/UL (ref 4.6–6.2)
SARS-COV-2 RDRP RESP QL NAA+PROBE: NEGATIVE
SODIUM SERPL-SCNC: 138 MMOL/L (ref 136–145)
TROPONIN I SERPL DL<=0.01 NG/ML-MCNC: 0.04 NG/ML
WBC # BLD AUTO: 8.41 K/UL (ref 3.9–12.7)

## 2020-11-19 PROCEDURE — 94761 N-INVAS EAR/PLS OXIMETRY MLT: CPT

## 2020-11-19 PROCEDURE — 85025 COMPLETE CBC W/AUTO DIFF WBC: CPT

## 2020-11-19 PROCEDURE — 63600175 PHARM REV CODE 636 W HCPCS: Performed by: EMERGENCY MEDICINE

## 2020-11-19 PROCEDURE — 93010 EKG 12-LEAD: ICD-10-PCS | Mod: ,,, | Performed by: INTERNAL MEDICINE

## 2020-11-19 PROCEDURE — 25000003 PHARM REV CODE 250: Performed by: EMERGENCY MEDICINE

## 2020-11-19 PROCEDURE — 96374 THER/PROPH/DIAG INJ IV PUSH: CPT

## 2020-11-19 PROCEDURE — 96361 HYDRATE IV INFUSION ADD-ON: CPT

## 2020-11-19 PROCEDURE — 25000242 PHARM REV CODE 250 ALT 637 W/ HCPCS: Performed by: EMERGENCY MEDICINE

## 2020-11-19 PROCEDURE — 93010 ELECTROCARDIOGRAM REPORT: CPT | Mod: ,,, | Performed by: INTERNAL MEDICINE

## 2020-11-19 PROCEDURE — 85379 FIBRIN DEGRADATION QUANT: CPT

## 2020-11-19 PROCEDURE — 80053 COMPREHEN METABOLIC PANEL: CPT

## 2020-11-19 PROCEDURE — 94640 AIRWAY INHALATION TREATMENT: CPT

## 2020-11-19 PROCEDURE — 82962 GLUCOSE BLOOD TEST: CPT

## 2020-11-19 PROCEDURE — 25000003 PHARM REV CODE 250: Performed by: INTERNAL MEDICINE

## 2020-11-19 PROCEDURE — 93005 ELECTROCARDIOGRAM TRACING: CPT | Performed by: INTERNAL MEDICINE

## 2020-11-19 PROCEDURE — 36415 COLL VENOUS BLD VENIPUNCTURE: CPT

## 2020-11-19 PROCEDURE — U0002 COVID-19 LAB TEST NON-CDC: HCPCS

## 2020-11-19 PROCEDURE — 83880 ASSAY OF NATRIURETIC PEPTIDE: CPT

## 2020-11-19 PROCEDURE — 84484 ASSAY OF TROPONIN QUANT: CPT | Mod: 91

## 2020-11-19 PROCEDURE — 21400001 HC TELEMETRY ROOM

## 2020-11-19 PROCEDURE — 99285 EMERGENCY DEPT VISIT HI MDM: CPT | Mod: 25

## 2020-11-19 PROCEDURE — 99900035 HC TECH TIME PER 15 MIN (STAT)

## 2020-11-19 PROCEDURE — 85610 PROTHROMBIN TIME: CPT

## 2020-11-19 PROCEDURE — 63600175 PHARM REV CODE 636 W HCPCS: Performed by: INTERNAL MEDICINE

## 2020-11-19 PROCEDURE — 99900031 HC PATIENT EDUCATION (STAT)

## 2020-11-19 PROCEDURE — 25500020 PHARM REV CODE 255: Performed by: INTERNAL MEDICINE

## 2020-11-19 PROCEDURE — 84484 ASSAY OF TROPONIN QUANT: CPT

## 2020-11-19 PROCEDURE — 27000221 HC OXYGEN, UP TO 24 HOURS

## 2020-11-19 PROCEDURE — 83735 ASSAY OF MAGNESIUM: CPT

## 2020-11-19 RX ORDER — IBUPROFEN 200 MG
16 TABLET ORAL
Status: DISCONTINUED | OUTPATIENT
Start: 2020-11-19 | End: 2020-11-21 | Stop reason: HOSPADM

## 2020-11-19 RX ORDER — INSULIN ASPART 100 [IU]/ML
1-10 INJECTION, SOLUTION INTRAVENOUS; SUBCUTANEOUS
Status: DISCONTINUED | OUTPATIENT
Start: 2020-11-19 | End: 2020-11-21 | Stop reason: HOSPADM

## 2020-11-19 RX ORDER — METOPROLOL SUCCINATE 25 MG/1
25 TABLET, EXTENDED RELEASE ORAL DAILY
Status: DISCONTINUED | OUTPATIENT
Start: 2020-11-19 | End: 2020-11-21 | Stop reason: HOSPADM

## 2020-11-19 RX ORDER — IBUPROFEN 200 MG
24 TABLET ORAL
Status: DISCONTINUED | OUTPATIENT
Start: 2020-11-19 | End: 2020-11-19

## 2020-11-19 RX ORDER — GLUCAGON 1 MG
1 KIT INJECTION
Status: DISCONTINUED | OUTPATIENT
Start: 2020-11-19 | End: 2020-11-19

## 2020-11-19 RX ORDER — FUROSEMIDE 10 MG/ML
60 INJECTION INTRAMUSCULAR; INTRAVENOUS
Status: COMPLETED | OUTPATIENT
Start: 2020-11-19 | End: 2020-11-19

## 2020-11-19 RX ORDER — NITROGLYCERIN 0.4 MG/1
0.4 TABLET SUBLINGUAL EVERY 5 MIN PRN
Status: DISCONTINUED | OUTPATIENT
Start: 2020-11-19 | End: 2020-11-21 | Stop reason: HOSPADM

## 2020-11-19 RX ORDER — ASPIRIN 81 MG/1
81 TABLET ORAL DAILY
Status: DISCONTINUED | OUTPATIENT
Start: 2020-11-19 | End: 2020-11-21

## 2020-11-19 RX ORDER — IBUPROFEN 200 MG
24 TABLET ORAL
Status: DISCONTINUED | OUTPATIENT
Start: 2020-11-19 | End: 2020-11-21 | Stop reason: HOSPADM

## 2020-11-19 RX ORDER — FUROSEMIDE 10 MG/ML
40 INJECTION INTRAMUSCULAR; INTRAVENOUS
Status: DISCONTINUED | OUTPATIENT
Start: 2020-11-19 | End: 2020-11-19

## 2020-11-19 RX ORDER — IPRATROPIUM BROMIDE AND ALBUTEROL SULFATE 2.5; .5 MG/3ML; MG/3ML
3 SOLUTION RESPIRATORY (INHALATION)
Status: COMPLETED | OUTPATIENT
Start: 2020-11-19 | End: 2020-11-19

## 2020-11-19 RX ORDER — FUROSEMIDE 10 MG/ML
20 INJECTION INTRAMUSCULAR; INTRAVENOUS EVERY 12 HOURS
Status: DISCONTINUED | OUTPATIENT
Start: 2020-11-19 | End: 2020-11-21

## 2020-11-19 RX ORDER — ENOXAPARIN SODIUM 100 MG/ML
1 INJECTION SUBCUTANEOUS
Status: DISCONTINUED | OUTPATIENT
Start: 2020-11-19 | End: 2020-11-21 | Stop reason: HOSPADM

## 2020-11-19 RX ORDER — IBUPROFEN 200 MG
16 TABLET ORAL
Status: DISCONTINUED | OUTPATIENT
Start: 2020-11-19 | End: 2020-11-19

## 2020-11-19 RX ORDER — ROSUVASTATIN CALCIUM 20 MG/1
40 TABLET, COATED ORAL DAILY
Status: DISCONTINUED | OUTPATIENT
Start: 2020-11-19 | End: 2020-11-21 | Stop reason: HOSPADM

## 2020-11-19 RX ORDER — PRAVASTATIN SODIUM 10 MG/1
10 TABLET ORAL
COMMUNITY
End: 2020-11-19

## 2020-11-19 RX ORDER — SODIUM CHLORIDE 0.9 % (FLUSH) 0.9 %
10 SYRINGE (ML) INJECTION
Status: DISCONTINUED | OUTPATIENT
Start: 2020-11-19 | End: 2020-11-21 | Stop reason: HOSPADM

## 2020-11-19 RX ORDER — GLUCAGON 1 MG
1 KIT INJECTION
Status: DISCONTINUED | OUTPATIENT
Start: 2020-11-19 | End: 2020-11-21 | Stop reason: HOSPADM

## 2020-11-19 RX ORDER — RANOLAZINE 500 MG/1
500 TABLET, EXTENDED RELEASE ORAL 2 TIMES DAILY
Status: DISCONTINUED | OUTPATIENT
Start: 2020-11-19 | End: 2020-11-21 | Stop reason: HOSPADM

## 2020-11-19 RX ADMIN — FUROSEMIDE 20 MG: 20 INJECTION, SOLUTION INTRAMUSCULAR; INTRAVENOUS at 08:11

## 2020-11-19 RX ADMIN — ASPIRIN 81 MG: 81 TABLET, COATED ORAL at 05:11

## 2020-11-19 RX ADMIN — METOPROLOL SUCCINATE 25 MG: 25 TABLET, FILM COATED, EXTENDED RELEASE ORAL at 05:11

## 2020-11-19 RX ADMIN — SODIUM CHLORIDE 1000 ML: 9 INJECTION, SOLUTION INTRAVENOUS at 10:11

## 2020-11-19 RX ADMIN — IPRATROPIUM BROMIDE AND ALBUTEROL SULFATE 3 ML: .5; 3 SOLUTION RESPIRATORY (INHALATION) at 11:11

## 2020-11-19 RX ADMIN — ROSUVASTATIN 40 MG: 20 TABLET, FILM COATED ORAL at 05:11

## 2020-11-19 RX ADMIN — RANOLAZINE 500 MG: 500 TABLET, EXTENDED RELEASE ORAL at 08:11

## 2020-11-19 RX ADMIN — TICAGRELOR 90 MG: 90 TABLET ORAL at 08:11

## 2020-11-19 RX ADMIN — INSULIN ASPART 8 UNITS: 100 INJECTION, SOLUTION INTRAVENOUS; SUBCUTANEOUS at 05:11

## 2020-11-19 RX ADMIN — ENOXAPARIN SODIUM 60 MG: 60 INJECTION SUBCUTANEOUS at 05:11

## 2020-11-19 RX ADMIN — FUROSEMIDE 40 MG: 10 INJECTION, SOLUTION INTRAMUSCULAR; INTRAVENOUS at 11:11

## 2020-11-19 RX ADMIN — IOHEXOL 100 ML: 350 INJECTION, SOLUTION INTRAVENOUS at 12:11

## 2020-11-19 NOTE — CARE UPDATE
11/19/20 1121   Patient Assessment/Suction   Level of Consciousness (AVPU) alert   Respiratory Effort Normal   Expansion/Accessory Muscles/Retractions no use of accessory muscles   RACHEL Breath Sounds diminished   LLL Breath Sounds diminished   RUL Breath Sounds clear   RML Breath Sounds clear   RLL Breath Sounds clear   PRE-TX-O2   SpO2 100 %   Pulse 93   Resp 17   Aerosol Therapy   $ Aerosol Therapy Charges Aerosol Treatment   Daily Review of Necessity (SVN) completed   Respiratory Treatment Status (SVN) given   Treatment Route (SVN) mask   Patient Position (SVN) Allen's   Post Treatment Assessment (SVN) increased aeration   Signs of Intolerance (SVN) none   Peak Flow   PEFR PREtreatment (L/Min) 2

## 2020-11-19 NOTE — ED NOTES
Chief Complaint   Patient presents with    Weakness       for 1 month     65-year-old male presented emergency department with generalized malaise and weakness for the past month and also complains of shortness of breath for the past month.  Patient denies fever or chills.  Denies nausea vomiting.  Patient states he has some cough.  Denies any focal weakness or numbness however has generalized malaise and weakness.  Patient has history of heart disease.  Denies nausea or vomiting

## 2020-11-19 NOTE — H&P
[facility]  History & Physical    Patient Name: Fredrick Cox  MRN: [id number]  Admission Date: 11/19/2020  Attending Physician: Trae Houston MD   Primary Care Provider: Amos Tee III, MD         Patient information was obtained from patient and ER records.     Subjective:     Principal Problem:Acute on chronic combined systolic and diastolic congestive heart failure    Chief Complaint:   Chief Complaint   Patient presents with    Weakness     for 1 month        HPI:     Fredrick Cox is a 65 year old male who presents to the ED with SOB for couple of days .  He was recently admitted to [facility] where he was admitted with myocardial infarct and got stent.  He has a history of coronary artery disease.  Ischemic cardiomyopathy with an ejection fraction of 25%. Had  STEMI  Recently  secondary to late stent thrombosis and   PCI of LAD and circumflex.    He was doing initially well immediate after discharge but gradually having worsening shortness of breath and was not able to lie flat and not able to walk without extreme shortness of breath previous couple of days and came to the hospital today.  Denied having chest pain, leg swelling, fever, production of sputum or abdominal pain.  Lab works  with elevated BNP and D-dimer but in clinical examination patient appeared to be dehydrated and having shortness of breath.  IV Lasix was given by emergency room physician and having diuresis at this moment.  CTA angiogram chest  came back with possible chronic nonocclusive clot within the pulmonary arteries.  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  Echo 4/26/20 showed ejection fraction 25% and recent post STEMI ECHO around 30%.  Despite all of this he continued to smoke.      Past Medical History:   Diagnosis Date    CHF (congestive heart failure)     Coronary artery disease      Diabetes mellitus     HLD (hyperlipidemia)     HTN (hypertension)     MI (myocardial infarction)     X's 2    Tobacco abuse        Past Surgical History:   Procedure Laterality Date    CARDIAC CATHETERIZATION  2007    x1    CARDIAC CATHETERIZATION  ?    x1    CORONARY ANGIOPLASTY WITH STENT PLACEMENT  2007 and ???    LEFT HEART CATHETERIZATION Left 12/5/2018    Procedure: Left heart cath;  Surgeon: Kenny Middleton MD;  Location: [facility] CATH LAB;  Service: Cardiology;  Laterality: Left;    LEFT HEART CATHETERIZATION Left 3/2/2020    Procedure: Left heart cath;  Surgeon: George Ho MD;  Location: [facility] CATH LAB;  Service: Cardiology;  Laterality: Left;    LEFT HEART CATHETERIZATION Left 10/23/2020    Procedure: Left heart cath;  Surgeon: Archie Pathak MD;  Location: [facility] CATH LAB;  Service: Cardiology;  Laterality: Left;       Review of patient's allergies indicates:  No Known Allergies    No current facility-administered medications on file prior to encounter.      Current Outpatient Medications on File Prior to Encounter   Medication Sig    aspirin (ECOTRIN) 81 MG EC tablet Take 1 tablet (81 mg total) by mouth once daily.    furosemide (LASIX) 40 MG tablet Take 1 tablet (40 mg total) by mouth once daily.    insulin aspart U-100 (NOVOLOG) 100 unit/mL InPn pen Inject 1-10 Units into the skin 3 (three) times daily with meals. Blood Glucose  mg/dL                  0600                0000                               1200                               1800  151-200                2 units             1 unit  201-250                4 units             2 units  251-300                6 units             3 units  301-350                8 units             4 units  >350                      10 units           5 units    insulin glargine, TOUJEO, (TOUJEO SOLOSTAR U-300 INSULIN) 300 unit/mL (1.5 mL) InPn pen Inject 30 Units into the skin every evening.    metFORMIN (GLUCOPHAGE) 500 MG tablet Take  "500 mg by mouth 2 (two) times daily with meals.    metoprolol succinate (TOPROL-XL) 25 MG 24 hr tablet Take 1 tablet (25 mg total) by mouth once daily.    nitroGLYCERIN (NITROSTAT) 0.4 MG SL tablet Place 1 tablet (0.4 mg total) under the tongue every 5 (five) minutes as needed.    ranolazine (RANEXA) 500 MG Tb12 Take 1 tablet (500 mg total) by mouth 2 (two) times daily.    rosuvastatin (CRESTOR) 40 MG Tab Take 1 tablet (40 mg total) by mouth once daily.    ticagrelor (BRILINTA) 90 mg tablet Take 1 tablet (90 mg total) by mouth 2 (two) times daily.    blood sugar diagnostic Strp 1 strip by Misc.(Non-Drug; Combo Route) route 2 (two) times daily.    pen needle, diabetic (BD INSULIN PEN NEEDLE UF MINI) 31 gauge x 3/16" Ndle USE AS DIRECTED    pen needle, diabetic 31 gauge x 5/16" Ndle 1 each by Misc.(Non-Drug; Combo Route) route 2 (two) times daily.    [DISCONTINUED] pravastatin (PRAVACHOL) 10 MG tablet Take 10 mg by mouth.     Family History     Problem Relation (Age of Onset)    No Known Problems Mother, Father, Sister, Brother, Maternal Aunt, Maternal Uncle, Paternal Aunt, Paternal Uncle, Maternal Grandmother, Maternal Grandfather, Paternal Grandmother, Paternal Grandfather        Tobacco Use    Smoking status: Current Every Day Smoker     Packs/day: 0.50     Years: 35.00     Pack years: 17.50     Types: Cigarettes    Smokeless tobacco: Never Used   Substance and Sexual Activity    Alcohol use: Yes     Alcohol/week: 0.8 standard drinks     Types: 1 Standard drinks or equivalent per week     Comment: social    Drug use: No    Sexual activity: Not on file     Review of Systems   Constitutional: Negative for chills.   HENT: Negative for congestion.    Respiratory: Positive for cough, chest tightness, shortness of breath and wheezing.    Cardiovascular: Negative for chest pain.   Gastrointestinal: Negative for abdominal distention and abdominal pain.   Genitourinary: Negative for difficulty urinating. "   Neurological: Negative for dizziness.   Psychiatric/Behavioral: Negative for agitation.     Objective:     Vital Signs (Most Recent):  Temp: 98.2 °F (36.8 °C) (11/19/20 1230)  Pulse: 99 (11/19/20 1223)  Resp: (!) 23 (11/19/20 1223)  BP: 117/80 (11/19/20 1216)  SpO2: 100 % (11/19/20 1223) Vital Signs (24h Range):  Temp:  [97.7 °F (36.5 °C)-98.2 °F (36.8 °C)] 98.2 °F (36.8 °C)  Pulse:  [] 99  Resp:  [16-23] 23  SpO2:  [95 %-100 %] 100 %  BP: (117)/(80-94) 117/80     Weight: 63.5 kg (140 lb)  Body mass index is 23.3 kg/m².    Physical Exam  Constitutional:       General: He is not in acute distress.     Appearance: He is well-developed.   HENT:      Head: Normocephalic.   Neck:      Musculoskeletal: Neck supple.   Cardiovascular:      Rate and Rhythm: Normal rate and regular rhythm.   Pulmonary:      Effort: No respiratory distress.      Breath sounds: Wheezing and rales present.   Abdominal:      General: There is no distension.      Tenderness: There is no abdominal tenderness.   Musculoskeletal: Normal range of motion.   Skin:     General: Skin is warm.      Findings: No rash.   Neurological:      Mental Status: He is alert and oriented to person, place, and time.   Psychiatric:         Mood and Affect: Mood normal.             Significant Labs:   BMP:   Recent Labs   Lab 11/19/20  1045   *      K 4.0   CL 99   CO2 27   BUN 19   CREATININE 1.3   CALCIUM 9.3   MG 2.1     CBC:   Recent Labs   Lab 11/19/20  1045   WBC 8.41   HGB 12.9*   HCT 42.0        Cardiac Markers:   Recent Labs   Lab 11/19/20  1045   BNP 2,242*       Significant Imaging: I have reviewed and interpreted all pertinent imaging results/findings within the past 24 hours.     CTA chest     FINDINGS:     This is a high quality assessment of the pulmonary arteries for  filling defect. There are 3 sites of eccentric linear filling defect  suggesting chronic clot. These include:  > left posterior basal segmental and subsegmental  pulmonary artery  branches (images 173 through 218, series 4).  > right lower lobe are pulmonary artery.  > junction of the right upper lobe pulmonary artery with proximal  apical segments (images 108 through 122).     Pulmonary arteries and aorta are not enlarged. There is right and left  coronary artery calcification and a stent in the left anterior  descending coronary artery. Right ventricle and right atrium are  mildly dilated. Contrast in the right heart has refluxed into the  hepatic veins. The interventricular is mildly bowed to the right.  There is interstitial pulmonary edema. Groundglass within the  dependent lower lobes may be alveolar edema versus atelectasis. Small  bilateral pleural effusions are present. Airways are mildly thickened  and patent.     There is no acute fracture. Bones are normally mineralized. There is  no focal osseous lesion.     IMPRESSION:        1. 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  2. There is right heart failure with pulmonary edema, bilateral  pleural effusions and dilation of the right heart.           Assessment/Plan:     * Acute on chronic combined systolic and diastolic congestive heart failure  Previous echo with ejection fraction around 30%    Plan   IV Lasix  Daily weight  Fluid restriction      Acute pulmonary embolism  Possible acute versus sub acute pulmonary embolism and associated CTEPH     Plan  Lovenox therapeutic dose   Oxygen support  NOAC change later   US legs      recent NSTEMI (non-ST elevated myocardial infarction) and LAD stent   Continue aspirin and Brilinta and other cardiac medications   Consult cardiology given complicated cardiac history and very low ejection fraction      Type 2 diabetes mellitus, with long-term current use of insulin  Home meds  SSI      Mixed hyperlipidemia    Home meds    Tobacco abuse  counseled for stopping      Essential hypertension  Continue home meds      VTE Risk Mitigation  (From admission, onward)         Ordered     enoxaparin injection 60 mg  Every 12 hours (non-standard times)      11/19/20 1305     IP VTE HIGH RISK PATIENT  Once      11/19/20 1320     Place sequential compression device  Until discontinued      11/19/20 1320                   Trae Houston MD  Department of Hospital Medicine   Count includes the [facility]

## 2020-11-19 NOTE — HPI
admission note  Fredrick Cox is a 65 year old male who presents to the ED with SOB for couple of days .  He was recently admitted to [facility] where he was admitted with myocardial infarct and got stent.  He has a history of coronary artery disease.  Ischemic cardiomyopathy with an ejection fraction of 25%. Had  STEMI  Recently  secondary to late stent thrombosis and   PCI of LAD and circumflex.    He was doing initially well immediate after discharge but gradually having worsening shortness of breath and was not able to lie flat and not able to walk without extreme shortness of breath previous couple of days and came to the hospital today.  Denied having chest pain, leg swelling, fever, production of sputum or abdominal pain.  Lab works  with elevated BNP and D-dimer but in clinical examination patient appeared to be dehydrated and having shortness of breath.  IV Lasix was given by emergency room physician and having diuresis at this moment.  CTA angiogram chest  came back with possible chronic nonocclusive clot within the pulmonary arteries.  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  Echo 4/26/20 showed ejection fraction 25% and recent post STEMI ECHO around 30%.  Despite all of this he continued to smoke.

## 2020-11-19 NOTE — ASSESSMENT & PLAN NOTE
Previous echo with ejection fraction around 30%    Plan   IV Lasix  Daily weight  Fluid restriction

## 2020-11-19 NOTE — ASSESSMENT & PLAN NOTE
Possible acute versus septic acute pulmonary embolism and associated CTEPH    Plan  Lovenox therapeutic dose  Oxygen support  NOAC change later   US legs

## 2020-11-19 NOTE — ED PROVIDER NOTES
Encounter Date: 11/19/2020       History     Chief Complaint   Patient presents with    Weakness     for 1 month     65-year-old male presented emergency department with generalized malaise and weakness for the past month and also complains of shortness of breath for the past month.  Patient denies fever or chills.  Denies nausea vomiting.  Patient states he has some cough.  Denies any focal weakness or numbness however has generalized malaise and weakness.  Patient has history of heart disease.  Denies nausea or vomiting        Review of patient's allergies indicates:  No Known Allergies  Past Medical History:   Diagnosis Date    CHF (congestive heart failure)     Coronary artery disease     Diabetes mellitus     HLD (hyperlipidemia)     HTN (hypertension)     MI (myocardial infarction)     X's 2    Tobacco abuse      Past Surgical History:   Procedure Laterality Date    CARDIAC CATHETERIZATION  2007    x1    CARDIAC CATHETERIZATION  ?    x1    CORONARY ANGIOPLASTY WITH STENT PLACEMENT  2007 and ???    LEFT HEART CATHETERIZATION Left 12/5/2018    Procedure: Left heart cath;  Surgeon: Kenny Middleton MD;  Location: [facility] CATH LAB;  Service: Cardiology;  Laterality: Left;    LEFT HEART CATHETERIZATION Left 3/2/2020    Procedure: Left heart cath;  Surgeon: George Ho MD;  Location: [facility] CATH LAB;  Service: Cardiology;  Laterality: Left;    LEFT HEART CATHETERIZATION Left 10/23/2020    Procedure: Left heart cath;  Surgeon: Archie Pathak MD;  Location: [facility] CATH LAB;  Service: Cardiology;  Laterality: Left;     Family History   Problem Relation Age of Onset    No Known Problems Mother     No Known Problems Father     No Known Problems Sister     No Known Problems Brother     No Known Problems Maternal Aunt     No Known Problems Maternal Uncle     No Known Problems Paternal Aunt     No Known Problems Paternal Uncle     No Known Problems Maternal Grandmother     No Known Problems  Maternal Grandfather     No Known Problems Paternal Grandmother     No Known Problems Paternal Grandfather     Amblyopia Neg Hx     Blindness Neg Hx     Cancer Neg Hx     Cataracts Neg Hx     Diabetes Neg Hx     Glaucoma Neg Hx     Hypertension Neg Hx     Macular degeneration Neg Hx     Retinal detachment Neg Hx     Strabismus Neg Hx     Stroke Neg Hx     Thyroid disease Neg Hx      Social History     Tobacco Use    Smoking status: Current Every Day Smoker     Packs/day: 0.50     Years: 35.00     Pack years: 17.50     Types: Cigarettes    Smokeless tobacco: Never Used   Substance Use Topics    Alcohol use: Yes     Alcohol/week: 0.8 standard drinks     Types: 1 Standard drinks or equivalent per week     Comment: social    Drug use: No     Review of Systems   Constitutional: Positive for fatigue.   HENT: Negative.    Eyes: Negative.    Respiratory: Positive for cough and shortness of breath.    Cardiovascular: Negative.    Gastrointestinal: Negative.    Endocrine: Negative.    Genitourinary: Negative.    Skin: Negative.    Allergic/Immunologic: Negative.    Neurological: Positive for weakness.   Hematological: Negative.    Psychiatric/Behavioral: Negative.    All other systems reviewed and are negative.      Physical Exam     Initial Vitals [11/19/20 1004]   BP Pulse Resp Temp SpO2   (!) 117/94 97 20 97.7 °F (36.5 °C) 99 %      MAP       --         Physical Exam    Nursing note and vitals reviewed.  Constitutional: He appears well-developed and well-nourished.   HENT:   Head: Normocephalic and atraumatic.   Nose: Nose normal.   Eyes: Conjunctivae and EOM are normal.   Neck: Normal range of motion. No tracheal deviation present.   Cardiovascular: Normal rate, regular rhythm, normal heart sounds and intact distal pulses. Exam reveals no friction rub.    No murmur heard.  Pulmonary/Chest: Breath sounds normal. No respiratory distress. He has no wheezes. He has no rales.   Abdominal: Soft. He exhibits no  distension. There is no abdominal tenderness.   Musculoskeletal: Normal range of motion.   Neurological: He is alert and oriented to person, place, and time. He has normal strength. No sensory deficit. GCS score is 15. GCS eye subscore is 4. GCS verbal subscore is 5. GCS motor subscore is 6.   Skin: Skin is warm and dry. Capillary refill takes less than 2 seconds.   Psychiatric: He has a normal mood and affect. Thought content normal.         ED Course   Procedures  Labs Reviewed   CBC W/ AUTO DIFFERENTIAL - Abnormal; Notable for the following components:       Result Value    Hemoglobin 12.9 (*)     MCH 25.3 (*)     MCHC 30.7 (*)     Gran % 74.0 (*)     Mono % 3.4 (*)     All other components within normal limits   COMPREHENSIVE METABOLIC PANEL - Abnormal; Notable for the following components:    Glucose 224 (*)     Total Bilirubin 1.4 (*)     eGFR if non  56.9 (*)     All other components within normal limits   B-TYPE NATRIURETIC PEPTIDE - Abnormal; Notable for the following components:    BNP 2,242 (*)     All other components within normal limits   PROTIME-INR - Abnormal; Notable for the following components:    PT 16.1 (*)     All other components within normal limits   D DIMER, QUANTITATIVE - Abnormal; Notable for the following components:    D-Dimer 2.02 (*)     All other components within normal limits    Narrative:     D-dimer critical result(s) called and verbal readback obtained from   Neetu Paris RN by [facility] 11/19/2020 11:15   POCT GLUCOSE - Abnormal; Notable for the following components:    POC Glucose 206 (*)     All other components within normal limits   TROPONIN I   SARS-COV-2 RNA AMPLIFICATION, QUAL   MAGNESIUM     EKG Readings: (Independently Interpreted)   Initial Reading: No STEMI. Rhythm: Normal Sinus Rhythm. Ectopy: No Ectopy. Conduction: RBBB.     ECG Results          EKG 12-lead (Final result)  Result time 11/19/20 11:42:51    Final result by Interface, Lab In [facility] (11/19/20  11:42:51)                 Narrative:    Test Reason : R06.02,    Vent. Rate : 097 BPM     Atrial Rate : 097 BPM     P-R Int : 166 ms          QRS Dur : 148 ms      QT Int : 410 ms       P-R-T Axes : 049 228 012 degrees     QTc Int : 520 ms    Sinus rhythm with occasional Premature ventricular complexes  Possible Left atrial enlargement  Right bundle branch block  Anteroseptal infarct (cited on or before 04-DEC-2018)  Abnormal ECG  When compared with ECG of 24-OCT-2020 10:25,  Premature ventricular complexes are now Present  Questionable change in initial forces of Lateral leads  Confirmed by Johann Wilson MD ([id number]) on 11/19/2020 11:42:41 AM    Referred By: [email or web address]   SELF           Confirmed By:Johann Wilson MD                            Imaging Results          X-Ray Chest AP Portable (Final result)  Result time 11/19/20 10:43:00    Final result by Jefferson Zapata MD (11/19/20 10:43:00)                 Narrative:    HISTORY: CHF, weakness.    FINDINGS: Portable chest radiograph at 1043 hours compared to multiple  prior exams including 09/13/2020 shows the cardiac silhouette is  enlarged, with the pulmonary vasculature are within normal limits.  There are changes of prior coronary endovascular stenting.    The lungs are symmetrically inflated, with right basilar opacities and  minor blunting of the right lateral costophrenic angle. There is no  consolidation or evidence of holden interstitial pulmonary edema, with  no large left pleural effusion or pneumothorax. No acute osseous  abnormality.    IMPRESSION: Cardiomegaly, with small right pleural effusion.    Electronically Signed by Jefferson CHARLES on 11/19/2020 10:48 AM                               Medical Decision Making:   Differential Diagnosis:   Patient presented emergency department with 1 month of generalized malaise and weakness and shortness of breath.  Patient also has a cough.  Chest x-ray shows evidence of cardiomegaly and pleural effusion and  patient has evidence of fluid overload based on labs.  Renal function within normal limits as D-dimer high and patient short of breath will do CT scan for further evaluation.  Hospital Medicine consulted for evaluation for admission and further management.  Clinical Tests:   Lab Tests: Reviewed  Radiological Study: Reviewed  Medical Tests: Reviewed                             Clinical Impression:       ICD-10-CM ICD-9-CM   1. Systolic congestive heart failure, unspecified HF chronicity  I50.20 428.20     428.0   2. Shortness of breath  R06.02 786.05   3. Generalized weakness  R53.1 780.79                          ED Disposition Condition    Admit                             Tasha Woodard MD  11/19/20 [id number]

## 2020-11-19 NOTE — NURSING
Spoke to lab regarding results for troponin timed for 1400.  Lab states that specimen has not been collected.  Phleb will be sent to draw lab asap.

## 2020-11-19 NOTE — SUBJECTIVE & OBJECTIVE
Past Medical History:   Diagnosis Date    CHF (congestive heart failure)     Coronary artery disease     Diabetes mellitus     HLD (hyperlipidemia)     HTN (hypertension)     MI (myocardial infarction)     X's 2    Tobacco abuse        Past Surgical History:   Procedure Laterality Date    CARDIAC CATHETERIZATION  2007    x1    CARDIAC CATHETERIZATION  ?    x1    CORONARY ANGIOPLASTY WITH STENT PLACEMENT  2007 and ???    LEFT HEART CATHETERIZATION Left 12/5/2018    Procedure: Left heart cath;  Surgeon: Kenny Middleton MD;  Location: [facility] CATH LAB;  Service: Cardiology;  Laterality: Left;    LEFT HEART CATHETERIZATION Left 3/2/2020    Procedure: Left heart cath;  Surgeon: George Ho MD;  Location: [facility] CATH LAB;  Service: Cardiology;  Laterality: Left;    LEFT HEART CATHETERIZATION Left 10/23/2020    Procedure: Left heart cath;  Surgeon: Archie Pathak MD;  Location: [facility] CATH LAB;  Service: Cardiology;  Laterality: Left;       Review of patient's allergies indicates:  No Known Allergies    No current facility-administered medications on file prior to encounter.      Current Outpatient Medications on File Prior to Encounter   Medication Sig    aspirin (ECOTRIN) 81 MG EC tablet Take 1 tablet (81 mg total) by mouth once daily.    furosemide (LASIX) 40 MG tablet Take 1 tablet (40 mg total) by mouth once daily.    insulin aspart U-100 (NOVOLOG) 100 unit/mL InPn pen Inject 1-10 Units into the skin 3 (three) times daily with meals. Blood Glucose  mg/dL                  0600                0000                               1200                               1800  151-200                2 units             1 unit  201-250                4 units             2 units  251-300                6 units             3 units  301-350                8 units             4 units  >350                      10 units           5 units    insulin glargine, TOUJEO, (TOUJEO SOLOSTAR U-300 INSULIN) 300 unit/mL (1.5  "mL) InPn pen Inject 30 Units into the skin every evening.    metFORMIN (GLUCOPHAGE) 500 MG tablet Take 500 mg by mouth 2 (two) times daily with meals.    metoprolol succinate (TOPROL-XL) 25 MG 24 hr tablet Take 1 tablet (25 mg total) by mouth once daily.    nitroGLYCERIN (NITROSTAT) 0.4 MG SL tablet Place 1 tablet (0.4 mg total) under the tongue every 5 (five) minutes as needed.    ranolazine (RANEXA) 500 MG Tb12 Take 1 tablet (500 mg total) by mouth 2 (two) times daily.    rosuvastatin (CRESTOR) 40 MG Tab Take 1 tablet (40 mg total) by mouth once daily.    ticagrelor (BRILINTA) 90 mg tablet Take 1 tablet (90 mg total) by mouth 2 (two) times daily.    blood sugar diagnostic Strp 1 strip by Misc.(Non-Drug; Combo Route) route 2 (two) times daily.    pen needle, diabetic (BD INSULIN PEN NEEDLE UF MINI) 31 gauge x 3/16" Ndle USE AS DIRECTED    pen needle, diabetic 31 gauge x 5/16" Ndle 1 each by Misc.(Non-Drug; Combo Route) route 2 (two) times daily.    [DISCONTINUED] pravastatin (PRAVACHOL) 10 MG tablet Take 10 mg by mouth.     Family History     Problem Relation (Age of Onset)    No Known Problems Mother, Father, Sister, Brother, Maternal Aunt, Maternal Uncle, Paternal Aunt, Paternal Uncle, Maternal Grandmother, Maternal Grandfather, Paternal Grandmother, Paternal Grandfather        Tobacco Use    Smoking status: Current Every Day Smoker     Packs/day: 0.50     Years: 35.00     Pack years: 17.50     Types: Cigarettes    Smokeless tobacco: Never Used   Substance and Sexual Activity    Alcohol use: Yes     Alcohol/week: 0.8 standard drinks     Types: 1 Standard drinks or equivalent per week     Comment: social    Drug use: No    Sexual activity: Not on file     Review of Systems   Constitutional: Negative for chills.   HENT: Negative for congestion.    Respiratory: Positive for cough, chest tightness, shortness of breath and wheezing.    Cardiovascular: Negative for chest pain.   Gastrointestinal: " Negative for abdominal distention and abdominal pain.   Genitourinary: Negative for difficulty urinating.   Neurological: Negative for dizziness.   Psychiatric/Behavioral: Negative for agitation.     Objective:     Vital Signs (Most Recent):  Temp: 98.2 °F (36.8 °C) (11/19/20 1230)  Pulse: 99 (11/19/20 1223)  Resp: (!) 23 (11/19/20 1223)  BP: 117/80 (11/19/20 1216)  SpO2: 100 % (11/19/20 1223) Vital Signs (24h Range):  Temp:  [97.7 °F (36.5 °C)-98.2 °F (36.8 °C)] 98.2 °F (36.8 °C)  Pulse:  [] 99  Resp:  [16-23] 23  SpO2:  [95 %-100 %] 100 %  BP: (117)/(80-94) 117/80     Weight: 63.5 kg (140 lb)  Body mass index is 23.3 kg/m².    Physical Exam  Constitutional:       General: He is not in acute distress.     Appearance: He is well-developed.   HENT:      Head: Normocephalic.   Neck:      Musculoskeletal: Neck supple.   Cardiovascular:      Rate and Rhythm: Normal rate and regular rhythm.   Pulmonary:      Effort: No respiratory distress.      Breath sounds: Wheezing and rales present.   Abdominal:      General: There is no distension.      Tenderness: There is no abdominal tenderness.   Musculoskeletal: Normal range of motion.   Skin:     General: Skin is warm.      Findings: No rash.   Neurological:      Mental Status: He is alert and oriented to person, place, and time.   Psychiatric:         Mood and Affect: Mood normal.             Significant Labs:   BMP:   Recent Labs   Lab 11/19/20  1045   *      K 4.0   CL 99   CO2 27   BUN 19   CREATININE 1.3   CALCIUM 9.3   MG 2.1     CBC:   Recent Labs   Lab 11/19/20  1045   WBC 8.41   HGB 12.9*   HCT 42.0        Cardiac Markers:   Recent Labs   Lab 11/19/20  1045   BNP 2,242*       Significant Imaging: I have reviewed and interpreted all pertinent imaging results/findings within the past 24 hours.     CTA chest     FINDINGS:     This is a high quality assessment of the pulmonary arteries for  filling defect. There are 3 sites of eccentric linear  filling defect  suggesting chronic clot. These include:  > left posterior basal segmental and subsegmental pulmonary artery  branches (images 173 through 218, series 4).  > right lower lobe are pulmonary artery.  > junction of the right upper lobe pulmonary artery with proximal  apical segments (images 108 through 122).     Pulmonary arteries and aorta are not enlarged. There is right and left  coronary artery calcification and a stent in the left anterior  descending coronary artery. Right ventricle and right atrium are  mildly dilated. Contrast in the right heart has refluxed into the  hepatic veins. The interventricular is mildly bowed to the right.  There is interstitial pulmonary edema. Groundglass within the  dependent lower lobes may be alveolar edema versus atelectasis. Small  bilateral pleural effusions are present. Airways are mildly thickened  and patent.     There is no acute fracture. Bones are normally mineralized. There is  no focal osseous lesion.     IMPRESSION:        1. 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  2. There is right heart failure with pulmonary edema, bilateral  pleural effusions and dilation of the right heart.

## 2020-11-19 NOTE — ASSESSMENT & PLAN NOTE
Continue aspirin and Brilinta  Consult cardiology given complicated cardiac history and very low ejection fraction

## 2020-11-20 LAB
ANION GAP SERPL CALC-SCNC: 12 MMOL/L (ref 8–16)
BUN SERPL-MCNC: 23 MG/DL (ref 8–23)
CALCIUM SERPL-MCNC: 9 MG/DL (ref 8.7–10.5)
CHLORIDE SERPL-SCNC: 96 MMOL/L (ref 95–110)
CO2 SERPL-SCNC: 30 MMOL/L (ref 23–29)
CREAT SERPL-MCNC: 1.3 MG/DL (ref 0.5–1.4)
ERYTHROCYTE [DISTWIDTH] IN BLOOD BY AUTOMATED COUNT: 14.5 % (ref 11.5–14.5)
EST. GFR  (AFRICAN AMERICAN): >60 ML/MIN/1.73 M^2
EST. GFR  (NON AFRICAN AMERICAN): 56.9 ML/MIN/1.73 M^2
GLUCOSE SERPL-MCNC: 135 MG/DL (ref 70–110)
GLUCOSE SERPL-MCNC: 154 MG/DL (ref 70–110)
GLUCOSE SERPL-MCNC: 215 MG/DL (ref 70–110)
GLUCOSE SERPL-MCNC: 267 MG/DL (ref 70–110)
GLUCOSE SERPL-MCNC: 311 MG/DL (ref 70–110)
HCT VFR BLD AUTO: 40.9 % (ref 40–54)
HGB BLD-MCNC: 12.6 G/DL (ref 14–18)
MCH RBC QN AUTO: 25.3 PG (ref 27–31)
MCHC RBC AUTO-ENTMCNC: 30.8 G/DL (ref 32–36)
MCV RBC AUTO: 82 FL (ref 82–98)
PLATELET # BLD AUTO: 210 K/UL (ref 150–350)
PMV BLD AUTO: 10.2 FL (ref 9.2–12.9)
POTASSIUM SERPL-SCNC: 3.7 MMOL/L (ref 3.5–5.1)
RBC # BLD AUTO: 4.99 M/UL (ref 4.6–6.2)
SODIUM SERPL-SCNC: 138 MMOL/L (ref 136–145)
WBC # BLD AUTO: 9.27 K/UL (ref 3.9–12.7)

## 2020-11-20 PROCEDURE — 21400001 HC TELEMETRY ROOM

## 2020-11-20 PROCEDURE — 36415 COLL VENOUS BLD VENIPUNCTURE: CPT

## 2020-11-20 PROCEDURE — 99223 1ST HOSP IP/OBS HIGH 75: CPT | Mod: ,,, | Performed by: INTERNAL MEDICINE

## 2020-11-20 PROCEDURE — 25000003 PHARM REV CODE 250: Performed by: INTERNAL MEDICINE

## 2020-11-20 PROCEDURE — 99223 PR INITIAL HOSPITAL CARE,LEVL III: ICD-10-PCS | Mod: ,,, | Performed by: INTERNAL MEDICINE

## 2020-11-20 PROCEDURE — 63600175 PHARM REV CODE 636 W HCPCS: Performed by: INTERNAL MEDICINE

## 2020-11-20 PROCEDURE — 85027 COMPLETE CBC AUTOMATED: CPT

## 2020-11-20 PROCEDURE — 80048 BASIC METABOLIC PNL TOTAL CA: CPT

## 2020-11-20 RX ORDER — PANTOPRAZOLE SODIUM 40 MG/1
40 TABLET, DELAYED RELEASE ORAL DAILY
Status: DISCONTINUED | OUTPATIENT
Start: 2020-11-21 | End: 2020-11-21 | Stop reason: HOSPADM

## 2020-11-20 RX ADMIN — ENOXAPARIN SODIUM 60 MG: 60 INJECTION SUBCUTANEOUS at 03:11

## 2020-11-20 RX ADMIN — RANOLAZINE 500 MG: 500 TABLET, EXTENDED RELEASE ORAL at 08:11

## 2020-11-20 RX ADMIN — TICAGRELOR 90 MG: 90 TABLET ORAL at 10:11

## 2020-11-20 RX ADMIN — FUROSEMIDE 20 MG: 20 INJECTION, SOLUTION INTRAMUSCULAR; INTRAVENOUS at 10:11

## 2020-11-20 RX ADMIN — RANOLAZINE 500 MG: 500 TABLET, EXTENDED RELEASE ORAL at 10:11

## 2020-11-20 RX ADMIN — ROSUVASTATIN 40 MG: 20 TABLET, FILM COATED ORAL at 10:11

## 2020-11-20 RX ADMIN — FUROSEMIDE 20 MG: 20 INJECTION, SOLUTION INTRAMUSCULAR; INTRAVENOUS at 08:11

## 2020-11-20 RX ADMIN — INSULIN ASPART 4 UNITS: 100 INJECTION, SOLUTION INTRAVENOUS; SUBCUTANEOUS at 02:11

## 2020-11-20 RX ADMIN — TICAGRELOR 90 MG: 90 TABLET ORAL at 08:11

## 2020-11-20 RX ADMIN — METOPROLOL SUCCINATE 25 MG: 25 TABLET, FILM COATED, EXTENDED RELEASE ORAL at 10:11

## 2020-11-20 RX ADMIN — ENOXAPARIN SODIUM 60 MG: 60 INJECTION SUBCUTANEOUS at 02:11

## 2020-11-20 RX ADMIN — ASPIRIN 81 MG: 81 TABLET, COATED ORAL at 10:11

## 2020-11-20 NOTE — SUBJECTIVE & OBJECTIVE
Interval History:     Review of Systems  Objective:     Vital Signs (Most Recent):  Temp: 98.1 °F (36.7 °C) (11/20/20 1115)  Pulse: 93 (11/20/20 1115)  Resp: 18 (11/20/20 1115)  BP: (!) 102/56 (11/20/20 1115)  SpO2: 100 % (11/20/20 1115) Vital Signs (24h Range):  Temp:  [98.1 °F (36.7 °C)-98.5 °F (36.9 °C)] 98.1 °F (36.7 °C)  Pulse:  [86-96] 93  Resp:  [18-22] 18  SpO2:  [97 %-100 %] 100 %  BP: ()/(56-79) 102/56     Weight: 66.2 kg (146 lb)  Body mass index is 23.57 kg/m².  No intake or output data in the 24 hours ending 11/20/20 1610   Physical Exam  Constitutional:       General: He is not in acute distress.     Appearance: He is well-developed.   HENT:      Head: Normocephalic.   Eyes:      Pupils: Pupils are equal, round, and reactive to light.   Neck:      Musculoskeletal: Neck supple.   Cardiovascular:      Rate and Rhythm: Normal rate and regular rhythm.   Pulmonary:      Effort: Pulmonary effort is normal. No respiratory distress.      Breath sounds: Rales present.   Abdominal:      General: Abdomen is flat. There is no distension.      Tenderness: There is no abdominal tenderness.   Musculoskeletal: Normal range of motion.   Skin:     Findings: No rash.   Neurological:      Mental Status: He is alert and oriented to person, place, and time.         Significant Labs:   BMP:   Recent Labs   Lab 11/19/20 1045 11/20/20 0317   * 154*    138   K 4.0 3.7   CL 99 96   CO2 27 30*   BUN 19 23   CREATININE 1.3 1.3   CALCIUM 9.3 9.0   MG 2.1  --      CBC:   Recent Labs   Lab 11/19/20 1045 11/20/20 0318   WBC 8.41 9.27   HGB 12.9* 12.6*   HCT 42.0 40.9    210     CMP:   Recent Labs   Lab 11/19/20 1045 11/20/20 0317    138   K 4.0 3.7   CL 99 96   CO2 27 30*   * 154*   BUN 19 23   CREATININE 1.3 1.3   CALCIUM 9.3 9.0   PROT 7.3  --    ALBUMIN 3.6  --    BILITOT 1.4*  --    ALKPHOS 84  --    AST 19  --    ALT 17  --    ANIONGAP 12 12   EGFRNONAA 56.9* 56.9*       Significant  Imaging: I have reviewed all pertinent imaging results/findings within the past 24 hours.

## 2020-11-20 NOTE — CONSULTS
[facility]  Department of Cardiology  Consult Note      PATIENT NAME: Fredrick Cox  MRN: [id number]  TODAY'S DATE: 11/20/2020  ADMIT DATE: 11/19/2020                          CONSULT REQUESTED BY: Trae Houston MD    SUBJECTIVE     PRINCIPAL PROBLEM: Acute on chronic combined systolic and diastolic congestive heart failure      REASON FOR CONSULT:  CHF      HPI:  65-YEAR-old male patient with a past medical history significant for diabetes, hypertension, coronary artery disease with a recent STEMI on October 23rd and he was found to have an ischemic cardiomyopathy with an ejection fraction 30% PCI of LAD and circumflex was done at [facility].  Echo reveals moderate pulmonary hypertension with PA pressure 55 in grade 3 diastolic dysfunction as well.  He was placed on Brilinta 90 mg twice a day.  He had been has been doing well except for the past couple of days he started having weakness and building up with fluid.  He is staying with his sister who lives in [place] so came to the emergency room for evaluation.  Currently is breathing on room air and was placed on IV Lasix 20 b.i.d..  He is a smoker but has not smoked in quite some time he says x-ray also looks like he has some COPD.    From H&P  Weakness        for 1 month         HPI:     Fredrick Cox is a 65 year old male who presents to the ED with SOB for couple of days .  He was recently admitted to [facility] where he was admitted with myocardial infarct and got stent.  He has a history of coronary artery disease.  Ischemic cardiomyopathy with an ejection fraction of 25%. Had  STEMI  Recently  secondary to late stent thrombosis and   PCI of LAD and circumflex.    He was doing initially well immediate after discharge but gradually having worsening shortness of breath and was not able to lie flat and not able to walk without extreme shortness of breath previous couple of days and came to the hospital today.  Denied having  chest pain, leg swelling, fever, production of sputum or abdominal pain.  Lab works  with elevated BNP and D-dimer but in clinical examination patient appeared to be dehydrated and having shortness of breath.  IV Lasix was given by emergency room physician and having diuresis at this moment.  CTA angiogram chest  came back with possible chronic nonocclusive clot within the pulmonary arteries.  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  Echo 4/26/20 showed ejection fraction 25% and recent post STEMI ECHO around 30%.  Despite all of this he continued to smoke.       Review of patient's allergies indicates:  No Known Allergies    Past Medical History:   Diagnosis Date    CHF (congestive heart failure)     Coronary artery disease     Diabetes mellitus     HLD (hyperlipidemia)     HTN (hypertension)     MI (myocardial infarction)     X's 2    Tobacco abuse      Past Surgical History:   Procedure Laterality Date    CARDIAC CATHETERIZATION  2007    x1    CARDIAC CATHETERIZATION  ?    x1    CORONARY ANGIOPLASTY WITH STENT PLACEMENT  2007 and ???    LEFT HEART CATHETERIZATION Left 12/5/2018    Procedure: Left heart cath;  Surgeon: Kenny Middleton MD;  Location: [facility] CATH LAB;  Service: Cardiology;  Laterality: Left;    LEFT HEART CATHETERIZATION Left 3/2/2020    Procedure: Left heart cath;  Surgeon: George Ho MD;  Location: [facility] CATH LAB;  Service: Cardiology;  Laterality: Left;    LEFT HEART CATHETERIZATION Left 10/23/2020    Procedure: Left heart cath;  Surgeon: Archie Pathak MD;  Location: [facility] CATH LAB;  Service: Cardiology;  Laterality: Left;     Social History     Tobacco Use    Smoking status: Current Every Day Smoker     Packs/day: 0.50     Years: 35.00     Pack years: 17.50     Types: Cigarettes    Smokeless tobacco: Never Used   Substance Use Topics    Alcohol use: Yes     Alcohol/week: 0.8  standard drinks     Types: 1 Standard drinks or equivalent per week     Comment: social    Drug use: No        REVIEW OF SYSTEMS  CONSTITUTIONAL: Negative for chills, fatigue and fever.   EYES: No double vision, No blurred vision  NEURO: No headaches, No dizziness  RESPIRATORY: Negative for cough, shortness of breath and wheezing.    CARDIOVASCULAR: Negative for chest pain. Negative for palpitations and leg swelling.   GI: Negative for abdominal pain, No melena, diarrhea, nausea and vomiting.   : Negative for dysuria and frequency, Negative for hematuria  SKIN: Negative for bruising, Negative for edema or discoloration noted.   ENDOCRINE: Negative for polyphagia, Negative for heat intolerance, Negative for cold intolerance  PSYCHIATRIC: Negative for depression, Negative for anxiety, Negative for memory loss  MUSCULOSKELETAL: Negative for neck pain, Negative for muscle weakness, Negative for back pain     OBJECTIVE     VITAL SIGNS (Most Recent)  Temp: 98.1 °F (36.7 °C) (11/20/20 1115)  Pulse: 93 (11/20/20 1115)  Resp: 18 (11/20/20 1115)  BP: (!) 102/56 (11/20/20 1115)  SpO2: 100 % (11/20/20 1115)    VENTILATION STATUS  Resp: 18 (11/20/20 1115)  SpO2: 100 % (11/20/20 1115)  Oxygen Concentration (%):  [3] 3    I & O (Last 24H):No intake or output data in the 24 hours ending 11/20/20 1501    WEIGHTS  Wt Readings from Last 1 Encounters:   11/19/20 1320 66.2 kg (146 lb)   11/19/20 1004 63.5 kg (140 lb)       PHYSICAL EXAM  GENERAL:  Thin male in no acute distress  HEENT: Normocephalic. Pupils normal and conjunctivae normal.  Mucous membranes normal, no cyanosis or icterus, trachea central,no pallor or icterus is noted..   NECK: No JVD. No bruit..   THYROID: Thyroid not enlarged. No nodules present..   CARDIAC: Regular rate and rhythm. S1 is normal.S2 is normal.  Soft systolic murmur is noted  CHEST ANATOMY: normal.   LUNGS:  Slightly diminished but clear overall no crackles noted  ABDOMEN: Soft no masses or  organomegaly.  No abdomen pulsations or bruits.  Normal bowel sounds. No pulsations and no masses felt, No guarding or rebound.   URINARY: No paul catheter   EXTREMITIES: No cyanosis, clubbing or edema noted at this time., no calf tenderness bilaterally.   PERIPHERAL VASCULAR SYSTEM: Good palpable distal pulses.   CENTRAL NERVOUS SYSTEM: No focal motor or sensory deficits noted.   SKIN: Skin without lesions, moist, well perfused.   MUSCLE STRENGTH & TONE: No noteable weakness, atrophy or abnormal movement.     HOME MEDICATIONS:  No current facility-administered medications on file prior to encounter.      Current Outpatient Medications on File Prior to Encounter   Medication Sig Dispense Refill    aspirin (ECOTRIN) 81 MG EC tablet Take 1 tablet (81 mg total) by mouth once daily.  0    furosemide (LASIX) 40 MG tablet Take 1 tablet (40 mg total) by mouth once daily. 90 tablet 3    insulin aspart U-100 (NOVOLOG) 100 unit/mL InPn pen Inject 1-10 Units into the skin 3 (three) times daily with meals. Blood Glucose  mg/dL                  0600                0000                               1200                               1800  151-200                2 units             1 unit  201-250                4 units             2 units  251-300                6 units             3 units  301-350                8 units             4 units  >350                      10 units           5 units 9 mL 11    insulin glargine, TOUJEO, (TOUJEO SOLOSTAR U-300 INSULIN) 300 unit/mL (1.5 mL) InPn pen Inject 30 Units into the skin every evening. 3 mL 3    metFORMIN (GLUCOPHAGE) 500 MG tablet Take 500 mg by mouth 2 (two) times daily with meals.      metoprolol succinate (TOPROL-XL) 25 MG 24 hr tablet Take 1 tablet (25 mg total) by mouth once daily. 90 tablet 3    nitroGLYCERIN (NITROSTAT) 0.4 MG SL tablet Place 1 tablet (0.4 mg total) under the tongue every 5 (five) minutes as needed. 25 tablet 11    ranolazine (RANEXA) 500 MG Tb12  "Take 1 tablet (500 mg total) by mouth 2 (two) times daily. 180 tablet 1    rosuvastatin (CRESTOR) 40 MG Tab Take 1 tablet (40 mg total) by mouth once daily. 90 tablet 1    ticagrelor (BRILINTA) 90 mg tablet Take 1 tablet (90 mg total) by mouth 2 (two) times daily. 60 tablet 5    blood sugar diagnostic Strp 1 strip by Misc.(Non-Drug; Combo Route) route 2 (two) times daily. 100 each 4    pen needle, diabetic (BD INSULIN PEN NEEDLE UF MINI) 31 gauge x 3/16" Ndle USE AS DIRECTED 100 each 3    pen needle, diabetic 31 gauge x 5/16" Ndle 1 each by Misc.(Non-Drug; Combo Route) route 2 (two) times daily. 100 each 0       SCHEDULED MEDS:   aspirin  81 mg Oral Daily    enoxparin  1 mg/kg Subcutaneous Q12H    furosemide (LASIX) IV  20 mg Intravenous Q12H    insulin aspart U-100  1-10 Units Subcutaneous TID WM    metoprolol succinate  25 mg Oral Daily    ranolazine  500 mg Oral BID    rosuvastatin  40 mg Oral Daily    ticagrelor  90 mg Oral BID       CONTINUOUS INFUSIONS:    PRN MEDS:dextrose 50%, dextrose 50%, glucagon (human recombinant), glucose, glucose, insulin aspart U-100, nitroGLYCERIN, sodium chloride 0.9%    LABS AND DIAGNOSTICS     CBC LAST 3 DAYS  Recent Labs   Lab 11/19/20  1045 11/20/20  0318   WBC 8.41 9.27   RBC 5.09 4.99   HGB 12.9* 12.6*   HCT 42.0 40.9   MCV 83 82   MCH 25.3* 25.3*   MCHC 30.7* 30.8*   RDW 14.4 14.5    210   MPV 9.8 10.2   GRAN 74.0*  6.2  --    LYMPH 19.5  1.6  --    MONO 3.4*  0.3  --    BASO 0.03  --    NRBC 0  --        COAGULATION LAST 3 DAYS  Recent Labs   Lab 11/19/20  1045   LABPT 16.1*   INR 1.4       CHEMISTRY LAST 3 DAYS  Recent Labs   Lab 11/19/20  1045 11/20/20  0317    138   K 4.0 3.7   CL 99 96   CO2 27 30*   ANIONGAP 12 12   BUN 19 23   CREATININE 1.3 1.3   * 154*   CALCIUM 9.3 9.0   MG 2.1  --    ALBUMIN 3.6  --    PROT 7.3  --    ALKPHOS 84  --    ALT 17  --    AST 19  --    BILITOT 1.4*  --        CARDIAC PROFILE LAST 3 DAYS  Recent Labs "   Lab 11/19/20  1045 11/19/20  1627 11/19/20  2130   BNP 2,242*  --   --    TROPONINI 0.037 0.035 0.040       ENDOCRINE LAST 3 DAYS  No results for input(s): TSH, PROCAL in the last 168 hours.    LAST ARTERIAL BLOOD GAS  ABG  No results for input(s): PH, PO2, PCO2, HCO3, BE in the last 168 hours.    LAST 7 DAYS MICROBIOLOGY   Microbiology Results (last 7 days)     ** No results found for the last 168 hours. **          MOST RECENT IMAGING  US Lower Extremity Veins Bilateral  CLINICAL HISTORY:  65 years (1955) Male PE    TECHNIQUE:  US LOWER EXTREMITY VEINS BILATERAL.  47 images obtained. Ultrasound  examination of both lower extremity deep venous systems was performed  using grayscale, color and spectral Doppler    COMPARISON:  None available.    FINDINGS:  RIGHT: The right common femoral, saphenofemoral junction, femoral, and  popliteal veins demonstrate a normal response to compression  maneuvers. There is also a normal response to respiratory variation.  The bifurcation of the common femoral vein into the superficial and  deep femoral veins is visualized.  Flow is identified in these vessels  with no evidence of intraluminal thrombus on either color Doppler or  grayscale images. The visualized portions of the right proximal calf  veins are also normal. Oral hypoechoic structure in the right  popliteal fossa measuring 3.6 x 2.5 x 0.9 cm in keeping with a Baker's  cyst.    LEFT: The left common femoral, saphenofemoral junction, femoral, and  popliteal veins demonstrate a normal response to compression  maneuvers. There is also a normal response to respiratory variation.  The bifurcation of the common femoral vein into the superficial and  deep femoral veins is visualized.  Flow is identified in these vessels  with no evidence of intraluminal thrombus on either color Doppler or  grayscale images. The visualized portions of the left proximal calf  veins are also normal.    IMPRESSION:  No evidence of acute deep  venous thrombosis in the visualized venous  segments of the bilateral lower extremities.    .    Electronically Signed by ERASMO Srinivasan on 11/19/2020 3:28 PM  CTA Chest Non-Coronary--ADDENDUM  ADDENDUM: The pulmonary artery filling defects were not present on CT  angiogram performed September 13, 2020 at [facility].    Electronically Signed by Amirah Montes De Oca on 11/19/2020 12:53 PM   ORIGINAL REPORT   CMS MANDATED QUALITY DATA - CT RADIATION 436. CT scans at this  facility utilize dose modulation, iterative reconstruction, and/or  weight based dosing when appropriate to reduce radiation dose to as  low as reasonably achievable.    PROCEDURE:    CTA CHEST NON CORONARY  dated  11/19/2020 12:08 PM    CLINICAL HISTORY:   Male 65 years of age.   PE suspected, intermediate  prob, positive D-dimer    TECHNIQUE:  CT angiogram of the chest with contrast, PE protocol,  utilizing dynamic arterial phase thin slice imaging. MIP reconstructed  images were reviewed.    COMPARISON:  None    FINDINGS:    This is a high quality assessment of the pulmonary arteries for  filling defect. There are 3 sites of eccentric linear filling defect  suggesting chronic clot. These include:  > left posterior basal segmental and subsegmental pulmonary artery  branches (images 173 through 218, series 4).  > right lower lobe are pulmonary artery.  > junction of the right upper lobe pulmonary artery with proximal  apical segments (images 108 through 122).    Pulmonary arteries and aorta are not enlarged. There is right and left  coronary artery calcification and a stent in the left anterior  descending coronary artery. Right ventricle and right atrium are  mildly dilated. Contrast in the right heart has refluxed into the  hepatic veins. The interventricular is mildly bowed to the right.  There is interstitial pulmonary edema. Groundglass within the  dependent lower lobes may be alveolar edema versus atelectasis. Small  bilateral pleural  effusions are present. Airways are mildly thickened  and patent.    There is no acute fracture. Bones are normally mineralized. There is  no focal osseous lesion.    IMPRESSION:    1. 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  2. There is right heart failure with pulmonary edema, bilateral  pleural effusions and dilation of the right heart.    Electronically Signed by Amirah Montes De Oca on 11/19/2020 12:40 PM  X-Ray Chest AP Portable  HISTORY: CHF, weakness.    FINDINGS: Portable chest radiograph at 1043 hours compared to multiple  prior exams including 09/13/2020 shows the cardiac silhouette is  enlarged, with the pulmonary vasculature are within normal limits.  There are changes of prior coronary endovascular stenting.    The lungs are symmetrically inflated, with right basilar opacities and  minor blunting of the right lateral costophrenic angle. There is no  consolidation or evidence of holden interstitial pulmonary edema, with  no large left pleural effusion or pneumothorax. No acute osseous  abnormality.    IMPRESSION: Cardiomegaly, with small right pleural effusion.    Electronically Signed by Jefferson CHARLES on 11/19/2020 10:48 AM      ECHOCARDIOGRAM RESULTS (last 5)  Results for orders placed during the hospital encounter of 10/23/20   Echo Color Flow Doppler? Yes    Narrative · The left ventricle is mildly enlarged with moderately decreased systolic   function. The estimated ejection fraction is 30%.  · There are segmental left ventricular wall motion abnormalities.  · Mild left atrial enlargement.  · Grade III diastolic dysfunction.  · Normal right ventricular systolic function.  · Mild aortic regurgitation.  · Mild mitral regurgitation.  · There is moderate pulmonary hypertension.  · Intermediate central venous pressure (8 mmHg).  · The estimated PA systolic pressure is 55 mmHg.      Results for orders placed during the hospital encounter of 04/25/20   Echo Color  Flow Doppler? Yes    Narrative · Severely decreased left ventricular systolic function. The estimated   ejection fraction is 25%.  · Akinesis of mid-distal anteroapical wall extending to apical lateral and   inferior walls c/w prior extensive LAD territory MI.  · Grade I (mild) left ventricular diastolic dysfunction consistent with   impaired relaxation.  · Normal right ventricular systolic function.  · The estimated PA systolic pressure is 28 mmHg.      Results for orders placed during the hospital encounter of 03/02/20   Echo Color Flow Doppler? Yes    Narrative · Severely decreased left ventricular systolic function. The estimated   ejection fraction is 25%.  · Eccentric left ventricular hypertrophy.  · Grade I (mild) left ventricular diastolic dysfunction consistent with   impaired relaxation.  · Local segmental wall motion abnormalities.  · Normal right ventricular systolic function.      Results for orders placed during the hospital encounter of 11/07/19   Echo Color Flow Doppler? Yes    Narrative · Mildly decreased left ventricular systolic function. The estimated   ejection fraction is 40%. Apical septal hypokinesis  · Grade I (mild) left ventricular diastolic dysfunction consistent with   impaired relaxation.  · Normal right ventricular systolic function.  · Mild tricuspid regurgitation.      Results for orders placed during the hospital encounter of 04/12/19   Transthoracic echo (TTE) 2D with Color Flow    Narrative · Low normal left ventricular systolic function. The estimated ejection   fraction is 50%  · Cannot exclude mid-distal anteroseptal/apical WMA.  · Concentric left ventricular hypertrophy.  · Normal LV diastolic function.  · Normal right ventricular systolic function.  · The estimated PA systolic pressure is 32 mm Hg  · No intracardiac source of embolus noted on this exam. If high clinical   suspicion, consider JAMI +/- bubble study.                CURRENT/PREVIOUS VISIT EKG  Results for orders  placed or performed during the hospital encounter of 11/19/20   EKG 12-lead    Collection Time: 11/19/20 10:26 AM    Narrative    Test Reason : R06.02,    Vent. Rate : 097 BPM     Atrial Rate : 097 BPM     P-R Int : 166 ms          QRS Dur : 148 ms      QT Int : 410 ms       P-R-T Axes : 049 228 012 degrees     QTc Int : 520 ms    Sinus rhythm with occasional Premature ventricular complexes  Possible Left atrial enlargement  Right bundle branch block  Anteroseptal infarct (cited on or before 04-DEC-2018)  Abnormal ECG  When compared with ECG of 24-OCT-2020 10:25,  Premature ventricular complexes are now Present  Questionable change in initial forces of Lateral leads  Confirmed by Johann Wilson MD ([id number]) on 11/19/2020 11:42:41 AM    Referred By: [id number]   SELF           Confirmed By:Johann Wilson MD     Left Main   Ost LM lesion is 40% stenosed.   Left Anterior Descending   Prox LAD lesion is 100% stenosed. This is the culprit lesion. The lesion was previously treated using a drug-eluting stent. Previous treatment took place 6-12 months ago.Previous stent with thrombosis. The stenosis was measured by a visual reading.   Previously placed Mid LAD stent (unknown type) is widely patent.   Dist LAD lesion is 100% stenosed. This is not the culprit lesion. There is chronic total occlusion. The total occlusion is greater than three months old. The lesion was not previously treated. The stenosis was measured by a visual reading.   Left Circumflex   Ost Cx to Prox Cx lesion is 35% stenosed.   Previously placed Mid Cx to Dist Cx drug eluting stent is widely patent. Previous treatment took place 6-12 months ago.No thrombosis in the previous stent. No restenosis in the previous stent. The stenosis was measured by a visual reading.   Dist Cx lesion is 70% stenosed.   Right Coronary Artery   Right Posterior Descending Artery   Collaterals   RPDA filled by collaterals from 2nd Sept.      Third Right Posterolateral Branch    Collaterals   3rd RPL filled by collaterals from 3rd Mrg.      Intervention      Prox LAD lesion   Angioplasty   The balloon was inserted and placed across lesion. An angioplasty using a standard balloon: CATH EMERGE MR 20 X 2.50 was performed independent of stent deployment. The balloon was inflated inflated multiple times. The balloon was removed following the angioplasty.   Supplies used: CATH EMERGE MR 20 X 2.50   Angioplasty   The balloon was inserted and placed across lesion. An angioplasty using a standard balloon: CATH EMERGE MR 20 X 2.50 was performed independent of stent deployment. The balloon was inflated inflated once. The balloon was removed following the angioplasty.   Supplies used: CATH EMERGE MR 20 X 2.50   Angioplasty   The balloon was inserted and placed across lesion. An angioplasty using a scoring balloon: CATH ANGIOSCULPT RX [id number] was performed independent of stent deployment. The balloon was inflated inflated multiple times. The balloon was removed following the angioplasty.   Supplies used: CATH ANGIOSCULPT RX [id number]   Post-Intervention Lesion Assessment   Intervention was planned with diagnostic cath. The intervention was successful. The guidewire crossed the lesion. Device was deployed. Diagnostic LILIAN flow is 0. Post-intervention LILIAN flow is 3.   There is a 0% residual stenosis post intervention.   Dist LAD lesion   Atherectomy   Post-Intervention Lesion Assessment   Diagnostic LILIAN flow is 0. Post-intervention LILIAN flow is 0.   There is a 100% residual stenosis post intervention.   Summary    1. Stent thrombosis of a previously placed left anterior descending artery drug-eluting stent secondary to non adherence.  2.  Plain old balloon angioplasty followed by angio sculpting with a 3 x 10 balloon  3.  Patent previously placed stents in the mid left anterior descending artery and mid left circumflex artery.  4.  Known RCA chronic total occlusion.  5.  Known distal LAD chronic total  occlusion.              ASSESSMENT/PLAN:     Active Hospital Problems    Diagnosis    *Acute on chronic combined systolic and diastolic congestive heart failure    Acute pulmonary embolism    recent NSTEMI (non-ST elevated myocardial infarction) and LAD stent     Type 2 diabetes mellitus, with long-term current use of insulin    Essential hypertension    Tobacco abuse    Mixed hyperlipidemia       ASSESSMENT & PLAN:   1.  Chronic pulmonary embolism?   2.  Recent STEMI and LAD stent-currently on Brilinta  3.  Acute on chronic combined systolic and diastolic CHF diuresed well on IV Lasix  4.  Pulmonary hypertension with right ventricular dilation and right heart failure  5.  Tobacco abuse  6.  Dyslipidemia  7.  Hypertension  8.  Elevated D-dimer probably secondary to 1.      RECOMMENDATIONS:    Continue IV Lasix 20 b.i.d.  Continue metoprolol 25 mg  Daily  Continue Brilinta 90 mg PO BID  Continue Lovenox for now, at discharge will need NOAC and stop ASA  Consider starting PPI      Nette Araujo NP  [facility]  Department of Cardiology  Date of Service: 11/20/2020        I have personally interviewed and examined the patient, I have reviewed the Nurse Practitioner's history and physical, assessment, and plan. I agree with the findings and plan.      Johann Wilson M.D.  [facility]  Department of Cardiology  Date of Service: 11/20/2020  3:01 PM

## 2020-11-20 NOTE — PLAN OF CARE
Spoke with pt in his room, verified pt's name, address, phone numbers on FS, and insurance with Medicare A & B.  Pharmacy he can use in [place] is the [facility] on [address], stated no problems getting his meds. 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 PCP is Dr Amos Tee III, in [facility] per pt.        11/20/20 1215   Discharge Assessment   Assessment Type Discharge Planning Assessment   Confirmed/corrected address and phone number on facesheet? Yes   Assessment information obtained from? Patient   Expected Length of Stay (days) 2   Communicated expected length of stay with patient/caregiver yes   Prior to hospitilization cognitive status: Alert/Oriented   Prior to hospitalization functional status: Independent   Current cognitive status: Alert/Oriented   Current Functional Status: Independent   Facility Arrived From: [facility] in [place] [facility]   Lives With alone  (Lives home alone in [place], [place])   Able to Return to Prior Arrangements yes  (back to his [facility])   Is patient able to care for self after discharge? Yes   Who are your caregiver(s) and their phone number(s)? Spouse Mrs Mary Cox at cell [phone]; Daughter Mrs Marlen Cox at cell [phone]   Patient's perception of discharge disposition home or selfcare   Readmission Within the Last 30 Days no previous admission in last 30 days   Patient currently being followed by outpatient case management? No   Patient currently receives any other outside agency services? No   Equipment Currently Used at Home none   Part D Coverage [employer]   Do you have any problems affording any of your prescribed medications? No   Is the patient taking medications as prescribed? yes   Does the patient have transportation home? Yes   Transportation Anticipated car,  drives self;family or friend will provide   Dialysis Name and Scheduled days NA   Does the patient receive services at the [facility]? No   Discharge Plan A Home with family   Discharge Plan B Home with family   DME Needed Upon Discharge  none   Patient/Family in Agreement with Plan yes

## 2020-11-20 NOTE — PLAN OF CARE
11/20/20 1631   Discharge Assessment   Assessment Type Discharge Planning Reassessment     Spoke with patient about their Preference for Patient Choice Form for HH, explained to patient that he hase the right to choose any agency, and a list of agencies were provided to patient to review, he verbalized an understanding, pt ok with first available, pt signed form, and form scanned into CM notes.

## 2020-11-20 NOTE — PLAN OF CARE
11/20/20 1543   Discharge Assessment   Assessment Type Discharge Planning Reassessment     Dr Niraj espinal asking for a  Nurse to see pt a few times per week.Living with his sister here in [place] and will retrun to stay with her he told this CM earlier, but he lives in [place], [place].

## 2020-11-20 NOTE — PROGRESS NOTES
Critical access hospital Medicine  Progress Note    Patient Name: Fredrick Cox  MRN: [id number]  Patient Class: IP- Inpatient   Admission Date: 11/19/2020  Length of Stay: 1 days  Attending Physician: Trae Houston MD  Primary Care Provider: Amos Tee III, MD        Subjective:     Principal Problem:Acute on chronic combined systolic and diastolic congestive heart failure        HPI:           Overview/Hospital Course:  Patient was seen and examined this morning  He is feeling a lot better  And exam with improvement    Interval History:     Review of Systems  Objective:     Vital Signs (Most Recent):  Temp: 98.1 °F (36.7 °C) (11/20/20 1115)  Pulse: 93 (11/20/20 1115)  Resp: 18 (11/20/20 1115)  BP: (!) 102/56 (11/20/20 1115)  SpO2: 100 % (11/20/20 1115) Vital Signs (24h Range):  Temp:  [98.1 °F (36.7 °C)-98.5 °F (36.9 °C)] 98.1 °F (36.7 °C)  Pulse:  [86-96] 93  Resp:  [18-22] 18  SpO2:  [97 %-100 %] 100 %  BP: ()/(56-79) 102/56     Weight: 66.2 kg (146 lb)  Body mass index is 23.57 kg/m².  No intake or output data in the 24 hours ending 11/20/20 1610   Physical Exam  Constitutional:       General: He is not in acute distress.     Appearance: He is well-developed.   HENT:      Head: Normocephalic.   Eyes:      Pupils: Pupils are equal, round, and reactive to light.   Neck:      Musculoskeletal: Neck supple.   Cardiovascular:      Rate and Rhythm: Normal rate and regular rhythm.   Pulmonary:      Effort: Pulmonary effort is normal. No respiratory distress.      Breath sounds: Rales present.   Abdominal:      General: Abdomen is flat. There is no distension.      Tenderness: There is no abdominal tenderness.   Musculoskeletal: Normal range of motion.   Skin:     Findings: No rash.   Neurological:      Mental Status: He is alert and oriented to person, place, and time.         Significant Labs:   BMP:   Recent Labs   Lab 11/19/20  1045 11/20/20  0317   * 154*    138   K 4.0 3.7   CL 99  96   CO2 27 30*   BUN 19 23   CREATININE 1.3 1.3   CALCIUM 9.3 9.0   MG 2.1  --      CBC:   Recent Labs   Lab 11/19/20  1045 11/20/20  0318   WBC 8.41 9.27   HGB 12.9* 12.6*   HCT 42.0 40.9    210     CMP:   Recent Labs   Lab 11/19/20  1045 11/20/20  0317    138   K 4.0 3.7   CL 99 96   CO2 27 30*   * 154*   BUN 19 23   CREATININE 1.3 1.3   CALCIUM 9.3 9.0   PROT 7.3  --    ALBUMIN 3.6  --    BILITOT 1.4*  --    ALKPHOS 84  --    AST 19  --    ALT 17  --    ANIONGAP 12 12   EGFRNONAA 56.9* 56.9*       Significant Imaging: I have reviewed all pertinent imaging results/findings within the past 24 hours.      Assessment/Plan:      * Acute on chronic combined systolic and diastolic congestive heart failure  Previous echo with ejection fraction around 30%    Plan   IV Lasix  Daily weight  Fluid restriction      Acute pulmonary embolism  Possible acute versus septic acute pulmonary embolism and associated CTEPH    Plan  Change to eliquis   Oxygen support  US legs neg      recent NSTEMI (non-ST elevated myocardial infarction) and LAD stent   Continue aspirin and Brilinta, Betablocker   cardiology follow up      Type 2 diabetes mellitus, with long-term current use of insulin  Home meds  SSI      Mixed hyperlipidemia    Home meds    Tobacco abuse  He stopped smoking       Essential hypertension  Continue home meds        VTE Risk Mitigation (From admission, onward)         Ordered     enoxaparin injection 60 mg  Every 12 hours (non-standard times)      11/19/20 1305     IP VTE HIGH RISK PATIENT  Once      11/19/20 1320     Place sequential compression device  Until discontinued      11/19/20 1320                Discharge Planning   MARITA:      Code Status: Full Code   Is the patient medically ready for discharge?:     Reason for patient still in hospital (select all that apply): Treatment  Discharge Plan A: Home with family                  Trae Houston MD  Department of Hospital Medicine   [facility]  [facility]

## 2020-11-20 NOTE — PHYSICIAN QUERY
"PT Name: Fredrick Cox  MR #: [id number]     Documentation Clarification      CDS/: Lesia Malone RN, CCDS               Contact information:  [phone]    This form is a permanent document in the medical record.     Query Date: November 20, 2020    By submitting this query, we are merely seeking further clarification of documentation. Please utilize your independent clinical judgment when addressing the question(s) below.    The Medical Record reflects the following:    Supporting Clinical Findings Location in Medical Record   He was recently admitted to [facility] where he was admitted with myocardial infarct and got stent.  He has a history of coronary artery disease.  Ischemic cardiomyopathy with an ejection fraction of 25%. Had  STEMI  Recently  secondary to late stent thrombosis and   PCI of LAD and circumflex.    Past surgical history:  Left heart catheterization  Date  10/23/2020    recent NSTEMI (non-ST elevated myocardial infarction) and LAD stent      Continue aspirin and Brilinta and other cardiac medications     Consult cardiology given complicated cardiac history and very low ejection fraction History and Physical            History and Physical      History and Physical                                                                                Provider, please provide diagnosis or diagnoses associated with above clinical findings.  Please further specify:  "was recently admitted to [facility] where he was admitted with myocardial infarct" and "Had  STEMI  Recently" and "recent NSTEMI (non-ST elevated myocardial infarction)."     [   ] STEMI occurred less than or equal to four weeks prior to this admission   [   ] STEMI occurred greater than four weeks prior to this admission   [  x ] NSTEMI occurred less than or equal to four weeks prior to this admission   [   ] NSTEMI occurred greater than four weeks prior to this admission   [   ] Myocardial infarction, " unspecified type, occurred less than or equal to four weeks prior to this admission   [   ] Myocardial infarction, unspecified type, occurred greater than four weeks prior to this admission   [   ] Other (please specify): ____________   [  ] Clinically undetermined

## 2020-11-20 NOTE — PLAN OF CARE
11/19/20 0400   PRE-TX-O2   O2 Device (Oxygen Therapy) nasal cannula   $ Is the patient on Low Flow Oxygen? Yes   Flow (L/min) 3   SpO2 98 %   Pulse Oximetry Type Intermittent   $ Pulse Oximetry - Multiple Charge Pulse Oximetry - Multiple   Education   $ Education Other (see comment);15 min  (oxygen)   Respiratory Evaluation   $ Care Plan Tech Time 15 min

## 2020-11-20 NOTE — HOSPITAL COURSE
Patient was admitted with shortness of breath heart failure exacerbation.  He had complicated cardiac history and recently had the stents.  D-dimer was elevated and CTA of the chest was done and show pulmonary embolism but possible it could be subacute.  Ultrasound legs are negative.  Patient was given IV diuretics and patient improved.  IV Lovenox started and later changed to Eliquis and aspirin was discontinued.  Patient was advised to follow-up with his own doctor [facility] and home health with heart failure nurse arranged.

## 2020-11-20 NOTE — ASSESSMENT & PLAN NOTE
Possible acute versus septic acute pulmonary embolism and associated CTEPH    Plan  Change to eliquis   Oxygen support  US legs neg

## 2020-11-21 VITALS
OXYGEN SATURATION: 99 % | BODY MASS INDEX: 23.46 KG/M2 | HEART RATE: 92 BPM | RESPIRATION RATE: 17 BRPM | SYSTOLIC BLOOD PRESSURE: 96 MMHG | WEIGHT: 146 LBS | DIASTOLIC BLOOD PRESSURE: 60 MMHG | HEIGHT: 66 IN | TEMPERATURE: 98 F

## 2020-11-21 LAB
ANION GAP SERPL CALC-SCNC: 11 MMOL/L (ref 8–16)
BNP SERPL-MCNC: 1692 PG/ML (ref 0–99)
BUN SERPL-MCNC: 23 MG/DL (ref 8–23)
CALCIUM SERPL-MCNC: 9.1 MG/DL (ref 8.7–10.5)
CHLORIDE SERPL-SCNC: 95 MMOL/L (ref 95–110)
CO2 SERPL-SCNC: 27 MMOL/L (ref 23–29)
CREAT SERPL-MCNC: 1.5 MG/DL (ref 0.5–1.4)
EST. GFR  (AFRICAN AMERICAN): 55.3 ML/MIN/1.73 M^2
EST. GFR  (NON AFRICAN AMERICAN): 47.8 ML/MIN/1.73 M^2
GLUCOSE SERPL-MCNC: 175 MG/DL (ref 70–110)
GLUCOSE SERPL-MCNC: 311 MG/DL (ref 70–110)
GLUCOSE SERPL-MCNC: 321 MG/DL (ref 70–110)
POTASSIUM SERPL-SCNC: 4.2 MMOL/L (ref 3.5–5.1)
SODIUM SERPL-SCNC: 133 MMOL/L (ref 136–145)

## 2020-11-21 PROCEDURE — 25000003 PHARM REV CODE 250: Performed by: NURSE PRACTITIONER

## 2020-11-21 PROCEDURE — 25000003 PHARM REV CODE 250: Performed by: INTERNAL MEDICINE

## 2020-11-21 PROCEDURE — 99233 SBSQ HOSP IP/OBS HIGH 50: CPT | Mod: ,,, | Performed by: INTERNAL MEDICINE

## 2020-11-21 PROCEDURE — 80048 BASIC METABOLIC PNL TOTAL CA: CPT

## 2020-11-21 PROCEDURE — 99233 PR SUBSEQUENT HOSPITAL CARE,LEVL III: ICD-10-PCS | Mod: ,,, | Performed by: INTERNAL MEDICINE

## 2020-11-21 PROCEDURE — 94761 N-INVAS EAR/PLS OXIMETRY MLT: CPT

## 2020-11-21 PROCEDURE — 83880 ASSAY OF NATRIURETIC PEPTIDE: CPT

## 2020-11-21 PROCEDURE — 63600175 PHARM REV CODE 636 W HCPCS: Performed by: INTERNAL MEDICINE

## 2020-11-21 PROCEDURE — 99900035 HC TECH TIME PER 15 MIN (STAT)

## 2020-11-21 PROCEDURE — 36415 COLL VENOUS BLD VENIPUNCTURE: CPT

## 2020-11-21 RX ADMIN — RANOLAZINE 500 MG: 500 TABLET, EXTENDED RELEASE ORAL at 09:11

## 2020-11-21 RX ADMIN — INSULIN ASPART 8 UNITS: 100 INJECTION, SOLUTION INTRAVENOUS; SUBCUTANEOUS at 12:11

## 2020-11-21 RX ADMIN — INSULIN ASPART 2 UNITS: 100 INJECTION, SOLUTION INTRAVENOUS; SUBCUTANEOUS at 08:11

## 2020-11-21 RX ADMIN — TICAGRELOR 90 MG: 90 TABLET ORAL at 09:11

## 2020-11-21 RX ADMIN — FUROSEMIDE 20 MG: 20 INJECTION, SOLUTION INTRAMUSCULAR; INTRAVENOUS at 09:11

## 2020-11-21 RX ADMIN — ROSUVASTATIN 40 MG: 20 TABLET, FILM COATED ORAL at 09:11

## 2020-11-21 RX ADMIN — METOPROLOL SUCCINATE 25 MG: 25 TABLET, FILM COATED, EXTENDED RELEASE ORAL at 09:11

## 2020-11-21 RX ADMIN — PANTOPRAZOLE SODIUM 40 MG: 40 TABLET, DELAYED RELEASE ORAL at 09:11

## 2020-11-21 RX ADMIN — ENOXAPARIN SODIUM 60 MG: 60 INJECTION SUBCUTANEOUS at 01:11

## 2020-11-21 RX ADMIN — ASPIRIN 81 MG: 81 TABLET, COATED ORAL at 09:11

## 2020-11-21 RX ADMIN — INSULIN ASPART 4 UNITS: 100 INJECTION, SOLUTION INTRAVENOUS; SUBCUTANEOUS at 12:11

## 2020-11-21 NOTE — PLAN OF CARE
11/21/20 1331   Final Note   Assessment Type Final Discharge Note   Anticipated Discharge Disposition Home-Health   Post-Acute Status   Post-Acute Authorization Home Health   Home Health Status Set-up Complete   Discharge Delays None known at this time   Cm received new order for home health. Pt had already signed Patient Choice Form for 1st available. Cm contacted Luci Liaison for [facility] and she stated she will contact on call nurse and pt will be seen 11/22/20.     Cm sent FS to [facility].    Pt is going to stay with his sister Jazmyn Herbert her phone #   [phone]:    [address] [place]. [address]

## 2020-11-21 NOTE — PROGRESS NOTES
[facility]  Department of Cardiology  Progress Note      PATIENT NAME: Fredrick Cox  MRN: [id number]  TODAY'S DATE: 11/21/2020  ADMIT DATE: 11/19/2020                          CONSULT REQUESTED BY: Trae Houston MD    SUBJECTIVE         11- INTERVAL HISTORY:    Patient is feeling much better. He denies chest pain or SOB. He is eager to go home.           PRINCIPAL PROBLEM: Acute on chronic combined systolic and diastolic congestive heart failure      REASON FOR CONSULT:  CHF      HPI:  65-YEAR-old male patient with a past medical history significant for diabetes, hypertension, coronary artery disease with a recent STEMI on October 23rd and he was found to have an ischemic cardiomyopathy with an ejection fraction 30% PCI of LAD and circumflex was done at [facility].  Echo reveals moderate pulmonary hypertension with PA pressure 55 in grade 3 diastolic dysfunction as well.  He was placed on Brilinta 90 mg twice a day.  He had been has been doing well except for the past couple of days he started having weakness and building up with fluid.  He is staying with his sister who lives in [place] so came to the emergency room for evaluation.  Currently is breathing on room air and was placed on IV Lasix 20 b.i.d..  He is a smoker but has not smoked in quite some time he says x-ray also looks like he has some COPD.    From H&P  Weakness        for 1 month         HPI:     Fredrick Cox is a 65 year old male who presents to the ED with SOB for couple of days .  He was recently admitted to [facility] where he was admitted with myocardial infarct and got stent.  He has a history of coronary artery disease.  Ischemic cardiomyopathy with an ejection fraction of 25%. Had  STEMI  Recently  secondary to late stent thrombosis and   PCI of LAD and circumflex.    He was doing initially well immediate after discharge but gradually having worsening shortness of breath and was not able to lie  flat and not able to walk without extreme shortness of breath previous couple of days and came to the hospital today.  Denied having chest pain, leg swelling, fever, production of sputum or abdominal pain.  Lab works  with elevated BNP and D-dimer but in clinical examination patient appeared to be dehydrated and having shortness of breath.  IV Lasix was given by emergency room physician and having diuresis at this moment.  CTA angiogram chest  came back with possible chronic nonocclusive clot within the pulmonary arteries.  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  Echo 4/26/20 showed ejection fraction 25% and recent post STEMI ECHO around 30%.  Despite all of this he continued to smoke.       Review of patient's allergies indicates:  No Known Allergies    Past Medical History:   Diagnosis Date    CHF (congestive heart failure)     Coronary artery disease     Diabetes mellitus     HLD (hyperlipidemia)     HTN (hypertension)     MI (myocardial infarction)     X's 2    Tobacco abuse      Past Surgical History:   Procedure Laterality Date    CARDIAC CATHETERIZATION  2007    x1    CARDIAC CATHETERIZATION  ?    x1    CORONARY ANGIOPLASTY WITH STENT PLACEMENT  2007 and ???    LEFT HEART CATHETERIZATION Left 12/5/2018    Procedure: Left heart cath;  Surgeon: Kenny Middleton MD;  Location: [facility] CATH LAB;  Service: Cardiology;  Laterality: Left;    LEFT HEART CATHETERIZATION Left 3/2/2020    Procedure: Left heart cath;  Surgeon: George Ho MD;  Location: [facility] CATH LAB;  Service: Cardiology;  Laterality: Left;    LEFT HEART CATHETERIZATION Left 10/23/2020    Procedure: Left heart cath;  Surgeon: Archie Pathak MD;  Location: [facility] CATH LAB;  Service: Cardiology;  Laterality: Left;     Social History     Tobacco Use    Smoking status: Current Every Day Smoker     Packs/day: 0.50     Years: 35.00     Pack  years: 17.50     Types: Cigarettes    Smokeless tobacco: Never Used   Substance Use Topics    Alcohol use: Yes     Alcohol/week: 0.8 standard drinks     Types: 1 Standard drinks or equivalent per week     Comment: social    Drug use: No        REVIEW OF SYSTEMS  CONSTITUTIONAL: Negative for chills, fatigue and fever.   EYES: No double vision, No blurred vision  NEURO: No headaches, No dizziness  RESPIRATORY: Negative for cough, shortness of breath and wheezing.    CARDIOVASCULAR: Negative for chest pain. Negative for palpitations and leg swelling.   GI: Negative for abdominal pain, No melena, diarrhea, nausea and vomiting.   : Negative for dysuria and frequency, Negative for hematuria  SKIN: Negative for bruising, Negative for edema or discoloration noted.   ENDOCRINE: Negative for polyphagia, Negative for heat intolerance, Negative for cold intolerance  PSYCHIATRIC: Negative for depression, Negative for anxiety, Negative for memory loss  MUSCULOSKELETAL: Negative for neck pain, Negative for muscle weakness, Negative for back pain     OBJECTIVE     VITAL SIGNS (Most Recent)  Temp: 97.6 °F (36.4 °C) (11/21/20 1100)  Pulse: 90 (11/21/20 1100)  Resp: 18 (11/21/20 1100)  BP: 90/65 (11/21/20 1100)  SpO2: 100 % (11/21/20 1100)    VENTILATION STATUS  Resp: 18 (11/21/20 1100)  SpO2: 100 % (11/21/20 1100)  Oxygen Concentration (%):  [3] 3    I & O (Last 24H):No intake or output data in the 24 hours ending 11/21/20 1251    WEIGHTS  Wt Readings from Last 1 Encounters:   11/19/20 1320 66.2 kg (146 lb)   11/19/20 1004 63.5 kg (140 lb)       PHYSICAL EXAM  GENERAL:  Thin male in no acute distress  HEENT: Normocephalic. Pupils normal and conjunctivae normal.  Mucous membranes normal, no cyanosis or icterus, trachea central,no pallor or icterus is noted..   NECK: No JVD. No bruit..   THYROID: Thyroid not enlarged. No nodules present..   CARDIAC: Regular rate and rhythm. S1 is normal.S2 is normal.  Soft systolic murmur is  noted  CHEST ANATOMY: normal.   LUNGS:  CTA  ABDOMEN: Soft no masses or organomegaly.  No abdomen pulsations or bruits.  Normal bowel sounds. No pulsations and no masses felt, No guarding or rebound.   URINARY: No paul catheter   EXTREMITIES: No cyanosis, clubbing or edema noted at this time., no calf tenderness bilaterally.        HOME MEDICATIONS:  No current facility-administered medications on file prior to encounter.      Current Outpatient Medications on File Prior to Encounter   Medication Sig Dispense Refill    furosemide (LASIX) 40 MG tablet Take 1 tablet (40 mg total) by mouth once daily. 90 tablet 3    insulin aspart U-100 (NOVOLOG) 100 unit/mL InPn pen Inject 1-10 Units into the skin 3 (three) times daily with meals. Blood Glucose  mg/dL                  0600                0000                               1200                               1800  151-200                2 units             1 unit  201-250                4 units             2 units  251-300                6 units             3 units  301-350                8 units             4 units  >350                      10 units           5 units 9 mL 11    insulin glargine, TOUJEO, (TOUJEO SOLOSTAR U-300 INSULIN) 300 unit/mL (1.5 mL) InPn pen Inject 30 Units into the skin every evening. 3 mL 3    metFORMIN (GLUCOPHAGE) 500 MG tablet Take 500 mg by mouth 2 (two) times daily with meals.      metoprolol succinate (TOPROL-XL) 25 MG 24 hr tablet Take 1 tablet (25 mg total) by mouth once daily. 90 tablet 3    nitroGLYCERIN (NITROSTAT) 0.4 MG SL tablet Place 1 tablet (0.4 mg total) under the tongue every 5 (five) minutes as needed. 25 tablet 11    ranolazine (RANEXA) 500 MG Tb12 Take 1 tablet (500 mg total) by mouth 2 (two) times daily. 180 tablet 1    rosuvastatin (CRESTOR) 40 MG Tab Take 1 tablet (40 mg total) by mouth once daily. 90 tablet 1    ticagrelor (BRILINTA) 90 mg tablet Take 1 tablet (90 mg total) by mouth 2 (two) times daily. 60  "tablet 5    [DISCONTINUED] aspirin (ECOTRIN) 81 MG EC tablet Take 1 tablet (81 mg total) by mouth once daily.  0    blood sugar diagnostic Strp 1 strip by Misc.(Non-Drug; Combo Route) route 2 (two) times daily. 100 each 4    pen needle, diabetic (BD INSULIN PEN NEEDLE UF MINI) 31 gauge x 3/16" Ndle USE AS DIRECTED 100 each 3    pen needle, diabetic 31 gauge x 5/16" Ndle 1 each by Misc.(Non-Drug; Combo Route) route 2 (two) times daily. 100 each 0       SCHEDULED MEDS:   aspirin  81 mg Oral Daily    enoxparin  1 mg/kg Subcutaneous Q12H    furosemide (LASIX) IV  20 mg Intravenous Q12H    insulin aspart U-100  1-10 Units Subcutaneous TID WM    metoprolol succinate  25 mg Oral Daily    pantoprazole  40 mg Oral Daily    ranolazine  500 mg Oral BID    rosuvastatin  40 mg Oral Daily    ticagrelor  90 mg Oral BID       CONTINUOUS INFUSIONS:    PRN MEDS:dextrose 50%, dextrose 50%, glucagon (human recombinant), glucose, glucose, insulin aspart U-100, nitroGLYCERIN, sodium chloride 0.9%    LABS AND DIAGNOSTICS     CBC LAST 3 DAYS  Recent Labs   Lab 11/19/20  1045 11/20/20  0318   WBC 8.41 9.27   RBC 5.09 4.99   HGB 12.9* 12.6*   HCT 42.0 40.9   MCV 83 82   MCH 25.3* 25.3*   MCHC 30.7* 30.8*   RDW 14.4 14.5    210   MPV 9.8 10.2   GRAN 74.0*  6.2  --    LYMPH 19.5  1.6  --    MONO 3.4*  0.3  --    BASO 0.03  --    NRBC 0  --        COAGULATION LAST 3 DAYS  Recent Labs   Lab 11/19/20  1045   LABPT 16.1*   INR 1.4       CHEMISTRY LAST 3 DAYS  Recent Labs   Lab 11/19/20  1045 11/20/20 0317 11/21/20  1026    138 133*   K 4.0 3.7 4.2   CL 99 96 95   CO2 27 30* 27   ANIONGAP 12 12 11   BUN 19 23 23   CREATININE 1.3 1.3 1.5*   * 154* 311*   CALCIUM 9.3 9.0 9.1   MG 2.1  --   --    ALBUMIN 3.6  --   --    PROT 7.3  --   --    ALKPHOS 84  --   --    ALT 17  --   --    AST 19  --   --    BILITOT 1.4*  --   --        CARDIAC PROFILE LAST 3 DAYS  Recent Labs   Lab 11/19/20  6477 11/19/20  5168 " 11/19/20 2130 11/21/20  1026   BNP 2,242*  --   --  1,692*   TROPONINI 0.037 0.035 0.040  --        ENDOCRINE LAST 3 DAYS  No results for input(s): TSH, PROCAL in the last 168 hours.    LAST ARTERIAL BLOOD GAS  ABG  No results for input(s): PH, PO2, PCO2, HCO3, BE in the last 168 hours.    LAST 7 DAYS MICROBIOLOGY   Microbiology Results (last 7 days)     ** No results found for the last 168 hours. **          MOST RECENT IMAGING  US Lower Extremity Veins Bilateral  CLINICAL HISTORY:  65 years (1955) Male PE    TECHNIQUE:  US LOWER EXTREMITY VEINS BILATERAL.  47 images obtained. Ultrasound  examination of both lower extremity deep venous systems was performed  using grayscale, color and spectral Doppler    COMPARISON:  None available.    FINDINGS:  RIGHT: The right common femoral, saphenofemoral junction, femoral, and  popliteal veins demonstrate a normal response to compression  maneuvers. There is also a normal response to respiratory variation.  The bifurcation of the common femoral vein into the superficial and  deep femoral veins is visualized.  Flow is identified in these vessels  with no evidence of intraluminal thrombus on either color Doppler or  grayscale images. The visualized portions of the right proximal calf  veins are also normal. Oral hypoechoic structure in the right  popliteal fossa measuring 3.6 x 2.5 x 0.9 cm in keeping with a Baker's  cyst.    LEFT: The left common femoral, saphenofemoral junction, femoral, and  popliteal veins demonstrate a normal response to compression  maneuvers. There is also a normal response to respiratory variation.  The bifurcation of the common femoral vein into the superficial and  deep femoral veins is visualized.  Flow is identified in these vessels  with no evidence of intraluminal thrombus on either color Doppler or  grayscale images. The visualized portions of the left proximal calf  veins are also normal.    IMPRESSION:  No evidence of acute deep venous  thrombosis in the visualized venous  segments of the bilateral lower extremities.    .    Electronically Signed by ERASMO Srinivasan on 11/19/2020 3:28 PM  CTA Chest Non-Coronary--ADDENDUM  ADDENDUM: The pulmonary artery filling defects were not present on CT  angiogram performed September 13, 2020 at [facility].    Electronically Signed by Amirah Montes De Oca on 11/19/2020 12:53 PM   ORIGINAL REPORT   CMS MANDATED QUALITY DATA - CT RADIATION 436. CT scans at this  facility utilize dose modulation, iterative reconstruction, and/or  weight based dosing when appropriate to reduce radiation dose to as  low as reasonably achievable.    PROCEDURE:    CTA CHEST NON CORONARY  dated  11/19/2020 12:08 PM    CLINICAL HISTORY:   Male 65 years of age.   PE suspected, intermediate  prob, positive D-dimer    TECHNIQUE:  CT angiogram of the chest with contrast, PE protocol,  utilizing dynamic arterial phase thin slice imaging. MIP reconstructed  images were reviewed.    COMPARISON:  None    FINDINGS:    This is a high quality assessment of the pulmonary arteries for  filling defect. There are 3 sites of eccentric linear filling defect  suggesting chronic clot. These include:  > left posterior basal segmental and subsegmental pulmonary artery  branches (images 173 through 218, series 4).  > right lower lobe are pulmonary artery.  > junction of the right upper lobe pulmonary artery with proximal  apical segments (images 108 through 122).    Pulmonary arteries and aorta are not enlarged. There is right and left  coronary artery calcification and a stent in the left anterior  descending coronary artery. Right ventricle and right atrium are  mildly dilated. Contrast in the right heart has refluxed into the  hepatic veins. The interventricular is mildly bowed to the right.  There is interstitial pulmonary edema. Groundglass within the  dependent lower lobes may be alveolar edema versus atelectasis. Small  bilateral pleural effusions  are present. Airways are mildly thickened  and patent.    There is no acute fracture. Bones are normally mineralized. There is  no focal osseous lesion.    IMPRESSION:    1. 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  2. There is right heart failure with pulmonary edema, bilateral  pleural effusions and dilation of the right heart.    Electronically Signed by Amirah Montes De Oca on 11/19/2020 12:40 PM  X-Ray Chest AP Portable  HISTORY: CHF, weakness.    FINDINGS: Portable chest radiograph at 1043 hours compared to multiple  prior exams including 09/13/2020 shows the cardiac silhouette is  enlarged, with the pulmonary vasculature are within normal limits.  There are changes of prior coronary endovascular stenting.    The lungs are symmetrically inflated, with right basilar opacities and  minor blunting of the right lateral costophrenic angle. There is no  consolidation or evidence of holden interstitial pulmonary edema, with  no large left pleural effusion or pneumothorax. No acute osseous  abnormality.    IMPRESSION: Cardiomegaly, with small right pleural effusion.    Electronically Signed by Jefferson CHARLES on 11/19/2020 10:48 AM      ECHOCARDIOGRAM RESULTS (last 5)  Results for orders placed during the hospital encounter of 10/23/20   Echo Color Flow Doppler? Yes    Narrative · The left ventricle is mildly enlarged with moderately decreased systolic   function. The estimated ejection fraction is 30%.  · There are segmental left ventricular wall motion abnormalities.  · Mild left atrial enlargement.  · Grade III diastolic dysfunction.  · Normal right ventricular systolic function.  · Mild aortic regurgitation.  · Mild mitral regurgitation.  · There is moderate pulmonary hypertension.  · Intermediate central venous pressure (8 mmHg).  · The estimated PA systolic pressure is 55 mmHg.      Results for orders placed during the hospital encounter of 04/25/20   Echo Color Flow Doppler?  Yes    Narrative · Severely decreased left ventricular systolic function. The estimated   ejection fraction is 25%.  · Akinesis of mid-distal anteroapical wall extending to apical lateral and   inferior walls c/w prior extensive LAD territory MI.  · Grade I (mild) left ventricular diastolic dysfunction consistent with   impaired relaxation.  · Normal right ventricular systolic function.  · The estimated PA systolic pressure is 28 mmHg.      Results for orders placed during the hospital encounter of 03/02/20   Echo Color Flow Doppler? Yes    Narrative · Severely decreased left ventricular systolic function. The estimated   ejection fraction is 25%.  · Eccentric left ventricular hypertrophy.  · Grade I (mild) left ventricular diastolic dysfunction consistent with   impaired relaxation.  · Local segmental wall motion abnormalities.  · Normal right ventricular systolic function.      Results for orders placed during the hospital encounter of 11/07/19   Echo Color Flow Doppler? Yes    Narrative · Mildly decreased left ventricular systolic function. The estimated   ejection fraction is 40%. Apical septal hypokinesis  · Grade I (mild) left ventricular diastolic dysfunction consistent with   impaired relaxation.  · Normal right ventricular systolic function.  · Mild tricuspid regurgitation.      Results for orders placed during the hospital encounter of 04/12/19   Transthoracic echo (TTE) 2D with Color Flow    Narrative · Low normal left ventricular systolic function. The estimated ejection   fraction is 50%  · Cannot exclude mid-distal anteroseptal/apical WMA.  · Concentric left ventricular hypertrophy.  · Normal LV diastolic function.  · Normal right ventricular systolic function.  · The estimated PA systolic pressure is 32 mm Hg  · No intracardiac source of embolus noted on this exam. If high clinical   suspicion, consider JAMI +/- bubble study.                CURRENT/PREVIOUS VISIT EKG  Results for orders placed or  performed during the hospital encounter of 11/19/20   EKG 12-lead    Collection Time: 11/19/20 10:26 AM    Narrative    Test Reason : R06.02,    Vent. Rate : 097 BPM     Atrial Rate : 097 BPM     P-R Int : 166 ms          QRS Dur : 148 ms      QT Int : 410 ms       P-R-T Axes : 049 228 012 degrees     QTc Int : 520 ms    Sinus rhythm with occasional Premature ventricular complexes  Possible Left atrial enlargement  Right bundle branch block  Anteroseptal infarct (cited on or before 04-DEC-2018)  Abnormal ECG  When compared with ECG of 24-OCT-2020 10:25,  Premature ventricular complexes are now Present  Questionable change in initial forces of Lateral leads  Confirmed by Johann Wilson MD ([id number]) on 11/19/2020 11:42:41 AM    Referred By: [email or web address]   SELF           Confirmed By:Johann Wilson MD     Left Main   Ost LM lesion is 40% stenosed.   Left Anterior Descending   Prox LAD lesion is 100% stenosed. This is the culprit lesion. The lesion was previously treated using a drug-eluting stent. Previous treatment took place 6-12 months ago.Previous stent with thrombosis. The stenosis was measured by a visual reading.   Previously placed Mid LAD stent (unknown type) is widely patent.   Dist LAD lesion is 100% stenosed. This is not the culprit lesion. There is chronic total occlusion. The total occlusion is greater than three months old. The lesion was not previously treated. The stenosis was measured by a visual reading.   Left Circumflex   Ost Cx to Prox Cx lesion is 35% stenosed.   Previously placed Mid Cx to Dist Cx drug eluting stent is widely patent. Previous treatment took place 6-12 months ago.No thrombosis in the previous stent. No restenosis in the previous stent. The stenosis was measured by a visual reading.   Dist Cx lesion is 70% stenosed.   Right Coronary Artery   Right Posterior Descending Artery   Collaterals   RPDA filled by collaterals from 2nd Sept.      Third Right Posterolateral Branch   Collaterals    3rd RPL filled by collaterals from 3rd Mrg.      Intervention      Prox LAD lesion   Angioplasty   The balloon was inserted and placed across lesion. An angioplasty using a standard balloon: CATH EMERGE MR 20 X 2.50 was performed independent of stent deployment. The balloon was inflated inflated multiple times. The balloon was removed following the angioplasty.   Supplies used: CATH EMERGE MR 20 X 2.50   Angioplasty   The balloon was inserted and placed across lesion. An angioplasty using a standard balloon: CATH EMERGE MR 20 X 2.50 was performed independent of stent deployment. The balloon was inflated inflated once. The balloon was removed following the angioplasty.   Supplies used: CATH EMERGE MR 20 X 2.50   Angioplasty   The balloon was inserted and placed across lesion. An angioplasty using a scoring balloon: CATH ANGIOSCULPT RX [id number] was performed independent of stent deployment. The balloon was inflated inflated multiple times. The balloon was removed following the angioplasty.   Supplies used: CATH ANGIOSCULPT RX [id number]   Post-Intervention Lesion Assessment   Intervention was planned with diagnostic cath. The intervention was successful. The guidewire crossed the lesion. Device was deployed. Diagnostic LILIAN flow is 0. Post-intervention LILIAN flow is 3.   There is a 0% residual stenosis post intervention.   Dist LAD lesion   Atherectomy   Post-Intervention Lesion Assessment   Diagnostic LILIAN flow is 0. Post-intervention LILIAN flow is 0.   There is a 100% residual stenosis post intervention.   Summary    1. Stent thrombosis of a previously placed left anterior descending artery drug-eluting stent secondary to non adherence.  2.  Plain old balloon angioplasty followed by angio sculpting with a 3 x 10 balloon  3.  Patent previously placed stents in the mid left anterior descending artery and mid left circumflex artery.  4.  Known RCA chronic total occlusion.  5.  Known distal LAD chronic total occlusion.               ASSESSMENT/PLAN:     Active Hospital Problems    Diagnosis    *Acute on chronic combined systolic and diastolic congestive heart failure    Acute pulmonary embolism    recent NSTEMI (non-ST elevated myocardial infarction) and LAD stent     Type 2 diabetes mellitus, with long-term current use of insulin    Essential hypertension    Tobacco abuse    Mixed hyperlipidemia       ASSESSMENT & PLAN:   1.  Chronic pulmonary embolism?   2.  Recent STEMI and LAD stent-currently on Brilinta  3.  Acute on chronic combined systolic and diastolic CHF diuresed well on IV Lasix  4.  Pulmonary hypertension with right ventricular dilation and right heart failure  5.  Tobacco abuse  6.  Dyslipidemia  7.  Hypertension  8.  Elevated D-dimer probably secondary to 1.      RECOMMENDATIONS:    DC IV medications and continue home medications  He can be discharged on his home regimen  Will need to add NOAC and DC ASA  He can follow up with his primary cardiologist, Dr. Zo Araujo NP  [facility]  Department of Cardiology  Date of Service: 11/21/2020        I have personally interviewed and examined the patient, I have reviewed the Nurse Practitioner's history and physical, assessment, and plan. I agree with the findings and plan.      Johann Wilson M.D.  [facility]  Department of Cardiology  Date of Service: 11/21/2020  3:01 PM

## 2020-11-21 NOTE — PLAN OF CARE
11/21/20 0825   PRE-TX-O2   O2 Device (Oxygen Therapy) room air   SpO2 99 %   Pulse Oximetry Type Intermittent   $ Pulse Oximetry - Multiple Charge Pulse Oximetry - Multiple   Pulse 90   Resp 16   Respiratory Evaluation   $ Care Plan Tech Time 15 min

## 2020-11-21 NOTE — PLAN OF CARE
11/21/20 1316   Medicare Message   Important Message from Medicare regarding Discharge Appeal Rights Given to patient/caregiver;Explained to patient/caregiver;Signed/date by patient/caregiver   Date IMM was signed 11/21/20   Time IMM was signed 131

## 2020-11-21 NOTE — DISCHARGE SUMMARY
[facility] Medicine  Discharge Summary      Patient Name: Fredrick Cox  MRN: [id number]  Admission Date: 11/19/2020  Hospital Length of Stay: 2 days  Discharge Date and Time:  11/21/2020 5:00 PM  Attending Physician: No att. providers found   Discharging Provider: Trae Houston MD  Primary Care Provider: Amos Tee III, MD      HPI:      admission note  Fredrick Cox is a 65 year old male who presents to the ED with SOB for couple of days .  He was recently admitted to [facility] where he was admitted with myocardial infarct and got stent.  He has a history of coronary artery disease.  Ischemic cardiomyopathy with an ejection fraction of 25%. Had  STEMI  Recently  secondary to late stent thrombosis and   PCI of LAD and circumflex.    He was doing initially well immediate after discharge but gradually having worsening shortness of breath and was not able to lie flat and not able to walk without extreme shortness of breath previous couple of days and came to the hospital today.  Denied having chest pain, leg swelling, fever, production of sputum or abdominal pain.  Lab works  with elevated BNP and D-dimer but in clinical examination patient appeared to be dehydrated and having shortness of breath.  IV Lasix was given by emergency room physician and having diuresis at this moment.  CTA angiogram chest  came back with possible chronic nonocclusive clot within the pulmonary arteries.  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  Echo 4/26/20 showed ejection fraction 25% and recent post STEMI ECHO around 30%.  Despite all of this he continued to smoke.           * No surgery found *      Hospital Course:   Patient was admitted with shortness of breath heart failure exacerbation.  He had complicated cardiac history and recently had the stents.  D-dimer was elevated and CTA of the chest was  done and show pulmonary embolism but possible it could be subacute.  Ultrasound legs are negative.  Patient was given IV diuretics and patient improved.  IV Lovenox started and later changed to Eliquis and aspirin was discontinued.  Patient was advised to follow-up with his own doctor Zo in [facility] and home health with heart failure nurse arranged.     Consults:   Consults (From admission, onward)        Status Ordering Provider     Inpatient consult to Cardiology  Once     Provider:  Johann Wilson MD    Completed CATHI BULLARD          No new Assessment & Plan notes have been filed under this hospital service since the last note was generated.  Service: Hospital Medicine    Final Active Diagnoses:    Diagnosis Date Noted POA    PRINCIPAL PROBLEM:  Acute on chronic combined systolic and diastolic congestive heart failure [I50.43] 01/17/2014 Yes    Acute pulmonary embolism [I26.99] 11/19/2020 Unknown    recent NSTEMI (non-ST elevated myocardial infarction) and LAD stent  [I21.4] 12/05/2018 Yes    Type 2 diabetes mellitus, with long-term current use of insulin [E11.9, Z79.4] 04/02/2014 Not Applicable     Chronic    Essential hypertension [I10] 01/17/2014 Yes     Chronic    Tobacco abuse [Z72.0] 01/17/2014 Yes     Chronic    Mixed hyperlipidemia [E78.2] 01/17/2014 Yes      Problems Resolved During this Admission:    Diagnosis Date Noted Date Resolved POA    Congestive heart failure [I50.9] 04/25/2020 11/19/2020 Yes       Discharged Condition: good    Disposition: Home or Self Care    Follow Up:  Follow-up Information     own cardiologist dr kelley In 1 week.           [facility] In 1 day.    Contact information:  [facility] will call tomorrow to make a visit.                Patient Instructions:      Ambulatory referral/consult to Home Health   Standing Status: Future   Referral Priority: Routine Referral Type: Home Health   Referral Reason: Specialty Services Required    Requested Specialty: Home Health Services   Number of Visits Requested: 1     Diet Cardiac     Activity as tolerated       Significant Diagnostic Studies: Labs:   CMP   Recent Labs   Lab 11/20/20 0317 11/21/20  1026    133*   K 3.7 4.2   CL 96 95   CO2 30* 27   * 311*   BUN 23 23   CREATININE 1.3 1.5*   CALCIUM 9.0 9.1   ANIONGAP 12 11   ESTGFRAFRICA >60.0 55.3*   EGFRNONAA 56.9* 47.8*    and CBC   Recent Labs   Lab 11/20/20 0318   WBC 9.27   HGB 12.6*   HCT 40.9          Pending Diagnostic Studies:     None         Medications:  Reconciled Home Medications:      Medication List      START taking these medications    apixaban 5 mg Tab  Commonly known as: ELIQUIS  Take 1 tablet (5 mg total) by mouth 2 (two) times daily.        CONTINUE taking these medications    blood sugar diagnostic Strp  1 strip by Misc.(Non-Drug; Combo Route) route 2 (two) times daily.     furosemide 40 MG tablet  Commonly known as: LASIX  Take 1 tablet (40 mg total) by mouth once daily.     insulin aspart U-100 100 unit/mL (3 mL) Inpn pen  Commonly known as: NovoLOG  Inject 1-10 Units into the skin 3 (three) times daily with meals. Blood Glucose  mg/dL                  0600                0000                               1200                               1800  151-200                2 units             1 unit  201-250                4 units             2 units  251-300                6 units             3 units  301-350                8 units             4 units  >350                      10 units           5 units     insulin glargine (TOUJEO) 300 unit/mL (1.5 mL) Inpn pen  Commonly known as: TOUJEO SOLOSTAR U-300 INSULIN  Inject 30 Units into the skin every evening.     metFORMIN 500 MG tablet  Commonly known as: GLUCOPHAGE  Take 500 mg by mouth 2 (two) times daily with meals.     metoprolol succinate 25 MG 24 hr tablet  Commonly known as: TOPROL-XL  Take 1 tablet (25 mg total) by mouth once daily.     nitroGLYCERIN  "0.4 MG SL tablet  Commonly known as: NITROSTAT  Place 1 tablet (0.4 mg total) under the tongue every 5 (five) minutes as needed.     * pen needle, diabetic 31 gauge x 5/16" Ndle  1 each by Misc.(Non-Drug; Combo Route) route 2 (two) times daily.     * pen needle, diabetic 31 gauge x 3/16" Ndle  Commonly known as: BD ULTRA-FINE MINI PEN NEEDLE  USE AS DIRECTED     ranolazine 500 MG Tb12  Commonly known as: RANEXA  Take 1 tablet (500 mg total) by mouth 2 (two) times daily.     rosuvastatin 40 MG Tab  Commonly known as: CRESTOR  Take 1 tablet (40 mg total) by mouth once daily.     ticagrelor 90 mg tablet  Commonly known as: BRILINTA  Take 1 tablet (90 mg total) by mouth 2 (two) times daily.         * This list has 2 medication(s) that are the same as other medications prescribed for you. Read the directions carefully, and ask your doctor or other care provider to review them with you.            STOP taking these medications    aspirin 81 MG EC tablet  Commonly known as: ECOTRIN            Indwelling Lines/Drains at time of discharge:   Lines/Drains/Airways     None                 Time spent on the discharge of patient: 34 minutes  Patient was seen and examined on the date of discharge and determined to be suitable for discharge.         Trae Houston MD  Department of Hospital Medicine  Formerly [facility], [facility]  "

## 2020-11-30 ENCOUNTER — EXTERNAL HOME HEALTH (OUTPATIENT)
Dept: HOME HEALTH SERVICES | Facility: HOSPITAL | Age: 65
End: 2020-11-30

## 2020-11-30 DIAGNOSIS — I21.02 ST ELEVATION MYOCARDIAL INFARCTION INVOLVING LEFT ANTERIOR DESCENDING (LAD) CORONARY ARTERY: Primary | ICD-10-CM

## 2020-12-01 ENCOUNTER — OFFICE VISIT (OUTPATIENT)
Dept: CARDIOLOGY | Facility: CLINIC | Age: 65
End: 2020-12-01
Payer: MEDICARE

## 2020-12-01 VITALS
BODY MASS INDEX: 22.82 KG/M2 | RESPIRATION RATE: 15 BRPM | HEIGHT: 66 IN | WEIGHT: 142 LBS | SYSTOLIC BLOOD PRESSURE: 124 MMHG | OXYGEN SATURATION: 98 % | HEART RATE: 94 BPM | DIASTOLIC BLOOD PRESSURE: 80 MMHG

## 2020-12-01 DIAGNOSIS — I25.10 CORONARY ARTERY DISEASE, ANGINA PRESENCE UNSPECIFIED, UNSPECIFIED VESSEL OR LESION TYPE, UNSPECIFIED WHETHER NATIVE OR TRANSPLANTED HEART: ICD-10-CM

## 2020-12-01 DIAGNOSIS — I10 ESSENTIAL HYPERTENSION: Chronic | ICD-10-CM

## 2020-12-01 DIAGNOSIS — I25.2 HISTORY OF MI (MYOCARDIAL INFARCTION): ICD-10-CM

## 2020-12-01 DIAGNOSIS — E78.2 MIXED HYPERLIPIDEMIA: ICD-10-CM

## 2020-12-01 DIAGNOSIS — E11.00 TYPE 2 DIABETES MELLITUS WITH HYPEROSMOLARITY WITHOUT COMA, WITH LONG-TERM CURRENT USE OF INSULIN: Chronic | ICD-10-CM

## 2020-12-01 DIAGNOSIS — I26.99 OTHER ACUTE PULMONARY EMBOLISM WITHOUT ACUTE COR PULMONALE: ICD-10-CM

## 2020-12-01 DIAGNOSIS — I70.0 AORTIC ATHEROSCLEROSIS: ICD-10-CM

## 2020-12-01 DIAGNOSIS — Z79.4 TYPE 2 DIABETES MELLITUS WITH HYPEROSMOLARITY WITHOUT COMA, WITH LONG-TERM CURRENT USE OF INSULIN: Chronic | ICD-10-CM

## 2020-12-01 DIAGNOSIS — Z72.0 TOBACCO ABUSE: Chronic | ICD-10-CM

## 2020-12-01 DIAGNOSIS — I25.10 CORONARY ARTERY DISEASE INVOLVING NATIVE CORONARY ARTERY OF NATIVE HEART WITHOUT ANGINA PECTORIS: Primary | ICD-10-CM

## 2020-12-01 DIAGNOSIS — N18.31 STAGE 3A CHRONIC KIDNEY DISEASE: ICD-10-CM

## 2020-12-01 DIAGNOSIS — I50.42 CHRONIC COMBINED SYSTOLIC AND DIASTOLIC CONGESTIVE HEART FAILURE: ICD-10-CM

## 2020-12-01 PROCEDURE — 99999 PR PBB SHADOW E&M-EST. PATIENT-LVL IV: ICD-10-PCS | Mod: PBBFAC,,, | Performed by: INTERNAL MEDICINE

## 2020-12-01 PROCEDURE — 99214 PR OFFICE/OUTPT VISIT, EST, LEVL IV, 30-39 MIN: ICD-10-PCS | Mod: S$PBB,,, | Performed by: INTERNAL MEDICINE

## 2020-12-01 PROCEDURE — 99214 OFFICE O/P EST MOD 30 MIN: CPT | Mod: S$PBB,,, | Performed by: INTERNAL MEDICINE

## 2020-12-01 PROCEDURE — 99214 OFFICE O/P EST MOD 30 MIN: CPT | Mod: PBBFAC | Performed by: INTERNAL MEDICINE

## 2020-12-01 PROCEDURE — 99999 PR PBB SHADOW E&M-EST. PATIENT-LVL IV: CPT | Mod: PBBFAC,,, | Performed by: INTERNAL MEDICINE

## 2020-12-01 RX ORDER — FUROSEMIDE 40 MG/1
40 TABLET ORAL DAILY
Qty: 90 TABLET | Refills: 3 | Status: SHIPPED | OUTPATIENT
Start: 2020-12-01 | End: 2021-03-29 | Stop reason: SDUPTHER

## 2020-12-01 RX ORDER — RANOLAZINE 500 MG/1
500 TABLET, EXTENDED RELEASE ORAL 2 TIMES DAILY
Qty: 180 TABLET | Refills: 3 | Status: SHIPPED | OUTPATIENT
Start: 2020-12-01

## 2020-12-01 RX ORDER — ROSUVASTATIN CALCIUM 40 MG/1
40 TABLET, COATED ORAL DAILY
Qty: 90 TABLET | Refills: 3 | Status: SHIPPED | OUTPATIENT
Start: 2020-12-01 | End: 2021-03-29 | Stop reason: SDUPTHER

## 2020-12-01 RX ORDER — METOPROLOL SUCCINATE 50 MG/1
50 TABLET, EXTENDED RELEASE ORAL DAILY
Qty: 90 TABLET | Refills: 3 | Status: ON HOLD | OUTPATIENT
Start: 2020-12-01 | End: 2021-06-02 | Stop reason: HOSPADM

## 2020-12-01 NOTE — PROGRESS NOTES
CARDIOVASCULAR PROGRESS NOTE    REASON FOR CONSULT:   Fredrick Cox is a 65 y.o. male who presents for follow up of CAD/ICM.    PCP: Randal  HISTORY OF PRESENT ILLNESS:   The patient returns for follow-up, accompanied by his wife.  In the interim since his last office visit with me in April 2020, he had recurrent stent thrombosis of his LAD in October 2020 and is now status post AngioSculpt POBA.  He was in [place] in November 2020 for heart failure.  At present, he denies angina, and has no holden dyspnea.  He does report some exertional fatigue.  He has had no palpitations or syncope.  He denies PND, orthopnea, or lower extremity edema.  There has been no melena, hematuria, or claudicant symptoms.  He does continue to smoke cigarettes and I have strongly encouraged to quit smoking and have referred him to the Ambulatory smoking cessation program.  He appears to be tolerating his apixaban and Brilinta treatment.  During his hospitalization in November up in [place], he was diagnosed with PE    CARDIOVASCULAR HISTORY:   CAD 3/2/20 STEMI s/p PCI LAD late stent thrombosis MICH x1/PCI dist LCx MICH x1/IVUS LM/IABP placement RFA.   10/23/20 STEMI s/p PCI prox LAD AST 3.0x10 angiosculpt    PE (CTA 11/2020)    PAST MEDICAL HISTORY:     Past Medical History:   Diagnosis Date    CHF (congestive heart failure)     Coronary artery disease     Diabetes mellitus     HLD (hyperlipidemia)     HTN (hypertension)     MI (myocardial infarction)     X's 2    Tobacco abuse        PAST SURGICAL HISTORY:     Past Surgical History:   Procedure Laterality Date    CARDIAC CATHETERIZATION  2007    x1    CARDIAC CATHETERIZATION  ?    x1    CORONARY ANGIOPLASTY WITH STENT PLACEMENT  2007 and ???    LEFT HEART CATHETERIZATION Left 12/5/2018    Procedure: Left heart cath;  Surgeon: Kenny Middleton MD;  Location: [facility] CATH LAB;  Service: Cardiology;  Laterality: Left;    LEFT HEART CATHETERIZATION Left 3/2/2020    Procedure: Left  "heart cath;  Surgeon: George Ho MD;  Location: [facility] CATH LAB;  Service: Cardiology;  Laterality: Left;    LEFT HEART CATHETERIZATION Left 10/23/2020    Procedure: Left heart cath;  Surgeon: Archie Pathak MD;  Location: [facility] CATH LAB;  Service: Cardiology;  Laterality: Left;       ALLERGIES AND MEDICATION:   Review of patient's allergies indicates:  No Known Allergies     Medication List          Accurate as of December 1, 2020 10:49 AM. If you have any questions, ask your nurse or doctor.            CONTINUE taking these medications    apixaban 5 mg Tab  Commonly known as: ELIQUIS  Take 1 tablet (5 mg total) by mouth 2 (two) times daily.     blood sugar diagnostic Strp  1 strip by Misc.(Non-Drug; Combo Route) route 2 (two) times daily.     furosemide 40 MG tablet  Commonly known as: LASIX  Take 1 tablet (40 mg total) by mouth once daily.     insulin aspart U-100 100 unit/mL (3 mL) Inpn pen  Commonly known as: NovoLOG  Inject 1-10 Units into the skin 3 (three) times daily with meals. Blood Glucose  mg/dL                  0600                0000                               1200                               1800  151-200                2 units             1 unit  201-250                4 units             2 units  251-300                6 units             3 units  301-350                8 units             4 units  >350                      10 units           5 units     insulin glargine (TOUJEO) 300 unit/mL (1.5 mL) Inpn pen  Commonly known as: TOUJEO SOLOSTAR U-300 INSULIN  Inject 30 Units into the skin every evening.     metFORMIN 500 MG tablet  Commonly known as: GLUCOPHAGE     metoprolol succinate 25 MG 24 hr tablet  Commonly known as: TOPROL-XL  Take 1 tablet (25 mg total) by mouth once daily.     nitroGLYCERIN 0.4 MG SL tablet  Commonly known as: NITROSTAT  Place 1 tablet (0.4 mg total) under the tongue every 5 (five) minutes as needed.     * pen needle, diabetic 31 gauge x 5/16" Ndle  1 each " "by Misc.(Non-Drug; Combo Route) route 2 (two) times daily.     * pen needle, diabetic 31 gauge x 3/16" Ndle  Commonly known as: BD ULTRA-FINE MINI PEN NEEDLE  USE AS DIRECTED     ranolazine 500 MG Tb12  Commonly known as: RANEXA  Take 1 tablet (500 mg total) by mouth 2 (two) times daily.     rosuvastatin 40 MG Tab  Commonly known as: CRESTOR  Take 1 tablet (40 mg total) by mouth once daily.     ticagrelor 90 mg tablet  Commonly known as: BRILINTA  Take 1 tablet (90 mg total) by mouth 2 (two) times daily.         * This list has 2 medication(s) that are the same as other medications prescribed for you. Read the directions carefully, and ask your doctor or other care provider to review them with you.                SOCIAL HISTORY:     Social History     Socioeconomic History    Marital status:      Spouse name: Not on file    Number of children: Not on file    Years of education: Not on file    Highest education level: Not on file   Occupational History    Not on file   Social Needs    Financial resource strain: Not on file    Food insecurity     Worry: Not on file     Inability: Not on file    Transportation needs     Medical: Not on file     Non-medical: Not on file   Tobacco Use    Smoking status: Current Every Day Smoker     Packs/day: 0.50     Years: 35.00     Pack years: 17.50     Types: Cigarettes    Smokeless tobacco: Never Used   Substance and Sexual Activity    Alcohol use: Yes     Alcohol/week: 0.8 standard drinks     Types: 1 Standard drinks or equivalent per week     Comment: social    Drug use: No    Sexual activity: Not on file   Lifestyle    Physical activity     Days per week: Not on file     Minutes per session: Not on file    Stress: Not on file   Relationships    Social connections     Talks on phone: Not on file     Gets together: Not on file     Attends [employer] service: Not on file     Active member of club or organization: Not on file     Attends meetings of clubs or " organizations: Not on file     Relationship status: Not on file   Other Topics Concern    Not on file   Social History Narrative    Not on file       FAMILY HISTORY:     Family History   Problem Relation Age of Onset    No Known Problems Mother     No Known Problems Father     No Known Problems Sister     No Known Problems Brother     No Known Problems Maternal Aunt     No Known Problems Maternal Uncle     No Known Problems Paternal Aunt     No Known Problems Paternal Uncle     No Known Problems Maternal Grandmother     No Known Problems Maternal Grandfather     No Known Problems Paternal Grandmother     No Known Problems Paternal Grandfather     Amblyopia Neg Hx     Blindness Neg Hx     Cancer Neg Hx     Cataracts Neg Hx     Diabetes Neg Hx     Glaucoma Neg Hx     Hypertension Neg Hx     Macular degeneration Neg Hx     Retinal detachment Neg Hx     Strabismus Neg Hx     Stroke Neg Hx     Thyroid disease Neg Hx        REVIEW OF SYSTEMS:   Review of Systems   Constitutional: Positive for malaise/fatigue. Negative for chills, diaphoresis and fever.   HENT: Negative for nosebleeds.    Eyes: Negative for blurred vision, double vision and photophobia.   Respiratory: Negative for hemoptysis, shortness of breath and wheezing.    Cardiovascular: Negative for chest pain, palpitations, orthopnea, claudication, leg swelling and PND.   Gastrointestinal: Negative for abdominal pain, blood in stool, heartburn, melena, nausea and vomiting.   Genitourinary: Negative for flank pain and hematuria.   Musculoskeletal: Negative for falls, myalgias and neck pain.   Skin: Negative for rash.   Neurological: Negative for dizziness, seizures, loss of consciousness, weakness and headaches.   Endo/Heme/Allergies: Negative for polydipsia. Does not bruise/bleed easily.   Psychiatric/Behavioral: Negative for depression and memory loss. The patient is not nervous/anxious.        PHYSICAL EXAM:     Vitals:    12/01/20 1035  "  BP: 124/80   Pulse: 94   Resp: 15    Body mass index is 22.92 kg/m².  Weight: 64.4 kg (141 lb 15.6 oz)   Height: 5' 6" (167.6 cm)     Physical Exam  Vitals signs reviewed.   Constitutional:       General: He is not in acute distress.     Appearance: He is well-developed. He is not ill-appearing, toxic-appearing or diaphoretic.   HENT:      Head: Normocephalic and atraumatic.   Eyes:      General: No scleral icterus.     Conjunctiva/sclera: Conjunctivae normal.      Pupils: Pupils are equal, round, and reactive to light.   Neck:      Musculoskeletal: Normal range of motion and neck supple. No edema or neck rigidity.      Thyroid: No thyromegaly.      Vascular: Normal carotid pulses. No carotid bruit or JVD.      Trachea: Trachea normal. No tracheal deviation.   Cardiovascular:      Rate and Rhythm: Normal rate and regular rhythm.      Pulses:           Carotid pulses are 2+ on the right side and 2+ on the left side.     Heart sounds: S1 normal and S2 normal. Heart sounds are distant. No murmur. No friction rub. No gallop.    Pulmonary:      Effort: Pulmonary effort is normal. No respiratory distress.      Breath sounds: Normal breath sounds. No stridor. No wheezing, rhonchi or rales.   Chest:      Chest wall: No tenderness.   Abdominal:      General: There is no distension.      Palpations: Abdomen is soft.   Musculoskeletal: Normal range of motion.         General: No swelling or tenderness.   Feet:      Right foot:      Skin integrity: No ulcer.      Left foot:      Skin integrity: No ulcer.   Skin:     General: Skin is warm and dry.      Findings: No erythema or rash.   Neurological:      Mental Status: He is alert and oriented to person, place, and time.      Cranial Nerves: No cranial nerve deficit.   Psychiatric:         Speech: Speech normal.         Behavior: Behavior normal. Behavior is cooperative.         DATA:   EKG: (personally reviewed tracing)  11/19/20 SR 97, RBBB, AWMI/IMI-age " [facility]    Laboratory:  CBC:  Recent Labs   Lab 10/24/20  0550 11/19/20  1045 11/20/20  0318   WBC 8.18 8.41 9.27   Hemoglobin 12.0 L 12.9 L 12.6 L   Hematocrit 39.1 L 42.0 40.9   Platelets 224 197 210       CHEMISTRIES:  Recent Labs   Lab 04/26/20  0406  11/19/20  1045 11/20/20  0317 11/21/20  1026   Glucose  --    < > 224 H 154 H 311 H   Sodium  --    < > 138 138 133 L   Potassium  --    < > 4.0 3.7 4.2   BUN  --    < > 19 23 23   Creatinine  --    < > 1.3 1.3 1.5 H   eGFR if   --    < > >60.0 >60.0 55.3 A   eGFR if non   --    < > 56.9 A 56.9 A 47.8 A   Calcium  --    < > 9.3 9.0 9.1   Magnesium 1.8  --  2.1  --   --     < > = values in this interval not displayed.       CARDIAC BIOMARKERS:  Recent Labs   Lab 10/23/20  1813  10/24/20  0550 11/19/20  1045 11/19/20  1627 11/19/20  2130    H  --  787 H  --   --   --    CPK MB 20.9 H  --  41.5 H  --   --   --    Troponin I  --    < > 23.252 H 0.037 0.035 0.040    < > = values in this interval not displayed.       COAGS:  Recent Labs   Lab 03/19/20  1839 10/23/20  1612 11/19/20  1045   INR 1.0 1.1 1.4       LIPIDS/LFTS:  Recent Labs   Lab 12/05/18  0448 04/12/19  1758  09/13/20  1643 10/23/20  1612 10/24/20  0022 11/19/20  1045   Cholesterol 235 H 235 H  --   --   --  116 L  --    Triglycerides 111 197 H  --   --   --  62  --    HDL 51 48  --   --   --  41  --    LDL Cholesterol 161.8 H 147.6  --   --   --  62.6 L  --    Non-HDL Cholesterol 184 187  --   --   --  75  --    AST  --  14   < > 10 29  --  19   ALT  --  11   < > 13 35  --  17    < > = values in this interval not displayed.       Cardiovascular Testing:  CTA Chest 11/19/20  1. There is chronic nonocclusive clot within the pulmonary arteries described above.  2. There is right heart failure with pulmonary edema, bilateral pleural effusions and dilation of the right heart.    Echo 10/24/20  · The left ventricle is mildly enlarged with moderately decreased systolic  function. The estimated ejection fraction is 30%.  · There are segmental left ventricular wall motion abnormalities.  · Mild left atrial enlargement.  · Grade III diastolic dysfunction.  · Normal right ventricular systolic function.  · Mild aortic regurgitation.  · Mild mitral regurgitation.  · There is moderate pulmonary hypertension.  · Intermediate central venous pressure (8 mmHg).  · The estimated PA systolic pressure is 55 mmHg.    Cath/PCI LAD 10/23/20  1. Stent thrombosis of a previously placed left anterior descending artery drug-eluting stent secondary to non adherence.  2.  Plain old balloon angioplasty followed by angio sculpting with a 3 x 10 balloon  3.  Patent previously placed stents in the mid left anterior descending artery and mid left circumflex artery.  4.  Known RCA chronic total occlusion.  5.  Known distal LAD chronic total occlusion.   Recommendations  1.  Admit to ICU  2.  2D echocardiogram  3.  Aspirin/Brilinta indefinitely.  Encourage compliance.  4.  Aggressive risk factor modification     Echo 4/26/20  · Severely decreased left ventricular systolic function. The estimated ejection fraction is 25%.  · Akinesis of mid-distal anteroapical wall extending to apical lateral and inferior walls c/w prior extensive LAD territory MI.  · Grade I (mild) left ventricular diastolic dysfunction consistent with impaired relaxation.  · Normal right ventricular systolic function.  · The estimated PA systolic pressure is 28 mmHg.    Cath 3/2/20  LVEDP: 35mmHg  LVEF: echo ordered  Dominance: Right  LM: prox 40-50%, MLA 7.1mm2, no significant MLA difference vs distal vessel  LAD: prox stent thrombosed, after PCI dist/transapical LAD occluded (small caliber)  LCx: dist 95%  RCA: not injected, known severe diffuse prox-mid disease with dist  and L-R collaterals  PCI prox LAD stent thrombosis:  Preop ASA/ticagrelor/heparin, intraop aggrastat  Predil 2.5x12  Stent 2.75x26 Resolute Tony MICH 16 patricia  De romy  thrombus formed aftre PCI->POBA 2.5x12 at 20 patricia with aggrastat  Excellent result, T3 flow, 0% residual stenosis  PCI dist LCx:  Predil 2.5x12  Stent 2.5x22 Resolute Tony MICH 14 patricia  Excellent result, T3 flow 0% residual stenosis  Hemostasis:  R Radial band  RFA IABP sheath in place  Impression:  STEMI/AWMI  CGS on arrival to lab  3V CAD  Late stent thrombosis of prox LAD-> 2.75x26 Resolute Tony MICH  De-romy dist LCx-> 2.5x22 Resolute [place] MICH  RCA , collateralized  IABP placed for hemodynamic support via RFA, sutured in place  R rad vasband for hemostasis  Plan:  Admit to ICU  ASA 81mg qd indefinitely  Ticagrelor 90mg bid for 1 year minimum (thru 3/2021), consider indefinite rx  Rosuva 40mg qd  IABP @ 1:1  Aggrastat gtt  Eventual outpat card rehab referral     Aortic US 11/13/19:  There is atherosclerotic disease of the abdominal aorta without evidence of aneurysmal dilatation.      ASSESSMENT:   # CAD/STEMI 3/2/20 s/p PCI LAD late stent thrombosis MICH x1/PCI dist LCx MICH x1/IVUS LM/IABP placement RFA.  # ICM, EF 30%, appears euvolemic  # AST x2 in setting of med nonadherence  # HLP on rosuva 40mg  # HTN, controlled  # hx PE 11/2020 (?CTE), now on apixaban 5mg bid  # DM  # CKD3a  # tob abuse  # aortic atherosclerosis (Aortic US 11/13/19)    PLAN:   Cont med rx  Cont apixaban 5mg bid  Brilinta 90mg bid thru 10/2021, consider indefinite rx.  Importance of med adherence stressed to pt.  Inc toprol 50mg qd  Amb smoking cessation program  RTC 1 month, consider addition of ACEi +/- aldact at that time  Repeat echo 2 months (Feb 2021) for reassessment of LVEF and need for ICD      George Ho MD, FACC

## 2021-01-20 ENCOUNTER — HOSPITAL ENCOUNTER (EMERGENCY)
Facility: HOSPITAL | Age: 66
Discharge: HOME | End: 2021-01-20
Attending: EMERGENCY MEDICINE
Payer: COMMERCIAL

## 2021-01-20 VITALS
HEART RATE: 85 BPM | TEMPERATURE: 98 F | BODY MASS INDEX: 24.99 KG/M2 | RESPIRATION RATE: 18 BRPM | HEIGHT: 65 IN | DIASTOLIC BLOOD PRESSURE: 65 MMHG | WEIGHT: 150 LBS | SYSTOLIC BLOOD PRESSURE: 98 MMHG | OXYGEN SATURATION: 100 %

## 2021-01-20 DIAGNOSIS — R06.02 SHORTNESS OF BREATH: ICD-10-CM

## 2021-01-20 DIAGNOSIS — R06.02 SOB (SHORTNESS OF BREATH): ICD-10-CM

## 2021-01-20 LAB
ALBUMIN SERPL BCP-MCNC: 3.5 G/DL (ref 3.5–5.2)
ALP SERPL-CCNC: 150 U/L (ref 55–135)
ALT SERPL W/O P-5'-P-CCNC: 31 U/L (ref 10–44)
ANION GAP SERPL CALC-SCNC: 11 MMOL/L (ref 8–16)
AST SERPL-CCNC: 30 U/L (ref 10–40)
BASOPHILS # BLD AUTO: 0.03 K/UL (ref 0–0.2)
BASOPHILS NFR BLD: 0.4 % (ref 0–1.9)
BILIRUB SERPL-MCNC: 1.1 MG/DL (ref 0.1–1)
BNP SERPL-MCNC: 3756 PG/ML (ref 0–99)
BUN SERPL-MCNC: 22 MG/DL (ref 8–23)
CALCIUM SERPL-MCNC: 9.2 MG/DL (ref 8.7–10.5)
CHLORIDE SERPL-SCNC: 98 MMOL/L (ref 95–110)
CO2 SERPL-SCNC: 28 MMOL/L (ref 23–29)
CREAT SERPL-MCNC: 1.7 MG/DL (ref 0.5–1.4)
CTP QC/QA: YES
DIFFERENTIAL METHOD: ABNORMAL
EOSINOPHIL # BLD AUTO: 0.1 K/UL (ref 0–0.5)
EOSINOPHIL NFR BLD: 1.6 % (ref 0–8)
ERYTHROCYTE [DISTWIDTH] IN BLOOD BY AUTOMATED COUNT: 16.2 % (ref 11.5–14.5)
EST. GFR  (AFRICAN AMERICAN): 48 ML/MIN/1.73 M^2
EST. GFR  (NON AFRICAN AMERICAN): 41 ML/MIN/1.73 M^2
GLUCOSE SERPL-MCNC: 272 MG/DL (ref 70–110)
HCT VFR BLD AUTO: 39.8 % (ref 40–54)
HGB BLD-MCNC: 12.4 G/DL (ref 14–18)
IMM GRANULOCYTES # BLD AUTO: 0.01 K/UL (ref 0–0.04)
IMM GRANULOCYTES NFR BLD AUTO: 0.1 % (ref 0–0.5)
LYMPHOCYTES # BLD AUTO: 1.6 K/UL (ref 1–4.8)
LYMPHOCYTES NFR BLD: 22.7 % (ref 18–48)
MCH RBC QN AUTO: 25.5 PG (ref 27–31)
MCHC RBC AUTO-ENTMCNC: 31.2 G/DL (ref 32–36)
MCV RBC AUTO: 82 FL (ref 82–98)
MONOCYTES # BLD AUTO: 0.3 K/UL (ref 0.3–1)
MONOCYTES NFR BLD: 4.7 % (ref 4–15)
NEUTROPHILS # BLD AUTO: 5 K/UL (ref 1.8–7.7)
NEUTROPHILS NFR BLD: 70.5 % (ref 38–73)
NRBC BLD-RTO: 0 /100 WBC
PLATELET # BLD AUTO: 252 K/UL (ref 150–350)
PMV BLD AUTO: 10.1 FL (ref 9.2–12.9)
POCT GLUCOSE: 248 MG/DL (ref 70–110)
POTASSIUM SERPL-SCNC: 3.7 MMOL/L (ref 3.5–5.1)
PROT SERPL-MCNC: 7.7 G/DL (ref 6–8.4)
RBC # BLD AUTO: 4.87 M/UL (ref 4.6–6.2)
SARS-COV-2 RDRP RESP QL NAA+PROBE: NEGATIVE
SODIUM SERPL-SCNC: 137 MMOL/L (ref 136–145)
WBC # BLD AUTO: 7.06 K/UL (ref 3.9–12.7)

## 2021-01-20 PROCEDURE — 83880 ASSAY OF NATRIURETIC PEPTIDE: CPT

## 2021-01-20 PROCEDURE — 99285 EMERGENCY DEPT VISIT HI MDM: CPT | Mod: 25

## 2021-01-20 PROCEDURE — 93010 ELECTROCARDIOGRAM REPORT: CPT | Mod: ,,, | Performed by: INTERNAL MEDICINE

## 2021-01-20 PROCEDURE — U0002 COVID-19 LAB TEST NON-CDC: HCPCS | Performed by: NURSE PRACTITIONER

## 2021-01-20 PROCEDURE — 85025 COMPLETE CBC W/AUTO DIFF WBC: CPT

## 2021-01-20 PROCEDURE — 80053 COMPREHEN METABOLIC PANEL: CPT

## 2021-01-20 PROCEDURE — 93010 EKG 12-LEAD: ICD-10-PCS | Mod: ,,, | Performed by: INTERNAL MEDICINE

## 2021-01-20 PROCEDURE — 82962 GLUCOSE BLOOD TEST: CPT

## 2021-01-20 PROCEDURE — 93005 ELECTROCARDIOGRAM TRACING: CPT

## 2021-01-20 RX ORDER — FUROSEMIDE 10 MG/ML
40 INJECTION INTRAMUSCULAR; INTRAVENOUS
Status: DISCONTINUED | OUTPATIENT
Start: 2021-01-20 | End: 2021-01-20 | Stop reason: HOSPADM

## 2021-01-21 ENCOUNTER — HOSPITAL ENCOUNTER (OUTPATIENT)
Facility: HOSPITAL | Age: 66
Discharge: HOME | End: 2021-01-23
Attending: EMERGENCY MEDICINE | Admitting: INTERNAL MEDICINE
Payer: COMMERCIAL

## 2021-01-21 DIAGNOSIS — R06.00 DYSPNEA: ICD-10-CM

## 2021-01-21 DIAGNOSIS — I26.99 ACUTE PULMONARY EMBOLISM WITHOUT ACUTE COR PULMONALE, UNSPECIFIED PULMONARY EMBOLISM TYPE: ICD-10-CM

## 2021-01-21 DIAGNOSIS — I10 ESSENTIAL HYPERTENSION: ICD-10-CM

## 2021-01-21 DIAGNOSIS — Z86.73 HISTORY OF ISCHEMIC LEFT MCA STROKE: ICD-10-CM

## 2021-01-21 DIAGNOSIS — I21.4 NSTEMI (NON-ST ELEVATED MYOCARDIAL INFARCTION): ICD-10-CM

## 2021-01-21 DIAGNOSIS — E78.2 MIXED HYPERLIPIDEMIA: ICD-10-CM

## 2021-01-21 DIAGNOSIS — Z72.0 TOBACCO ABUSE: ICD-10-CM

## 2021-01-21 DIAGNOSIS — I25.10 CORONARY ARTERY DISEASE INVOLVING NATIVE CORONARY ARTERY OF NATIVE HEART WITHOUT ANGINA PECTORIS: ICD-10-CM

## 2021-01-21 DIAGNOSIS — I50.43 ACUTE ON CHRONIC COMBINED SYSTOLIC AND DIASTOLIC CONGESTIVE HEART FAILURE: Primary | ICD-10-CM

## 2021-01-21 DIAGNOSIS — Z79.4 TYPE 2 DIABETES MELLITUS WITH HYPEROSMOLARITY WITHOUT COMA, WITH LONG-TERM CURRENT USE OF INSULIN: ICD-10-CM

## 2021-01-21 DIAGNOSIS — N18.31 STAGE 3A CHRONIC KIDNEY DISEASE: ICD-10-CM

## 2021-01-21 DIAGNOSIS — I25.2 HISTORY OF MI (MYOCARDIAL INFARCTION): ICD-10-CM

## 2021-01-21 DIAGNOSIS — E11.00 TYPE 2 DIABETES MELLITUS WITH HYPEROSMOLARITY WITHOUT COMA, WITH LONG-TERM CURRENT USE OF INSULIN: ICD-10-CM

## 2021-01-21 LAB
ALBUMIN SERPL BCP-MCNC: 3.6 G/DL (ref 3.5–5.2)
ALP SERPL-CCNC: 119 U/L (ref 55–135)
ALT SERPL W/O P-5'-P-CCNC: 48 U/L (ref 10–44)
ANION GAP SERPL CALC-SCNC: 13 MMOL/L (ref 8–16)
AST SERPL-CCNC: 49 U/L (ref 10–40)
BACTERIA #/AREA URNS HPF: NEGATIVE /HPF
BASOPHILS # BLD AUTO: 0.04 K/UL (ref 0–0.2)
BASOPHILS NFR BLD: 0.5 % (ref 0–1.9)
BILIRUB SERPL-MCNC: 1.5 MG/DL (ref 0.1–1)
BILIRUB UR QL STRIP: NEGATIVE
BNP SERPL-MCNC: 4187 PG/ML (ref 0–99)
BUN SERPL-MCNC: 23 MG/DL (ref 8–23)
CALCIUM SERPL-MCNC: 9.5 MG/DL (ref 8.7–10.5)
CHLORIDE SERPL-SCNC: 97 MMOL/L (ref 95–110)
CLARITY UR: ABNORMAL
CO2 SERPL-SCNC: 27 MMOL/L (ref 23–29)
COLOR UR: YELLOW
CREAT SERPL-MCNC: 1.8 MG/DL (ref 0.5–1.4)
DIFFERENTIAL METHOD: ABNORMAL
EOSINOPHIL # BLD AUTO: 0.1 K/UL (ref 0–0.5)
EOSINOPHIL NFR BLD: 1.1 % (ref 0–8)
ERYTHROCYTE [DISTWIDTH] IN BLOOD BY AUTOMATED COUNT: 17.3 % (ref 11.5–14.5)
EST. GFR  (AFRICAN AMERICAN): 44.3 ML/MIN/1.73 M^2
EST. GFR  (NON AFRICAN AMERICAN): 38.4 ML/MIN/1.73 M^2
GLUCOSE SERPL-MCNC: 196 MG/DL (ref 70–110)
GLUCOSE UR QL STRIP: ABNORMAL
HCT VFR BLD AUTO: 43.4 % (ref 40–54)
HGB BLD-MCNC: 13.5 G/DL (ref 14–18)
HGB UR QL STRIP: ABNORMAL
HYALINE CASTS #/AREA URNS LPF: 63 /LPF
IMM GRANULOCYTES # BLD AUTO: 0.01 K/UL (ref 0–0.04)
IMM GRANULOCYTES NFR BLD AUTO: 0.1 % (ref 0–0.5)
KETONES UR QL STRIP: NEGATIVE
LEUKOCYTE ESTERASE UR QL STRIP: NEGATIVE
LIPASE SERPL-CCNC: 42 U/L (ref 4–60)
LYMPHOCYTES # BLD AUTO: 1.7 K/UL (ref 1–4.8)
LYMPHOCYTES NFR BLD: 21.3 % (ref 18–48)
MCH RBC QN AUTO: 25.9 PG (ref 27–31)
MCHC RBC AUTO-ENTMCNC: 31.1 G/DL (ref 32–36)
MCV RBC AUTO: 83 FL (ref 82–98)
MICROSCOPIC COMMENT: ABNORMAL
MONOCYTES # BLD AUTO: 0.5 K/UL (ref 0.3–1)
MONOCYTES NFR BLD: 6.2 % (ref 4–15)
NEUTROPHILS # BLD AUTO: 5.6 K/UL (ref 1.8–7.7)
NEUTROPHILS NFR BLD: 70.8 % (ref 38–73)
NITRITE UR QL STRIP: NEGATIVE
NRBC BLD-RTO: 0 /100 WBC
PH UR STRIP: 6 [PH] (ref 5–8)
PLATELET # BLD AUTO: 284 K/UL (ref 150–350)
PMV BLD AUTO: 10.3 FL (ref 9.2–12.9)
POTASSIUM SERPL-SCNC: 3.8 MMOL/L (ref 3.5–5.1)
PROT SERPL-MCNC: 7.8 G/DL (ref 6–8.4)
PROT UR QL STRIP: ABNORMAL
RBC # BLD AUTO: 5.21 M/UL (ref 4.6–6.2)
RBC #/AREA URNS HPF: 2 /HPF (ref 0–4)
SODIUM SERPL-SCNC: 137 MMOL/L (ref 136–145)
SP GR UR STRIP: 1.03 (ref 1–1.03)
SQUAMOUS #/AREA URNS HPF: 24 /HPF
TROPONIN I SERPL DL<=0.01 NG/ML-MCNC: 0.04 NG/ML
URN SPEC COLLECT METH UR: ABNORMAL
UROBILINOGEN UR STRIP-ACNC: ABNORMAL EU/DL
WBC # BLD AUTO: 7.95 K/UL (ref 3.9–12.7)
WBC #/AREA URNS HPF: 10 /HPF (ref 0–5)

## 2021-01-21 PROCEDURE — 63600175 PHARM REV CODE 636 W HCPCS: Performed by: EMERGENCY MEDICINE

## 2021-01-21 PROCEDURE — G0378 HOSPITAL OBSERVATION PER HR: HCPCS

## 2021-01-21 PROCEDURE — 93005 ELECTROCARDIOGRAM TRACING: CPT | Performed by: INTERNAL MEDICINE

## 2021-01-21 PROCEDURE — 96375 TX/PRO/DX INJ NEW DRUG ADDON: CPT

## 2021-01-21 PROCEDURE — 96374 THER/PROPH/DIAG INJ IV PUSH: CPT

## 2021-01-21 PROCEDURE — 99285 EMERGENCY DEPT VISIT HI MDM: CPT | Mod: 25

## 2021-01-21 PROCEDURE — 84484 ASSAY OF TROPONIN QUANT: CPT | Mod: 91

## 2021-01-21 PROCEDURE — 84484 ASSAY OF TROPONIN QUANT: CPT

## 2021-01-21 PROCEDURE — 25000003 PHARM REV CODE 250: Performed by: EMERGENCY MEDICINE

## 2021-01-21 PROCEDURE — 85025 COMPLETE CBC W/AUTO DIFF WBC: CPT

## 2021-01-21 PROCEDURE — 93010 EKG 12-LEAD: ICD-10-PCS | Mod: ,,, | Performed by: INTERNAL MEDICINE

## 2021-01-21 PROCEDURE — 96372 THER/PROPH/DIAG INJ SC/IM: CPT | Mod: 59

## 2021-01-21 PROCEDURE — 36415 COLL VENOUS BLD VENIPUNCTURE: CPT

## 2021-01-21 PROCEDURE — 81001 URINALYSIS AUTO W/SCOPE: CPT

## 2021-01-21 PROCEDURE — 83880 ASSAY OF NATRIURETIC PEPTIDE: CPT

## 2021-01-21 PROCEDURE — 25000003 PHARM REV CODE 250: Performed by: INTERNAL MEDICINE

## 2021-01-21 PROCEDURE — 93010 ELECTROCARDIOGRAM REPORT: CPT | Mod: ,,, | Performed by: INTERNAL MEDICINE

## 2021-01-21 PROCEDURE — 80053 COMPREHEN METABOLIC PANEL: CPT

## 2021-01-21 PROCEDURE — 83690 ASSAY OF LIPASE: CPT

## 2021-01-21 RX ORDER — GLUCAGON 1 MG
1 KIT INJECTION
Status: DISCONTINUED | OUTPATIENT
Start: 2021-01-21 | End: 2021-01-23 | Stop reason: HOSPADM

## 2021-01-21 RX ORDER — IBUPROFEN 200 MG
24 TABLET ORAL
Status: DISCONTINUED | OUTPATIENT
Start: 2021-01-21 | End: 2021-01-23 | Stop reason: HOSPADM

## 2021-01-21 RX ORDER — RANOLAZINE 500 MG/1
500 TABLET, EXTENDED RELEASE ORAL 2 TIMES DAILY
Status: DISCONTINUED | OUTPATIENT
Start: 2021-01-21 | End: 2021-01-23 | Stop reason: HOSPADM

## 2021-01-21 RX ORDER — ROSUVASTATIN CALCIUM 20 MG/1
40 TABLET, COATED ORAL NIGHTLY
Status: DISCONTINUED | OUTPATIENT
Start: 2021-01-21 | End: 2021-01-23 | Stop reason: HOSPADM

## 2021-01-21 RX ORDER — FUROSEMIDE 10 MG/ML
40 INJECTION INTRAMUSCULAR; INTRAVENOUS
Status: COMPLETED | OUTPATIENT
Start: 2021-01-21 | End: 2021-01-21

## 2021-01-21 RX ORDER — FUROSEMIDE 10 MG/ML
40 INJECTION INTRAMUSCULAR; INTRAVENOUS
Status: DISCONTINUED | OUTPATIENT
Start: 2021-01-21 | End: 2021-01-21

## 2021-01-21 RX ORDER — SODIUM CHLORIDE 0.9 % (FLUSH) 0.9 %
10 SYRINGE (ML) INJECTION
Status: DISCONTINUED | OUTPATIENT
Start: 2021-01-21 | End: 2021-01-23 | Stop reason: HOSPADM

## 2021-01-21 RX ORDER — METOPROLOL SUCCINATE 50 MG/1
50 TABLET, EXTENDED RELEASE ORAL DAILY
Status: DISCONTINUED | OUTPATIENT
Start: 2021-01-22 | End: 2021-01-23 | Stop reason: HOSPADM

## 2021-01-21 RX ORDER — NITROGLYCERIN 0.4 MG/1
0.4 TABLET SUBLINGUAL EVERY 5 MIN PRN
Status: DISCONTINUED | OUTPATIENT
Start: 2021-01-21 | End: 2021-01-23 | Stop reason: HOSPADM

## 2021-01-21 RX ORDER — DICYCLOMINE HYDROCHLORIDE 10 MG/ML
20 INJECTION INTRAMUSCULAR
Status: COMPLETED | OUTPATIENT
Start: 2021-01-21 | End: 2021-01-21

## 2021-01-21 RX ORDER — FUROSEMIDE 10 MG/ML
20 INJECTION INTRAMUSCULAR; INTRAVENOUS
Status: DISCONTINUED | OUTPATIENT
Start: 2021-01-22 | End: 2021-01-23 | Stop reason: HOSPADM

## 2021-01-21 RX ORDER — IBUPROFEN 200 MG
16 TABLET ORAL
Status: DISCONTINUED | OUTPATIENT
Start: 2021-01-21 | End: 2021-01-23 | Stop reason: HOSPADM

## 2021-01-21 RX ORDER — ONDANSETRON 2 MG/ML
4 INJECTION INTRAMUSCULAR; INTRAVENOUS
Status: COMPLETED | OUTPATIENT
Start: 2021-01-21 | End: 2021-01-21

## 2021-01-21 RX ORDER — ASPIRIN 325 MG
325 TABLET ORAL
Status: COMPLETED | OUTPATIENT
Start: 2021-01-21 | End: 2021-01-21

## 2021-01-21 RX ADMIN — RANOLAZINE 500 MG: 500 TABLET, EXTENDED RELEASE ORAL at 11:01

## 2021-01-21 RX ADMIN — ASPIRIN 325 MG ORAL TABLET 325 MG: 325 PILL ORAL at 03:01

## 2021-01-21 RX ADMIN — TICAGRELOR 90 MG: 90 TABLET ORAL at 11:01

## 2021-01-21 RX ADMIN — ONDANSETRON 4 MG: 2 INJECTION INTRAMUSCULAR; INTRAVENOUS at 03:01

## 2021-01-21 RX ADMIN — DICYCLOMINE HYDROCHLORIDE 20 MG: 10 INJECTION INTRAMUSCULAR at 03:01

## 2021-01-21 RX ADMIN — APIXABAN 5 MG: 5 TABLET, FILM COATED ORAL at 11:01

## 2021-01-21 RX ADMIN — ROSUVASTATIN CALCIUM 40 MG: 20 TABLET, FILM COATED ORAL at 11:01

## 2021-01-21 RX ADMIN — FUROSEMIDE 40 MG: 10 INJECTION, SOLUTION INTRAMUSCULAR; INTRAVENOUS at 04:01

## 2021-01-22 PROBLEM — Z86.73 HISTORY OF ISCHEMIC LEFT MCA STROKE: Status: ACTIVE | Noted: 2019-04-12

## 2021-01-22 LAB
ANION GAP SERPL CALC-SCNC: 11 MMOL/L (ref 8–16)
BNP SERPL-MCNC: 2553 PG/ML (ref 0–99)
BUN SERPL-MCNC: 26 MG/DL (ref 8–23)
CALCIUM SERPL-MCNC: 8.8 MG/DL (ref 8.7–10.5)
CHLORIDE SERPL-SCNC: 92 MMOL/L (ref 95–110)
CO2 SERPL-SCNC: 28 MMOL/L (ref 23–29)
CREAT SERPL-MCNC: 1.8 MG/DL (ref 0.5–1.4)
EST. GFR  (AFRICAN AMERICAN): 44.3 ML/MIN/1.73 M^2
EST. GFR  (NON AFRICAN AMERICAN): 38.4 ML/MIN/1.73 M^2
GLUCOSE SERPL-MCNC: 270 MG/DL (ref 70–110)
MAGNESIUM SERPL-MCNC: 2.3 MG/DL (ref 1.6–2.6)
POTASSIUM SERPL-SCNC: 3.5 MMOL/L (ref 3.5–5.1)
SODIUM SERPL-SCNC: 131 MMOL/L (ref 136–145)
TROPONIN I SERPL DL<=0.01 NG/ML-MCNC: 0.04 NG/ML
TROPONIN I SERPL DL<=0.01 NG/ML-MCNC: 0.04 NG/ML

## 2021-01-22 PROCEDURE — 63600175 PHARM REV CODE 636 W HCPCS: Performed by: INTERNAL MEDICINE

## 2021-01-22 PROCEDURE — 36415 COLL VENOUS BLD VENIPUNCTURE: CPT

## 2021-01-22 PROCEDURE — 25000003 PHARM REV CODE 250: Performed by: INTERNAL MEDICINE

## 2021-01-22 PROCEDURE — 80048 BASIC METABOLIC PNL TOTAL CA: CPT

## 2021-01-22 PROCEDURE — 83880 ASSAY OF NATRIURETIC PEPTIDE: CPT

## 2021-01-22 PROCEDURE — 96376 TX/PRO/DX INJ SAME DRUG ADON: CPT

## 2021-01-22 PROCEDURE — 84484 ASSAY OF TROPONIN QUANT: CPT

## 2021-01-22 PROCEDURE — 83735 ASSAY OF MAGNESIUM: CPT

## 2021-01-22 PROCEDURE — G0378 HOSPITAL OBSERVATION PER HR: HCPCS

## 2021-01-22 RX ORDER — POTASSIUM CHLORIDE 20 MEQ/1
40 TABLET, EXTENDED RELEASE ORAL
Status: DISCONTINUED | OUTPATIENT
Start: 2021-01-22 | End: 2021-01-23 | Stop reason: HOSPADM

## 2021-01-22 RX ORDER — MAGNESIUM SULFATE HEPTAHYDRATE 40 MG/ML
4 INJECTION, SOLUTION INTRAVENOUS
Status: DISCONTINUED | OUTPATIENT
Start: 2021-01-22 | End: 2021-01-23 | Stop reason: HOSPADM

## 2021-01-22 RX ORDER — MAGNESIUM SULFATE HEPTAHYDRATE 40 MG/ML
2 INJECTION, SOLUTION INTRAVENOUS
Status: DISCONTINUED | OUTPATIENT
Start: 2021-01-22 | End: 2021-01-23 | Stop reason: HOSPADM

## 2021-01-22 RX ORDER — CALCIUM CHLORIDE IN 0.9 % NACL 1 G/100 ML
1 INTRAVENOUS SOLUTION, PIGGYBACK (ML) INTRAVENOUS
Status: DISCONTINUED | OUTPATIENT
Start: 2021-01-22 | End: 2021-01-23 | Stop reason: HOSPADM

## 2021-01-22 RX ORDER — POTASSIUM CHLORIDE 7.45 MG/ML
20 INJECTION INTRAVENOUS
Status: DISCONTINUED | OUTPATIENT
Start: 2021-01-22 | End: 2021-01-23 | Stop reason: HOSPADM

## 2021-01-22 RX ORDER — MAGNESIUM SULFATE 1 G/100ML
1 INJECTION INTRAVENOUS
Status: DISCONTINUED | OUTPATIENT
Start: 2021-01-22 | End: 2021-01-23 | Stop reason: HOSPADM

## 2021-01-22 RX ORDER — POTASSIUM CHLORIDE 7.45 MG/ML
40 INJECTION INTRAVENOUS
Status: DISCONTINUED | OUTPATIENT
Start: 2021-01-22 | End: 2021-01-23 | Stop reason: HOSPADM

## 2021-01-22 RX ORDER — POTASSIUM CHLORIDE 20 MEQ/1
20 TABLET, EXTENDED RELEASE ORAL
Status: DISCONTINUED | OUTPATIENT
Start: 2021-01-22 | End: 2021-01-23 | Stop reason: HOSPADM

## 2021-01-22 RX ORDER — LANOLIN ALCOHOL/MO/W.PET/CERES
800 CREAM (GRAM) TOPICAL
Status: DISCONTINUED | OUTPATIENT
Start: 2021-01-22 | End: 2021-01-23 | Stop reason: HOSPADM

## 2021-01-22 RX ADMIN — TICAGRELOR 90 MG: 90 TABLET ORAL at 08:01

## 2021-01-22 RX ADMIN — APIXABAN 5 MG: 5 TABLET, FILM COATED ORAL at 08:01

## 2021-01-22 RX ADMIN — POTASSIUM CHLORIDE 20 MEQ: 20 TABLET, EXTENDED RELEASE ORAL at 03:01

## 2021-01-22 RX ADMIN — ROSUVASTATIN CALCIUM 40 MG: 20 TABLET, FILM COATED ORAL at 08:01

## 2021-01-22 RX ADMIN — FUROSEMIDE 20 MG: 10 INJECTION, SOLUTION INTRAVENOUS at 05:01

## 2021-01-22 RX ADMIN — FUROSEMIDE 20 MG: 10 INJECTION, SOLUTION INTRAVENOUS at 07:01

## 2021-01-22 RX ADMIN — RANOLAZINE 500 MG: 500 TABLET, EXTENDED RELEASE ORAL at 09:01

## 2021-01-22 RX ADMIN — TICAGRELOR 90 MG: 90 TABLET ORAL at 09:01

## 2021-01-22 RX ADMIN — APIXABAN 5 MG: 5 TABLET, FILM COATED ORAL at 09:01

## 2021-01-22 RX ADMIN — RANOLAZINE 500 MG: 500 TABLET, EXTENDED RELEASE ORAL at 08:01

## 2021-01-23 VITALS
WEIGHT: 142.88 LBS | RESPIRATION RATE: 18 BRPM | OXYGEN SATURATION: 98 % | SYSTOLIC BLOOD PRESSURE: 103 MMHG | HEIGHT: 65 IN | TEMPERATURE: 98 F | DIASTOLIC BLOOD PRESSURE: 70 MMHG | HEART RATE: 86 BPM | BODY MASS INDEX: 23.81 KG/M2

## 2021-01-23 LAB
ANION GAP SERPL CALC-SCNC: 10 MMOL/L (ref 8–16)
BUN SERPL-MCNC: 24 MG/DL (ref 8–23)
CALCIUM SERPL-MCNC: 8.8 MG/DL (ref 8.7–10.5)
CHLORIDE SERPL-SCNC: 97 MMOL/L (ref 95–110)
CO2 SERPL-SCNC: 26 MMOL/L (ref 23–29)
CREAT SERPL-MCNC: 1.7 MG/DL (ref 0.5–1.4)
ERYTHROCYTE [DISTWIDTH] IN BLOOD BY AUTOMATED COUNT: 16.5 % (ref 11.5–14.5)
EST. GFR  (AFRICAN AMERICAN): 47.5 ML/MIN/1.73 M^2
EST. GFR  (NON AFRICAN AMERICAN): 41.1 ML/MIN/1.73 M^2
GLUCOSE SERPL-MCNC: 226 MG/DL (ref 70–110)
HCT VFR BLD AUTO: 38 % (ref 40–54)
HGB BLD-MCNC: 12.1 G/DL (ref 14–18)
MAGNESIUM SERPL-MCNC: 2.2 MG/DL (ref 1.6–2.6)
MCH RBC QN AUTO: 25.5 PG (ref 27–31)
MCHC RBC AUTO-ENTMCNC: 31.8 G/DL (ref 32–36)
MCV RBC AUTO: 80 FL (ref 82–98)
PLATELET # BLD AUTO: 227 K/UL (ref 150–350)
PMV BLD AUTO: 9.9 FL (ref 9.2–12.9)
POTASSIUM SERPL-SCNC: 3.3 MMOL/L (ref 3.5–5.1)
RBC # BLD AUTO: 4.74 M/UL (ref 4.6–6.2)
SODIUM SERPL-SCNC: 133 MMOL/L (ref 136–145)
WBC # BLD AUTO: 7.86 K/UL (ref 3.9–12.7)

## 2021-01-23 PROCEDURE — 63600175 PHARM REV CODE 636 W HCPCS: Performed by: INTERNAL MEDICINE

## 2021-01-23 PROCEDURE — 25000003 PHARM REV CODE 250: Performed by: INTERNAL MEDICINE

## 2021-01-23 PROCEDURE — 85027 COMPLETE CBC AUTOMATED: CPT

## 2021-01-23 PROCEDURE — 96376 TX/PRO/DX INJ SAME DRUG ADON: CPT

## 2021-01-23 PROCEDURE — 83735 ASSAY OF MAGNESIUM: CPT

## 2021-01-23 PROCEDURE — 36415 COLL VENOUS BLD VENIPUNCTURE: CPT

## 2021-01-23 PROCEDURE — G0378 HOSPITAL OBSERVATION PER HR: HCPCS

## 2021-01-23 PROCEDURE — 80048 BASIC METABOLIC PNL TOTAL CA: CPT

## 2021-01-23 RX ORDER — POTASSIUM CHLORIDE 750 MG/1
10 TABLET, EXTENDED RELEASE ORAL DAILY
Qty: 5 TABLET | Refills: 0 | Status: ON HOLD | OUTPATIENT
Start: 2021-01-23 | End: 2021-02-24 | Stop reason: HOSPADM

## 2021-01-23 RX ADMIN — METOPROLOL SUCCINATE 50 MG: 50 TABLET, EXTENDED RELEASE ORAL at 08:01

## 2021-01-23 RX ADMIN — TICAGRELOR 90 MG: 90 TABLET ORAL at 08:01

## 2021-01-23 RX ADMIN — FUROSEMIDE 20 MG: 10 INJECTION, SOLUTION INTRAVENOUS at 07:01

## 2021-01-23 RX ADMIN — POTASSIUM CHLORIDE 40 MEQ: 20 TABLET, EXTENDED RELEASE ORAL at 09:01

## 2021-01-23 RX ADMIN — APIXABAN 5 MG: 5 TABLET, FILM COATED ORAL at 08:01

## 2021-01-23 RX ADMIN — RANOLAZINE 500 MG: 500 TABLET, EXTENDED RELEASE ORAL at 08:01

## 2021-01-25 ENCOUNTER — TELEPHONE (OUTPATIENT)
Dept: SMOKING CESSATION | Facility: CLINIC | Age: 66
End: 2021-01-25

## 2021-01-26 ENCOUNTER — HOSPITAL ENCOUNTER (EMERGENCY)
Facility: HOSPITAL | Age: 66
Discharge: HOME | End: 2021-01-26
Attending: EMERGENCY MEDICINE
Payer: COMMERCIAL

## 2021-01-26 VITALS
DIASTOLIC BLOOD PRESSURE: 72 MMHG | RESPIRATION RATE: 18 BRPM | TEMPERATURE: 98 F | HEART RATE: 98 BPM | OXYGEN SATURATION: 96 % | BODY MASS INDEX: 23.81 KG/M2 | HEIGHT: 65 IN | WEIGHT: 142.88 LBS | SYSTOLIC BLOOD PRESSURE: 113 MMHG

## 2021-01-26 DIAGNOSIS — R10.9 ABDOMINAL PAIN, UNSPECIFIED ABDOMINAL LOCATION: Primary | ICD-10-CM

## 2021-01-26 DIAGNOSIS — K59.00 CONSTIPATION: ICD-10-CM

## 2021-01-26 PROCEDURE — 99283 EMERGENCY DEPT VISIT LOW MDM: CPT | Mod: 25

## 2021-01-26 RX ORDER — POLYETHYLENE GLYCOL 3350 17 G/17G
17 POWDER, FOR SOLUTION ORAL DAILY
Qty: 5 PACKET | Refills: 0 | Status: SHIPPED | OUTPATIENT
Start: 2021-01-26

## 2021-02-08 ENCOUNTER — OFFICE VISIT (OUTPATIENT)
Dept: FAMILY MEDICINE | Facility: CLINIC | Age: 66
End: 2021-02-08
Payer: COMMERCIAL

## 2021-02-08 VITALS
WEIGHT: 143 LBS | DIASTOLIC BLOOD PRESSURE: 60 MMHG | SYSTOLIC BLOOD PRESSURE: 90 MMHG | BODY MASS INDEX: 23.82 KG/M2 | TEMPERATURE: 98 F | OXYGEN SATURATION: 100 % | HEIGHT: 65 IN | HEART RATE: 81 BPM

## 2021-02-08 DIAGNOSIS — Z12.5 PROSTATE CANCER SCREENING: ICD-10-CM

## 2021-02-08 DIAGNOSIS — Z79.4 TYPE 2 DIABETES MELLITUS WITH HYPEROSMOLARITY WITHOUT COMA, WITH LONG-TERM CURRENT USE OF INSULIN: Primary | ICD-10-CM

## 2021-02-08 DIAGNOSIS — Z11.4 ENCOUNTER FOR SCREENING FOR HIV: ICD-10-CM

## 2021-02-08 DIAGNOSIS — Z11.59 NEED FOR HEPATITIS C SCREENING TEST: ICD-10-CM

## 2021-02-08 DIAGNOSIS — E11.00 TYPE 2 DIABETES MELLITUS WITH HYPEROSMOLARITY WITHOUT COMA, WITH LONG-TERM CURRENT USE OF INSULIN: Primary | ICD-10-CM

## 2021-02-08 DIAGNOSIS — I10 ESSENTIAL HYPERTENSION: ICD-10-CM

## 2021-02-08 DIAGNOSIS — E78.2 MIXED HYPERLIPIDEMIA: ICD-10-CM

## 2021-02-08 PROCEDURE — 99204 OFFICE O/P NEW MOD 45 MIN: CPT | Mod: S$GLB,,, | Performed by: NURSE PRACTITIONER

## 2021-02-08 PROCEDURE — 99204 PR OFFICE/OUTPT VISIT, NEW, LEVL IV, 45-59 MIN: ICD-10-PCS | Mod: S$GLB,,, | Performed by: NURSE PRACTITIONER

## 2021-02-08 RX ORDER — PEN NEEDLE, DIABETIC 30 GX3/16"
NEEDLE, DISPOSABLE MISCELLANEOUS
Qty: 100 EACH | Refills: 3 | Status: SHIPPED | OUTPATIENT
Start: 2021-02-08

## 2021-02-09 ENCOUNTER — LAB VISIT (OUTPATIENT)
Dept: LAB | Facility: HOSPITAL | Age: 66
End: 2021-02-09
Attending: NURSE PRACTITIONER
Payer: COMMERCIAL

## 2021-02-09 DIAGNOSIS — Z79.4 TYPE 2 DIABETES MELLITUS WITH HYPEROSMOLARITY WITHOUT COMA, WITH LONG-TERM CURRENT USE OF INSULIN: ICD-10-CM

## 2021-02-09 DIAGNOSIS — Z11.59 NEED FOR HEPATITIS C SCREENING TEST: ICD-10-CM

## 2021-02-09 DIAGNOSIS — E78.2 MIXED HYPERLIPIDEMIA: ICD-10-CM

## 2021-02-09 DIAGNOSIS — Z11.4 ENCOUNTER FOR SCREENING FOR HIV: ICD-10-CM

## 2021-02-09 DIAGNOSIS — I10 ESSENTIAL HYPERTENSION: ICD-10-CM

## 2021-02-09 DIAGNOSIS — E11.00 TYPE 2 DIABETES MELLITUS WITH HYPEROSMOLARITY WITHOUT COMA, WITH LONG-TERM CURRENT USE OF INSULIN: ICD-10-CM

## 2021-02-09 DIAGNOSIS — Z12.5 PROSTATE CANCER SCREENING: ICD-10-CM

## 2021-02-09 LAB
ALBUMIN SERPL BCP-MCNC: 3.6 G/DL (ref 3.5–5.2)
ALP SERPL-CCNC: 157 U/L (ref 55–135)
ALT SERPL W/O P-5'-P-CCNC: 88 U/L (ref 10–44)
ANION GAP SERPL CALC-SCNC: 13 MMOL/L (ref 8–16)
AST SERPL-CCNC: 67 U/L (ref 10–40)
BASOPHILS # BLD AUTO: 0.04 K/UL (ref 0–0.2)
BASOPHILS NFR BLD: 0.6 % (ref 0–1.9)
BILIRUB SERPL-MCNC: 1.8 MG/DL (ref 0.1–1)
BUN SERPL-MCNC: 24 MG/DL (ref 8–23)
CALCIUM SERPL-MCNC: 9.6 MG/DL (ref 8.7–10.5)
CHLORIDE SERPL-SCNC: 100 MMOL/L (ref 95–110)
CHOLEST SERPL-MCNC: 98 MG/DL (ref 120–199)
CHOLEST/HDLC SERPL: 2.5 {RATIO} (ref 2–5)
CO2 SERPL-SCNC: 25 MMOL/L (ref 23–29)
COMPLEXED PSA SERPL-MCNC: 0.85 NG/ML (ref 0–4)
CREAT SERPL-MCNC: 1.7 MG/DL (ref 0.5–1.4)
DIFFERENTIAL METHOD: ABNORMAL
EOSINOPHIL # BLD AUTO: 0.1 K/UL (ref 0–0.5)
EOSINOPHIL NFR BLD: 1.2 % (ref 0–8)
ERYTHROCYTE [DISTWIDTH] IN BLOOD BY AUTOMATED COUNT: 17.2 % (ref 11.5–14.5)
EST. GFR  (AFRICAN AMERICAN): 47.5 ML/MIN/1.73 M^2
EST. GFR  (NON AFRICAN AMERICAN): 41.1 ML/MIN/1.73 M^2
ESTIMATED AVG GLUCOSE: 275 MG/DL (ref 68–131)
GLUCOSE SERPL-MCNC: 217 MG/DL (ref 70–110)
HBA1C MFR BLD: 11.2 % (ref 4.5–6.2)
HCT VFR BLD AUTO: 42.6 % (ref 40–54)
HDLC SERPL-MCNC: 39 MG/DL (ref 40–75)
HDLC SERPL: 39.8 % (ref 20–50)
HGB BLD-MCNC: 13.1 G/DL (ref 14–18)
IMM GRANULOCYTES # BLD AUTO: 0.02 K/UL (ref 0–0.04)
IMM GRANULOCYTES NFR BLD AUTO: 0.3 % (ref 0–0.5)
LDLC SERPL CALC-MCNC: 49 MG/DL (ref 63–159)
LYMPHOCYTES # BLD AUTO: 1.7 K/UL (ref 1–4.8)
LYMPHOCYTES NFR BLD: 23.7 % (ref 18–48)
MCH RBC QN AUTO: 25.4 PG (ref 27–31)
MCHC RBC AUTO-ENTMCNC: 30.8 G/DL (ref 32–36)
MCV RBC AUTO: 83 FL (ref 82–98)
MONOCYTES # BLD AUTO: 0.4 K/UL (ref 0.3–1)
MONOCYTES NFR BLD: 5.4 % (ref 4–15)
NEUTROPHILS # BLD AUTO: 5 K/UL (ref 1.8–7.7)
NEUTROPHILS NFR BLD: 68.8 % (ref 38–73)
NONHDLC SERPL-MCNC: 59 MG/DL
NRBC BLD-RTO: 0 /100 WBC
PLATELET # BLD AUTO: 228 K/UL (ref 150–350)
PMV BLD AUTO: 10.3 FL (ref 9.2–12.9)
POTASSIUM SERPL-SCNC: 4.1 MMOL/L (ref 3.5–5.1)
PROT SERPL-MCNC: 7.8 G/DL (ref 6–8.4)
RBC # BLD AUTO: 5.15 M/UL (ref 4.6–6.2)
SODIUM SERPL-SCNC: 138 MMOL/L (ref 136–145)
TRIGL SERPL-MCNC: 50 MG/DL (ref 30–150)
WBC # BLD AUTO: 7.26 K/UL (ref 3.9–12.7)

## 2021-02-09 PROCEDURE — 84153 ASSAY OF PSA TOTAL: CPT

## 2021-02-09 PROCEDURE — 80061 LIPID PANEL: CPT

## 2021-02-09 PROCEDURE — 36415 COLL VENOUS BLD VENIPUNCTURE: CPT

## 2021-02-09 PROCEDURE — 83036 HEMOGLOBIN GLYCOSYLATED A1C: CPT

## 2021-02-09 PROCEDURE — 86803 HEPATITIS C AB TEST: CPT

## 2021-02-09 PROCEDURE — 80053 COMPREHEN METABOLIC PANEL: CPT

## 2021-02-09 PROCEDURE — 87389 HIV-1 AG W/HIV-1&-2 AB AG IA: CPT

## 2021-02-09 PROCEDURE — 85025 COMPLETE CBC W/AUTO DIFF WBC: CPT

## 2021-02-10 LAB
HCV AB S/CO SERPL IA: 0.1 S/CO RATIO (ref 0–0.9)
HIV 1+2 AB+HIV1 P24 AG SERPL QL IA: NON REACTIVE

## 2021-02-23 ENCOUNTER — HOSPITAL ENCOUNTER (OUTPATIENT)
Facility: HOSPITAL | Age: 66
Discharge: HOME | End: 2021-02-24
Attending: EMERGENCY MEDICINE | Admitting: EMERGENCY MEDICINE
Payer: COMMERCIAL

## 2021-02-23 DIAGNOSIS — Z72.0 TOBACCO ABUSE: ICD-10-CM

## 2021-02-23 DIAGNOSIS — R06.02 SOB (SHORTNESS OF BREATH): Primary | ICD-10-CM

## 2021-02-23 DIAGNOSIS — I50.9 ACUTE ON CHRONIC CONGESTIVE HEART FAILURE, UNSPECIFIED HEART FAILURE TYPE: ICD-10-CM

## 2021-02-23 DIAGNOSIS — E78.2 MIXED HYPERLIPIDEMIA: ICD-10-CM

## 2021-02-23 DIAGNOSIS — I10 ESSENTIAL HYPERTENSION: ICD-10-CM

## 2021-02-23 DIAGNOSIS — I50.43 ACUTE ON CHRONIC COMBINED SYSTOLIC AND DIASTOLIC CONGESTIVE HEART FAILURE: ICD-10-CM

## 2021-02-23 DIAGNOSIS — I25.10 CORONARY ARTERY DISEASE INVOLVING NATIVE CORONARY ARTERY OF NATIVE HEART WITHOUT ANGINA PECTORIS: ICD-10-CM

## 2021-02-23 DIAGNOSIS — R07.9 CHEST PAIN: ICD-10-CM

## 2021-02-23 PROBLEM — Z86.73 HISTORY OF ISCHEMIC LEFT MCA STROKE: Chronic | Status: ACTIVE | Noted: 2019-04-12

## 2021-02-23 PROBLEM — I26.99 ACUTE PULMONARY EMBOLISM: Chronic | Status: ACTIVE | Noted: 2020-11-19

## 2021-02-23 PROBLEM — N18.31 STAGE 3A CHRONIC KIDNEY DISEASE: Chronic | Status: ACTIVE | Noted: 2020-12-01

## 2021-02-23 LAB
ALBUMIN SERPL BCP-MCNC: 2.9 G/DL (ref 3.5–5.2)
ALP SERPL-CCNC: 224 U/L (ref 55–135)
ALT SERPL W/O P-5'-P-CCNC: 54 U/L (ref 10–44)
ANION GAP SERPL CALC-SCNC: 14 MMOL/L (ref 8–16)
AST SERPL-CCNC: 46 U/L (ref 10–40)
BASOPHILS # BLD AUTO: 0.02 K/UL (ref 0–0.2)
BASOPHILS NFR BLD: 0.3 % (ref 0–1.9)
BILIRUB SERPL-MCNC: 1.2 MG/DL (ref 0.1–1)
BNP SERPL-MCNC: 3203 PG/ML (ref 0–99)
BUN SERPL-MCNC: 23 MG/DL (ref 8–23)
CALCIUM SERPL-MCNC: 8.7 MG/DL (ref 8.7–10.5)
CHLORIDE SERPL-SCNC: 96 MMOL/L (ref 95–110)
CO2 SERPL-SCNC: 25 MMOL/L (ref 23–29)
CREAT SERPL-MCNC: 1.6 MG/DL (ref 0.5–1.4)
CTP QC/QA: YES
DIFFERENTIAL METHOD: ABNORMAL
EOSINOPHIL # BLD AUTO: 0.1 K/UL (ref 0–0.5)
EOSINOPHIL NFR BLD: 1.1 % (ref 0–8)
ERYTHROCYTE [DISTWIDTH] IN BLOOD BY AUTOMATED COUNT: 17.3 % (ref 11.5–14.5)
EST. GFR  (AFRICAN AMERICAN): 51 ML/MIN/1.73 M^2
EST. GFR  (NON AFRICAN AMERICAN): 44 ML/MIN/1.73 M^2
GLUCOSE SERPL-MCNC: 285 MG/DL (ref 70–110)
HCT VFR BLD AUTO: 38.7 % (ref 40–54)
HGB BLD-MCNC: 12.1 G/DL (ref 14–18)
IMM GRANULOCYTES # BLD AUTO: 0.01 K/UL (ref 0–0.04)
IMM GRANULOCYTES NFR BLD AUTO: 0.1 % (ref 0–0.5)
LYMPHOCYTES # BLD AUTO: 1.1 K/UL (ref 1–4.8)
LYMPHOCYTES NFR BLD: 16 % (ref 18–48)
MAGNESIUM SERPL-MCNC: 1.8 MG/DL (ref 1.6–2.6)
MCH RBC QN AUTO: 25.1 PG (ref 27–31)
MCHC RBC AUTO-ENTMCNC: 31.3 G/DL (ref 32–36)
MCV RBC AUTO: 80 FL (ref 82–98)
MONOCYTES # BLD AUTO: 0.3 K/UL (ref 0.3–1)
MONOCYTES NFR BLD: 4.7 % (ref 4–15)
NEUTROPHILS # BLD AUTO: 5.5 K/UL (ref 1.8–7.7)
NEUTROPHILS NFR BLD: 77.8 % (ref 38–73)
NRBC BLD-RTO: 0 /100 WBC
PLATELET # BLD AUTO: 200 K/UL (ref 150–350)
PMV BLD AUTO: 10.2 FL (ref 9.2–12.9)
POCT GLUCOSE: 235 MG/DL (ref 70–110)
POCT GLUCOSE: 334 MG/DL (ref 70–110)
POCT GLUCOSE: 341 MG/DL (ref 70–110)
POTASSIUM SERPL-SCNC: 3 MMOL/L (ref 3.5–5.1)
POTASSIUM SERPL-SCNC: 3.4 MMOL/L (ref 3.5–5.1)
PROCALCITONIN SERPL IA-MCNC: 0.05 NG/ML
PROT SERPL-MCNC: 7 G/DL (ref 6–8.4)
RBC # BLD AUTO: 4.83 M/UL (ref 4.6–6.2)
SARS-COV-2 RDRP RESP QL NAA+PROBE: NEGATIVE
SODIUM SERPL-SCNC: 135 MMOL/L (ref 136–145)
TROPONIN I SERPL DL<=0.01 NG/ML-MCNC: 0.02 NG/ML (ref 0–0.03)
TROPONIN I SERPL DL<=0.01 NG/ML-MCNC: 0.05 NG/ML (ref 0–0.03)
WBC # BLD AUTO: 7.01 K/UL (ref 3.9–12.7)

## 2021-02-23 PROCEDURE — 99285 EMERGENCY DEPT VISIT HI MDM: CPT | Mod: 25

## 2021-02-23 PROCEDURE — 84145 PROCALCITONIN (PCT): CPT

## 2021-02-23 PROCEDURE — 83735 ASSAY OF MAGNESIUM: CPT

## 2021-02-23 PROCEDURE — 96375 TX/PRO/DX INJ NEW DRUG ADDON: CPT

## 2021-02-23 PROCEDURE — 83880 ASSAY OF NATRIURETIC PEPTIDE: CPT

## 2021-02-23 PROCEDURE — 96372 THER/PROPH/DIAG INJ SC/IM: CPT | Mod: 59

## 2021-02-23 PROCEDURE — 96374 THER/PROPH/DIAG INJ IV PUSH: CPT

## 2021-02-23 PROCEDURE — 93010 ELECTROCARDIOGRAM REPORT: CPT | Mod: ,,, | Performed by: INTERNAL MEDICINE

## 2021-02-23 PROCEDURE — 80053 COMPREHEN METABOLIC PANEL: CPT

## 2021-02-23 PROCEDURE — 93010 EKG 12-LEAD: ICD-10-PCS | Mod: ,,, | Performed by: INTERNAL MEDICINE

## 2021-02-23 PROCEDURE — G0378 HOSPITAL OBSERVATION PER HR: HCPCS

## 2021-02-23 PROCEDURE — 96376 TX/PRO/DX INJ SAME DRUG ADON: CPT

## 2021-02-23 PROCEDURE — 85025 COMPLETE CBC W/AUTO DIFF WBC: CPT

## 2021-02-23 PROCEDURE — 63600175 PHARM REV CODE 636 W HCPCS: Performed by: HOSPITALIST

## 2021-02-23 PROCEDURE — 84484 ASSAY OF TROPONIN QUANT: CPT

## 2021-02-23 PROCEDURE — 25000003 PHARM REV CODE 250: Performed by: EMERGENCY MEDICINE

## 2021-02-23 PROCEDURE — 63600175 PHARM REV CODE 636 W HCPCS: Performed by: EMERGENCY MEDICINE

## 2021-02-23 PROCEDURE — 99219 PR INITIAL OBSERVATION CARE,LEVL II: ICD-10-PCS | Mod: ,,, | Performed by: INTERNAL MEDICINE

## 2021-02-23 PROCEDURE — 99219 PR INITIAL OBSERVATION CARE,LEVL II: CPT | Mod: ,,, | Performed by: INTERNAL MEDICINE

## 2021-02-23 PROCEDURE — 84132 ASSAY OF SERUM POTASSIUM: CPT

## 2021-02-23 PROCEDURE — 25000003 PHARM REV CODE 250: Performed by: HOSPITALIST

## 2021-02-23 PROCEDURE — U0002 COVID-19 LAB TEST NON-CDC: HCPCS | Performed by: EMERGENCY MEDICINE

## 2021-02-23 PROCEDURE — 93005 ELECTROCARDIOGRAM TRACING: CPT

## 2021-02-23 RX ORDER — POLYETHYLENE GLYCOL 3350 17 G/17G
17 POWDER, FOR SOLUTION ORAL DAILY
Status: DISCONTINUED | OUTPATIENT
Start: 2021-02-23 | End: 2021-02-24 | Stop reason: HOSPADM

## 2021-02-23 RX ORDER — FUROSEMIDE 10 MG/ML
80 INJECTION INTRAMUSCULAR; INTRAVENOUS
Status: COMPLETED | OUTPATIENT
Start: 2021-02-23 | End: 2021-02-23

## 2021-02-23 RX ORDER — NAPROXEN SODIUM 220 MG/1
162 TABLET, FILM COATED ORAL
Status: COMPLETED | OUTPATIENT
Start: 2021-02-23 | End: 2021-02-23

## 2021-02-23 RX ORDER — ENOXAPARIN SODIUM 100 MG/ML
40 INJECTION SUBCUTANEOUS EVERY 24 HOURS
Status: DISCONTINUED | OUTPATIENT
Start: 2021-02-23 | End: 2021-02-23

## 2021-02-23 RX ORDER — GLUCAGON 1 MG
1 KIT INJECTION
Status: DISCONTINUED | OUTPATIENT
Start: 2021-02-23 | End: 2021-02-24 | Stop reason: HOSPADM

## 2021-02-23 RX ORDER — METOPROLOL SUCCINATE 50 MG/1
50 TABLET, EXTENDED RELEASE ORAL DAILY
Status: DISCONTINUED | OUTPATIENT
Start: 2021-02-24 | End: 2021-02-24

## 2021-02-23 RX ORDER — IBUPROFEN 200 MG
16 TABLET ORAL
Status: DISCONTINUED | OUTPATIENT
Start: 2021-02-23 | End: 2021-02-24 | Stop reason: HOSPADM

## 2021-02-23 RX ORDER — TALC
6 POWDER (GRAM) TOPICAL NIGHTLY PRN
Status: DISCONTINUED | OUTPATIENT
Start: 2021-02-23 | End: 2021-02-24 | Stop reason: HOSPADM

## 2021-02-23 RX ORDER — ROSUVASTATIN CALCIUM 10 MG/1
40 TABLET, COATED ORAL NIGHTLY
Status: DISCONTINUED | OUTPATIENT
Start: 2021-02-23 | End: 2021-02-24 | Stop reason: HOSPADM

## 2021-02-23 RX ORDER — FUROSEMIDE 10 MG/ML
40 INJECTION INTRAMUSCULAR; INTRAVENOUS 2 TIMES DAILY WITH MEALS
Status: DISCONTINUED | OUTPATIENT
Start: 2021-02-23 | End: 2021-02-23

## 2021-02-23 RX ORDER — SODIUM CHLORIDE 0.9 % (FLUSH) 0.9 %
10 SYRINGE (ML) INJECTION
Status: DISCONTINUED | OUTPATIENT
Start: 2021-02-23 | End: 2021-02-24 | Stop reason: HOSPADM

## 2021-02-23 RX ORDER — FUROSEMIDE 10 MG/ML
40 INJECTION INTRAMUSCULAR; INTRAVENOUS 2 TIMES DAILY WITH MEALS
Status: DISCONTINUED | OUTPATIENT
Start: 2021-02-24 | End: 2021-02-24 | Stop reason: HOSPADM

## 2021-02-23 RX ORDER — RANOLAZINE 500 MG/1
500 TABLET, EXTENDED RELEASE ORAL 2 TIMES DAILY
Status: DISCONTINUED | OUTPATIENT
Start: 2021-02-23 | End: 2021-02-24 | Stop reason: HOSPADM

## 2021-02-23 RX ORDER — INSULIN ASPART 100 [IU]/ML
1-10 INJECTION, SOLUTION INTRAVENOUS; SUBCUTANEOUS
Status: DISCONTINUED | OUTPATIENT
Start: 2021-02-23 | End: 2021-02-24 | Stop reason: HOSPADM

## 2021-02-23 RX ORDER — ACETAMINOPHEN 325 MG/1
650 TABLET ORAL EVERY 6 HOURS PRN
Status: DISCONTINUED | OUTPATIENT
Start: 2021-02-23 | End: 2021-02-24 | Stop reason: HOSPADM

## 2021-02-23 RX ORDER — MAGNESIUM SULFATE 1 G/100ML
1 INJECTION INTRAVENOUS ONCE
Status: COMPLETED | OUTPATIENT
Start: 2021-02-23 | End: 2021-02-23

## 2021-02-23 RX ORDER — IBUPROFEN 200 MG
24 TABLET ORAL
Status: DISCONTINUED | OUTPATIENT
Start: 2021-02-23 | End: 2021-02-24 | Stop reason: HOSPADM

## 2021-02-23 RX ORDER — FUROSEMIDE 10 MG/ML
40 INJECTION INTRAMUSCULAR; INTRAVENOUS
Status: COMPLETED | OUTPATIENT
Start: 2021-02-23 | End: 2021-02-23

## 2021-02-23 RX ADMIN — ROSUVASTATIN CALCIUM 40 MG: 10 TABLET, FILM COATED ORAL at 08:02

## 2021-02-23 RX ADMIN — RANOLAZINE 500 MG: 500 TABLET, FILM COATED, EXTENDED RELEASE ORAL at 08:02

## 2021-02-23 RX ADMIN — ASPIRIN 162 MG: 81 TABLET, CHEWABLE ORAL at 11:02

## 2021-02-23 RX ADMIN — FUROSEMIDE 40 MG: 10 INJECTION, SOLUTION INTRAVENOUS at 12:02

## 2021-02-23 RX ADMIN — APIXABAN 5 MG: 5 TABLET, FILM COATED ORAL at 08:02

## 2021-02-23 RX ADMIN — FUROSEMIDE 80 MG: 10 INJECTION, SOLUTION INTRAVENOUS at 02:02

## 2021-02-23 RX ADMIN — TICAGRELOR 90 MG: 90 TABLET ORAL at 08:02

## 2021-02-23 RX ADMIN — POTASSIUM BICARBONATE 40 MEQ: 391 TABLET, EFFERVESCENT ORAL at 05:02

## 2021-02-23 RX ADMIN — MAGNESIUM SULFATE 1 G: 1 INJECTION INTRAVENOUS at 05:02

## 2021-02-23 RX ADMIN — INSULIN ASPART 8 UNITS: 100 INJECTION, SOLUTION INTRAVENOUS; SUBCUTANEOUS at 06:02

## 2021-02-24 VITALS
BODY MASS INDEX: 23.32 KG/M2 | OXYGEN SATURATION: 99 % | SYSTOLIC BLOOD PRESSURE: 101 MMHG | RESPIRATION RATE: 18 BRPM | WEIGHT: 140 LBS | HEART RATE: 98 BPM | HEIGHT: 65 IN | DIASTOLIC BLOOD PRESSURE: 56 MMHG | TEMPERATURE: 98 F

## 2021-02-24 LAB
ALBUMIN SERPL BCP-MCNC: 2.7 G/DL (ref 3.5–5.2)
ALP SERPL-CCNC: 194 U/L (ref 55–135)
ALT SERPL W/O P-5'-P-CCNC: 46 U/L (ref 10–44)
ANION GAP SERPL CALC-SCNC: 12 MMOL/L (ref 8–16)
AORTIC ROOT ANNULUS: 3.22 CM
AORTIC VALVE CUSP SEPERATION: 2.08 CM
ASCENDING AORTA: 2.97 CM
AST SERPL-CCNC: 35 U/L (ref 10–40)
AV INDEX (PROSTH): 0.5
AV MEAN GRADIENT: 3 MMHG
AV PEAK GRADIENT: 5 MMHG
AV VALVE AREA: 1.61 CM2
AV VELOCITY RATIO: 0.5
BILIRUB SERPL-MCNC: 1.5 MG/DL (ref 0.1–1)
BSA FOR ECHO PROCEDURE: 1.71 M2
BUN SERPL-MCNC: 20 MG/DL (ref 8–23)
CALCIUM SERPL-MCNC: 8.6 MG/DL (ref 8.7–10.5)
CHLORIDE SERPL-SCNC: 97 MMOL/L (ref 95–110)
CO2 SERPL-SCNC: 29 MMOL/L (ref 23–29)
CREAT SERPL-MCNC: 1.4 MG/DL (ref 0.5–1.4)
CV ECHO LV RWT: 0.39 CM
DOP CALC AO PEAK VEL: 1.07 M/S
DOP CALC AO VTI: 14.32 CM
DOP CALC LVOT AREA: 3.2 CM2
DOP CALC LVOT DIAMETER: 2.02 CM
DOP CALC LVOT PEAK VEL: 0.54 M/S
DOP CALC LVOT STROKE VOLUME: 23.09 CM3
DOP CALCLVOT PEAK VEL VTI: 7.21 CM
E WAVE DECELERATION TIME: 117.03 MSEC
E/A RATIO: 1.97
E/E' RATIO: 20.33 M/S
ECHO LV POSTERIOR WALL: 0.98 CM (ref 0.6–1.1)
EST. GFR  (AFRICAN AMERICAN): >60 ML/MIN/1.73 M^2
EST. GFR  (NON AFRICAN AMERICAN): 52 ML/MIN/1.73 M^2
FRACTIONAL SHORTENING: 9 % (ref 28–44)
GLUCOSE SERPL-MCNC: 165 MG/DL (ref 70–110)
INTERVENTRICULAR SEPTUM: 1.22 CM (ref 0.6–1.1)
IVRT: 97.05 MSEC
LA MAJOR: 5.52 CM
LA MINOR: 5.51 CM
LA WIDTH: 3.78 CM
LEFT ATRIUM SIZE: 3.58 CM
LEFT ATRIUM VOLUME INDEX: 37.3 ML/M2
LEFT ATRIUM VOLUME: 63.44 CM3
LEFT INTERNAL DIMENSION IN SYSTOLE: 4.59 CM (ref 2.1–4)
LEFT VENTRICLE DIASTOLIC VOLUME INDEX: 70.28 ML/M2
LEFT VENTRICLE DIASTOLIC VOLUME: 119.47 ML
LEFT VENTRICLE MASS INDEX: 123 G/M2
LEFT VENTRICLE SYSTOLIC VOLUME INDEX: 57 ML/M2
LEFT VENTRICLE SYSTOLIC VOLUME: 96.98 ML
LEFT VENTRICULAR INTERNAL DIMENSION IN DIASTOLE: 5.02 CM (ref 3.5–6)
LEFT VENTRICULAR MASS: 208.48 G
LV LATERAL E/E' RATIO: 15.25 M/S
LV SEPTAL E/E' RATIO: 30.5 M/S
MV PEAK A VEL: 0.31 M/S
MV PEAK E VEL: 0.61 M/S
MV STENOSIS PRESSURE HALF TIME: 33.94 MS
MV VALVE AREA P 1/2 METHOD: 6.48 CM2
PISA TR MAX VEL: 3.09 M/S
POCT GLUCOSE: 182 MG/DL (ref 70–110)
POCT GLUCOSE: 293 MG/DL (ref 70–110)
POTASSIUM SERPL-SCNC: 3.4 MMOL/L (ref 3.5–5.1)
PROT SERPL-MCNC: 6.6 G/DL (ref 6–8.4)
PV PEAK VELOCITY: 0.64 CM/S
RA MAJOR: 5.28 CM
RA PRESSURE: 15 MMHG
RA WIDTH: 4.85 CM
RIGHT VENTRICULAR END-DIASTOLIC DIMENSION: 4.71 CM
RV TISSUE DOPPLER FREE WALL SYSTOLIC VELOCITY 1 (APICAL 4 CHAMBER VIEW): 10.81 CM/S
SINUS: 3.1 CM
SODIUM SERPL-SCNC: 138 MMOL/L (ref 136–145)
STJ: 2.47 CM
TDI LATERAL: 0.04 M/S
TDI SEPTAL: 0.02 M/S
TDI: 0.03 M/S
TR MAX PG: 38 MMHG
TRICUSPID ANNULAR PLANE SYSTOLIC EXCURSION: 1.54 CM
TV REST PULMONARY ARTERY PRESSURE: 53 MMHG

## 2021-02-24 PROCEDURE — 96376 TX/PRO/DX INJ SAME DRUG ADON: CPT

## 2021-02-24 PROCEDURE — 96372 THER/PROPH/DIAG INJ SC/IM: CPT

## 2021-02-24 PROCEDURE — 25000003 PHARM REV CODE 250: Performed by: HOSPITALIST

## 2021-02-24 PROCEDURE — 80053 COMPREHEN METABOLIC PANEL: CPT

## 2021-02-24 PROCEDURE — G0378 HOSPITAL OBSERVATION PER HR: HCPCS

## 2021-02-24 PROCEDURE — 63600175 PHARM REV CODE 636 W HCPCS: Performed by: HOSPITALIST

## 2021-02-24 PROCEDURE — 99225 PR SUBSEQUENT OBSERVATION CARE,LEVEL II: ICD-10-PCS | Mod: 25,,, | Performed by: INTERNAL MEDICINE

## 2021-02-24 PROCEDURE — 36415 COLL VENOUS BLD VENIPUNCTURE: CPT

## 2021-02-24 PROCEDURE — 99225 PR SUBSEQUENT OBSERVATION CARE,LEVEL II: CPT | Mod: 25,,, | Performed by: INTERNAL MEDICINE

## 2021-02-24 RX ORDER — FUROSEMIDE 20 MG/1
20 TABLET ORAL 2 TIMES DAILY
Qty: 6 TABLET | Refills: 0 | Status: SHIPPED | OUTPATIENT
Start: 2021-02-24 | End: 2021-02-27

## 2021-02-24 RX ORDER — POTASSIUM CHLORIDE 750 MG/1
10 CAPSULE, EXTENDED RELEASE ORAL 2 TIMES DAILY
Qty: 8 CAPSULE | Refills: 0 | Status: SHIPPED | OUTPATIENT
Start: 2021-02-24 | End: 2021-02-28

## 2021-02-24 RX ADMIN — INSULIN ASPART 6 UNITS: 100 INJECTION, SOLUTION INTRAVENOUS; SUBCUTANEOUS at 11:02

## 2021-02-24 RX ADMIN — TICAGRELOR 90 MG: 90 TABLET ORAL at 09:02

## 2021-02-24 RX ADMIN — FUROSEMIDE 40 MG: 10 INJECTION, SOLUTION INTRAMUSCULAR; INTRAVENOUS at 09:02

## 2021-02-24 RX ADMIN — POLYETHYLENE GLYCOL 3350 17 G: 17 POWDER, FOR SOLUTION ORAL at 09:02

## 2021-02-24 RX ADMIN — INSULIN ASPART 2 UNITS: 100 INJECTION, SOLUTION INTRAVENOUS; SUBCUTANEOUS at 09:02

## 2021-02-24 RX ADMIN — APIXABAN 5 MG: 5 TABLET, FILM COATED ORAL at 09:02

## 2021-02-24 RX ADMIN — RANOLAZINE 500 MG: 500 TABLET, FILM COATED, EXTENDED RELEASE ORAL at 09:02

## 2021-03-29 ENCOUNTER — OFFICE VISIT (OUTPATIENT)
Dept: FAMILY MEDICINE | Facility: CLINIC | Age: 66
End: 2021-03-29
Payer: COMMERCIAL

## 2021-03-29 VITALS
WEIGHT: 153 LBS | HEIGHT: 65 IN | BODY MASS INDEX: 25.49 KG/M2 | TEMPERATURE: 99 F | HEART RATE: 72 BPM | DIASTOLIC BLOOD PRESSURE: 70 MMHG | SYSTOLIC BLOOD PRESSURE: 100 MMHG

## 2021-03-29 DIAGNOSIS — E78.2 MIXED HYPERLIPIDEMIA: ICD-10-CM

## 2021-03-29 DIAGNOSIS — I50.9 HEART FAILURE, UNSPECIFIED HF CHRONICITY, UNSPECIFIED HEART FAILURE TYPE: ICD-10-CM

## 2021-03-29 DIAGNOSIS — I10 ESSENTIAL HYPERTENSION: Primary | ICD-10-CM

## 2021-03-29 DIAGNOSIS — E11.00 TYPE 2 DIABETES MELLITUS WITH HYPEROSMOLARITY WITHOUT COMA, WITH LONG-TERM CURRENT USE OF INSULIN: ICD-10-CM

## 2021-03-29 DIAGNOSIS — I25.10 CORONARY ARTERY DISEASE, ANGINA PRESENCE UNSPECIFIED, UNSPECIFIED VESSEL OR LESION TYPE, UNSPECIFIED WHETHER NATIVE OR TRANSPLANTED HEART: ICD-10-CM

## 2021-03-29 DIAGNOSIS — E66.3 OVERWEIGHT (BMI 25.0-29.9): ICD-10-CM

## 2021-03-29 DIAGNOSIS — Z79.4 TYPE 2 DIABETES MELLITUS WITH HYPEROSMOLARITY WITHOUT COMA, WITH LONG-TERM CURRENT USE OF INSULIN: ICD-10-CM

## 2021-03-29 PROCEDURE — 99214 OFFICE O/P EST MOD 30 MIN: CPT | Mod: S$GLB,,, | Performed by: NURSE PRACTITIONER

## 2021-03-29 PROCEDURE — 99214 PR OFFICE/OUTPT VISIT, EST, LEVL IV, 30-39 MIN: ICD-10-PCS | Mod: S$GLB,,, | Performed by: NURSE PRACTITIONER

## 2021-03-29 RX ORDER — INSULIN GLARGINE 300 U/ML
30 INJECTION, SOLUTION SUBCUTANEOUS NIGHTLY
Qty: 3 ML | Refills: 3 | Status: ON HOLD | OUTPATIENT
Start: 2021-03-29 | End: 2021-05-12 | Stop reason: HOSPADM

## 2021-03-29 RX ORDER — IBUPROFEN 800 MG/1
800 TABLET ORAL
Status: ON HOLD | COMMUNITY
Start: 2021-01-04 | End: 2021-05-12 | Stop reason: HOSPADM

## 2021-03-29 RX ORDER — METFORMIN HYDROCHLORIDE 500 MG/1
500 TABLET ORAL 2 TIMES DAILY WITH MEALS
Qty: 180 TABLET | Refills: 1 | Status: ON HOLD | OUTPATIENT
Start: 2021-03-29 | End: 2021-05-12 | Stop reason: HOSPADM

## 2021-03-29 RX ORDER — ROSUVASTATIN CALCIUM 40 MG/1
40 TABLET, COATED ORAL NIGHTLY
Qty: 90 TABLET | Refills: 1 | Status: SHIPPED | OUTPATIENT
Start: 2021-03-29 | End: 2021-05-13

## 2021-03-29 RX ORDER — FUROSEMIDE 40 MG/1
40 TABLET ORAL DAILY
Qty: 90 TABLET | Refills: 3 | Status: ON HOLD | OUTPATIENT
Start: 2021-03-29 | End: 2021-05-12 | Stop reason: SDUPTHER

## 2021-03-29 RX ORDER — INSULIN ASPART 100 [IU]/ML
1-10 INJECTION, SOLUTION INTRAVENOUS; SUBCUTANEOUS
Qty: 9 ML | Refills: 11 | Status: SHIPPED | OUTPATIENT
Start: 2021-03-29 | End: 2022-03-29

## 2021-05-09 ENCOUNTER — HOSPITAL ENCOUNTER (INPATIENT)
Facility: HOSPITAL | Age: 66
LOS: 3 days | Discharge: HOME | DRG: 291 | End: 2021-05-12
Attending: EMERGENCY MEDICINE | Admitting: EMERGENCY MEDICINE
Payer: COMMERCIAL

## 2021-05-09 DIAGNOSIS — I50.43 ACUTE ON CHRONIC COMBINED SYSTOLIC AND DIASTOLIC CONGESTIVE HEART FAILURE: Primary | ICD-10-CM

## 2021-05-09 DIAGNOSIS — R60.0 LOWER EXTREMITY EDEMA: ICD-10-CM

## 2021-05-09 DIAGNOSIS — R07.9 CHEST PAIN: ICD-10-CM

## 2021-05-09 DIAGNOSIS — Z79.4 TYPE 2 DIABETES MELLITUS WITH HYPEROSMOLARITY WITHOUT COMA, WITH LONG-TERM CURRENT USE OF INSULIN: ICD-10-CM

## 2021-05-09 DIAGNOSIS — E11.00 TYPE 2 DIABETES MELLITUS WITH HYPEROSMOLARITY WITHOUT COMA, WITH LONG-TERM CURRENT USE OF INSULIN: ICD-10-CM

## 2021-05-09 DIAGNOSIS — I10 ESSENTIAL HYPERTENSION: ICD-10-CM

## 2021-05-09 DIAGNOSIS — I50.9 CHF (CONGESTIVE HEART FAILURE): ICD-10-CM

## 2021-05-09 DIAGNOSIS — E87.6 HYPOKALEMIA: ICD-10-CM

## 2021-05-09 PROBLEM — R79.89 POSITIVE D DIMER: Status: ACTIVE | Noted: 2021-05-09

## 2021-05-09 LAB
ALBUMIN SERPL BCP-MCNC: 2.9 G/DL (ref 3.5–5.2)
ALP SERPL-CCNC: 402 U/L (ref 55–135)
ALT SERPL W/O P-5'-P-CCNC: 87 U/L (ref 10–44)
ANION GAP SERPL CALC-SCNC: 14 MMOL/L (ref 8–16)
AST SERPL-CCNC: 69 U/L (ref 10–40)
BASOPHILS # BLD AUTO: 0.02 K/UL (ref 0–0.2)
BASOPHILS NFR BLD: 0.3 % (ref 0–1.9)
BILIRUB SERPL-MCNC: 2.8 MG/DL (ref 0.1–1)
BNP SERPL-MCNC: >4900 PG/ML (ref 0–99)
BUN SERPL-MCNC: 14 MG/DL (ref 8–23)
CALCIUM SERPL-MCNC: 9 MG/DL (ref 8.7–10.5)
CHLORIDE SERPL-SCNC: 94 MMOL/L (ref 95–110)
CO2 SERPL-SCNC: 29 MMOL/L (ref 23–29)
CREAT SERPL-MCNC: 1.8 MG/DL (ref 0.5–1.4)
CTP QC/QA: YES
D DIMER PPP IA.FEU-MCNC: 2.05 MG/L FEU
DIFFERENTIAL METHOD: ABNORMAL
EOSINOPHIL # BLD AUTO: 0.1 K/UL (ref 0–0.5)
EOSINOPHIL NFR BLD: 0.8 % (ref 0–8)
ERYTHROCYTE [DISTWIDTH] IN BLOOD BY AUTOMATED COUNT: 21.7 % (ref 11.5–14.5)
EST. GFR  (AFRICAN AMERICAN): 44 ML/MIN/1.73 M^2
EST. GFR  (NON AFRICAN AMERICAN): 38 ML/MIN/1.73 M^2
GLUCOSE SERPL-MCNC: 263 MG/DL (ref 70–110)
HCT VFR BLD AUTO: 39.4 % (ref 40–54)
HGB BLD-MCNC: 12.6 G/DL (ref 14–18)
IMM GRANULOCYTES # BLD AUTO: 0.01 K/UL (ref 0–0.04)
IMM GRANULOCYTES NFR BLD AUTO: 0.2 % (ref 0–0.5)
LYMPHOCYTES # BLD AUTO: 1 K/UL (ref 1–4.8)
LYMPHOCYTES NFR BLD: 15.8 % (ref 18–48)
MAGNESIUM SERPL-MCNC: 2.4 MG/DL (ref 1.6–2.6)
MCH RBC QN AUTO: 24.9 PG (ref 27–31)
MCHC RBC AUTO-ENTMCNC: 32 G/DL (ref 32–36)
MCV RBC AUTO: 78 FL (ref 82–98)
MONOCYTES # BLD AUTO: 0.3 K/UL (ref 0.3–1)
MONOCYTES NFR BLD: 4.7 % (ref 4–15)
NEUTROPHILS # BLD AUTO: 5 K/UL (ref 1.8–7.7)
NEUTROPHILS NFR BLD: 78.2 % (ref 38–73)
NRBC BLD-RTO: 0 /100 WBC
PLATELET # BLD AUTO: 151 K/UL (ref 150–450)
PMV BLD AUTO: 10.1 FL (ref 9.2–12.9)
POTASSIUM SERPL-SCNC: <2 MMOL/L (ref 3.5–5.1)
PROT SERPL-MCNC: 7.5 G/DL (ref 6–8.4)
RBC # BLD AUTO: 5.07 M/UL (ref 4.6–6.2)
SARS-COV-2 RDRP RESP QL NAA+PROBE: NEGATIVE
SODIUM SERPL-SCNC: 137 MMOL/L (ref 136–145)
TROPONIN I SERPL DL<=0.01 NG/ML-MCNC: 0.03 NG/ML (ref 0–0.03)
WBC # BLD AUTO: 6.34 K/UL (ref 3.9–12.7)

## 2021-05-09 PROCEDURE — 93005 ELECTROCARDIOGRAM TRACING: CPT

## 2021-05-09 PROCEDURE — 83735 ASSAY OF MAGNESIUM: CPT | Performed by: EMERGENCY MEDICINE

## 2021-05-09 PROCEDURE — 84484 ASSAY OF TROPONIN QUANT: CPT | Performed by: EMERGENCY MEDICINE

## 2021-05-09 PROCEDURE — 85379 FIBRIN DEGRADATION QUANT: CPT | Performed by: EMERGENCY MEDICINE

## 2021-05-09 PROCEDURE — 63600175 PHARM REV CODE 636 W HCPCS: Performed by: EMERGENCY MEDICINE

## 2021-05-09 PROCEDURE — 83880 ASSAY OF NATRIURETIC PEPTIDE: CPT | Performed by: EMERGENCY MEDICINE

## 2021-05-09 PROCEDURE — 12000002 HC ACUTE/MED SURGE SEMI-PRIVATE ROOM

## 2021-05-09 PROCEDURE — 93010 ELECTROCARDIOGRAM REPORT: CPT | Mod: ,,, | Performed by: INTERNAL MEDICINE

## 2021-05-09 PROCEDURE — 85025 COMPLETE CBC W/AUTO DIFF WBC: CPT | Performed by: EMERGENCY MEDICINE

## 2021-05-09 PROCEDURE — 93010 EKG 12-LEAD: ICD-10-PCS | Mod: ,,, | Performed by: INTERNAL MEDICINE

## 2021-05-09 PROCEDURE — 25000003 PHARM REV CODE 250: Performed by: EMERGENCY MEDICINE

## 2021-05-09 PROCEDURE — 99285 EMERGENCY DEPT VISIT HI MDM: CPT | Mod: 25

## 2021-05-09 PROCEDURE — 80053 COMPREHEN METABOLIC PANEL: CPT | Performed by: EMERGENCY MEDICINE

## 2021-05-09 PROCEDURE — U0002 COVID-19 LAB TEST NON-CDC: HCPCS | Performed by: EMERGENCY MEDICINE

## 2021-05-09 RX ORDER — GLUCAGON 1 MG
1 KIT INJECTION
Status: DISCONTINUED | OUTPATIENT
Start: 2021-05-09 | End: 2021-05-11 | Stop reason: SDUPTHER

## 2021-05-09 RX ORDER — POTASSIUM CHLORIDE 20 MEQ/1
40 TABLET, EXTENDED RELEASE ORAL ONCE
Status: COMPLETED | OUTPATIENT
Start: 2021-05-09 | End: 2021-05-10

## 2021-05-09 RX ORDER — IBUPROFEN 200 MG
24 TABLET ORAL
Status: DISCONTINUED | OUTPATIENT
Start: 2021-05-09 | End: 2021-05-11 | Stop reason: SDUPTHER

## 2021-05-09 RX ORDER — SODIUM CHLORIDE 0.9 % (FLUSH) 0.9 %
10 SYRINGE (ML) INJECTION
Status: DISCONTINUED | OUTPATIENT
Start: 2021-05-09 | End: 2021-05-11

## 2021-05-09 RX ORDER — IBUPROFEN 200 MG
16 TABLET ORAL
Status: DISCONTINUED | OUTPATIENT
Start: 2021-05-09 | End: 2021-05-11 | Stop reason: SDUPTHER

## 2021-05-09 RX ORDER — FUROSEMIDE 10 MG/ML
80 INJECTION INTRAMUSCULAR; INTRAVENOUS 2 TIMES DAILY
Status: DISCONTINUED | OUTPATIENT
Start: 2021-05-10 | End: 2021-05-10

## 2021-05-09 RX ORDER — TALC
6 POWDER (GRAM) TOPICAL NIGHTLY PRN
Status: DISCONTINUED | OUTPATIENT
Start: 2021-05-09 | End: 2021-05-11 | Stop reason: SDUPTHER

## 2021-05-09 RX ORDER — ACETAMINOPHEN 325 MG/1
650 TABLET ORAL EVERY 4 HOURS PRN
Status: DISCONTINUED | OUTPATIENT
Start: 2021-05-09 | End: 2021-05-11 | Stop reason: SDUPTHER

## 2021-05-09 RX ORDER — ONDANSETRON 2 MG/ML
4 INJECTION INTRAMUSCULAR; INTRAVENOUS EVERY 8 HOURS PRN
Status: DISCONTINUED | OUTPATIENT
Start: 2021-05-09 | End: 2021-05-11 | Stop reason: SDUPTHER

## 2021-05-09 RX ORDER — INSULIN ASPART 100 [IU]/ML
1-10 INJECTION, SOLUTION INTRAVENOUS; SUBCUTANEOUS
Status: DISCONTINUED | OUTPATIENT
Start: 2021-05-09 | End: 2021-05-12 | Stop reason: HOSPADM

## 2021-05-09 RX ORDER — POTASSIUM CHLORIDE 7.45 MG/ML
10 INJECTION INTRAVENOUS ONCE
Status: COMPLETED | OUTPATIENT
Start: 2021-05-09 | End: 2021-05-09

## 2021-05-09 RX ADMIN — POTASSIUM CHLORIDE 10 MEQ: 7.46 INJECTION, SOLUTION INTRAVENOUS at 07:05

## 2021-05-09 RX ADMIN — POTASSIUM BICARBONATE 60 MEQ: 391 TABLET, EFFERVESCENT ORAL at 07:05

## 2021-05-10 LAB
ALBUMIN SERPL BCP-MCNC: 2.5 G/DL (ref 3.5–5.2)
ALP SERPL-CCNC: 381 U/L (ref 55–135)
ALT SERPL W/O P-5'-P-CCNC: 83 U/L (ref 10–44)
ANION GAP SERPL CALC-SCNC: 11 MMOL/L (ref 8–16)
ANION GAP SERPL CALC-SCNC: 11 MMOL/L (ref 8–16)
ANION GAP SERPL CALC-SCNC: 13 MMOL/L (ref 8–16)
AORTIC ROOT ANNULUS: 3.22 CM
AORTIC VALVE CUSP SEPERATION: 1.94 CM
ASCENDING AORTA: 3.28 CM
AST SERPL-CCNC: 81 U/L (ref 10–40)
AV PEAK GRADIENT: 2 MMHG
AV VELOCITY RATIO: 0.61
BASOPHILS # BLD AUTO: 0.02 K/UL (ref 0–0.2)
BASOPHILS NFR BLD: 0.3 % (ref 0–1.9)
BILIRUB SERPL-MCNC: 2.6 MG/DL (ref 0.1–1)
BSA FOR ECHO PROCEDURE: 1.75 M2
BUN SERPL-MCNC: 15 MG/DL (ref 8–23)
BUN SERPL-MCNC: 17 MG/DL (ref 8–23)
BUN SERPL-MCNC: 17 MG/DL (ref 8–23)
CALCIUM SERPL-MCNC: 8.3 MG/DL (ref 8.7–10.5)
CALCIUM SERPL-MCNC: 8.5 MG/DL (ref 8.7–10.5)
CALCIUM SERPL-MCNC: 8.9 MG/DL (ref 8.7–10.5)
CHLORIDE SERPL-SCNC: 100 MMOL/L (ref 95–110)
CHLORIDE SERPL-SCNC: 96 MMOL/L (ref 95–110)
CHLORIDE SERPL-SCNC: 97 MMOL/L (ref 95–110)
CO2 SERPL-SCNC: 22 MMOL/L (ref 23–29)
CO2 SERPL-SCNC: 29 MMOL/L (ref 23–29)
CO2 SERPL-SCNC: 29 MMOL/L (ref 23–29)
CREAT SERPL-MCNC: 1.6 MG/DL (ref 0.5–1.4)
CREAT SERPL-MCNC: 1.8 MG/DL (ref 0.5–1.4)
CREAT SERPL-MCNC: 1.8 MG/DL (ref 0.5–1.4)
CV ECHO LV RWT: 0.39 CM
DIFFERENTIAL METHOD: ABNORMAL
DOP CALC AO PEAK VEL: 0.77 M/S
DOP CALC LVOT AREA: 3 CM2
DOP CALC LVOT DIAMETER: 1.94 CM
DOP CALC LVOT PEAK VEL: 0.47 M/S
DOP CALC LVOT STROKE VOLUME: 20 CM3
DOP CALCLVOT PEAK VEL VTI: 6.77 CM
E WAVE DECELERATION TIME: 143.21 MSEC
E/A RATIO: 3.15
ECHO LV POSTERIOR WALL: 1.02 CM (ref 0.6–1.1)
EJECTION FRACTION: 20 %
EOSINOPHIL # BLD AUTO: 0.1 K/UL (ref 0–0.5)
EOSINOPHIL NFR BLD: 0.8 % (ref 0–8)
ERYTHROCYTE [DISTWIDTH] IN BLOOD BY AUTOMATED COUNT: 21.9 % (ref 11.5–14.5)
EST. GFR  (AFRICAN AMERICAN): 44 ML/MIN/1.73 M^2
EST. GFR  (AFRICAN AMERICAN): 44 ML/MIN/1.73 M^2
EST. GFR  (AFRICAN AMERICAN): 51 ML/MIN/1.73 M^2
EST. GFR  (NON AFRICAN AMERICAN): 38 ML/MIN/1.73 M^2
EST. GFR  (NON AFRICAN AMERICAN): 38 ML/MIN/1.73 M^2
EST. GFR  (NON AFRICAN AMERICAN): 44 ML/MIN/1.73 M^2
FRACTIONAL SHORTENING: 10 % (ref 28–44)
GLUCOSE SERPL-MCNC: 148 MG/DL (ref 70–110)
GLUCOSE SERPL-MCNC: 246 MG/DL (ref 70–110)
GLUCOSE SERPL-MCNC: 289 MG/DL (ref 70–110)
HCT VFR BLD AUTO: 35.9 % (ref 40–54)
HGB BLD-MCNC: 11.3 G/DL (ref 14–18)
IMM GRANULOCYTES # BLD AUTO: 0.01 K/UL (ref 0–0.04)
IMM GRANULOCYTES NFR BLD AUTO: 0.2 % (ref 0–0.5)
INTERVENTRICULAR SEPTUM: 1.01 CM (ref 0.6–1.1)
LA MAJOR: 5.54 CM
LA MINOR: 5.64 CM
LA WIDTH: 4.04 CM
LEFT ATRIUM SIZE: 4.46 CM
LEFT ATRIUM VOLUME INDEX: 49.5 ML/M2
LEFT ATRIUM VOLUME: 85.61 CM3
LEFT INTERNAL DIMENSION IN SYSTOLE: 4.74 CM (ref 2.1–4)
LEFT VENTRICLE DIASTOLIC VOLUME INDEX: 76.65 ML/M2
LEFT VENTRICLE DIASTOLIC VOLUME: 132.6 ML
LEFT VENTRICLE MASS INDEX: 116 G/M2
LEFT VENTRICLE SYSTOLIC VOLUME INDEX: 60.3 ML/M2
LEFT VENTRICLE SYSTOLIC VOLUME: 104.39 ML
LEFT VENTRICULAR INTERNAL DIMENSION IN DIASTOLE: 5.25 CM (ref 3.5–6)
LEFT VENTRICULAR MASS: 201.22 G
LV LATERAL E/E' RATIO: 21 M/S
LYMPHOCYTES # BLD AUTO: 1.2 K/UL (ref 1–4.8)
LYMPHOCYTES NFR BLD: 19.6 % (ref 18–48)
MAGNESIUM SERPL-MCNC: 2.4 MG/DL (ref 1.6–2.6)
MCH RBC QN AUTO: 24.7 PG (ref 27–31)
MCHC RBC AUTO-ENTMCNC: 31.5 G/DL (ref 32–36)
MCV RBC AUTO: 78 FL (ref 82–98)
MONOCYTES # BLD AUTO: 0.3 K/UL (ref 0.3–1)
MONOCYTES NFR BLD: 5.4 % (ref 4–15)
MV PEAK A VEL: 0.2 M/S
MV PEAK E VEL: 0.63 M/S
MV STENOSIS PRESSURE HALF TIME: 41.53 MS
MV VALVE AREA P 1/2 METHOD: 5.3 CM2
NEUTROPHILS # BLD AUTO: 4.6 K/UL (ref 1.8–7.7)
NEUTROPHILS NFR BLD: 73.7 % (ref 38–73)
NRBC BLD-RTO: 0 /100 WBC
PISA MRMAX VEL: 0.03 M/S
PISA TR MAX VEL: 2.53 M/S
PLATELET # BLD AUTO: 160 K/UL (ref 150–450)
PMV BLD AUTO: 10.5 FL (ref 9.2–12.9)
POCT GLUCOSE: 162 MG/DL (ref 70–110)
POCT GLUCOSE: 194 MG/DL (ref 70–110)
POCT GLUCOSE: 209 MG/DL (ref 70–110)
POCT GLUCOSE: 239 MG/DL (ref 70–110)
POCT GLUCOSE: 259 MG/DL (ref 70–110)
POCT GLUCOSE: 287 MG/DL (ref 70–110)
POTASSIUM SERPL-SCNC: 2.6 MMOL/L (ref 3.5–5.1)
POTASSIUM SERPL-SCNC: 2.8 MMOL/L (ref 3.5–5.1)
POTASSIUM SERPL-SCNC: 3.7 MMOL/L (ref 3.5–5.1)
PROT SERPL-MCNC: 6.7 G/DL (ref 6–8.4)
PV PEAK VELOCITY: 0.53 CM/S
RA MAJOR: 4.52 CM
RA PRESSURE: 15 MMHG
RA WIDTH: 4.6 CM
RBC # BLD AUTO: 4.58 M/UL (ref 4.6–6.2)
RIGHT VENTRICULAR END-DIASTOLIC DIMENSION: 4.87 CM
RV TISSUE DOPPLER FREE WALL SYSTOLIC VELOCITY 1 (APICAL 4 CHAMBER VIEW): 4.54 CM/S
SINUS: 3.2 CM
SODIUM SERPL-SCNC: 135 MMOL/L (ref 136–145)
SODIUM SERPL-SCNC: 136 MMOL/L (ref 136–145)
SODIUM SERPL-SCNC: 137 MMOL/L (ref 136–145)
STJ: 2.37 CM
TDI LATERAL: 0.03 M/S
TR MAX PG: 26 MMHG
TRICUSPID ANNULAR PLANE SYSTOLIC EXCURSION: 1.04 CM
TV REST PULMONARY ARTERY PRESSURE: 41 MMHG
WBC # BLD AUTO: 6.24 K/UL (ref 3.9–12.7)

## 2021-05-10 PROCEDURE — 36415 COLL VENOUS BLD VENIPUNCTURE: CPT | Performed by: HOSPITALIST

## 2021-05-10 PROCEDURE — 36415 COLL VENOUS BLD VENIPUNCTURE: CPT | Performed by: INTERNAL MEDICINE

## 2021-05-10 PROCEDURE — 99222 PR INITIAL HOSPITAL CARE,LEVL II: ICD-10-PCS | Mod: 25,,, | Performed by: INTERNAL MEDICINE

## 2021-05-10 PROCEDURE — 80053 COMPREHEN METABOLIC PANEL: CPT | Performed by: INTERNAL MEDICINE

## 2021-05-10 PROCEDURE — 21400001 HC TELEMETRY ROOM

## 2021-05-10 PROCEDURE — 63600175 PHARM REV CODE 636 W HCPCS: Performed by: INTERNAL MEDICINE

## 2021-05-10 PROCEDURE — 63600175 PHARM REV CODE 636 W HCPCS: Performed by: HOSPITALIST

## 2021-05-10 PROCEDURE — 25000003 PHARM REV CODE 250: Performed by: INTERNAL MEDICINE

## 2021-05-10 PROCEDURE — 80048 BASIC METABOLIC PNL TOTAL CA: CPT | Mod: 91 | Performed by: HOSPITALIST

## 2021-05-10 PROCEDURE — 80048 BASIC METABOLIC PNL TOTAL CA: CPT | Performed by: INTERNAL MEDICINE

## 2021-05-10 PROCEDURE — 85025 COMPLETE CBC W/AUTO DIFF WBC: CPT | Performed by: INTERNAL MEDICINE

## 2021-05-10 PROCEDURE — 99222 1ST HOSP IP/OBS MODERATE 55: CPT | Mod: 25,,, | Performed by: INTERNAL MEDICINE

## 2021-05-10 PROCEDURE — 83735 ASSAY OF MAGNESIUM: CPT | Performed by: HOSPITALIST

## 2021-05-10 RX ORDER — ROSUVASTATIN CALCIUM 10 MG/1
40 TABLET, COATED ORAL NIGHTLY
Status: DISCONTINUED | OUTPATIENT
Start: 2021-05-10 | End: 2021-05-12 | Stop reason: HOSPADM

## 2021-05-10 RX ORDER — POTASSIUM CHLORIDE 20 MEQ/1
40 TABLET, EXTENDED RELEASE ORAL EVERY 4 HOURS
Status: COMPLETED | OUTPATIENT
Start: 2021-05-10 | End: 2021-05-10

## 2021-05-10 RX ORDER — POTASSIUM CHLORIDE 20 MEQ/1
40 TABLET, EXTENDED RELEASE ORAL EVERY 4 HOURS
Status: DISCONTINUED | OUTPATIENT
Start: 2021-05-10 | End: 2021-05-10

## 2021-05-10 RX ORDER — METOPROLOL SUCCINATE 50 MG/1
50 TABLET, EXTENDED RELEASE ORAL DAILY
Status: DISCONTINUED | OUTPATIENT
Start: 2021-05-10 | End: 2021-05-11

## 2021-05-10 RX ORDER — POLYETHYLENE GLYCOL 3350 17 G/17G
17 POWDER, FOR SOLUTION ORAL DAILY
Status: DISCONTINUED | OUTPATIENT
Start: 2021-05-10 | End: 2021-05-11 | Stop reason: SDUPTHER

## 2021-05-10 RX ORDER — FUROSEMIDE 10 MG/ML
40 INJECTION INTRAMUSCULAR; INTRAVENOUS 2 TIMES DAILY
Status: DISCONTINUED | OUTPATIENT
Start: 2021-05-10 | End: 2021-05-11

## 2021-05-10 RX ORDER — RANOLAZINE 500 MG/1
500 TABLET, EXTENDED RELEASE ORAL 2 TIMES DAILY
Status: DISCONTINUED | OUTPATIENT
Start: 2021-05-10 | End: 2021-05-12 | Stop reason: HOSPADM

## 2021-05-10 RX ORDER — POTASSIUM CHLORIDE 20 MEQ/1
60 TABLET, EXTENDED RELEASE ORAL ONCE
Status: COMPLETED | OUTPATIENT
Start: 2021-05-10 | End: 2021-05-10

## 2021-05-10 RX ADMIN — INSULIN ASPART 1 UNITS: 100 INJECTION, SOLUTION INTRAVENOUS; SUBCUTANEOUS at 10:05

## 2021-05-10 RX ADMIN — ROSUVASTATIN CALCIUM 40 MG: 10 TABLET, FILM COATED ORAL at 09:05

## 2021-05-10 RX ADMIN — POTASSIUM CHLORIDE 60 MEQ: 1500 TABLET, EXTENDED RELEASE ORAL at 11:05

## 2021-05-10 RX ADMIN — TICAGRELOR 90 MG: 90 TABLET ORAL at 09:05

## 2021-05-10 RX ADMIN — RANOLAZINE 500 MG: 500 TABLET, FILM COATED, EXTENDED RELEASE ORAL at 09:05

## 2021-05-10 RX ADMIN — RANOLAZINE 500 MG: 500 TABLET, FILM COATED, EXTENDED RELEASE ORAL at 08:05

## 2021-05-10 RX ADMIN — INSULIN ASPART 4 UNITS: 100 INJECTION, SOLUTION INTRAVENOUS; SUBCUTANEOUS at 05:05

## 2021-05-10 RX ADMIN — INSULIN ASPART 3 UNITS: 100 INJECTION, SOLUTION INTRAVENOUS; SUBCUTANEOUS at 12:05

## 2021-05-10 RX ADMIN — APIXABAN 5 MG: 5 TABLET, FILM COATED ORAL at 09:05

## 2021-05-10 RX ADMIN — APIXABAN 5 MG: 5 TABLET, FILM COATED ORAL at 08:05

## 2021-05-10 RX ADMIN — POTASSIUM CHLORIDE 40 MEQ: 1500 TABLET, EXTENDED RELEASE ORAL at 12:05

## 2021-05-10 RX ADMIN — POTASSIUM CHLORIDE 40 MEQ: 1500 TABLET, EXTENDED RELEASE ORAL at 06:05

## 2021-05-10 RX ADMIN — TICAGRELOR 90 MG: 90 TABLET ORAL at 08:05

## 2021-05-10 RX ADMIN — FUROSEMIDE 40 MG: 10 INJECTION, SOLUTION INTRAVENOUS at 11:05

## 2021-05-10 RX ADMIN — POTASSIUM CHLORIDE 40 MEQ: 1500 TABLET, EXTENDED RELEASE ORAL at 02:05

## 2021-05-11 LAB
ALBUMIN SERPL BCP-MCNC: 2.6 G/DL (ref 3.5–5.2)
ALP SERPL-CCNC: 356 U/L (ref 55–135)
ALT SERPL W/O P-5'-P-CCNC: 76 U/L (ref 10–44)
ANION GAP SERPL CALC-SCNC: 13 MMOL/L (ref 8–16)
AST SERPL-CCNC: 61 U/L (ref 10–40)
BASOPHILS # BLD AUTO: 0.02 K/UL (ref 0–0.2)
BASOPHILS NFR BLD: 0.3 % (ref 0–1.9)
BILIRUB SERPL-MCNC: 3.2 MG/DL (ref 0.1–1)
BUN SERPL-MCNC: 18 MG/DL (ref 8–23)
CALCIUM SERPL-MCNC: 8.7 MG/DL (ref 8.7–10.5)
CHLORIDE SERPL-SCNC: 100 MMOL/L (ref 95–110)
CO2 SERPL-SCNC: 24 MMOL/L (ref 23–29)
CREAT SERPL-MCNC: 1.5 MG/DL (ref 0.5–1.4)
DIFFERENTIAL METHOD: ABNORMAL
EOSINOPHIL # BLD AUTO: 0 K/UL (ref 0–0.5)
EOSINOPHIL NFR BLD: 0.7 % (ref 0–8)
ERYTHROCYTE [DISTWIDTH] IN BLOOD BY AUTOMATED COUNT: 22 % (ref 11.5–14.5)
EST. GFR  (AFRICAN AMERICAN): 55 ML/MIN/1.73 M^2
EST. GFR  (NON AFRICAN AMERICAN): 48 ML/MIN/1.73 M^2
GLUCOSE SERPL-MCNC: 120 MG/DL (ref 70–110)
HCT VFR BLD AUTO: 37.2 % (ref 40–54)
HGB BLD-MCNC: 11.8 G/DL (ref 14–18)
IMM GRANULOCYTES # BLD AUTO: 0.02 K/UL (ref 0–0.04)
IMM GRANULOCYTES NFR BLD AUTO: 0.3 % (ref 0–0.5)
LYMPHOCYTES # BLD AUTO: 1.2 K/UL (ref 1–4.8)
LYMPHOCYTES NFR BLD: 19.9 % (ref 18–48)
MCH RBC QN AUTO: 24.6 PG (ref 27–31)
MCHC RBC AUTO-ENTMCNC: 31.7 G/DL (ref 32–36)
MCV RBC AUTO: 78 FL (ref 82–98)
MONOCYTES # BLD AUTO: 0.3 K/UL (ref 0.3–1)
MONOCYTES NFR BLD: 4.3 % (ref 4–15)
NEUTROPHILS # BLD AUTO: 4.5 K/UL (ref 1.8–7.7)
NEUTROPHILS NFR BLD: 74.5 % (ref 38–73)
NRBC BLD-RTO: 0 /100 WBC
PLATELET # BLD AUTO: 152 K/UL (ref 150–450)
PMV BLD AUTO: 10.4 FL (ref 9.2–12.9)
POCT GLUCOSE: 129 MG/DL (ref 70–110)
POCT GLUCOSE: 202 MG/DL (ref 70–110)
POCT GLUCOSE: 205 MG/DL (ref 70–110)
POCT GLUCOSE: 221 MG/DL (ref 70–110)
POTASSIUM SERPL-SCNC: 3.4 MMOL/L (ref 3.5–5.1)
PROT SERPL-MCNC: 6.7 G/DL (ref 6–8.4)
RBC # BLD AUTO: 4.8 M/UL (ref 4.6–6.2)
SODIUM SERPL-SCNC: 137 MMOL/L (ref 136–145)
WBC # BLD AUTO: 6.04 K/UL (ref 3.9–12.7)

## 2021-05-11 PROCEDURE — 85025 COMPLETE CBC W/AUTO DIFF WBC: CPT | Performed by: INTERNAL MEDICINE

## 2021-05-11 PROCEDURE — 99232 SBSQ HOSP IP/OBS MODERATE 35: CPT | Mod: ,,, | Performed by: INTERNAL MEDICINE

## 2021-05-11 PROCEDURE — 63600175 PHARM REV CODE 636 W HCPCS: Performed by: HOSPITALIST

## 2021-05-11 PROCEDURE — 25000003 PHARM REV CODE 250: Performed by: INTERNAL MEDICINE

## 2021-05-11 PROCEDURE — 83036 HEMOGLOBIN GLYCOSYLATED A1C: CPT | Performed by: HOSPITALIST

## 2021-05-11 PROCEDURE — 36415 COLL VENOUS BLD VENIPUNCTURE: CPT | Performed by: INTERNAL MEDICINE

## 2021-05-11 PROCEDURE — 94761 N-INVAS EAR/PLS OXIMETRY MLT: CPT

## 2021-05-11 PROCEDURE — 99232 PR SUBSEQUENT HOSPITAL CARE,LEVL II: ICD-10-PCS | Mod: ,,, | Performed by: INTERNAL MEDICINE

## 2021-05-11 PROCEDURE — 80053 COMPREHEN METABOLIC PANEL: CPT | Performed by: INTERNAL MEDICINE

## 2021-05-11 PROCEDURE — 21400001 HC TELEMETRY ROOM

## 2021-05-11 PROCEDURE — 36415 COLL VENOUS BLD VENIPUNCTURE: CPT | Performed by: HOSPITALIST

## 2021-05-11 PROCEDURE — 25000003 PHARM REV CODE 250: Performed by: HOSPITALIST

## 2021-05-11 RX ORDER — METOPROLOL SUCCINATE 50 MG/1
50 TABLET, EXTENDED RELEASE ORAL 2 TIMES DAILY
Status: DISCONTINUED | OUTPATIENT
Start: 2021-05-11 | End: 2021-05-12 | Stop reason: HOSPADM

## 2021-05-11 RX ORDER — TALC
6 POWDER (GRAM) TOPICAL NIGHTLY PRN
Status: DISCONTINUED | OUTPATIENT
Start: 2021-05-11 | End: 2021-05-12 | Stop reason: HOSPADM

## 2021-05-11 RX ORDER — SODIUM CHLORIDE 0.9 % (FLUSH) 0.9 %
3 SYRINGE (ML) INJECTION EVERY 8 HOURS
Status: DISCONTINUED | OUTPATIENT
Start: 2021-05-11 | End: 2021-05-12 | Stop reason: HOSPADM

## 2021-05-11 RX ORDER — LANOLIN ALCOHOL/MO/W.PET/CERES
800 CREAM (GRAM) TOPICAL
Status: DISCONTINUED | OUTPATIENT
Start: 2021-05-11 | End: 2021-05-12 | Stop reason: HOSPADM

## 2021-05-11 RX ORDER — ACETAMINOPHEN 325 MG/1
650 TABLET ORAL EVERY 8 HOURS PRN
Status: DISCONTINUED | OUTPATIENT
Start: 2021-05-11 | End: 2021-05-12 | Stop reason: HOSPADM

## 2021-05-11 RX ORDER — FUROSEMIDE 10 MG/ML
20 INJECTION INTRAMUSCULAR; INTRAVENOUS 2 TIMES DAILY
Status: DISCONTINUED | OUTPATIENT
Start: 2021-05-11 | End: 2021-05-12 | Stop reason: HOSPADM

## 2021-05-11 RX ORDER — SODIUM,POTASSIUM PHOSPHATES 280-250MG
2 POWDER IN PACKET (EA) ORAL
Status: DISCONTINUED | OUTPATIENT
Start: 2021-05-11 | End: 2021-05-12 | Stop reason: HOSPADM

## 2021-05-11 RX ORDER — IBUPROFEN 200 MG
16 TABLET ORAL
Status: DISCONTINUED | OUTPATIENT
Start: 2021-05-11 | End: 2021-05-12 | Stop reason: HOSPADM

## 2021-05-11 RX ORDER — GLUCAGON 1 MG
1 KIT INJECTION
Status: DISCONTINUED | OUTPATIENT
Start: 2021-05-11 | End: 2021-05-12 | Stop reason: HOSPADM

## 2021-05-11 RX ORDER — SODIUM CHLORIDE 0.9 % (FLUSH) 0.9 %
10 SYRINGE (ML) INJECTION
Status: DISCONTINUED | OUTPATIENT
Start: 2021-05-11 | End: 2021-05-12 | Stop reason: HOSPADM

## 2021-05-11 RX ORDER — IBUPROFEN 200 MG
24 TABLET ORAL
Status: DISCONTINUED | OUTPATIENT
Start: 2021-05-11 | End: 2021-05-12 | Stop reason: HOSPADM

## 2021-05-11 RX ORDER — ONDANSETRON 2 MG/ML
8 INJECTION INTRAMUSCULAR; INTRAVENOUS EVERY 6 HOURS PRN
Status: DISCONTINUED | OUTPATIENT
Start: 2021-05-11 | End: 2021-05-12 | Stop reason: HOSPADM

## 2021-05-11 RX ORDER — ACETAMINOPHEN 325 MG/1
650 TABLET ORAL EVERY 4 HOURS PRN
Status: DISCONTINUED | OUTPATIENT
Start: 2021-05-11 | End: 2021-05-12 | Stop reason: HOSPADM

## 2021-05-11 RX ORDER — POTASSIUM CHLORIDE 20 MEQ/1
40 TABLET, EXTENDED RELEASE ORAL DAILY
Status: DISCONTINUED | OUTPATIENT
Start: 2021-05-11 | End: 2021-05-12 | Stop reason: HOSPADM

## 2021-05-11 RX ORDER — POLYETHYLENE GLYCOL 3350 17 G/17G
17 POWDER, FOR SOLUTION ORAL DAILY
Status: DISCONTINUED | OUTPATIENT
Start: 2021-05-12 | End: 2021-05-12 | Stop reason: HOSPADM

## 2021-05-11 RX ADMIN — METOPROLOL SUCCINATE 50 MG: 50 TABLET, EXTENDED RELEASE ORAL at 08:05

## 2021-05-11 RX ADMIN — INSULIN ASPART 4 UNITS: 100 INJECTION, SOLUTION INTRAVENOUS; SUBCUTANEOUS at 12:05

## 2021-05-11 RX ADMIN — ROSUVASTATIN CALCIUM 40 MG: 10 TABLET, FILM COATED ORAL at 08:05

## 2021-05-11 RX ADMIN — INSULIN ASPART 2 UNITS: 100 INJECTION, SOLUTION INTRAVENOUS; SUBCUTANEOUS at 09:05

## 2021-05-11 RX ADMIN — APIXABAN 5 MG: 5 TABLET, FILM COATED ORAL at 08:05

## 2021-05-11 RX ADMIN — POTASSIUM CHLORIDE 40 MEQ: 1500 TABLET, EXTENDED RELEASE ORAL at 12:05

## 2021-05-11 RX ADMIN — RANOLAZINE 500 MG: 500 TABLET, FILM COATED, EXTENDED RELEASE ORAL at 08:05

## 2021-05-11 RX ADMIN — FUROSEMIDE 40 MG: 10 INJECTION, SOLUTION INTRAVENOUS at 08:05

## 2021-05-11 RX ADMIN — TICAGRELOR 90 MG: 90 TABLET ORAL at 08:05

## 2021-05-11 RX ADMIN — POLYETHYLENE GLYCOL 3350 17 G: 17 POWDER, FOR SOLUTION ORAL at 08:05

## 2021-05-11 RX ADMIN — FUROSEMIDE 20 MG: 10 INJECTION, SOLUTION INTRAMUSCULAR; INTRAVENOUS at 08:05

## 2021-05-12 VITALS
TEMPERATURE: 98 F | SYSTOLIC BLOOD PRESSURE: 100 MMHG | RESPIRATION RATE: 18 BRPM | HEART RATE: 87 BPM | DIASTOLIC BLOOD PRESSURE: 72 MMHG | HEIGHT: 65 IN | OXYGEN SATURATION: 99 % | BODY MASS INDEX: 24.43 KG/M2 | WEIGHT: 146.63 LBS

## 2021-05-12 LAB
ALBUMIN SERPL BCP-MCNC: 2.5 G/DL (ref 3.5–5.2)
ALP SERPL-CCNC: 330 U/L (ref 55–135)
ALT SERPL W/O P-5'-P-CCNC: 65 U/L (ref 10–44)
ANION GAP SERPL CALC-SCNC: 12 MMOL/L (ref 8–16)
AST SERPL-CCNC: 48 U/L (ref 10–40)
BASOPHILS # BLD AUTO: 0.01 K/UL (ref 0–0.2)
BASOPHILS NFR BLD: 0.2 % (ref 0–1.9)
BILIRUB SERPL-MCNC: 3.1 MG/DL (ref 0.1–1)
BUN SERPL-MCNC: 20 MG/DL (ref 8–23)
CALCIUM SERPL-MCNC: 8.9 MG/DL (ref 8.7–10.5)
CHLORIDE SERPL-SCNC: 99 MMOL/L (ref 95–110)
CO2 SERPL-SCNC: 22 MMOL/L (ref 23–29)
CREAT SERPL-MCNC: 1.8 MG/DL (ref 0.5–1.4)
DIFFERENTIAL METHOD: ABNORMAL
EOSINOPHIL # BLD AUTO: 0 K/UL (ref 0–0.5)
EOSINOPHIL NFR BLD: 0.6 % (ref 0–8)
ERYTHROCYTE [DISTWIDTH] IN BLOOD BY AUTOMATED COUNT: 22.3 % (ref 11.5–14.5)
EST. GFR  (AFRICAN AMERICAN): 44 ML/MIN/1.73 M^2
EST. GFR  (NON AFRICAN AMERICAN): 38 ML/MIN/1.73 M^2
ESTIMATED AVG GLUCOSE: 301 MG/DL (ref 68–131)
GLUCOSE SERPL-MCNC: 118 MG/DL (ref 70–110)
HBA1C MFR BLD: 12.1 % (ref 4–5.6)
HCT VFR BLD AUTO: 37.8 % (ref 40–54)
HGB BLD-MCNC: 12.2 G/DL (ref 14–18)
IMM GRANULOCYTES # BLD AUTO: 0.02 K/UL (ref 0–0.04)
IMM GRANULOCYTES NFR BLD AUTO: 0.4 % (ref 0–0.5)
LYMPHOCYTES # BLD AUTO: 1.2 K/UL (ref 1–4.8)
LYMPHOCYTES NFR BLD: 22 % (ref 18–48)
MCH RBC QN AUTO: 25.3 PG (ref 27–31)
MCHC RBC AUTO-ENTMCNC: 32.3 G/DL (ref 32–36)
MCV RBC AUTO: 78 FL (ref 82–98)
MONOCYTES # BLD AUTO: 0.3 K/UL (ref 0.3–1)
MONOCYTES NFR BLD: 5.1 % (ref 4–15)
NEUTROPHILS # BLD AUTO: 3.9 K/UL (ref 1.8–7.7)
NEUTROPHILS NFR BLD: 71.7 % (ref 38–73)
NRBC BLD-RTO: 1 /100 WBC
PLATELET # BLD AUTO: 182 K/UL (ref 150–450)
PMV BLD AUTO: 11.1 FL (ref 9.2–12.9)
POCT GLUCOSE: 103 MG/DL (ref 70–110)
POCT GLUCOSE: 160 MG/DL (ref 70–110)
POTASSIUM SERPL-SCNC: 3.9 MMOL/L (ref 3.5–5.1)
PROT SERPL-MCNC: 6.5 G/DL (ref 6–8.4)
RBC # BLD AUTO: 4.82 M/UL (ref 4.6–6.2)
SODIUM SERPL-SCNC: 133 MMOL/L (ref 136–145)
WBC # BLD AUTO: 5.45 K/UL (ref 3.9–12.7)

## 2021-05-12 PROCEDURE — 80053 COMPREHEN METABOLIC PANEL: CPT | Performed by: INTERNAL MEDICINE

## 2021-05-12 PROCEDURE — 85025 COMPLETE CBC W/AUTO DIFF WBC: CPT | Performed by: INTERNAL MEDICINE

## 2021-05-12 PROCEDURE — 36415 COLL VENOUS BLD VENIPUNCTURE: CPT | Performed by: INTERNAL MEDICINE

## 2021-05-12 PROCEDURE — 25000003 PHARM REV CODE 250: Performed by: HOSPITALIST

## 2021-05-12 PROCEDURE — 25000003 PHARM REV CODE 250: Performed by: INTERNAL MEDICINE

## 2021-05-12 PROCEDURE — 63600175 PHARM REV CODE 636 W HCPCS: Performed by: HOSPITALIST

## 2021-05-12 RX ORDER — SPIRONOLACTONE 25 MG/1
25 TABLET ORAL DAILY
Qty: 30 TABLET | Refills: 0 | Status: SHIPPED | OUTPATIENT
Start: 2021-05-12 | End: 2021-06-11

## 2021-05-12 RX ORDER — FUROSEMIDE 40 MG/1
40 TABLET ORAL 2 TIMES DAILY
Qty: 60 TABLET | Refills: 0 | Status: ON HOLD | OUTPATIENT
Start: 2021-05-12 | End: 2021-06-02 | Stop reason: SDUPTHER

## 2021-05-12 RX ADMIN — POLYETHYLENE GLYCOL 3350 17 G: 17 POWDER, FOR SOLUTION ORAL at 08:05

## 2021-05-12 RX ADMIN — TICAGRELOR 90 MG: 90 TABLET ORAL at 08:05

## 2021-05-12 RX ADMIN — APIXABAN 5 MG: 5 TABLET, FILM COATED ORAL at 08:05

## 2021-05-12 RX ADMIN — RANOLAZINE 500 MG: 500 TABLET, FILM COATED, EXTENDED RELEASE ORAL at 08:05

## 2021-05-12 RX ADMIN — METOPROLOL SUCCINATE 50 MG: 50 TABLET, EXTENDED RELEASE ORAL at 08:05

## 2021-05-12 RX ADMIN — POTASSIUM CHLORIDE 40 MEQ: 1500 TABLET, EXTENDED RELEASE ORAL at 08:05

## 2021-05-12 RX ADMIN — FUROSEMIDE 20 MG: 10 INJECTION, SOLUTION INTRAMUSCULAR; INTRAVENOUS at 08:05

## 2021-05-13 ENCOUNTER — NURSE TRIAGE (OUTPATIENT)
Dept: ADMINISTRATIVE | Facility: CLINIC | Age: 66
End: 2021-05-13

## 2021-05-13 ENCOUNTER — PATIENT OUTREACH (OUTPATIENT)
Dept: ADMINISTRATIVE | Facility: CLINIC | Age: 66
End: 2021-05-13

## 2021-05-13 DIAGNOSIS — E78.2 MIXED HYPERLIPIDEMIA: ICD-10-CM

## 2021-05-13 RX ORDER — ROSUVASTATIN CALCIUM 40 MG/1
40 TABLET, COATED ORAL NIGHTLY
Qty: 90 TABLET | Refills: 0 | Status: SHIPPED | OUTPATIENT
Start: 2021-05-13

## 2021-05-23 ENCOUNTER — HOSPITAL ENCOUNTER (INPATIENT)
Facility: HOSPITAL | Age: 66
LOS: 9 days | Discharge: HOSPICE | DRG: 871 | End: 2021-06-02
Attending: EMERGENCY MEDICINE | Admitting: EMERGENCY MEDICINE
Payer: COMMERCIAL

## 2021-05-23 DIAGNOSIS — I26.01 CHRONIC SEPTIC PULMONARY EMBOLISM WITH ACUTE COR PULMONALE: ICD-10-CM

## 2021-05-23 DIAGNOSIS — R07.9 CHEST PAIN: ICD-10-CM

## 2021-05-23 DIAGNOSIS — A41.9 SEPSIS: ICD-10-CM

## 2021-05-23 DIAGNOSIS — E78.2 MIXED HYPERLIPIDEMIA: Chronic | ICD-10-CM

## 2021-05-23 DIAGNOSIS — I27.82 CHRONIC PULMONARY EMBOLISM WITH ACUTE COR PULMONALE, UNSPECIFIED PULMONARY EMBOLISM TYPE: ICD-10-CM

## 2021-05-23 DIAGNOSIS — I10 ESSENTIAL HYPERTENSION: ICD-10-CM

## 2021-05-23 DIAGNOSIS — I26.09 CHRONIC PULMONARY EMBOLISM WITH ACUTE COR PULMONALE, UNSPECIFIED PULMONARY EMBOLISM TYPE: ICD-10-CM

## 2021-05-23 DIAGNOSIS — I25.10 CORONARY ARTERY DISEASE INVOLVING NATIVE CORONARY ARTERY OF NATIVE HEART WITHOUT ANGINA PECTORIS: Chronic | ICD-10-CM

## 2021-05-23 DIAGNOSIS — R55 SYNCOPE: ICD-10-CM

## 2021-05-23 DIAGNOSIS — D50.8 OTHER IRON DEFICIENCY ANEMIA: ICD-10-CM

## 2021-05-23 DIAGNOSIS — R53.1 WEAKNESS: ICD-10-CM

## 2021-05-23 DIAGNOSIS — R57.0 CARDIOGENIC SHOCK: ICD-10-CM

## 2021-05-23 DIAGNOSIS — Z71.89 ADVANCED CARE PLANNING/COUNSELING DISCUSSION: ICD-10-CM

## 2021-05-23 DIAGNOSIS — Z71.89 GOALS OF CARE, COUNSELING/DISCUSSION: ICD-10-CM

## 2021-05-23 DIAGNOSIS — I25.10 CORONARY ARTERY DISEASE, ANGINA PRESENCE UNSPECIFIED, UNSPECIFIED VESSEL OR LESION TYPE, UNSPECIFIED WHETHER NATIVE OR TRANSPLANTED HEART: ICD-10-CM

## 2021-05-23 DIAGNOSIS — E87.1 HYPONATREMIA: ICD-10-CM

## 2021-05-23 DIAGNOSIS — I27.82 CHRONIC SEPTIC PULMONARY EMBOLISM WITH ACUTE COR PULMONALE: ICD-10-CM

## 2021-05-23 DIAGNOSIS — R17 ELEVATED BILIRUBIN: ICD-10-CM

## 2021-05-23 DIAGNOSIS — Z51.5 PALLIATIVE CARE ENCOUNTER: ICD-10-CM

## 2021-05-23 DIAGNOSIS — Z86.73 HISTORY OF ISCHEMIC LEFT MCA STROKE: Chronic | ICD-10-CM

## 2021-05-23 DIAGNOSIS — I50.43 ACUTE ON CHRONIC COMBINED SYSTOLIC AND DIASTOLIC CONGESTIVE HEART FAILURE: Primary | ICD-10-CM

## 2021-05-23 LAB
ALBUMIN SERPL BCP-MCNC: 2.4 G/DL (ref 3.5–5.2)
ALP SERPL-CCNC: 251 U/L (ref 55–135)
ALT SERPL W/O P-5'-P-CCNC: 36 U/L (ref 10–44)
ANION GAP SERPL CALC-SCNC: 19 MMOL/L (ref 8–16)
AST SERPL-CCNC: 30 U/L (ref 10–40)
B-OH-BUTYR BLD STRIP-SCNC: 0.2 MMOL/L (ref 0–0.5)
BASOPHILS # BLD AUTO: 0.01 K/UL (ref 0–0.2)
BASOPHILS NFR BLD: 0.1 % (ref 0–1.9)
BILIRUB SERPL-MCNC: 11.1 MG/DL (ref 0.1–1)
BNP SERPL-MCNC: >4900 PG/ML (ref 0–99)
BUN SERPL-MCNC: 19 MG/DL (ref 8–23)
CALCIUM SERPL-MCNC: 8.7 MG/DL (ref 8.7–10.5)
CHLORIDE SERPL-SCNC: 95 MMOL/L (ref 95–110)
CO2 SERPL-SCNC: 17 MMOL/L (ref 23–29)
CREAT SERPL-MCNC: 1.3 MG/DL (ref 0.5–1.4)
CTP QC/QA: YES
DIFFERENTIAL METHOD: ABNORMAL
EOSINOPHIL # BLD AUTO: 0 K/UL (ref 0–0.5)
EOSINOPHIL NFR BLD: 0 % (ref 0–8)
ERYTHROCYTE [DISTWIDTH] IN BLOOD BY AUTOMATED COUNT: 22.7 % (ref 11.5–14.5)
EST. GFR  (AFRICAN AMERICAN): >60 ML/MIN/1.73 M^2
EST. GFR  (NON AFRICAN AMERICAN): 57 ML/MIN/1.73 M^2
GLUCOSE SERPL-MCNC: 159 MG/DL (ref 70–110)
HCT VFR BLD AUTO: 33.3 % (ref 40–54)
HGB BLD-MCNC: 11.2 G/DL (ref 14–18)
IMM GRANULOCYTES # BLD AUTO: 0.03 K/UL (ref 0–0.04)
IMM GRANULOCYTES NFR BLD AUTO: 0.4 % (ref 0–0.5)
INR PPP: 1.8 (ref 0.8–1.2)
LACTATE SERPL-SCNC: 6.4 MMOL/L (ref 0.5–2.2)
LYMPHOCYTES # BLD AUTO: 0.9 K/UL (ref 1–4.8)
LYMPHOCYTES NFR BLD: 12.1 % (ref 18–48)
MAGNESIUM SERPL-MCNC: 2.3 MG/DL (ref 1.6–2.6)
MCH RBC QN AUTO: 25.9 PG (ref 27–31)
MCHC RBC AUTO-ENTMCNC: 33.6 G/DL (ref 32–36)
MCV RBC AUTO: 77 FL (ref 82–98)
MONOCYTES # BLD AUTO: 0.5 K/UL (ref 0.3–1)
MONOCYTES NFR BLD: 7.2 % (ref 4–15)
NEUTROPHILS # BLD AUTO: 5.6 K/UL (ref 1.8–7.7)
NEUTROPHILS NFR BLD: 80.2 % (ref 38–73)
NRBC BLD-RTO: 1 /100 WBC
PHOSPHATE SERPL-MCNC: 2.4 MG/DL (ref 2.7–4.5)
PLATELET # BLD AUTO: 168 K/UL (ref 150–450)
PMV BLD AUTO: 10 FL (ref 9.2–12.9)
POTASSIUM SERPL-SCNC: 3 MMOL/L (ref 3.5–5.1)
PROCALCITONIN SERPL IA-MCNC: 0.9 NG/ML
PROT SERPL-MCNC: 6.7 G/DL (ref 6–8.4)
PROTHROMBIN TIME: 18.2 SEC (ref 9–12.5)
RBC # BLD AUTO: 4.32 M/UL (ref 4.6–6.2)
SARS-COV-2 RDRP RESP QL NAA+PROBE: NEGATIVE
SODIUM SERPL-SCNC: 131 MMOL/L (ref 136–145)
TROPONIN I SERPL DL<=0.01 NG/ML-MCNC: 0.02 NG/ML (ref 0–0.03)
WBC # BLD AUTO: 7.04 K/UL (ref 3.9–12.7)

## 2021-05-23 PROCEDURE — 93010 ELECTROCARDIOGRAM REPORT: CPT | Mod: ,,, | Performed by: INTERNAL MEDICINE

## 2021-05-23 PROCEDURE — 99285 EMERGENCY DEPT VISIT HI MDM: CPT | Mod: 25

## 2021-05-23 PROCEDURE — 93005 ELECTROCARDIOGRAM TRACING: CPT

## 2021-05-23 PROCEDURE — 84145 PROCALCITONIN (PCT): CPT | Performed by: EMERGENCY MEDICINE

## 2021-05-23 PROCEDURE — 25000003 PHARM REV CODE 250: Performed by: EMERGENCY MEDICINE

## 2021-05-23 PROCEDURE — 80053 COMPREHEN METABOLIC PANEL: CPT | Performed by: EMERGENCY MEDICINE

## 2021-05-23 PROCEDURE — 83735 ASSAY OF MAGNESIUM: CPT | Performed by: EMERGENCY MEDICINE

## 2021-05-23 PROCEDURE — 25500020 PHARM REV CODE 255: Performed by: EMERGENCY MEDICINE

## 2021-05-23 PROCEDURE — 83880 ASSAY OF NATRIURETIC PEPTIDE: CPT | Performed by: EMERGENCY MEDICINE

## 2021-05-23 PROCEDURE — 83605 ASSAY OF LACTIC ACID: CPT | Performed by: EMERGENCY MEDICINE

## 2021-05-23 PROCEDURE — 87040 BLOOD CULTURE FOR BACTERIA: CPT | Performed by: EMERGENCY MEDICINE

## 2021-05-23 PROCEDURE — 96374 THER/PROPH/DIAG INJ IV PUSH: CPT

## 2021-05-23 PROCEDURE — 82010 KETONE BODYS QUAN: CPT | Performed by: EMERGENCY MEDICINE

## 2021-05-23 PROCEDURE — 80143 DRUG ASSAY ACETAMINOPHEN: CPT | Performed by: EMERGENCY MEDICINE

## 2021-05-23 PROCEDURE — 85610 PROTHROMBIN TIME: CPT | Performed by: EMERGENCY MEDICINE

## 2021-05-23 PROCEDURE — 84484 ASSAY OF TROPONIN QUANT: CPT | Performed by: EMERGENCY MEDICINE

## 2021-05-23 PROCEDURE — 85025 COMPLETE CBC W/AUTO DIFF WBC: CPT | Performed by: EMERGENCY MEDICINE

## 2021-05-23 PROCEDURE — U0002 COVID-19 LAB TEST NON-CDC: HCPCS | Performed by: EMERGENCY MEDICINE

## 2021-05-23 PROCEDURE — 63600175 PHARM REV CODE 636 W HCPCS: Performed by: EMERGENCY MEDICINE

## 2021-05-23 PROCEDURE — 93010 EKG 12-LEAD: ICD-10-PCS | Mod: ,,, | Performed by: INTERNAL MEDICINE

## 2021-05-23 PROCEDURE — 84100 ASSAY OF PHOSPHORUS: CPT | Performed by: EMERGENCY MEDICINE

## 2021-05-23 RX ORDER — CEFEPIME HYDROCHLORIDE 1 G/50ML
2 INJECTION, SOLUTION INTRAVENOUS
Status: COMPLETED | OUTPATIENT
Start: 2021-05-23 | End: 2021-05-23

## 2021-05-23 RX ORDER — ACETAMINOPHEN 500 MG
1000 TABLET ORAL
Status: COMPLETED | OUTPATIENT
Start: 2021-05-23 | End: 2021-05-23

## 2021-05-23 RX ADMIN — ACETAMINOPHEN 1000 MG: 500 TABLET, FILM COATED ORAL at 07:05

## 2021-05-23 RX ADMIN — SODIUM CHLORIDE, SODIUM LACTATE, POTASSIUM CHLORIDE, AND CALCIUM CHLORIDE 2000 ML: .6; .31; .03; .02 INJECTION, SOLUTION INTRAVENOUS at 07:05

## 2021-05-23 RX ADMIN — IOHEXOL 75 ML: 350 INJECTION, SOLUTION INTRAVENOUS at 10:05

## 2021-05-23 RX ADMIN — CEFEPIME HYDROCHLORIDE 2 G: 2 INJECTION, SOLUTION INTRAVENOUS at 07:05

## 2021-05-24 PROBLEM — R53.1 WEAKNESS: Status: ACTIVE | Noted: 2021-05-24

## 2021-05-24 PROBLEM — A41.9 SEPSIS: Status: ACTIVE | Noted: 2021-05-24

## 2021-05-24 PROBLEM — R55 NEAR SYNCOPE: Status: ACTIVE | Noted: 2021-05-24

## 2021-05-24 LAB
ALBUMIN SERPL BCP-MCNC: 2.3 G/DL (ref 3.5–5.2)
ALP SERPL-CCNC: 228 U/L (ref 55–135)
ALT SERPL W/O P-5'-P-CCNC: 31 U/L (ref 10–44)
ANION GAP SERPL CALC-SCNC: 13 MMOL/L (ref 8–16)
APAP SERPL-MCNC: <3 UG/ML (ref 10–20)
ASCENDING AORTA: 2.86 CM
AST SERPL-CCNC: 23 U/L (ref 10–40)
AV INDEX (PROSTH): 0.58
AV MEAN GRADIENT: 2 MMHG
AV PEAK GRADIENT: 3 MMHG
AV VALVE AREA: 2.09 CM2
AV VELOCITY RATIO: 0.69
BASOPHILS # BLD AUTO: 0.01 K/UL (ref 0–0.2)
BASOPHILS NFR BLD: 0.2 % (ref 0–1.9)
BILIRUB SERPL-MCNC: 9.9 MG/DL (ref 0.1–1)
BSA FOR ECHO PROCEDURE: 1.64 M2
BUN SERPL-MCNC: 19 MG/DL (ref 8–23)
CALCIUM SERPL-MCNC: 8.6 MG/DL (ref 8.7–10.5)
CHLORIDE SERPL-SCNC: 95 MMOL/L (ref 95–110)
CO2 SERPL-SCNC: 21 MMOL/L (ref 23–29)
CREAT SERPL-MCNC: 1.1 MG/DL (ref 0.5–1.4)
CV ECHO LV RWT: 0.42 CM
DIFFERENTIAL METHOD: ABNORMAL
DOP CALC AO PEAK VEL: 0.85 M/S
DOP CALC AO VTI: 13.18 CM
DOP CALC LVOT AREA: 3.6 CM2
DOP CALC LVOT DIAMETER: 2.15 CM
DOP CALC LVOT PEAK VEL: 0.59 M/S
DOP CALC LVOT STROKE VOLUME: 27.58 CM3
DOP CALCLVOT PEAK VEL VTI: 7.6 CM
ECHO LV POSTERIOR WALL: 1.04 CM (ref 0.6–1.1)
EJECTION FRACTION: 15 %
EOSINOPHIL # BLD AUTO: 0 K/UL (ref 0–0.5)
EOSINOPHIL NFR BLD: 0.6 % (ref 0–8)
ERYTHROCYTE [DISTWIDTH] IN BLOOD BY AUTOMATED COUNT: 23.4 % (ref 11.5–14.5)
EST. GFR  (AFRICAN AMERICAN): >60 ML/MIN/1.73 M^2
EST. GFR  (NON AFRICAN AMERICAN): >60 ML/MIN/1.73 M^2
FRACTIONAL SHORTENING: 15 % (ref 28–44)
GLUCOSE SERPL-MCNC: 170 MG/DL (ref 70–110)
HCT VFR BLD AUTO: 32.4 % (ref 40–54)
HGB BLD-MCNC: 10.7 G/DL (ref 14–18)
IMM GRANULOCYTES # BLD AUTO: 0.03 K/UL (ref 0–0.04)
IMM GRANULOCYTES NFR BLD AUTO: 0.5 % (ref 0–0.5)
INTERVENTRICULAR SEPTUM: 0.55 CM (ref 0.6–1.1)
IVRT: 83.04 MSEC
LA MAJOR: 5.64 CM
LA MINOR: 5.78 CM
LA WIDTH: 4.3 CM
LACTATE SERPL-SCNC: 2.6 MMOL/L (ref 0.5–2.2)
LACTATE SERPL-SCNC: 3.7 MMOL/L (ref 0.5–2.2)
LEFT ATRIUM SIZE: 4.1 CM
LEFT ATRIUM VOLUME INDEX: 51.9 ML/M2
LEFT ATRIUM VOLUME: 85.55 CM3
LEFT INTERNAL DIMENSION IN SYSTOLE: 4.24 CM (ref 2.1–4)
LEFT VENTRICLE DIASTOLIC VOLUME INDEX: 70.65 ML/M2
LEFT VENTRICLE DIASTOLIC VOLUME: 116.58 ML
LEFT VENTRICLE MASS INDEX: 81 G/M2
LEFT VENTRICLE SYSTOLIC VOLUME INDEX: 48.6 ML/M2
LEFT VENTRICLE SYSTOLIC VOLUME: 80.19 ML
LEFT VENTRICULAR INTERNAL DIMENSION IN DIASTOLE: 4.97 CM (ref 3.5–6)
LEFT VENTRICULAR MASS: 133.33 G
LYMPHOCYTES # BLD AUTO: 1 K/UL (ref 1–4.8)
LYMPHOCYTES NFR BLD: 15.9 % (ref 18–48)
MCH RBC QN AUTO: 25.6 PG (ref 27–31)
MCHC RBC AUTO-ENTMCNC: 33 G/DL (ref 32–36)
MCV RBC AUTO: 78 FL (ref 82–98)
MONOCYTES # BLD AUTO: 0.6 K/UL (ref 0.3–1)
MONOCYTES NFR BLD: 9.8 % (ref 4–15)
NEUTROPHILS # BLD AUTO: 4.7 K/UL (ref 1.8–7.7)
NEUTROPHILS NFR BLD: 73 % (ref 38–73)
NRBC BLD-RTO: 1 /100 WBC
PISA TR MAX VEL: 2.87 M/S
PLATELET # BLD AUTO: 158 K/UL (ref 150–450)
PMV BLD AUTO: 10.3 FL (ref 9.2–12.9)
POCT GLUCOSE: 148 MG/DL (ref 70–110)
POCT GLUCOSE: 163 MG/DL (ref 70–110)
POCT GLUCOSE: 171 MG/DL (ref 70–110)
POCT GLUCOSE: 226 MG/DL (ref 70–110)
POCT GLUCOSE: 228 MG/DL (ref 70–110)
POTASSIUM SERPL-SCNC: 3.2 MMOL/L (ref 3.5–5.1)
PROT SERPL-MCNC: 6.3 G/DL (ref 6–8.4)
PV PEAK VELOCITY: 0.53 CM/S
RA MAJOR: 5.8 CM
RA PRESSURE: 15 MMHG
RA WIDTH: 5.91 CM
RBC # BLD AUTO: 4.18 M/UL (ref 4.6–6.2)
RIGHT VENTRICULAR END-DIASTOLIC DIMENSION: 5.43 CM
RV TISSUE DOPPLER FREE WALL SYSTOLIC VELOCITY 1 (APICAL 4 CHAMBER VIEW): 8.02 CM/S
SINUS: 3.46 CM
SODIUM SERPL-SCNC: 129 MMOL/L (ref 136–145)
STJ: 2.19 CM
TR MAX PG: 33 MMHG
TRICUSPID ANNULAR PLANE SYSTOLIC EXCURSION: 1.55 CM
TV REST PULMONARY ARTERY PRESSURE: 48 MMHG
WBC # BLD AUTO: 6.41 K/UL (ref 3.9–12.7)

## 2021-05-24 PROCEDURE — 83605 ASSAY OF LACTIC ACID: CPT | Mod: 91 | Performed by: INTERNAL MEDICINE

## 2021-05-24 PROCEDURE — 63600175 PHARM REV CODE 636 W HCPCS: Performed by: INTERNAL MEDICINE

## 2021-05-24 PROCEDURE — 25000003 PHARM REV CODE 250: Performed by: INTERNAL MEDICINE

## 2021-05-24 PROCEDURE — 80053 COMPREHEN METABOLIC PANEL: CPT | Performed by: INTERNAL MEDICINE

## 2021-05-24 PROCEDURE — 83605 ASSAY OF LACTIC ACID: CPT | Performed by: EMERGENCY MEDICINE

## 2021-05-24 PROCEDURE — 11000001 HC ACUTE MED/SURG PRIVATE ROOM

## 2021-05-24 PROCEDURE — 25000003 PHARM REV CODE 250: Performed by: EMERGENCY MEDICINE

## 2021-05-24 PROCEDURE — 63600175 PHARM REV CODE 636 W HCPCS: Performed by: EMERGENCY MEDICINE

## 2021-05-24 PROCEDURE — 96375 TX/PRO/DX INJ NEW DRUG ADDON: CPT

## 2021-05-24 PROCEDURE — 85025 COMPLETE CBC W/AUTO DIFF WBC: CPT | Performed by: INTERNAL MEDICINE

## 2021-05-24 RX ORDER — POTASSIUM CHLORIDE 20 MEQ/1
60 TABLET, EXTENDED RELEASE ORAL ONCE
Status: COMPLETED | OUTPATIENT
Start: 2021-05-24 | End: 2021-05-24

## 2021-05-24 RX ORDER — TALC
6 POWDER (GRAM) TOPICAL NIGHTLY PRN
Status: DISCONTINUED | OUTPATIENT
Start: 2021-05-24 | End: 2021-06-02 | Stop reason: HOSPADM

## 2021-05-24 RX ORDER — ACETAMINOPHEN 325 MG/1
650 TABLET ORAL EVERY 4 HOURS PRN
Status: DISCONTINUED | OUTPATIENT
Start: 2021-05-24 | End: 2021-05-25

## 2021-05-24 RX ORDER — SODIUM CHLORIDE 0.9 % (FLUSH) 0.9 %
10 SYRINGE (ML) INJECTION
Status: DISCONTINUED | OUTPATIENT
Start: 2021-05-24 | End: 2021-05-28

## 2021-05-24 RX ORDER — IBUPROFEN 200 MG
24 TABLET ORAL
Status: DISCONTINUED | OUTPATIENT
Start: 2021-05-24 | End: 2021-06-02 | Stop reason: HOSPADM

## 2021-05-24 RX ORDER — ACETAMINOPHEN 325 MG/1
650 TABLET ORAL EVERY 8 HOURS PRN
Status: DISCONTINUED | OUTPATIENT
Start: 2021-05-24 | End: 2021-05-25

## 2021-05-24 RX ORDER — ROSUVASTATIN CALCIUM 20 MG/1
40 TABLET, COATED ORAL NIGHTLY
Status: DISCONTINUED | OUTPATIENT
Start: 2021-05-24 | End: 2021-06-02 | Stop reason: HOSPADM

## 2021-05-24 RX ORDER — IBUPROFEN 200 MG
16 TABLET ORAL
Status: DISCONTINUED | OUTPATIENT
Start: 2021-05-24 | End: 2021-06-02 | Stop reason: HOSPADM

## 2021-05-24 RX ORDER — HYDROCODONE BITARTRATE AND ACETAMINOPHEN 5; 325 MG/1; MG/1
1 TABLET ORAL EVERY 6 HOURS PRN
Status: DISCONTINUED | OUTPATIENT
Start: 2021-05-24 | End: 2021-06-02 | Stop reason: HOSPADM

## 2021-05-24 RX ORDER — FUROSEMIDE 10 MG/ML
40 INJECTION INTRAMUSCULAR; INTRAVENOUS DAILY
Status: DISCONTINUED | OUTPATIENT
Start: 2021-05-24 | End: 2021-05-24

## 2021-05-24 RX ORDER — ONDANSETRON 2 MG/ML
4 INJECTION INTRAMUSCULAR; INTRAVENOUS EVERY 8 HOURS PRN
Status: DISCONTINUED | OUTPATIENT
Start: 2021-05-24 | End: 2021-06-02 | Stop reason: HOSPADM

## 2021-05-24 RX ORDER — METOPROLOL SUCCINATE 50 MG/1
50 TABLET, EXTENDED RELEASE ORAL DAILY
Status: DISCONTINUED | OUTPATIENT
Start: 2021-05-24 | End: 2021-05-25

## 2021-05-24 RX ORDER — SPIRONOLACTONE 25 MG/1
25 TABLET ORAL DAILY
Status: DISCONTINUED | OUTPATIENT
Start: 2021-05-24 | End: 2021-05-26

## 2021-05-24 RX ORDER — POLYETHYLENE GLYCOL 3350 17 G/17G
17 POWDER, FOR SOLUTION ORAL DAILY
Status: DISCONTINUED | OUTPATIENT
Start: 2021-05-24 | End: 2021-06-02 | Stop reason: HOSPADM

## 2021-05-24 RX ORDER — INSULIN ASPART 100 [IU]/ML
1-10 INJECTION, SOLUTION INTRAVENOUS; SUBCUTANEOUS
Status: DISCONTINUED | OUTPATIENT
Start: 2021-05-24 | End: 2021-06-02 | Stop reason: HOSPADM

## 2021-05-24 RX ORDER — GLUCAGON 1 MG
1 KIT INJECTION
Status: DISCONTINUED | OUTPATIENT
Start: 2021-05-24 | End: 2021-06-02 | Stop reason: HOSPADM

## 2021-05-24 RX ORDER — RANOLAZINE 500 MG/1
500 TABLET, EXTENDED RELEASE ORAL 2 TIMES DAILY
Status: DISCONTINUED | OUTPATIENT
Start: 2021-05-24 | End: 2021-06-02 | Stop reason: HOSPADM

## 2021-05-24 RX ORDER — FUROSEMIDE 10 MG/ML
60 INJECTION INTRAMUSCULAR; INTRAVENOUS
Status: DISCONTINUED | OUTPATIENT
Start: 2021-05-24 | End: 2021-05-24

## 2021-05-24 RX ADMIN — VANCOMYCIN HYDROCHLORIDE 1250 MG: 1.25 INJECTION, POWDER, LYOPHILIZED, FOR SOLUTION INTRAVENOUS at 12:05

## 2021-05-24 RX ADMIN — TICAGRELOR 90 MG: 90 TABLET ORAL at 09:05

## 2021-05-24 RX ADMIN — RANOLAZINE 500 MG: 500 TABLET, FILM COATED, EXTENDED RELEASE ORAL at 09:05

## 2021-05-24 RX ADMIN — PIPERACILLIN AND TAZOBACTAM 4.5 G: 4; .5 INJECTION, POWDER, LYOPHILIZED, FOR SOLUTION INTRAVENOUS; PARENTERAL at 06:05

## 2021-05-24 RX ADMIN — PIPERACILLIN AND TAZOBACTAM 4.5 G: 4; .5 INJECTION, POWDER, LYOPHILIZED, FOR SOLUTION INTRAVENOUS; PARENTERAL at 02:05

## 2021-05-24 RX ADMIN — POTASSIUM CHLORIDE 60 MEQ: 1500 TABLET, EXTENDED RELEASE ORAL at 09:05

## 2021-05-24 RX ADMIN — MELATONIN TAB 3 MG 6 MG: 3 TAB at 09:05

## 2021-05-24 RX ADMIN — SPIRONOLACTONE 25 MG: 25 TABLET ORAL at 09:05

## 2021-05-24 RX ADMIN — PIPERACILLIN AND TAZOBACTAM 4.5 G: 4; .5 INJECTION, POWDER, LYOPHILIZED, FOR SOLUTION INTRAVENOUS; PARENTERAL at 09:05

## 2021-05-24 RX ADMIN — APIXABAN 5 MG: 5 TABLET, FILM COATED ORAL at 09:05

## 2021-05-24 RX ADMIN — SODIUM CHLORIDE, SODIUM LACTATE, POTASSIUM CHLORIDE, AND CALCIUM CHLORIDE 1000 ML: .6; .31; .03; .02 INJECTION, SOLUTION INTRAVENOUS at 03:05

## 2021-05-24 RX ADMIN — INSULIN ASPART 2 UNITS: 100 INJECTION, SOLUTION INTRAVENOUS; SUBCUTANEOUS at 09:05

## 2021-05-24 RX ADMIN — INSULIN ASPART 1 UNITS: 100 INJECTION, SOLUTION INTRAVENOUS; SUBCUTANEOUS at 08:05

## 2021-05-24 RX ADMIN — ROSUVASTATIN CALCIUM 40 MG: 20 TABLET, FILM COATED ORAL at 09:05

## 2021-05-25 PROBLEM — I27.82 CHRONIC PULMONARY EMBOLISM: Status: ACTIVE | Noted: 2020-11-19

## 2021-05-25 PROBLEM — D50.9 MICROCYTIC ANEMIA: Status: ACTIVE | Noted: 2021-05-25

## 2021-05-25 PROBLEM — E83.39 HYPOPHOSPHATEMIA: Status: ACTIVE | Noted: 2021-05-25

## 2021-05-25 PROBLEM — E87.1 HYPONATREMIA: Status: ACTIVE | Noted: 2021-05-25

## 2021-05-25 PROBLEM — R07.9 CHEST PAIN: Status: ACTIVE | Noted: 2021-05-25

## 2021-05-25 PROBLEM — R79.1 ELEVATED INR: Status: ACTIVE | Noted: 2021-05-25

## 2021-05-25 LAB
ALBUMIN SERPL BCP-MCNC: 2.1 G/DL (ref 3.5–5.2)
ALBUMIN SERPL BCP-MCNC: 2.1 G/DL (ref 3.5–5.2)
ALP SERPL-CCNC: 237 U/L (ref 55–135)
ALP SERPL-CCNC: 237 U/L (ref 55–135)
ALT SERPL W/O P-5'-P-CCNC: 36 U/L (ref 10–44)
ALT SERPL W/O P-5'-P-CCNC: 36 U/L (ref 10–44)
ANION GAP SERPL CALC-SCNC: 13 MMOL/L (ref 8–16)
ANION GAP SERPL CALC-SCNC: 13 MMOL/L (ref 8–16)
AST SERPL-CCNC: 37 U/L (ref 10–40)
AST SERPL-CCNC: 37 U/L (ref 10–40)
BACTERIA #/AREA URNS HPF: NORMAL /HPF
BASOPHILS # BLD AUTO: 0.01 K/UL (ref 0–0.2)
BASOPHILS # BLD AUTO: 0.01 K/UL (ref 0–0.2)
BASOPHILS NFR BLD: 0.2 % (ref 0–1.9)
BASOPHILS NFR BLD: 0.2 % (ref 0–1.9)
BILIRUB SERPL-MCNC: 7.3 MG/DL (ref 0.1–1)
BILIRUB SERPL-MCNC: 7.3 MG/DL (ref 0.1–1)
BILIRUB UR QL STRIP: ABNORMAL
BUN SERPL-MCNC: 19 MG/DL (ref 8–23)
BUN SERPL-MCNC: 19 MG/DL (ref 8–23)
CALCIUM SERPL-MCNC: 8.6 MG/DL (ref 8.7–10.5)
CALCIUM SERPL-MCNC: 8.6 MG/DL (ref 8.7–10.5)
CHLORIDE SERPL-SCNC: 96 MMOL/L (ref 95–110)
CHLORIDE SERPL-SCNC: 96 MMOL/L (ref 95–110)
CLARITY UR: CLEAR
CO2 SERPL-SCNC: 21 MMOL/L (ref 23–29)
CO2 SERPL-SCNC: 21 MMOL/L (ref 23–29)
COLOR UR: YELLOW
CREAT SERPL-MCNC: 1.1 MG/DL (ref 0.5–1.4)
CREAT SERPL-MCNC: 1.1 MG/DL (ref 0.5–1.4)
DIFFERENTIAL METHOD: ABNORMAL
DIFFERENTIAL METHOD: ABNORMAL
EOSINOPHIL # BLD AUTO: 0.1 K/UL (ref 0–0.5)
EOSINOPHIL # BLD AUTO: 0.1 K/UL (ref 0–0.5)
EOSINOPHIL NFR BLD: 1.3 % (ref 0–8)
EOSINOPHIL NFR BLD: 1.3 % (ref 0–8)
ERYTHROCYTE [DISTWIDTH] IN BLOOD BY AUTOMATED COUNT: 23.5 % (ref 11.5–14.5)
ERYTHROCYTE [DISTWIDTH] IN BLOOD BY AUTOMATED COUNT: 23.5 % (ref 11.5–14.5)
EST. GFR  (AFRICAN AMERICAN): >60 ML/MIN/1.73 M^2
EST. GFR  (AFRICAN AMERICAN): >60 ML/MIN/1.73 M^2
EST. GFR  (NON AFRICAN AMERICAN): >60 ML/MIN/1.73 M^2
EST. GFR  (NON AFRICAN AMERICAN): >60 ML/MIN/1.73 M^2
GLUCOSE SERPL-MCNC: 128 MG/DL (ref 70–110)
GLUCOSE SERPL-MCNC: 128 MG/DL (ref 70–110)
GLUCOSE UR QL STRIP: ABNORMAL
HCT VFR BLD AUTO: 33.1 % (ref 40–54)
HCT VFR BLD AUTO: 33.1 % (ref 40–54)
HGB BLD-MCNC: 10.9 G/DL (ref 14–18)
HGB BLD-MCNC: 10.9 G/DL (ref 14–18)
HGB UR QL STRIP: NEGATIVE
HYALINE CASTS #/AREA URNS LPF: 0 /LPF
IMM GRANULOCYTES # BLD AUTO: 0.05 K/UL (ref 0–0.04)
IMM GRANULOCYTES # BLD AUTO: 0.05 K/UL (ref 0–0.04)
IMM GRANULOCYTES NFR BLD AUTO: 0.8 % (ref 0–0.5)
IMM GRANULOCYTES NFR BLD AUTO: 0.8 % (ref 0–0.5)
IRON SERPL-MCNC: 31 UG/DL (ref 45–160)
KETONES UR QL STRIP: ABNORMAL
LACTATE SERPL-SCNC: 3.3 MMOL/L (ref 0.5–2.2)
LEUKOCYTE ESTERASE UR QL STRIP: NEGATIVE
LYMPHOCYTES # BLD AUTO: 1 K/UL (ref 1–4.8)
LYMPHOCYTES # BLD AUTO: 1 K/UL (ref 1–4.8)
LYMPHOCYTES NFR BLD: 15.1 % (ref 18–48)
LYMPHOCYTES NFR BLD: 15.1 % (ref 18–48)
MAGNESIUM SERPL-MCNC: 2.1 MG/DL (ref 1.6–2.6)
MCH RBC QN AUTO: 25.4 PG (ref 27–31)
MCH RBC QN AUTO: 25.4 PG (ref 27–31)
MCHC RBC AUTO-ENTMCNC: 32.9 G/DL (ref 32–36)
MCHC RBC AUTO-ENTMCNC: 32.9 G/DL (ref 32–36)
MCV RBC AUTO: 77 FL (ref 82–98)
MCV RBC AUTO: 77 FL (ref 82–98)
MICROSCOPIC COMMENT: NORMAL
MONOCYTES # BLD AUTO: 0.5 K/UL (ref 0.3–1)
MONOCYTES # BLD AUTO: 0.5 K/UL (ref 0.3–1)
MONOCYTES NFR BLD: 7.9 % (ref 4–15)
MONOCYTES NFR BLD: 7.9 % (ref 4–15)
NEUTROPHILS # BLD AUTO: 4.7 K/UL (ref 1.8–7.7)
NEUTROPHILS # BLD AUTO: 4.7 K/UL (ref 1.8–7.7)
NEUTROPHILS NFR BLD: 74.7 % (ref 38–73)
NEUTROPHILS NFR BLD: 74.7 % (ref 38–73)
NITRITE UR QL STRIP: NEGATIVE
NRBC BLD-RTO: 1 /100 WBC
NRBC BLD-RTO: 1 /100 WBC
PH UR STRIP: 6 [PH] (ref 5–8)
PHOSPHATE SERPL-MCNC: 2.2 MG/DL (ref 2.7–4.5)
PLATELET # BLD AUTO: 165 K/UL (ref 150–450)
PLATELET # BLD AUTO: 165 K/UL (ref 150–450)
PMV BLD AUTO: 10.4 FL (ref 9.2–12.9)
PMV BLD AUTO: 10.4 FL (ref 9.2–12.9)
POCT GLUCOSE: 194 MG/DL (ref 70–110)
POCT GLUCOSE: 258 MG/DL (ref 70–110)
POCT GLUCOSE: 273 MG/DL (ref 70–110)
POCT GLUCOSE: 287 MG/DL (ref 70–110)
POTASSIUM SERPL-SCNC: 3.5 MMOL/L (ref 3.5–5.1)
POTASSIUM SERPL-SCNC: 3.5 MMOL/L (ref 3.5–5.1)
PROT SERPL-MCNC: 5.9 G/DL (ref 6–8.4)
PROT SERPL-MCNC: 5.9 G/DL (ref 6–8.4)
PROT UR QL STRIP: ABNORMAL
RBC # BLD AUTO: 4.29 M/UL (ref 4.6–6.2)
RBC # BLD AUTO: 4.29 M/UL (ref 4.6–6.2)
RBC #/AREA URNS HPF: 0 /HPF (ref 0–4)
SATURATED IRON: 9 % (ref 20–50)
SODIUM SERPL-SCNC: 130 MMOL/L (ref 136–145)
SODIUM SERPL-SCNC: 130 MMOL/L (ref 136–145)
SP GR UR STRIP: >1.03 (ref 1–1.03)
SQUAMOUS #/AREA URNS HPF: 3 /HPF
TOTAL IRON BINDING CAPACITY: 340 UG/DL (ref 250–450)
TRANSFERRIN SERPL-MCNC: 230 MG/DL (ref 200–375)
UNIDENT CRYS URNS QL MICRO: NORMAL
URN SPEC COLLECT METH UR: ABNORMAL
UROBILINOGEN UR STRIP-ACNC: ABNORMAL EU/DL
WBC # BLD AUTO: 6.31 K/UL (ref 3.9–12.7)
WBC # BLD AUTO: 6.31 K/UL (ref 3.9–12.7)
WBC #/AREA URNS HPF: 2 /HPF (ref 0–5)

## 2021-05-25 PROCEDURE — 85025 COMPLETE CBC W/AUTO DIFF WBC: CPT | Performed by: INTERNAL MEDICINE

## 2021-05-25 PROCEDURE — 83605 ASSAY OF LACTIC ACID: CPT | Performed by: PHYSICIAN ASSISTANT

## 2021-05-25 PROCEDURE — 63600175 PHARM REV CODE 636 W HCPCS: Performed by: INTERNAL MEDICINE

## 2021-05-25 PROCEDURE — 82746 ASSAY OF FOLIC ACID SERUM: CPT | Performed by: HOSPITALIST

## 2021-05-25 PROCEDURE — 20000000 HC ICU ROOM

## 2021-05-25 PROCEDURE — 84100 ASSAY OF PHOSPHORUS: CPT | Performed by: INTERNAL MEDICINE

## 2021-05-25 PROCEDURE — 36415 COLL VENOUS BLD VENIPUNCTURE: CPT | Performed by: HOSPITALIST

## 2021-05-25 PROCEDURE — 97530 THERAPEUTIC ACTIVITIES: CPT

## 2021-05-25 PROCEDURE — 36415 COLL VENOUS BLD VENIPUNCTURE: CPT | Performed by: INTERNAL MEDICINE

## 2021-05-25 PROCEDURE — 36415 COLL VENOUS BLD VENIPUNCTURE: CPT | Performed by: PHYSICIAN ASSISTANT

## 2021-05-25 PROCEDURE — C9399 UNCLASSIFIED DRUGS OR BIOLOG: HCPCS | Performed by: HOSPITALIST

## 2021-05-25 PROCEDURE — 81000 URINALYSIS NONAUTO W/SCOPE: CPT | Performed by: EMERGENCY MEDICINE

## 2021-05-25 PROCEDURE — 83735 ASSAY OF MAGNESIUM: CPT | Performed by: INTERNAL MEDICINE

## 2021-05-25 PROCEDURE — 25000003 PHARM REV CODE 250: Performed by: HOSPITALIST

## 2021-05-25 PROCEDURE — 63600175 PHARM REV CODE 636 W HCPCS: Performed by: HOSPITALIST

## 2021-05-25 PROCEDURE — 25000003 PHARM REV CODE 250: Performed by: EMERGENCY MEDICINE

## 2021-05-25 PROCEDURE — 99291 PR CRITICAL CARE, E/M 30-74 MINUTES: ICD-10-PCS | Mod: ,,, | Performed by: INTERNAL MEDICINE

## 2021-05-25 PROCEDURE — 99291 CRITICAL CARE FIRST HOUR: CPT | Mod: ,,, | Performed by: INTERNAL MEDICINE

## 2021-05-25 PROCEDURE — 80053 COMPREHEN METABOLIC PANEL: CPT | Performed by: INTERNAL MEDICINE

## 2021-05-25 PROCEDURE — 83540 ASSAY OF IRON: CPT | Performed by: HOSPITALIST

## 2021-05-25 PROCEDURE — 82607 VITAMIN B-12: CPT | Performed by: HOSPITALIST

## 2021-05-25 PROCEDURE — 63600175 PHARM REV CODE 636 W HCPCS: Performed by: EMERGENCY MEDICINE

## 2021-05-25 PROCEDURE — 25000003 PHARM REV CODE 250: Performed by: INTERNAL MEDICINE

## 2021-05-25 PROCEDURE — 97161 PT EVAL LOW COMPLEX 20 MIN: CPT

## 2021-05-25 PROCEDURE — 97165 OT EVAL LOW COMPLEX 30 MIN: CPT

## 2021-05-25 RX ORDER — INSULIN ASPART 100 [IU]/ML
3 INJECTION, SOLUTION INTRAVENOUS; SUBCUTANEOUS
Status: DISCONTINUED | OUTPATIENT
Start: 2021-05-26 | End: 2021-05-26

## 2021-05-25 RX ORDER — AMOXICILLIN 250 MG
2 CAPSULE ORAL 2 TIMES DAILY
Status: DISCONTINUED | OUTPATIENT
Start: 2021-05-25 | End: 2021-06-02 | Stop reason: HOSPADM

## 2021-05-25 RX ORDER — FERROUS SULFATE 325(65) MG
325 TABLET, DELAYED RELEASE (ENTERIC COATED) ORAL DAILY
Status: DISCONTINUED | OUTPATIENT
Start: 2021-05-26 | End: 2021-06-02 | Stop reason: HOSPADM

## 2021-05-25 RX ORDER — FUROSEMIDE 10 MG/ML
40 INJECTION INTRAMUSCULAR; INTRAVENOUS 2 TIMES DAILY
Status: DISCONTINUED | OUTPATIENT
Start: 2021-05-25 | End: 2021-05-26

## 2021-05-25 RX ORDER — DOBUTAMINE HYDROCHLORIDE 400 MG/100ML
0-20 INJECTION INTRAVENOUS CONTINUOUS
Status: DISCONTINUED | OUTPATIENT
Start: 2021-05-25 | End: 2021-05-27

## 2021-05-25 RX ORDER — FUROSEMIDE 10 MG/ML
40 INJECTION INTRAMUSCULAR; INTRAVENOUS ONCE
Status: COMPLETED | OUTPATIENT
Start: 2021-05-25 | End: 2021-05-25

## 2021-05-25 RX ORDER — ACETAMINOPHEN 325 MG/1
650 TABLET ORAL EVERY 4 HOURS PRN
Status: DISCONTINUED | OUTPATIENT
Start: 2021-05-25 | End: 2021-06-02 | Stop reason: HOSPADM

## 2021-05-25 RX ORDER — AMOXICILLIN AND CLAVULANATE POTASSIUM 875; 125 MG/1; MG/1
1 TABLET, FILM COATED ORAL EVERY 12 HOURS
Status: DISCONTINUED | OUTPATIENT
Start: 2021-05-25 | End: 2021-05-27

## 2021-05-25 RX ADMIN — TICAGRELOR 90 MG: 90 TABLET ORAL at 09:05

## 2021-05-25 RX ADMIN — INSULIN ASPART 3 UNITS: 100 INJECTION, SOLUTION INTRAVENOUS; SUBCUTANEOUS at 09:05

## 2021-05-25 RX ADMIN — APIXABAN 5 MG: 5 TABLET, FILM COATED ORAL at 09:05

## 2021-05-25 RX ADMIN — RANOLAZINE 500 MG: 500 TABLET, FILM COATED, EXTENDED RELEASE ORAL at 09:05

## 2021-05-25 RX ADMIN — DOBUTAMINE HYDROCHLORIDE 5 MCG/KG/MIN: 400 INJECTION INTRAVENOUS at 10:05

## 2021-05-25 RX ADMIN — METOPROLOL SUCCINATE 50 MG: 50 TABLET, EXTENDED RELEASE ORAL at 09:05

## 2021-05-25 RX ADMIN — AMOXICILLIN AND CLAVULANATE POTASSIUM 1 TABLET: 875; 125 TABLET, FILM COATED ORAL at 08:05

## 2021-05-25 RX ADMIN — ROSUVASTATIN CALCIUM 40 MG: 20 TABLET, FILM COATED ORAL at 09:05

## 2021-05-25 RX ADMIN — VANCOMYCIN HYDROCHLORIDE 750 MG: 750 INJECTION, POWDER, LYOPHILIZED, FOR SOLUTION INTRAVENOUS at 12:05

## 2021-05-25 RX ADMIN — DOCUSATE SODIUM 50 MG AND SENNOSIDES 8.6 MG 2 TABLET: 8.6; 5 TABLET, FILM COATED ORAL at 09:05

## 2021-05-25 RX ADMIN — SPIRONOLACTONE 25 MG: 25 TABLET ORAL at 09:05

## 2021-05-25 RX ADMIN — PIPERACILLIN AND TAZOBACTAM 4.5 G: 4; .5 INJECTION, POWDER, LYOPHILIZED, FOR SOLUTION INTRAVENOUS; PARENTERAL at 06:05

## 2021-05-25 RX ADMIN — INSULIN ASPART 6 UNITS: 100 INJECTION, SOLUTION INTRAVENOUS; SUBCUTANEOUS at 01:05

## 2021-05-25 RX ADMIN — POLYETHYLENE GLYCOL 3350 17 G: 17 POWDER, FOR SOLUTION ORAL at 09:05

## 2021-05-25 RX ADMIN — INSULIN DETEMIR 10 UNITS: 100 INJECTION, SOLUTION SUBCUTANEOUS at 09:05

## 2021-05-25 RX ADMIN — INSULIN ASPART 6 UNITS: 100 INJECTION, SOLUTION INTRAVENOUS; SUBCUTANEOUS at 05:05

## 2021-05-25 RX ADMIN — FUROSEMIDE 40 MG: 10 INJECTION, SOLUTION INTRAVENOUS at 01:05

## 2021-05-25 RX ADMIN — PIPERACILLIN AND TAZOBACTAM 4.5 G: 4; .5 INJECTION, POWDER, LYOPHILIZED, FOR SOLUTION INTRAVENOUS; PARENTERAL at 01:05

## 2021-05-26 PROBLEM — R07.9 CHEST PAIN: Status: RESOLVED | Noted: 2021-05-25 | Resolved: 2021-05-26

## 2021-05-26 PROBLEM — R17 ELEVATED BILIRUBIN: Status: ACTIVE | Noted: 2021-05-26

## 2021-05-26 LAB
ALBUMIN SERPL BCP-MCNC: 1.9 G/DL (ref 3.5–5.2)
ALBUMIN SERPL BCP-MCNC: 2 G/DL (ref 3.5–5.2)
ALLENS TEST: ABNORMAL
ALP SERPL-CCNC: 208 U/L (ref 55–135)
ALP SERPL-CCNC: 223 U/L (ref 55–135)
ALT SERPL W/O P-5'-P-CCNC: 31 U/L (ref 10–44)
ALT SERPL W/O P-5'-P-CCNC: 33 U/L (ref 10–44)
ANION GAP SERPL CALC-SCNC: 8 MMOL/L (ref 8–16)
ANION GAP SERPL CALC-SCNC: 9 MMOL/L (ref 8–16)
ANION GAP SERPL CALC-SCNC: 9 MMOL/L (ref 8–16)
APTT BLDCRRT: 30.7 SEC (ref 21–32)
AST SERPL-CCNC: 32 U/L (ref 10–40)
AST SERPL-CCNC: 35 U/L (ref 10–40)
BASOPHILS # BLD AUTO: 0 K/UL (ref 0–0.2)
BASOPHILS # BLD AUTO: 0.01 K/UL (ref 0–0.2)
BASOPHILS NFR BLD: 0 % (ref 0–1.9)
BASOPHILS NFR BLD: 0.2 % (ref 0–1.9)
BILIRUB SERPL-MCNC: 4.9 MG/DL (ref 0.1–1)
BILIRUB SERPL-MCNC: 5.2 MG/DL (ref 0.1–1)
BUN SERPL-MCNC: 15 MG/DL (ref 8–23)
BUN SERPL-MCNC: 16 MG/DL (ref 8–23)
BUN SERPL-MCNC: 18 MG/DL (ref 8–23)
CALCIUM SERPL-MCNC: 7.5 MG/DL (ref 8.7–10.5)
CALCIUM SERPL-MCNC: 7.7 MG/DL (ref 8.7–10.5)
CALCIUM SERPL-MCNC: 8 MG/DL (ref 8.7–10.5)
CHLORIDE SERPL-SCNC: 93 MMOL/L (ref 95–110)
CHLORIDE SERPL-SCNC: 94 MMOL/L (ref 95–110)
CHLORIDE SERPL-SCNC: 98 MMOL/L (ref 95–110)
CO2 SERPL-SCNC: 24 MMOL/L (ref 23–29)
CO2 SERPL-SCNC: 25 MMOL/L (ref 23–29)
CO2 SERPL-SCNC: 26 MMOL/L (ref 23–29)
CREAT SERPL-MCNC: 1.1 MG/DL (ref 0.5–1.4)
CREAT UR-MCNC: 41.7 MG/DL (ref 23–375)
DIFFERENTIAL METHOD: ABNORMAL
DIFFERENTIAL METHOD: ABNORMAL
EOSINOPHIL # BLD AUTO: 0 K/UL (ref 0–0.5)
EOSINOPHIL # BLD AUTO: 0.1 K/UL (ref 0–0.5)
EOSINOPHIL NFR BLD: 0.5 % (ref 0–8)
EOSINOPHIL NFR BLD: 0.8 % (ref 0–8)
ERYTHROCYTE [DISTWIDTH] IN BLOOD BY AUTOMATED COUNT: 23.9 % (ref 11.5–14.5)
ERYTHROCYTE [DISTWIDTH] IN BLOOD BY AUTOMATED COUNT: 24.9 % (ref 11.5–14.5)
EST. GFR  (AFRICAN AMERICAN): >60 ML/MIN/1.73 M^2
EST. GFR  (NON AFRICAN AMERICAN): >60 ML/MIN/1.73 M^2
FOLATE SERPL-MCNC: 7.9 NG/ML (ref 4–24)
GLUCOSE SERPL-MCNC: 232 MG/DL (ref 70–110)
GLUCOSE SERPL-MCNC: 276 MG/DL (ref 70–110)
GLUCOSE SERPL-MCNC: 39 MG/DL (ref 70–110)
HCO3 UR-SCNC: 28 MMOL/L (ref 24–28)
HCT VFR BLD AUTO: 31.3 % (ref 40–54)
HCT VFR BLD AUTO: 32.1 % (ref 40–54)
HGB BLD-MCNC: 10.6 G/DL (ref 14–18)
HGB BLD-MCNC: 10.9 G/DL (ref 14–18)
IMM GRANULOCYTES # BLD AUTO: 0.04 K/UL (ref 0–0.04)
IMM GRANULOCYTES # BLD AUTO: 0.04 K/UL (ref 0–0.04)
IMM GRANULOCYTES NFR BLD AUTO: 0.6 % (ref 0–0.5)
IMM GRANULOCYTES NFR BLD AUTO: 0.7 % (ref 0–0.5)
INR PPP: 1.7 (ref 0.8–1.2)
LACTATE SERPL-SCNC: 1.7 MMOL/L (ref 0.5–2.2)
LYMPHOCYTES # BLD AUTO: 0.4 K/UL (ref 1–4.8)
LYMPHOCYTES # BLD AUTO: 0.5 K/UL (ref 1–4.8)
LYMPHOCYTES NFR BLD: 7.1 % (ref 18–48)
LYMPHOCYTES NFR BLD: 7.2 % (ref 18–48)
MAGNESIUM SERPL-MCNC: 1.9 MG/DL (ref 1.6–2.6)
MAGNESIUM SERPL-MCNC: 2 MG/DL (ref 1.6–2.6)
MCH RBC QN AUTO: 26.2 PG (ref 27–31)
MCH RBC QN AUTO: 26.4 PG (ref 27–31)
MCHC RBC AUTO-ENTMCNC: 33.9 G/DL (ref 32–36)
MCHC RBC AUTO-ENTMCNC: 34 G/DL (ref 32–36)
MCV RBC AUTO: 77 FL (ref 82–98)
MCV RBC AUTO: 78 FL (ref 82–98)
MONOCYTES # BLD AUTO: 0.3 K/UL (ref 0.3–1)
MONOCYTES # BLD AUTO: 0.4 K/UL (ref 0.3–1)
MONOCYTES NFR BLD: 4.9 % (ref 4–15)
MONOCYTES NFR BLD: 6.9 % (ref 4–15)
NEUTROPHILS # BLD AUTO: 5 K/UL (ref 1.8–7.7)
NEUTROPHILS # BLD AUTO: 5.5 K/UL (ref 1.8–7.7)
NEUTROPHILS NFR BLD: 84.3 % (ref 38–73)
NEUTROPHILS NFR BLD: 86.8 % (ref 38–73)
NRBC BLD-RTO: 0 /100 WBC
NRBC BLD-RTO: 0 /100 WBC
PCO2 BLDA: 34.4 MMHG (ref 35–45)
PH SMN: 7.52 [PH] (ref 7.35–7.45)
PHOSPHATE SERPL-MCNC: 2.1 MG/DL (ref 2.7–4.5)
PLATELET # BLD AUTO: 162 K/UL (ref 150–450)
PLATELET # BLD AUTO: 165 K/UL (ref 150–450)
PMV BLD AUTO: 10.2 FL (ref 9.2–12.9)
PMV BLD AUTO: 9.5 FL (ref 9.2–12.9)
PO2 BLDA: 32 MMHG (ref 40–60)
POC BE: 5 MMOL/L
POC SATURATED O2: 70 % (ref 95–100)
POC TCO2: 29 MMOL/L (ref 24–29)
POCT GLUCOSE: 259 MG/DL (ref 70–110)
POCT GLUCOSE: 301 MG/DL (ref 70–110)
POCT GLUCOSE: 70 MG/DL (ref 70–110)
POCT GLUCOSE: 82 MG/DL (ref 70–110)
POCT GLUCOSE: 89 MG/DL (ref 70–110)
POTASSIUM SERPL-SCNC: 2.7 MMOL/L (ref 3.5–5.1)
POTASSIUM SERPL-SCNC: 2.7 MMOL/L (ref 3.5–5.1)
POTASSIUM SERPL-SCNC: 3 MMOL/L (ref 3.5–5.1)
PROCALCITONIN SERPL IA-MCNC: 0.49 NG/ML
PROT SERPL-MCNC: 5.6 G/DL (ref 6–8.4)
PROT SERPL-MCNC: 5.7 G/DL (ref 6–8.4)
PROT UR-MCNC: 19 MG/DL
PROT/CREAT UR: 0.46 MG/G{CREAT} (ref 0–0.2)
PROTHROMBIN TIME: 18 SEC (ref 9–12.5)
RBC # BLD AUTO: 4.02 M/UL (ref 4.6–6.2)
RBC # BLD AUTO: 4.16 M/UL (ref 4.6–6.2)
SAMPLE: ABNORMAL
SITE: ABNORMAL
SODIUM SERPL-SCNC: 127 MMOL/L (ref 136–145)
SODIUM SERPL-SCNC: 128 MMOL/L (ref 136–145)
SODIUM SERPL-SCNC: 131 MMOL/L (ref 136–145)
VIT B12 SERPL-MCNC: 875 PG/ML (ref 210–950)
WBC # BLD AUTO: 5.91 K/UL (ref 3.9–12.7)
WBC # BLD AUTO: 6.36 K/UL (ref 3.9–12.7)

## 2021-05-26 PROCEDURE — 80053 COMPREHEN METABOLIC PANEL: CPT | Performed by: INTERNAL MEDICINE

## 2021-05-26 PROCEDURE — 80053 COMPREHEN METABOLIC PANEL: CPT | Mod: 91 | Performed by: INTERNAL MEDICINE

## 2021-05-26 PROCEDURE — 99291 CRITICAL CARE FIRST HOUR: CPT | Mod: ,,, | Performed by: INTERNAL MEDICINE

## 2021-05-26 PROCEDURE — 99223 1ST HOSP IP/OBS HIGH 75: CPT | Mod: GC,,, | Performed by: INTERNAL MEDICINE

## 2021-05-26 PROCEDURE — 84100 ASSAY OF PHOSPHORUS: CPT | Performed by: INTERNAL MEDICINE

## 2021-05-26 PROCEDURE — 83605 ASSAY OF LACTIC ACID: CPT | Performed by: INTERNAL MEDICINE

## 2021-05-26 PROCEDURE — A4216 STERILE WATER/SALINE, 10 ML: HCPCS | Performed by: HOSPITALIST

## 2021-05-26 PROCEDURE — 63600175 PHARM REV CODE 636 W HCPCS: Performed by: INTERNAL MEDICINE

## 2021-05-26 PROCEDURE — 85025 COMPLETE CBC W/AUTO DIFF WBC: CPT | Performed by: INTERNAL MEDICINE

## 2021-05-26 PROCEDURE — 85610 PROTHROMBIN TIME: CPT | Performed by: INTERNAL MEDICINE

## 2021-05-26 PROCEDURE — 80048 BASIC METABOLIC PNL TOTAL CA: CPT | Performed by: HOSPITALIST

## 2021-05-26 PROCEDURE — 25000003 PHARM REV CODE 250: Performed by: INTERNAL MEDICINE

## 2021-05-26 PROCEDURE — 20000000 HC ICU ROOM

## 2021-05-26 PROCEDURE — 85730 THROMBOPLASTIN TIME PARTIAL: CPT | Performed by: INTERNAL MEDICINE

## 2021-05-26 PROCEDURE — 99291 PR CRITICAL CARE, E/M 30-74 MINUTES: ICD-10-PCS | Mod: ,,, | Performed by: INTERNAL MEDICINE

## 2021-05-26 PROCEDURE — 94761 N-INVAS EAR/PLS OXIMETRY MLT: CPT

## 2021-05-26 PROCEDURE — 25000003 PHARM REV CODE 250

## 2021-05-26 PROCEDURE — 25000003 PHARM REV CODE 250: Performed by: HOSPITALIST

## 2021-05-26 PROCEDURE — 99223 PR INITIAL HOSPITAL CARE,LEVL III: ICD-10-PCS | Mod: GC,,, | Performed by: INTERNAL MEDICINE

## 2021-05-26 PROCEDURE — 83735 ASSAY OF MAGNESIUM: CPT | Performed by: INTERNAL MEDICINE

## 2021-05-26 PROCEDURE — 85025 COMPLETE CBC W/AUTO DIFF WBC: CPT | Mod: 91 | Performed by: INTERNAL MEDICINE

## 2021-05-26 PROCEDURE — 25000003 PHARM REV CODE 250: Performed by: STUDENT IN AN ORGANIZED HEALTH CARE EDUCATION/TRAINING PROGRAM

## 2021-05-26 PROCEDURE — 82570 ASSAY OF URINE CREATININE: CPT | Performed by: HOSPITALIST

## 2021-05-26 PROCEDURE — C1751 CATH, INF, PER/CENT/MIDLINE: HCPCS

## 2021-05-26 PROCEDURE — 36569 INSJ PICC 5 YR+ W/O IMAGING: CPT

## 2021-05-26 PROCEDURE — 63600175 PHARM REV CODE 636 W HCPCS: Performed by: STUDENT IN AN ORGANIZED HEALTH CARE EDUCATION/TRAINING PROGRAM

## 2021-05-26 PROCEDURE — 83735 ASSAY OF MAGNESIUM: CPT | Mod: 91 | Performed by: INTERNAL MEDICINE

## 2021-05-26 PROCEDURE — 63600175 PHARM REV CODE 636 W HCPCS: Performed by: HOSPITALIST

## 2021-05-26 PROCEDURE — 84145 PROCALCITONIN (PCT): CPT | Performed by: HOSPITALIST

## 2021-05-26 PROCEDURE — 99900035 HC TECH TIME PER 15 MIN (STAT)

## 2021-05-26 RX ORDER — SODIUM,POTASSIUM PHOSPHATES 280-250MG
2 POWDER IN PACKET (EA) ORAL
Status: DISCONTINUED | OUTPATIENT
Start: 2021-05-26 | End: 2021-05-26

## 2021-05-26 RX ORDER — POTASSIUM CHLORIDE 7.45 MG/ML
10 INJECTION INTRAVENOUS
Status: COMPLETED | OUTPATIENT
Start: 2021-05-26 | End: 2021-05-27

## 2021-05-26 RX ORDER — LANOLIN ALCOHOL/MO/W.PET/CERES
800 CREAM (GRAM) TOPICAL
Status: DISCONTINUED | OUTPATIENT
Start: 2021-05-26 | End: 2021-05-26

## 2021-05-26 RX ORDER — SODIUM CHLORIDE 0.9 % (FLUSH) 0.9 %
10 SYRINGE (ML) INJECTION
Status: DISCONTINUED | OUTPATIENT
Start: 2021-05-26 | End: 2021-05-28

## 2021-05-26 RX ORDER — NOREPINEPHRINE BITARTRATE/D5W 4MG/250ML
0-3 PLASTIC BAG, INJECTION (ML) INTRAVENOUS CONTINUOUS
Status: DISCONTINUED | OUTPATIENT
Start: 2021-05-26 | End: 2021-05-28

## 2021-05-26 RX ORDER — POTASSIUM CHLORIDE 20 MEQ/1
40 TABLET, EXTENDED RELEASE ORAL ONCE
Status: COMPLETED | OUTPATIENT
Start: 2021-05-26 | End: 2021-05-26

## 2021-05-26 RX ORDER — LIDOCAINE HYDROCHLORIDE 10 MG/ML
INJECTION INFILTRATION; PERINEURAL
Status: COMPLETED
Start: 2021-05-26 | End: 2021-05-26

## 2021-05-26 RX ORDER — FUROSEMIDE 10 MG/ML
80 INJECTION INTRAMUSCULAR; INTRAVENOUS
Status: DISCONTINUED | OUTPATIENT
Start: 2021-05-26 | End: 2021-05-27

## 2021-05-26 RX ORDER — SODIUM CHLORIDE 0.9 % (FLUSH) 0.9 %
10 SYRINGE (ML) INJECTION EVERY 6 HOURS
Status: DISCONTINUED | OUTPATIENT
Start: 2021-05-26 | End: 2021-05-28

## 2021-05-26 RX ADMIN — DEXTROSE MONOHYDRATE 25 G: 25 INJECTION, SOLUTION INTRAVENOUS at 06:05

## 2021-05-26 RX ADMIN — AMOXICILLIN AND CLAVULANATE POTASSIUM 1 TABLET: 875; 125 TABLET, FILM COATED ORAL at 10:05

## 2021-05-26 RX ADMIN — DOBUTAMINE HYDROCHLORIDE 20 MCG/KG/MIN: 400 INJECTION INTRAVENOUS at 02:05

## 2021-05-26 RX ADMIN — APIXABAN 5 MG: 5 TABLET, FILM COATED ORAL at 08:05

## 2021-05-26 RX ADMIN — Medication 0.16 MCG/KG/MIN: at 02:05

## 2021-05-26 RX ADMIN — Medication 0.14 MCG/KG/MIN: at 10:05

## 2021-05-26 RX ADMIN — POTASSIUM CHLORIDE 10 MEQ: 7.46 INJECTION, SOLUTION INTRAVENOUS at 06:05

## 2021-05-26 RX ADMIN — TICAGRELOR 90 MG: 90 TABLET ORAL at 10:05

## 2021-05-26 RX ADMIN — FUROSEMIDE 80 MG: 10 INJECTION, SOLUTION INTRAMUSCULAR; INTRAVENOUS at 06:05

## 2021-05-26 RX ADMIN — FUROSEMIDE 40 MG: 10 INJECTION, SOLUTION INTRAVENOUS at 08:05

## 2021-05-26 RX ADMIN — SODIUM CHLORIDE 250 ML: 0.9 INJECTION, SOLUTION INTRAVENOUS at 05:05

## 2021-05-26 RX ADMIN — POTASSIUM BICARBONATE 60 MEQ: 978 TABLET, EFFERVESCENT ORAL at 01:05

## 2021-05-26 RX ADMIN — POTASSIUM CHLORIDE 10 MEQ: 7.46 INJECTION, SOLUTION INTRAVENOUS at 08:05

## 2021-05-26 RX ADMIN — Medication 10 ML: at 05:05

## 2021-05-26 RX ADMIN — POTASSIUM CHLORIDE 40 MEQ: 1500 TABLET, EXTENDED RELEASE ORAL at 06:05

## 2021-05-26 RX ADMIN — Medication 0.16 MCG/KG/MIN: at 06:05

## 2021-05-26 RX ADMIN — POLYETHYLENE GLYCOL 3350 17 G: 17 POWDER, FOR SOLUTION ORAL at 08:05

## 2021-05-26 RX ADMIN — DOCUSATE SODIUM 50 MG AND SENNOSIDES 8.6 MG 2 TABLET: 8.6; 5 TABLET, FILM COATED ORAL at 10:05

## 2021-05-26 RX ADMIN — POTASSIUM CHLORIDE 10 MEQ: 7.46 INJECTION, SOLUTION INTRAVENOUS at 10:05

## 2021-05-26 RX ADMIN — APIXABAN 5 MG: 5 TABLET, FILM COATED ORAL at 10:05

## 2021-05-26 RX ADMIN — DOBUTAMINE HYDROCHLORIDE 20 MCG/KG/MIN: 400 INJECTION INTRAVENOUS at 05:05

## 2021-05-26 RX ADMIN — RANOLAZINE 500 MG: 500 TABLET, FILM COATED, EXTENDED RELEASE ORAL at 08:05

## 2021-05-26 RX ADMIN — INSULIN ASPART 8 UNITS: 100 INJECTION, SOLUTION INTRAVENOUS; SUBCUTANEOUS at 12:05

## 2021-05-26 RX ADMIN — DOCUSATE SODIUM 50 MG AND SENNOSIDES 8.6 MG 2 TABLET: 8.6; 5 TABLET, FILM COATED ORAL at 08:05

## 2021-05-26 RX ADMIN — RANOLAZINE 500 MG: 500 TABLET, FILM COATED, EXTENDED RELEASE ORAL at 10:05

## 2021-05-26 RX ADMIN — LIDOCAINE HYDROCHLORIDE 30 MG: 10 INJECTION, SOLUTION INFILTRATION; PERINEURAL at 05:05

## 2021-05-26 RX ADMIN — Medication 325 MG: at 08:05

## 2021-05-26 RX ADMIN — POTASSIUM PHOSPHATE, MONOBASIC 500 MG: 500 TABLET, SOLUBLE ORAL at 08:05

## 2021-05-26 RX ADMIN — TICAGRELOR 90 MG: 90 TABLET ORAL at 09:05

## 2021-05-26 RX ADMIN — ROSUVASTATIN CALCIUM 40 MG: 20 TABLET, FILM COATED ORAL at 09:05

## 2021-05-26 RX ADMIN — Medication 0.02 MCG/KG/MIN: at 06:05

## 2021-05-26 RX ADMIN — AMOXICILLIN AND CLAVULANATE POTASSIUM 1 TABLET: 875; 125 TABLET, FILM COATED ORAL at 08:05

## 2021-05-27 PROBLEM — R50.9 FEVER: Status: ACTIVE | Noted: 2021-05-27

## 2021-05-27 LAB
ALBUMIN SERPL BCP-MCNC: 2.3 G/DL (ref 3.5–5.2)
ALBUMIN SERPL BCP-MCNC: 2.3 G/DL (ref 3.5–5.2)
ALP SERPL-CCNC: 229 U/L (ref 55–135)
ALP SERPL-CCNC: 232 U/L (ref 55–135)
ALT SERPL W/O P-5'-P-CCNC: 31 U/L (ref 10–44)
ALT SERPL W/O P-5'-P-CCNC: 33 U/L (ref 10–44)
ANION GAP SERPL CALC-SCNC: 13 MMOL/L (ref 8–16)
ANION GAP SERPL CALC-SCNC: 13 MMOL/L (ref 8–16)
ANION GAP SERPL CALC-SCNC: 14 MMOL/L (ref 8–16)
AST SERPL-CCNC: 28 U/L (ref 10–40)
AST SERPL-CCNC: 31 U/L (ref 10–40)
BASOPHILS # BLD AUTO: 0 K/UL (ref 0–0.2)
BASOPHILS NFR BLD: 0 % (ref 0–1.9)
BILIRUB SERPL-MCNC: 5.1 MG/DL (ref 0.1–1)
BILIRUB SERPL-MCNC: 5.3 MG/DL (ref 0.1–1)
BUN SERPL-MCNC: 12 MG/DL (ref 8–23)
BUN SERPL-MCNC: 13 MG/DL (ref 8–23)
BUN SERPL-MCNC: 14 MG/DL (ref 8–23)
CALCIUM SERPL-MCNC: 8 MG/DL (ref 8.7–10.5)
CALCIUM SERPL-MCNC: 8 MG/DL (ref 8.7–10.5)
CALCIUM SERPL-MCNC: 8.2 MG/DL (ref 8.7–10.5)
CHLORIDE SERPL-SCNC: 97 MMOL/L (ref 95–110)
CHLORIDE SERPL-SCNC: 98 MMOL/L (ref 95–110)
CHLORIDE SERPL-SCNC: 98 MMOL/L (ref 95–110)
CO2 SERPL-SCNC: 19 MMOL/L (ref 23–29)
CO2 SERPL-SCNC: 19 MMOL/L (ref 23–29)
CO2 SERPL-SCNC: 22 MMOL/L (ref 23–29)
CREAT SERPL-MCNC: 1.1 MG/DL (ref 0.5–1.4)
CREAT SERPL-MCNC: 1.2 MG/DL (ref 0.5–1.4)
CREAT SERPL-MCNC: 1.3 MG/DL (ref 0.5–1.4)
DIFFERENTIAL METHOD: ABNORMAL
EOSINOPHIL # BLD AUTO: 0.1 K/UL (ref 0–0.5)
EOSINOPHIL NFR BLD: 2.2 % (ref 0–8)
ERYTHROCYTE [DISTWIDTH] IN BLOOD BY AUTOMATED COUNT: 24.5 % (ref 11.5–14.5)
EST. GFR  (AFRICAN AMERICAN): >60 ML/MIN/1.73 M^2
EST. GFR  (NON AFRICAN AMERICAN): 56.9 ML/MIN/1.73 M^2
EST. GFR  (NON AFRICAN AMERICAN): >60 ML/MIN/1.73 M^2
EST. GFR  (NON AFRICAN AMERICAN): >60 ML/MIN/1.73 M^2
GLUCOSE SERPL-MCNC: 104 MG/DL (ref 70–110)
GLUCOSE SERPL-MCNC: 139 MG/DL (ref 70–110)
GLUCOSE SERPL-MCNC: 227 MG/DL (ref 70–110)
HCT VFR BLD AUTO: 31.2 % (ref 40–54)
HGB BLD-MCNC: 10.7 G/DL (ref 14–18)
IMM GRANULOCYTES # BLD AUTO: 0.03 K/UL (ref 0–0.04)
IMM GRANULOCYTES NFR BLD AUTO: 0.5 % (ref 0–0.5)
LYMPHOCYTES # BLD AUTO: 0.7 K/UL (ref 1–4.8)
LYMPHOCYTES NFR BLD: 11.6 % (ref 18–48)
MAGNESIUM SERPL-MCNC: 1.9 MG/DL (ref 1.6–2.6)
MCH RBC QN AUTO: 26 PG (ref 27–31)
MCHC RBC AUTO-ENTMCNC: 34.3 G/DL (ref 32–36)
MCV RBC AUTO: 76 FL (ref 82–98)
MONOCYTES # BLD AUTO: 0.3 K/UL (ref 0.3–1)
MONOCYTES NFR BLD: 4.6 % (ref 4–15)
NEUTROPHILS # BLD AUTO: 4.8 K/UL (ref 1.8–7.7)
NEUTROPHILS NFR BLD: 81.1 % (ref 38–73)
NRBC BLD-RTO: 1 /100 WBC
PLATELET # BLD AUTO: 160 K/UL (ref 150–450)
PMV BLD AUTO: 10.2 FL (ref 9.2–12.9)
POCT GLUCOSE: 170 MG/DL (ref 70–110)
POCT GLUCOSE: 181 MG/DL (ref 70–110)
POCT GLUCOSE: 230 MG/DL (ref 70–110)
POTASSIUM SERPL-SCNC: 3.8 MMOL/L (ref 3.5–5.1)
POTASSIUM SERPL-SCNC: 4 MMOL/L (ref 3.5–5.1)
POTASSIUM SERPL-SCNC: 4.4 MMOL/L (ref 3.5–5.1)
PROT SERPL-MCNC: 6.3 G/DL (ref 6–8.4)
PROT SERPL-MCNC: 6.3 G/DL (ref 6–8.4)
RBC # BLD AUTO: 4.11 M/UL (ref 4.6–6.2)
SARS-COV-2 RDRP RESP QL NAA+PROBE: NEGATIVE
SODIUM SERPL-SCNC: 130 MMOL/L (ref 136–145)
SODIUM SERPL-SCNC: 131 MMOL/L (ref 136–145)
SODIUM SERPL-SCNC: 132 MMOL/L (ref 136–145)
WBC # BLD AUTO: 5.88 K/UL (ref 3.9–12.7)

## 2021-05-27 PROCEDURE — 25000003 PHARM REV CODE 250: Performed by: INTERNAL MEDICINE

## 2021-05-27 PROCEDURE — 99223 PR INITIAL HOSPITAL CARE,LEVL III: ICD-10-PCS | Mod: GC,,, | Performed by: INTERNAL MEDICINE

## 2021-05-27 PROCEDURE — 63600175 PHARM REV CODE 636 W HCPCS: Performed by: STUDENT IN AN ORGANIZED HEALTH CARE EDUCATION/TRAINING PROGRAM

## 2021-05-27 PROCEDURE — 99223 1ST HOSP IP/OBS HIGH 75: CPT | Mod: GC,,, | Performed by: INTERNAL MEDICINE

## 2021-05-27 PROCEDURE — 93451 RIGHT HEART CATH: CPT | Mod: 26,,, | Performed by: INTERNAL MEDICINE

## 2021-05-27 PROCEDURE — 80048 BASIC METABOLIC PNL TOTAL CA: CPT | Performed by: INTERNAL MEDICINE

## 2021-05-27 PROCEDURE — 63600175 PHARM REV CODE 636 W HCPCS

## 2021-05-27 PROCEDURE — C1751 CATH, INF, PER/CENT/MIDLINE: HCPCS | Performed by: INTERNAL MEDICINE

## 2021-05-27 PROCEDURE — C1894 INTRO/SHEATH, NON-LASER: HCPCS | Performed by: INTERNAL MEDICINE

## 2021-05-27 PROCEDURE — 25000003 PHARM REV CODE 250: Performed by: HOSPITALIST

## 2021-05-27 PROCEDURE — 94761 N-INVAS EAR/PLS OXIMETRY MLT: CPT

## 2021-05-27 PROCEDURE — 20000000 HC ICU ROOM

## 2021-05-27 PROCEDURE — C1769 GUIDE WIRE: HCPCS | Performed by: INTERNAL MEDICINE

## 2021-05-27 PROCEDURE — A4216 STERILE WATER/SALINE, 10 ML: HCPCS | Performed by: HOSPITALIST

## 2021-05-27 PROCEDURE — 80053 COMPREHEN METABOLIC PANEL: CPT | Performed by: STUDENT IN AN ORGANIZED HEALTH CARE EDUCATION/TRAINING PROGRAM

## 2021-05-27 PROCEDURE — 63600175 PHARM REV CODE 636 W HCPCS: Performed by: INTERNAL MEDICINE

## 2021-05-27 PROCEDURE — 93451 RIGHT HEART CATH: CPT | Performed by: INTERNAL MEDICINE

## 2021-05-27 PROCEDURE — 93451 PR RIGHT HEART CATH O2 SATURATION & CARDIAC OUTPUT: ICD-10-PCS | Mod: 26,,, | Performed by: INTERNAL MEDICINE

## 2021-05-27 PROCEDURE — 85025 COMPLETE CBC W/AUTO DIFF WBC: CPT | Performed by: STUDENT IN AN ORGANIZED HEALTH CARE EDUCATION/TRAINING PROGRAM

## 2021-05-27 PROCEDURE — U0002 COVID-19 LAB TEST NON-CDC: HCPCS | Performed by: INTERNAL MEDICINE

## 2021-05-27 PROCEDURE — 83735 ASSAY OF MAGNESIUM: CPT | Performed by: INTERNAL MEDICINE

## 2021-05-27 RX ORDER — SODIUM CHLORIDE 0.9 G/100ML
IRRIGANT IRRIGATION
Status: DISCONTINUED | OUTPATIENT
Start: 2021-05-27 | End: 2021-05-27 | Stop reason: HOSPADM

## 2021-05-27 RX ORDER — TAMSULOSIN HYDROCHLORIDE 0.4 MG/1
0.4 CAPSULE ORAL DAILY
Status: DISCONTINUED | OUTPATIENT
Start: 2021-05-27 | End: 2021-06-02 | Stop reason: HOSPADM

## 2021-05-27 RX ORDER — VANCOMYCIN HCL IN 5 % DEXTROSE 1G/250ML
15 PLASTIC BAG, INJECTION (ML) INTRAVENOUS
Status: DISCONTINUED | OUTPATIENT
Start: 2021-05-28 | End: 2021-05-28

## 2021-05-27 RX ORDER — AMOXICILLIN AND CLAVULANATE POTASSIUM 875; 125 MG/1; MG/1
1 TABLET, FILM COATED ORAL EVERY 12 HOURS
Status: COMPLETED | OUTPATIENT
Start: 2021-05-27 | End: 2021-05-29

## 2021-05-27 RX ORDER — LIDOCAINE HYDROCHLORIDE 20 MG/ML
INJECTION, SOLUTION INFILTRATION; PERINEURAL
Status: DISCONTINUED | OUTPATIENT
Start: 2021-05-27 | End: 2021-05-27 | Stop reason: HOSPADM

## 2021-05-27 RX ORDER — FUROSEMIDE 10 MG/ML
80 INJECTION INTRAMUSCULAR; INTRAVENOUS EVERY 8 HOURS
Status: DISCONTINUED | OUTPATIENT
Start: 2021-05-27 | End: 2021-05-27

## 2021-05-27 RX ORDER — DOBUTAMINE HYDROCHLORIDE 400 MG/100ML
7.5 INJECTION INTRAVENOUS CONTINUOUS
Status: DISCONTINUED | OUTPATIENT
Start: 2021-05-27 | End: 2021-05-31

## 2021-05-27 RX ORDER — FUROSEMIDE 10 MG/ML
80 INJECTION INTRAMUSCULAR; INTRAVENOUS 2 TIMES DAILY
Status: DISCONTINUED | OUTPATIENT
Start: 2021-05-28 | End: 2021-05-28

## 2021-05-27 RX ORDER — POTASSIUM CHLORIDE 20 MEQ/1
60 TABLET, EXTENDED RELEASE ORAL ONCE
Status: COMPLETED | OUTPATIENT
Start: 2021-05-27 | End: 2021-05-27

## 2021-05-27 RX ORDER — SODIUM CHLORIDE 9 MG/ML
INJECTION, SOLUTION INTRAVENOUS
Status: DISCONTINUED | OUTPATIENT
Start: 2021-05-27 | End: 2021-06-02 | Stop reason: HOSPADM

## 2021-05-27 RX ORDER — FUROSEMIDE 10 MG/ML
INJECTION INTRAMUSCULAR; INTRAVENOUS
Status: COMPLETED
Start: 2021-05-27 | End: 2021-05-27

## 2021-05-27 RX ORDER — FUROSEMIDE 10 MG/ML
80 INJECTION INTRAMUSCULAR; INTRAVENOUS ONCE
Status: COMPLETED | OUTPATIENT
Start: 2021-05-27 | End: 2021-05-27

## 2021-05-27 RX ADMIN — ROSUVASTATIN CALCIUM 40 MG: 20 TABLET, FILM COATED ORAL at 08:05

## 2021-05-27 RX ADMIN — AMOXICILLIN AND CLAVULANATE POTASSIUM 1 TABLET: 875; 125 TABLET, FILM COATED ORAL at 08:05

## 2021-05-27 RX ADMIN — Medication 10 ML: at 12:05

## 2021-05-27 RX ADMIN — TICAGRELOR 90 MG: 90 TABLET ORAL at 08:05

## 2021-05-27 RX ADMIN — RANOLAZINE 500 MG: 500 TABLET, FILM COATED, EXTENDED RELEASE ORAL at 08:05

## 2021-05-27 RX ADMIN — Medication 10 ML: at 06:05

## 2021-05-27 RX ADMIN — INSULIN ASPART 2 UNITS: 100 INJECTION, SOLUTION INTRAVENOUS; SUBCUTANEOUS at 06:05

## 2021-05-27 RX ADMIN — TAMSULOSIN HYDROCHLORIDE 0.4 MG: 0.4 CAPSULE ORAL at 06:05

## 2021-05-27 RX ADMIN — FUROSEMIDE 80 MG: 10 INJECTION INTRAMUSCULAR; INTRAVENOUS at 09:05

## 2021-05-27 RX ADMIN — Medication 325 MG: at 08:05

## 2021-05-27 RX ADMIN — DOCUSATE SODIUM 50 MG AND SENNOSIDES 8.6 MG 2 TABLET: 8.6; 5 TABLET, FILM COATED ORAL at 08:05

## 2021-05-27 RX ADMIN — FUROSEMIDE 80 MG: 10 INJECTION, SOLUTION INTRAMUSCULAR; INTRAVENOUS at 06:05

## 2021-05-27 RX ADMIN — POTASSIUM CHLORIDE 60 MEQ: 1500 TABLET, EXTENDED RELEASE ORAL at 06:05

## 2021-05-27 RX ADMIN — FUROSEMIDE 80 MG: 10 INJECTION, SOLUTION INTRAMUSCULAR; INTRAVENOUS at 09:05

## 2021-05-27 RX ADMIN — FUROSEMIDE 80 MG: 10 INJECTION, SOLUTION INTRAMUSCULAR; INTRAVENOUS at 01:05

## 2021-05-27 RX ADMIN — DOBUTAMINE HYDROCHLORIDE 5 MCG/KG/MIN: 400 INJECTION INTRAVENOUS at 08:05

## 2021-05-27 RX ADMIN — POTASSIUM CHLORIDE 10 MEQ: 7.46 INJECTION, SOLUTION INTRAVENOUS at 02:05

## 2021-05-27 RX ADMIN — Medication 0.12 MCG/KG/MIN: at 08:05

## 2021-05-27 RX ADMIN — VANCOMYCIN HYDROCHLORIDE 1250 MG: 1.25 INJECTION, POWDER, LYOPHILIZED, FOR SOLUTION INTRAVENOUS at 01:05

## 2021-05-27 RX ADMIN — MELATONIN TAB 3 MG 6 MG: 3 TAB at 08:05

## 2021-05-27 RX ADMIN — INSULIN ASPART 2 UNITS: 100 INJECTION, SOLUTION INTRAVENOUS; SUBCUTANEOUS at 09:05

## 2021-05-28 PROBLEM — Z51.5 PALLIATIVE CARE ENCOUNTER: Status: ACTIVE | Noted: 2021-05-28

## 2021-05-28 LAB
ALBUMIN SERPL BCP-MCNC: 2.2 G/DL (ref 3.5–5.2)
ALBUMIN SERPL BCP-MCNC: 2.3 G/DL (ref 3.5–5.2)
ALLENS TEST: ABNORMAL
ALLENS TEST: ABNORMAL
ALP SERPL-CCNC: 228 U/L (ref 55–135)
ALP SERPL-CCNC: 257 U/L (ref 55–135)
ALT SERPL W/O P-5'-P-CCNC: 28 U/L (ref 10–44)
ALT SERPL W/O P-5'-P-CCNC: 29 U/L (ref 10–44)
ANION GAP SERPL CALC-SCNC: 12 MMOL/L (ref 8–16)
ANION GAP SERPL CALC-SCNC: 12 MMOL/L (ref 8–16)
AST SERPL-CCNC: 25 U/L (ref 10–40)
AST SERPL-CCNC: 28 U/L (ref 10–40)
BACTERIA BLD CULT: NORMAL
BACTERIA BLD CULT: NORMAL
BASOPHILS # BLD AUTO: 0.01 K/UL (ref 0–0.2)
BASOPHILS NFR BLD: 0.2 % (ref 0–1.9)
BILIRUB SERPL-MCNC: 4.3 MG/DL (ref 0.1–1)
BILIRUB SERPL-MCNC: 4.8 MG/DL (ref 0.1–1)
BUN SERPL-MCNC: 11 MG/DL (ref 8–23)
BUN SERPL-MCNC: 13 MG/DL (ref 8–23)
CALCIUM SERPL-MCNC: 8 MG/DL (ref 8.7–10.5)
CALCIUM SERPL-MCNC: 8.2 MG/DL (ref 8.7–10.5)
CHLORIDE SERPL-SCNC: 95 MMOL/L (ref 95–110)
CHLORIDE SERPL-SCNC: 98 MMOL/L (ref 95–110)
CO2 SERPL-SCNC: 22 MMOL/L (ref 23–29)
CO2 SERPL-SCNC: 23 MMOL/L (ref 23–29)
CREAT SERPL-MCNC: 1.2 MG/DL (ref 0.5–1.4)
CREAT SERPL-MCNC: 1.5 MG/DL (ref 0.5–1.4)
DELSYS: ABNORMAL
DELSYS: ABNORMAL
DIFFERENTIAL METHOD: ABNORMAL
EOSINOPHIL # BLD AUTO: 0 K/UL (ref 0–0.5)
EOSINOPHIL NFR BLD: 0.8 % (ref 0–8)
ERYTHROCYTE [DISTWIDTH] IN BLOOD BY AUTOMATED COUNT: 25.3 % (ref 11.5–14.5)
EST. GFR  (AFRICAN AMERICAN): 55.3 ML/MIN/1.73 M^2
EST. GFR  (AFRICAN AMERICAN): >60 ML/MIN/1.73 M^2
EST. GFR  (NON AFRICAN AMERICAN): 47.8 ML/MIN/1.73 M^2
EST. GFR  (NON AFRICAN AMERICAN): >60 ML/MIN/1.73 M^2
GLUCOSE SERPL-MCNC: 183 MG/DL (ref 70–110)
GLUCOSE SERPL-MCNC: 275 MG/DL (ref 70–110)
HCO3 UR-SCNC: 26.7 MMOL/L (ref 24–28)
HCO3 UR-SCNC: 27.6 MMOL/L (ref 24–28)
HCT VFR BLD AUTO: 31.3 % (ref 40–54)
HGB BLD-MCNC: 10.3 G/DL (ref 14–18)
IMM GRANULOCYTES # BLD AUTO: 0.03 K/UL (ref 0–0.04)
IMM GRANULOCYTES NFR BLD AUTO: 0.6 % (ref 0–0.5)
LYMPHOCYTES # BLD AUTO: 0.5 K/UL (ref 1–4.8)
LYMPHOCYTES NFR BLD: 10.6 % (ref 18–48)
MAGNESIUM SERPL-MCNC: 1.7 MG/DL (ref 1.6–2.6)
MCH RBC QN AUTO: 25.8 PG (ref 27–31)
MCHC RBC AUTO-ENTMCNC: 32.9 G/DL (ref 32–36)
MCV RBC AUTO: 78 FL (ref 82–98)
MONOCYTES # BLD AUTO: 0.3 K/UL (ref 0.3–1)
MONOCYTES NFR BLD: 6.1 % (ref 4–15)
NEUTROPHILS # BLD AUTO: 3.9 K/UL (ref 1.8–7.7)
NEUTROPHILS NFR BLD: 81.7 % (ref 38–73)
NRBC BLD-RTO: 0 /100 WBC
PCO2 BLDA: 35.2 MMHG (ref 35–45)
PCO2 BLDA: 37.6 MMHG (ref 35–45)
PH SMN: 7.46 [PH] (ref 7.35–7.45)
PH SMN: 7.5 [PH] (ref 7.35–7.45)
PLATELET # BLD AUTO: 161 K/UL (ref 150–450)
PMV BLD AUTO: 10.5 FL (ref 9.2–12.9)
PO2 BLDA: 26 MMHG (ref 40–60)
PO2 BLDA: 27 MMHG (ref 40–60)
POC BE: 3 MMOL/L
POC BE: 4 MMOL/L
POC SATURATED O2: 53 % (ref 95–100)
POC SATURATED O2: 56 % (ref 95–100)
POC TCO2: 28 MMOL/L (ref 24–29)
POC TCO2: 29 MMOL/L (ref 24–29)
POCT GLUCOSE: 176 MG/DL (ref 70–110)
POCT GLUCOSE: 210 MG/DL (ref 70–110)
POCT GLUCOSE: 244 MG/DL (ref 70–110)
POCT GLUCOSE: 272 MG/DL (ref 70–110)
POTASSIUM SERPL-SCNC: 3.7 MMOL/L (ref 3.5–5.1)
POTASSIUM SERPL-SCNC: 4 MMOL/L (ref 3.5–5.1)
PROT SERPL-MCNC: 6 G/DL (ref 6–8.4)
PROT SERPL-MCNC: 6.4 G/DL (ref 6–8.4)
RBC # BLD AUTO: 4 M/UL (ref 4.6–6.2)
SAMPLE: ABNORMAL
SAMPLE: ABNORMAL
SITE: ABNORMAL
SITE: ABNORMAL
SODIUM SERPL-SCNC: 129 MMOL/L (ref 136–145)
SODIUM SERPL-SCNC: 133 MMOL/L (ref 136–145)
WBC # BLD AUTO: 4.73 K/UL (ref 3.9–12.7)

## 2021-05-28 PROCEDURE — 99232 SBSQ HOSP IP/OBS MODERATE 35: CPT | Mod: GC,,, | Performed by: INTERNAL MEDICINE

## 2021-05-28 PROCEDURE — 99233 PR SUBSEQUENT HOSPITAL CARE,LEVL III: ICD-10-PCS | Mod: ,,, | Performed by: INTERNAL MEDICINE

## 2021-05-28 PROCEDURE — 99223 1ST HOSP IP/OBS HIGH 75: CPT | Mod: ,,, | Performed by: CLINICAL NURSE SPECIALIST

## 2021-05-28 PROCEDURE — 99900035 HC TECH TIME PER 15 MIN (STAT)

## 2021-05-28 PROCEDURE — 83735 ASSAY OF MAGNESIUM: CPT | Performed by: INTERNAL MEDICINE

## 2021-05-28 PROCEDURE — 25000003 PHARM REV CODE 250: Performed by: HOSPITALIST

## 2021-05-28 PROCEDURE — C1751 CATH, INF, PER/CENT/MIDLINE: HCPCS

## 2021-05-28 PROCEDURE — 99497 ADVNCD CARE PLAN 30 MIN: CPT | Mod: 25,,, | Performed by: CLINICAL NURSE SPECIALIST

## 2021-05-28 PROCEDURE — 63600175 PHARM REV CODE 636 W HCPCS: Performed by: INTERNAL MEDICINE

## 2021-05-28 PROCEDURE — 25000003 PHARM REV CODE 250: Performed by: INTERNAL MEDICINE

## 2021-05-28 PROCEDURE — 99497 PR ADVNCD CARE PLAN 30 MIN: ICD-10-PCS | Mod: 25,,, | Performed by: CLINICAL NURSE SPECIALIST

## 2021-05-28 PROCEDURE — 85025 COMPLETE CBC W/AUTO DIFF WBC: CPT | Performed by: STUDENT IN AN ORGANIZED HEALTH CARE EDUCATION/TRAINING PROGRAM

## 2021-05-28 PROCEDURE — 80053 COMPREHEN METABOLIC PANEL: CPT | Mod: 91 | Performed by: STUDENT IN AN ORGANIZED HEALTH CARE EDUCATION/TRAINING PROGRAM

## 2021-05-28 PROCEDURE — 99233 SBSQ HOSP IP/OBS HIGH 50: CPT | Mod: ,,, | Performed by: INTERNAL MEDICINE

## 2021-05-28 PROCEDURE — 20000000 HC ICU ROOM

## 2021-05-28 PROCEDURE — 99232 PR SUBSEQUENT HOSPITAL CARE,LEVL II: ICD-10-PCS | Mod: GC,,, | Performed by: INTERNAL MEDICINE

## 2021-05-28 PROCEDURE — 99223 PR INITIAL HOSPITAL CARE,LEVL III: ICD-10-PCS | Mod: ,,, | Performed by: CLINICAL NURSE SPECIALIST

## 2021-05-28 RX ORDER — FUROSEMIDE 10 MG/ML
100 INJECTION INTRAMUSCULAR; INTRAVENOUS EVERY 8 HOURS
Status: DISCONTINUED | OUTPATIENT
Start: 2021-05-28 | End: 2021-06-02 | Stop reason: HOSPADM

## 2021-05-28 RX ORDER — BALSAM PERU/CASTOR OIL
OINTMENT (GRAM) TOPICAL 2 TIMES DAILY
Status: DISCONTINUED | OUTPATIENT
Start: 2021-05-28 | End: 2021-06-02 | Stop reason: HOSPADM

## 2021-05-28 RX ORDER — MAGNESIUM SULFATE HEPTAHYDRATE 40 MG/ML
2 INJECTION, SOLUTION INTRAVENOUS ONCE
Status: COMPLETED | OUTPATIENT
Start: 2021-05-28 | End: 2021-05-28

## 2021-05-28 RX ORDER — NOREPINEPHRINE BITARTRATE/D5W 4MG/250ML
0-3 PLASTIC BAG, INJECTION (ML) INTRAVENOUS CONTINUOUS
Status: DISCONTINUED | OUTPATIENT
Start: 2021-05-28 | End: 2021-05-30

## 2021-05-28 RX ORDER — FUROSEMIDE 10 MG/ML
80 INJECTION INTRAMUSCULAR; INTRAVENOUS
Status: DISCONTINUED | OUTPATIENT
Start: 2021-05-28 | End: 2021-05-28

## 2021-05-28 RX ORDER — POTASSIUM CHLORIDE 29.8 MG/ML
40 INJECTION INTRAVENOUS ONCE
Status: COMPLETED | OUTPATIENT
Start: 2021-05-28 | End: 2021-05-29

## 2021-05-28 RX ADMIN — INSULIN ASPART 4 UNITS: 100 INJECTION, SOLUTION INTRAVENOUS; SUBCUTANEOUS at 11:05

## 2021-05-28 RX ADMIN — RANOLAZINE 500 MG: 500 TABLET, FILM COATED, EXTENDED RELEASE ORAL at 09:05

## 2021-05-28 RX ADMIN — Medication: at 09:05

## 2021-05-28 RX ADMIN — APIXABAN 5 MG: 5 TABLET, FILM COATED ORAL at 08:05

## 2021-05-28 RX ADMIN — Medication 325 MG: at 08:05

## 2021-05-28 RX ADMIN — INSULIN ASPART 6 UNITS: 100 INJECTION, SOLUTION INTRAVENOUS; SUBCUTANEOUS at 04:05

## 2021-05-28 RX ADMIN — MAGNESIUM SULFATE 2 G: 2 INJECTION INTRAVENOUS at 06:05

## 2021-05-28 RX ADMIN — Medication 0.02 MCG/KG/MIN: at 10:05

## 2021-05-28 RX ADMIN — ROSUVASTATIN CALCIUM 40 MG: 20 TABLET, FILM COATED ORAL at 09:05

## 2021-05-28 RX ADMIN — FUROSEMIDE 10 MG/HR: 10 INJECTION, SOLUTION INTRAMUSCULAR; INTRAVENOUS at 03:05

## 2021-05-28 RX ADMIN — FUROSEMIDE 80 MG: 10 INJECTION, SOLUTION INTRAMUSCULAR; INTRAVENOUS at 09:05

## 2021-05-28 RX ADMIN — INSULIN ASPART 2 UNITS: 100 INJECTION, SOLUTION INTRAVENOUS; SUBCUTANEOUS at 07:05

## 2021-05-28 RX ADMIN — MELATONIN TAB 3 MG 6 MG: 3 TAB at 09:05

## 2021-05-28 RX ADMIN — DOCUSATE SODIUM 50 MG AND SENNOSIDES 8.6 MG 2 TABLET: 8.6; 5 TABLET, FILM COATED ORAL at 09:05

## 2021-05-28 RX ADMIN — Medication: at 11:05

## 2021-05-28 RX ADMIN — FUROSEMIDE 100 MG: 10 INJECTION, SOLUTION INTRAMUSCULAR; INTRAVENOUS at 09:05

## 2021-05-28 RX ADMIN — APIXABAN 5 MG: 5 TABLET, FILM COATED ORAL at 09:05

## 2021-05-28 RX ADMIN — TICAGRELOR 90 MG: 90 TABLET ORAL at 08:05

## 2021-05-28 RX ADMIN — AMOXICILLIN AND CLAVULANATE POTASSIUM 1 TABLET: 875; 125 TABLET, FILM COATED ORAL at 09:05

## 2021-05-28 RX ADMIN — POTASSIUM CHLORIDE 40 MEQ: 400 INJECTION, SOLUTION INTRAVENOUS at 09:05

## 2021-05-28 RX ADMIN — TAMSULOSIN HYDROCHLORIDE 0.4 MG: 0.4 CAPSULE ORAL at 08:05

## 2021-05-28 RX ADMIN — RANOLAZINE 500 MG: 500 TABLET, FILM COATED, EXTENDED RELEASE ORAL at 08:05

## 2021-05-28 RX ADMIN — AMOXICILLIN AND CLAVULANATE POTASSIUM 1 TABLET: 875; 125 TABLET, FILM COATED ORAL at 08:05

## 2021-05-28 RX ADMIN — INSULIN ASPART 2 UNITS: 100 INJECTION, SOLUTION INTRAVENOUS; SUBCUTANEOUS at 09:05

## 2021-05-28 RX ADMIN — FUROSEMIDE 100 MG: 10 INJECTION, SOLUTION INTRAMUSCULAR; INTRAVENOUS at 03:05

## 2021-05-28 RX ADMIN — TICAGRELOR 90 MG: 90 TABLET ORAL at 09:05

## 2021-05-28 RX ADMIN — DOBUTAMINE HYDROCHLORIDE 7.5 MCG/KG/MIN: 400 INJECTION INTRAVENOUS at 07:05

## 2021-05-29 LAB
ALBUMIN SERPL BCP-MCNC: 2.4 G/DL (ref 3.5–5.2)
ALBUMIN SERPL BCP-MCNC: 2.4 G/DL (ref 3.5–5.2)
ALLENS TEST: ABNORMAL
ALP SERPL-CCNC: 241 U/L (ref 55–135)
ALP SERPL-CCNC: 274 U/L (ref 55–135)
ALT SERPL W/O P-5'-P-CCNC: 28 U/L (ref 10–44)
ALT SERPL W/O P-5'-P-CCNC: 30 U/L (ref 10–44)
ANION GAP SERPL CALC-SCNC: 12 MMOL/L (ref 8–16)
ANION GAP SERPL CALC-SCNC: 12 MMOL/L (ref 8–16)
AST SERPL-CCNC: 25 U/L (ref 10–40)
AST SERPL-CCNC: 33 U/L (ref 10–40)
BASOPHILS # BLD AUTO: 0.01 K/UL (ref 0–0.2)
BASOPHILS NFR BLD: 0.2 % (ref 0–1.9)
BILIRUB SERPL-MCNC: 4.7 MG/DL (ref 0.1–1)
BILIRUB SERPL-MCNC: 4.8 MG/DL (ref 0.1–1)
BUN SERPL-MCNC: 11 MG/DL (ref 8–23)
BUN SERPL-MCNC: 13 MG/DL (ref 8–23)
CALCIUM SERPL-MCNC: 8.3 MG/DL (ref 8.7–10.5)
CALCIUM SERPL-MCNC: 8.4 MG/DL (ref 8.7–10.5)
CHLORIDE SERPL-SCNC: 97 MMOL/L (ref 95–110)
CHLORIDE SERPL-SCNC: 97 MMOL/L (ref 95–110)
CO2 SERPL-SCNC: 21 MMOL/L (ref 23–29)
CO2 SERPL-SCNC: 25 MMOL/L (ref 23–29)
CREAT SERPL-MCNC: 1.2 MG/DL (ref 0.5–1.4)
CREAT SERPL-MCNC: 1.4 MG/DL (ref 0.5–1.4)
DELSYS: ABNORMAL
DELSYS: ABNORMAL
DIFFERENTIAL METHOD: ABNORMAL
EOSINOPHIL # BLD AUTO: 0.1 K/UL (ref 0–0.5)
EOSINOPHIL NFR BLD: 1.8 % (ref 0–8)
ERYTHROCYTE [DISTWIDTH] IN BLOOD BY AUTOMATED COUNT: 25.3 % (ref 11.5–14.5)
EST. GFR  (AFRICAN AMERICAN): >60 ML/MIN/1.73 M^2
EST. GFR  (AFRICAN AMERICAN): >60 ML/MIN/1.73 M^2
EST. GFR  (NON AFRICAN AMERICAN): 52 ML/MIN/1.73 M^2
EST. GFR  (NON AFRICAN AMERICAN): >60 ML/MIN/1.73 M^2
GLUCOSE SERPL-MCNC: 119 MG/DL (ref 70–110)
GLUCOSE SERPL-MCNC: 199 MG/DL (ref 70–110)
HCO3 UR-SCNC: 26 MMOL/L (ref 24–28)
HCO3 UR-SCNC: 27.7 MMOL/L (ref 24–28)
HCO3 UR-SCNC: 28.1 MMOL/L (ref 24–28)
HCT VFR BLD AUTO: 32.3 % (ref 40–54)
HGB BLD-MCNC: 10.5 G/DL (ref 14–18)
IMM GRANULOCYTES # BLD AUTO: 0.03 K/UL (ref 0–0.04)
IMM GRANULOCYTES NFR BLD AUTO: 0.5 % (ref 0–0.5)
LYMPHOCYTES # BLD AUTO: 0.7 K/UL (ref 1–4.8)
LYMPHOCYTES NFR BLD: 11.8 % (ref 18–48)
MAGNESIUM SERPL-MCNC: 2.2 MG/DL (ref 1.6–2.6)
MCH RBC QN AUTO: 25.8 PG (ref 27–31)
MCHC RBC AUTO-ENTMCNC: 32.5 G/DL (ref 32–36)
MCV RBC AUTO: 79 FL (ref 82–98)
MONOCYTES # BLD AUTO: 0.3 K/UL (ref 0.3–1)
MONOCYTES NFR BLD: 5.6 % (ref 4–15)
NEUTROPHILS # BLD AUTO: 4.5 K/UL (ref 1.8–7.7)
NEUTROPHILS NFR BLD: 80.1 % (ref 38–73)
NRBC BLD-RTO: 0 /100 WBC
PCO2 BLDA: 35.5 MMHG (ref 35–45)
PCO2 BLDA: 36.3 MMHG (ref 35–45)
PCO2 BLDA: 36.4 MMHG (ref 35–45)
PH SMN: 7.46 [PH] (ref 7.35–7.45)
PH SMN: 7.49 [PH] (ref 7.35–7.45)
PH SMN: 7.51 [PH] (ref 7.35–7.45)
PLATELET # BLD AUTO: 161 K/UL (ref 150–450)
PMV BLD AUTO: 10.5 FL (ref 9.2–12.9)
PO2 BLDA: 23 MMHG (ref 40–60)
PO2 BLDA: 24 MMHG (ref 40–60)
PO2 BLDA: 29 MMHG (ref 40–60)
POC BE: 2 MMOL/L
POC BE: 4 MMOL/L
POC BE: 5 MMOL/L
POC SATURATED O2: 47 % (ref 95–100)
POC SATURATED O2: 48 % (ref 95–100)
POC SATURATED O2: 63 % (ref 95–100)
POC TCO2: 27 MMOL/L (ref 24–29)
POC TCO2: 29 MMOL/L (ref 24–29)
POC TCO2: 29 MMOL/L (ref 24–29)
POCT GLUCOSE: 118 MG/DL (ref 70–110)
POCT GLUCOSE: 153 MG/DL (ref 70–110)
POCT GLUCOSE: 160 MG/DL (ref 70–110)
POCT GLUCOSE: 187 MG/DL (ref 70–110)
POTASSIUM SERPL-SCNC: 3.4 MMOL/L (ref 3.5–5.1)
POTASSIUM SERPL-SCNC: 4 MMOL/L (ref 3.5–5.1)
PROT SERPL-MCNC: 6.4 G/DL (ref 6–8.4)
PROT SERPL-MCNC: 6.5 G/DL (ref 6–8.4)
RBC # BLD AUTO: 4.07 M/UL (ref 4.6–6.2)
SAMPLE: ABNORMAL
SITE: ABNORMAL
SODIUM SERPL-SCNC: 130 MMOL/L (ref 136–145)
SODIUM SERPL-SCNC: 134 MMOL/L (ref 136–145)
WBC # BLD AUTO: 5.58 K/UL (ref 3.9–12.7)

## 2021-05-29 PROCEDURE — 99900035 HC TECH TIME PER 15 MIN (STAT)

## 2021-05-29 PROCEDURE — 20000000 HC ICU ROOM

## 2021-05-29 PROCEDURE — 25000003 PHARM REV CODE 250: Performed by: INTERNAL MEDICINE

## 2021-05-29 PROCEDURE — 83735 ASSAY OF MAGNESIUM: CPT | Performed by: INTERNAL MEDICINE

## 2021-05-29 PROCEDURE — 63600175 PHARM REV CODE 636 W HCPCS: Performed by: INTERNAL MEDICINE

## 2021-05-29 PROCEDURE — 85025 COMPLETE CBC W/AUTO DIFF WBC: CPT | Performed by: STUDENT IN AN ORGANIZED HEALTH CARE EDUCATION/TRAINING PROGRAM

## 2021-05-29 PROCEDURE — 99233 SBSQ HOSP IP/OBS HIGH 50: CPT | Mod: ,,, | Performed by: INTERNAL MEDICINE

## 2021-05-29 PROCEDURE — 25000003 PHARM REV CODE 250: Performed by: HOSPITALIST

## 2021-05-29 PROCEDURE — 99233 PR SUBSEQUENT HOSPITAL CARE,LEVL III: ICD-10-PCS | Mod: ,,, | Performed by: INTERNAL MEDICINE

## 2021-05-29 PROCEDURE — 82803 BLOOD GASES ANY COMBINATION: CPT

## 2021-05-29 PROCEDURE — 80053 COMPREHEN METABOLIC PANEL: CPT | Mod: 91 | Performed by: INTERNAL MEDICINE

## 2021-05-29 PROCEDURE — 80053 COMPREHEN METABOLIC PANEL: CPT | Performed by: STUDENT IN AN ORGANIZED HEALTH CARE EDUCATION/TRAINING PROGRAM

## 2021-05-29 RX ORDER — MUPIROCIN 20 MG/G
OINTMENT TOPICAL 2 TIMES DAILY
Status: DISCONTINUED | OUTPATIENT
Start: 2021-05-29 | End: 2021-06-02 | Stop reason: HOSPADM

## 2021-05-29 RX ORDER — SODIUM CHLORIDE 9 MG/ML
INJECTION, SOLUTION INTRAVENOUS CONTINUOUS
Status: DISCONTINUED | OUTPATIENT
Start: 2021-05-29 | End: 2021-06-02 | Stop reason: HOSPADM

## 2021-05-29 RX ORDER — POTASSIUM CHLORIDE 29.8 MG/ML
40 INJECTION INTRAVENOUS ONCE
Status: COMPLETED | OUTPATIENT
Start: 2021-05-29 | End: 2021-05-29

## 2021-05-29 RX ADMIN — MUPIROCIN: 20 OINTMENT TOPICAL at 10:05

## 2021-05-29 RX ADMIN — TICAGRELOR 90 MG: 90 TABLET ORAL at 08:05

## 2021-05-29 RX ADMIN — Medication: at 08:05

## 2021-05-29 RX ADMIN — FUROSEMIDE 100 MG: 10 INJECTION, SOLUTION INTRAMUSCULAR; INTRAVENOUS at 03:05

## 2021-05-29 RX ADMIN — POTASSIUM CHLORIDE 40 MEQ: 400 INJECTION, SOLUTION INTRAVENOUS at 10:05

## 2021-05-29 RX ADMIN — APIXABAN 5 MG: 5 TABLET, FILM COATED ORAL at 08:05

## 2021-05-29 RX ADMIN — SODIUM CHLORIDE: 0.9 INJECTION, SOLUTION INTRAVENOUS at 10:05

## 2021-05-29 RX ADMIN — INSULIN ASPART 1 UNITS: 100 INJECTION, SOLUTION INTRAVENOUS; SUBCUTANEOUS at 10:05

## 2021-05-29 RX ADMIN — AMOXICILLIN AND CLAVULANATE POTASSIUM 1 TABLET: 875; 125 TABLET, FILM COATED ORAL at 08:05

## 2021-05-29 RX ADMIN — Medication 325 MG: at 08:05

## 2021-05-29 RX ADMIN — INSULIN ASPART 2 UNITS: 100 INJECTION, SOLUTION INTRAVENOUS; SUBCUTANEOUS at 04:05

## 2021-05-29 RX ADMIN — MUPIROCIN: 20 OINTMENT TOPICAL at 08:05

## 2021-05-29 RX ADMIN — INSULIN ASPART 2 UNITS: 100 INJECTION, SOLUTION INTRAVENOUS; SUBCUTANEOUS at 11:05

## 2021-05-29 RX ADMIN — TAMSULOSIN HYDROCHLORIDE 0.4 MG: 0.4 CAPSULE ORAL at 08:05

## 2021-05-29 RX ADMIN — FUROSEMIDE 100 MG: 10 INJECTION, SOLUTION INTRAMUSCULAR; INTRAVENOUS at 05:05

## 2021-05-29 RX ADMIN — ROSUVASTATIN CALCIUM 40 MG: 20 TABLET, FILM COATED ORAL at 09:05

## 2021-05-29 RX ADMIN — FUROSEMIDE 100 MG: 10 INJECTION, SOLUTION INTRAMUSCULAR; INTRAVENOUS at 10:05

## 2021-05-29 RX ADMIN — RANOLAZINE 500 MG: 500 TABLET, FILM COATED, EXTENDED RELEASE ORAL at 08:05

## 2021-05-30 LAB
ALBUMIN SERPL BCP-MCNC: 2.5 G/DL (ref 3.5–5.2)
ALBUMIN SERPL BCP-MCNC: 2.5 G/DL (ref 3.5–5.2)
ALLENS TEST: ABNORMAL
ALP SERPL-CCNC: 266 U/L (ref 55–135)
ALP SERPL-CCNC: 290 U/L (ref 55–135)
ALT SERPL W/O P-5'-P-CCNC: 30 U/L (ref 10–44)
ALT SERPL W/O P-5'-P-CCNC: 35 U/L (ref 10–44)
ANION GAP SERPL CALC-SCNC: 13 MMOL/L (ref 8–16)
ANION GAP SERPL CALC-SCNC: 15 MMOL/L (ref 8–16)
AST SERPL-CCNC: 29 U/L (ref 10–40)
AST SERPL-CCNC: 39 U/L (ref 10–40)
BASOPHILS # BLD AUTO: 0.02 K/UL (ref 0–0.2)
BASOPHILS NFR BLD: 0.2 % (ref 0–1.9)
BILIRUB SERPL-MCNC: 4.7 MG/DL (ref 0.1–1)
BILIRUB SERPL-MCNC: 4.8 MG/DL (ref 0.1–1)
BUN SERPL-MCNC: 11 MG/DL (ref 8–23)
BUN SERPL-MCNC: 11 MG/DL (ref 8–23)
CALCIUM SERPL-MCNC: 8.3 MG/DL (ref 8.7–10.5)
CALCIUM SERPL-MCNC: 8.4 MG/DL (ref 8.7–10.5)
CHLORIDE SERPL-SCNC: 95 MMOL/L (ref 95–110)
CHLORIDE SERPL-SCNC: 96 MMOL/L (ref 95–110)
CO2 SERPL-SCNC: 18 MMOL/L (ref 23–29)
CO2 SERPL-SCNC: 23 MMOL/L (ref 23–29)
CREAT SERPL-MCNC: 1.2 MG/DL (ref 0.5–1.4)
CREAT SERPL-MCNC: 1.5 MG/DL (ref 0.5–1.4)
DELSYS: ABNORMAL
DIFFERENTIAL METHOD: ABNORMAL
EOSINOPHIL # BLD AUTO: 0.1 K/UL (ref 0–0.5)
EOSINOPHIL NFR BLD: 1 % (ref 0–8)
ERYTHROCYTE [DISTWIDTH] IN BLOOD BY AUTOMATED COUNT: 25.2 % (ref 11.5–14.5)
EST. GFR  (AFRICAN AMERICAN): 55.3 ML/MIN/1.73 M^2
EST. GFR  (AFRICAN AMERICAN): >60 ML/MIN/1.73 M^2
EST. GFR  (NON AFRICAN AMERICAN): 47.8 ML/MIN/1.73 M^2
EST. GFR  (NON AFRICAN AMERICAN): >60 ML/MIN/1.73 M^2
GLUCOSE SERPL-MCNC: 146 MG/DL (ref 70–110)
GLUCOSE SERPL-MCNC: 254 MG/DL (ref 70–110)
HCO3 UR-SCNC: 27.4 MMOL/L (ref 24–28)
HCT VFR BLD AUTO: 32.1 % (ref 40–54)
HGB BLD-MCNC: 10.7 G/DL (ref 14–18)
IMM GRANULOCYTES # BLD AUTO: 0.06 K/UL (ref 0–0.04)
IMM GRANULOCYTES NFR BLD AUTO: 0.5 % (ref 0–0.5)
LACTATE SERPL-SCNC: 1.4 MMOL/L (ref 0.5–2.2)
LYMPHOCYTES # BLD AUTO: 0.5 K/UL (ref 1–4.8)
LYMPHOCYTES NFR BLD: 4.1 % (ref 18–48)
MAGNESIUM SERPL-MCNC: 2 MG/DL (ref 1.6–2.6)
MCH RBC QN AUTO: 25.5 PG (ref 27–31)
MCHC RBC AUTO-ENTMCNC: 33.3 G/DL (ref 32–36)
MCV RBC AUTO: 76 FL (ref 82–98)
MONOCYTES # BLD AUTO: 0.5 K/UL (ref 0.3–1)
MONOCYTES NFR BLD: 3.8 % (ref 4–15)
NEUTROPHILS # BLD AUTO: 11.1 K/UL (ref 1.8–7.7)
NEUTROPHILS NFR BLD: 90.4 % (ref 38–73)
NRBC BLD-RTO: 0 /100 WBC
PCO2 BLDA: 34.8 MMHG (ref 35–45)
PH SMN: 7.5 [PH] (ref 7.35–7.45)
PLATELET # BLD AUTO: 164 K/UL (ref 150–450)
PMV BLD AUTO: 10.3 FL (ref 9.2–12.9)
PO2 BLDA: 29 MMHG (ref 40–60)
POC BE: 4 MMOL/L
POC SATURATED O2: 63 % (ref 95–100)
POC TCO2: 28 MMOL/L (ref 24–29)
POCT GLUCOSE: 131 MG/DL (ref 70–110)
POCT GLUCOSE: 152 MG/DL (ref 70–110)
POCT GLUCOSE: 183 MG/DL (ref 70–110)
POCT GLUCOSE: 215 MG/DL (ref 70–110)
POCT GLUCOSE: 247 MG/DL (ref 70–110)
POTASSIUM SERPL-SCNC: 3.3 MMOL/L (ref 3.5–5.1)
POTASSIUM SERPL-SCNC: 3.5 MMOL/L (ref 3.5–5.1)
POTASSIUM SERPL-SCNC: 4.2 MMOL/L (ref 3.5–5.1)
PROT SERPL-MCNC: 6.6 G/DL (ref 6–8.4)
PROT SERPL-MCNC: 6.9 G/DL (ref 6–8.4)
RBC # BLD AUTO: 4.2 M/UL (ref 4.6–6.2)
SAMPLE: ABNORMAL
SITE: ABNORMAL
SODIUM SERPL-SCNC: 128 MMOL/L (ref 136–145)
SODIUM SERPL-SCNC: 132 MMOL/L (ref 136–145)
WBC # BLD AUTO: 12.23 K/UL (ref 3.9–12.7)

## 2021-05-30 PROCEDURE — 85025 COMPLETE CBC W/AUTO DIFF WBC: CPT | Performed by: STUDENT IN AN ORGANIZED HEALTH CARE EDUCATION/TRAINING PROGRAM

## 2021-05-30 PROCEDURE — 99900035 HC TECH TIME PER 15 MIN (STAT)

## 2021-05-30 PROCEDURE — 63600175 PHARM REV CODE 636 W HCPCS: Performed by: INTERNAL MEDICINE

## 2021-05-30 PROCEDURE — 25000003 PHARM REV CODE 250: Performed by: INTERNAL MEDICINE

## 2021-05-30 PROCEDURE — 84132 ASSAY OF SERUM POTASSIUM: CPT | Performed by: INTERNAL MEDICINE

## 2021-05-30 PROCEDURE — 99233 SBSQ HOSP IP/OBS HIGH 50: CPT | Mod: ,,, | Performed by: INTERNAL MEDICINE

## 2021-05-30 PROCEDURE — 94761 N-INVAS EAR/PLS OXIMETRY MLT: CPT

## 2021-05-30 PROCEDURE — 83605 ASSAY OF LACTIC ACID: CPT | Performed by: INTERNAL MEDICINE

## 2021-05-30 PROCEDURE — 99233 PR SUBSEQUENT HOSPITAL CARE,LEVL III: ICD-10-PCS | Mod: ,,, | Performed by: INTERNAL MEDICINE

## 2021-05-30 PROCEDURE — 25000003 PHARM REV CODE 250: Performed by: HOSPITALIST

## 2021-05-30 PROCEDURE — 80053 COMPREHEN METABOLIC PANEL: CPT | Performed by: INTERNAL MEDICINE

## 2021-05-30 PROCEDURE — 20000000 HC ICU ROOM

## 2021-05-30 PROCEDURE — 83735 ASSAY OF MAGNESIUM: CPT | Performed by: INTERNAL MEDICINE

## 2021-05-30 RX ORDER — POTASSIUM CHLORIDE 20 MEQ/1
40 TABLET, EXTENDED RELEASE ORAL ONCE
Status: COMPLETED | OUTPATIENT
Start: 2021-05-30 | End: 2021-05-30

## 2021-05-30 RX ORDER — POTASSIUM CHLORIDE 29.8 MG/ML
40 INJECTION INTRAVENOUS ONCE
Status: COMPLETED | OUTPATIENT
Start: 2021-05-30 | End: 2021-05-30

## 2021-05-30 RX ORDER — POTASSIUM CHLORIDE 20 MEQ/1
20 TABLET, EXTENDED RELEASE ORAL ONCE
Status: COMPLETED | OUTPATIENT
Start: 2021-05-30 | End: 2021-05-30

## 2021-05-30 RX ORDER — NOREPINEPHRINE BITARTRATE/D5W 4MG/250ML
0-3 PLASTIC BAG, INJECTION (ML) INTRAVENOUS CONTINUOUS
Status: DISCONTINUED | OUTPATIENT
Start: 2021-05-30 | End: 2021-05-31

## 2021-05-30 RX ADMIN — MUPIROCIN: 20 OINTMENT TOPICAL at 08:05

## 2021-05-30 RX ADMIN — FUROSEMIDE 100 MG: 10 INJECTION, SOLUTION INTRAMUSCULAR; INTRAVENOUS at 03:05

## 2021-05-30 RX ADMIN — APIXABAN 5 MG: 5 TABLET, FILM COATED ORAL at 08:05

## 2021-05-30 RX ADMIN — POTASSIUM CHLORIDE 40 MEQ: 1500 TABLET, EXTENDED RELEASE ORAL at 06:05

## 2021-05-30 RX ADMIN — RANOLAZINE 500 MG: 500 TABLET, FILM COATED, EXTENDED RELEASE ORAL at 08:05

## 2021-05-30 RX ADMIN — POTASSIUM CHLORIDE 40 MEQ: 400 INJECTION, SOLUTION INTRAVENOUS at 12:05

## 2021-05-30 RX ADMIN — MELATONIN TAB 3 MG 6 MG: 3 TAB at 08:05

## 2021-05-30 RX ADMIN — INSULIN ASPART 1 UNITS: 100 INJECTION, SOLUTION INTRAVENOUS; SUBCUTANEOUS at 08:05

## 2021-05-30 RX ADMIN — Medication: at 08:05

## 2021-05-30 RX ADMIN — INSULIN ASPART 4 UNITS: 100 INJECTION, SOLUTION INTRAVENOUS; SUBCUTANEOUS at 04:05

## 2021-05-30 RX ADMIN — INSULIN ASPART 2 UNITS: 100 INJECTION, SOLUTION INTRAVENOUS; SUBCUTANEOUS at 12:05

## 2021-05-30 RX ADMIN — HYDROCODONE BITARTRATE AND ACETAMINOPHEN 1 TABLET: 5; 325 TABLET ORAL at 08:05

## 2021-05-30 RX ADMIN — TICAGRELOR 90 MG: 90 TABLET ORAL at 08:05

## 2021-05-30 RX ADMIN — ROSUVASTATIN CALCIUM 40 MG: 20 TABLET, FILM COATED ORAL at 08:05

## 2021-05-30 RX ADMIN — POTASSIUM CHLORIDE 20 MEQ: 1500 TABLET, EXTENDED RELEASE ORAL at 12:05

## 2021-05-30 RX ADMIN — FUROSEMIDE 100 MG: 10 INJECTION, SOLUTION INTRAMUSCULAR; INTRAVENOUS at 06:05

## 2021-05-30 RX ADMIN — DOBUTAMINE HYDROCHLORIDE 7.5 MCG/KG/MIN: 400 INJECTION INTRAVENOUS at 08:05

## 2021-05-30 RX ADMIN — TAMSULOSIN HYDROCHLORIDE 0.4 MG: 0.4 CAPSULE ORAL at 08:05

## 2021-05-30 RX ADMIN — Medication 325 MG: at 08:05

## 2021-05-30 RX ADMIN — FUROSEMIDE 100 MG: 10 INJECTION, SOLUTION INTRAMUSCULAR; INTRAVENOUS at 09:05

## 2021-05-31 PROBLEM — R50.9 FEVER: Status: RESOLVED | Noted: 2021-05-27 | Resolved: 2021-05-31

## 2021-05-31 PROBLEM — R79.1 ELEVATED INR: Status: RESOLVED | Noted: 2021-05-25 | Resolved: 2021-05-31

## 2021-05-31 PROBLEM — E83.39 HYPOPHOSPHATEMIA: Status: RESOLVED | Noted: 2021-05-25 | Resolved: 2021-05-31

## 2021-05-31 PROBLEM — D50.9 IRON DEFICIENCY ANEMIA: Status: RESOLVED | Noted: 2021-05-25 | Resolved: 2021-05-31

## 2021-05-31 PROBLEM — E87.6 HYPOKALEMIA: Status: RESOLVED | Noted: 2021-05-09 | Resolved: 2021-05-31

## 2021-05-31 LAB
ALBUMIN SERPL BCP-MCNC: 2.4 G/DL (ref 3.5–5.2)
ALBUMIN SERPL BCP-MCNC: 2.4 G/DL (ref 3.5–5.2)
ALLENS TEST: ABNORMAL
ALP SERPL-CCNC: 280 U/L (ref 55–135)
ALP SERPL-CCNC: 300 U/L (ref 55–135)
ALT SERPL W/O P-5'-P-CCNC: 36 U/L (ref 10–44)
ALT SERPL W/O P-5'-P-CCNC: 43 U/L (ref 10–44)
ANION GAP SERPL CALC-SCNC: 13 MMOL/L (ref 8–16)
ANION GAP SERPL CALC-SCNC: 9 MMOL/L (ref 8–16)
ANISOCYTOSIS BLD QL SMEAR: SLIGHT
AST SERPL-CCNC: 39 U/L (ref 10–40)
AST SERPL-CCNC: 53 U/L (ref 10–40)
BASOPHILS # BLD AUTO: 0.01 K/UL (ref 0–0.2)
BASOPHILS NFR BLD: 0.1 % (ref 0–1.9)
BILIRUB SERPL-MCNC: 4.5 MG/DL (ref 0.1–1)
BILIRUB SERPL-MCNC: 4.6 MG/DL (ref 0.1–1)
BNP SERPL-MCNC: 4057 PG/ML (ref 0–99)
BUN SERPL-MCNC: 10 MG/DL (ref 8–23)
BUN SERPL-MCNC: 11 MG/DL (ref 8–23)
BURR CELLS BLD QL SMEAR: ABNORMAL
CALCIUM SERPL-MCNC: 8.6 MG/DL (ref 8.7–10.5)
CALCIUM SERPL-MCNC: 8.6 MG/DL (ref 8.7–10.5)
CHLORIDE SERPL-SCNC: 95 MMOL/L (ref 95–110)
CHLORIDE SERPL-SCNC: 95 MMOL/L (ref 95–110)
CO2 SERPL-SCNC: 21 MMOL/L (ref 23–29)
CO2 SERPL-SCNC: 25 MMOL/L (ref 23–29)
CREAT SERPL-MCNC: 1.3 MG/DL (ref 0.5–1.4)
CREAT SERPL-MCNC: 1.4 MG/DL (ref 0.5–1.4)
DELSYS: ABNORMAL
DIFFERENTIAL METHOD: ABNORMAL
EOSINOPHIL # BLD AUTO: 0.1 K/UL (ref 0–0.5)
EOSINOPHIL NFR BLD: 1.2 % (ref 0–8)
ERYTHROCYTE [DISTWIDTH] IN BLOOD BY AUTOMATED COUNT: 25.1 % (ref 11.5–14.5)
EST. GFR  (AFRICAN AMERICAN): >60 ML/MIN/1.73 M^2
EST. GFR  (AFRICAN AMERICAN): >60 ML/MIN/1.73 M^2
EST. GFR  (NON AFRICAN AMERICAN): 52 ML/MIN/1.73 M^2
EST. GFR  (NON AFRICAN AMERICAN): 56.9 ML/MIN/1.73 M^2
GLUCOSE SERPL-MCNC: 110 MG/DL (ref 70–110)
GLUCOSE SERPL-MCNC: 188 MG/DL (ref 70–110)
HCO3 UR-SCNC: 26.4 MMOL/L (ref 24–28)
HCT VFR BLD AUTO: 32.6 % (ref 40–54)
HGB BLD-MCNC: 11 G/DL (ref 14–18)
HYPOCHROMIA BLD QL SMEAR: ABNORMAL
IMM GRANULOCYTES # BLD AUTO: 0.05 K/UL (ref 0–0.04)
IMM GRANULOCYTES NFR BLD AUTO: 0.5 % (ref 0–0.5)
LYMPHOCYTES # BLD AUTO: 0.7 K/UL (ref 1–4.8)
LYMPHOCYTES NFR BLD: 6.7 % (ref 18–48)
MAGNESIUM SERPL-MCNC: 1.9 MG/DL (ref 1.6–2.6)
MCH RBC QN AUTO: 26.5 PG (ref 27–31)
MCHC RBC AUTO-ENTMCNC: 33.7 G/DL (ref 32–36)
MCV RBC AUTO: 79 FL (ref 82–98)
MONOCYTES # BLD AUTO: 0.4 K/UL (ref 0.3–1)
MONOCYTES NFR BLD: 3.6 % (ref 4–15)
NEUTROPHILS # BLD AUTO: 9.7 K/UL (ref 1.8–7.7)
NEUTROPHILS NFR BLD: 87.9 % (ref 38–73)
NRBC BLD-RTO: 0 /100 WBC
PCO2 BLDA: 37.6 MMHG (ref 35–45)
PH SMN: 7.45 [PH] (ref 7.35–7.45)
PLATELET # BLD AUTO: 164 K/UL (ref 150–450)
PLATELET BLD QL SMEAR: ABNORMAL
PMV BLD AUTO: 10 FL (ref 9.2–12.9)
PO2 BLDA: 29 MMHG (ref 40–60)
POC BE: 2 MMOL/L
POC SATURATED O2: 60 % (ref 95–100)
POC TCO2: 28 MMOL/L (ref 24–29)
POCT GLUCOSE: 122 MG/DL (ref 70–110)
POCT GLUCOSE: 209 MG/DL (ref 70–110)
POIKILOCYTOSIS BLD QL SMEAR: SLIGHT
POTASSIUM SERPL-SCNC: 3.4 MMOL/L (ref 3.5–5.1)
POTASSIUM SERPL-SCNC: 4.1 MMOL/L (ref 3.5–5.1)
PROT SERPL-MCNC: 6.8 G/DL (ref 6–8.4)
PROT SERPL-MCNC: 7 G/DL (ref 6–8.4)
RBC # BLD AUTO: 4.15 M/UL (ref 4.6–6.2)
SAMPLE: ABNORMAL
SITE: ABNORMAL
SODIUM SERPL-SCNC: 129 MMOL/L (ref 136–145)
SODIUM SERPL-SCNC: 129 MMOL/L (ref 136–145)
T4 FREE SERPL-MCNC: 0.84 NG/DL (ref 0.71–1.51)
TARGETS BLD QL SMEAR: ABNORMAL
TSH SERPL DL<=0.005 MIU/L-ACNC: 3.24 UIU/ML (ref 0.4–4)
WBC # BLD AUTO: 11.02 K/UL (ref 3.9–12.7)

## 2021-05-31 PROCEDURE — 85025 COMPLETE CBC W/AUTO DIFF WBC: CPT | Performed by: STUDENT IN AN ORGANIZED HEALTH CARE EDUCATION/TRAINING PROGRAM

## 2021-05-31 PROCEDURE — 25000003 PHARM REV CODE 250: Performed by: INTERNAL MEDICINE

## 2021-05-31 PROCEDURE — 80053 COMPREHEN METABOLIC PANEL: CPT | Mod: 91 | Performed by: INTERNAL MEDICINE

## 2021-05-31 PROCEDURE — 99291 PR CRITICAL CARE, E/M 30-74 MINUTES: ICD-10-PCS | Mod: ,,, | Performed by: NURSE PRACTITIONER

## 2021-05-31 PROCEDURE — 84439 ASSAY OF FREE THYROXINE: CPT | Performed by: NURSE PRACTITIONER

## 2021-05-31 PROCEDURE — 99900035 HC TECH TIME PER 15 MIN (STAT)

## 2021-05-31 PROCEDURE — 20000000 HC ICU ROOM

## 2021-05-31 PROCEDURE — 83880 ASSAY OF NATRIURETIC PEPTIDE: CPT | Performed by: NURSE PRACTITIONER

## 2021-05-31 PROCEDURE — 25000003 PHARM REV CODE 250: Performed by: HOSPITALIST

## 2021-05-31 PROCEDURE — 63600175 PHARM REV CODE 636 W HCPCS: Performed by: INTERNAL MEDICINE

## 2021-05-31 PROCEDURE — 83735 ASSAY OF MAGNESIUM: CPT | Performed by: INTERNAL MEDICINE

## 2021-05-31 PROCEDURE — 25000003 PHARM REV CODE 250: Performed by: STUDENT IN AN ORGANIZED HEALTH CARE EDUCATION/TRAINING PROGRAM

## 2021-05-31 PROCEDURE — 63600175 PHARM REV CODE 636 W HCPCS: Performed by: NURSE PRACTITIONER

## 2021-05-31 PROCEDURE — 84443 ASSAY THYROID STIM HORMONE: CPT | Performed by: NURSE PRACTITIONER

## 2021-05-31 PROCEDURE — 25000003 PHARM REV CODE 250: Performed by: NURSE PRACTITIONER

## 2021-05-31 PROCEDURE — 99291 CRITICAL CARE FIRST HOUR: CPT | Mod: ,,, | Performed by: NURSE PRACTITIONER

## 2021-05-31 RX ORDER — DOBUTAMINE HYDROCHLORIDE 400 MG/100ML
7.5 INJECTION INTRAVENOUS CONTINUOUS
Status: DISCONTINUED | OUTPATIENT
Start: 2021-05-31 | End: 2021-06-02 | Stop reason: HOSPADM

## 2021-05-31 RX ORDER — DOPAMINE HCL IN DEXTROSE 5 % 400MG/.25L
3 INFUSION BOTTLE (ML) INTRAVENOUS CONTINUOUS
Status: DISCONTINUED | OUTPATIENT
Start: 2021-05-31 | End: 2021-05-31

## 2021-05-31 RX ORDER — POTASSIUM CHLORIDE 20 MEQ/1
40 TABLET, EXTENDED RELEASE ORAL ONCE
Status: COMPLETED | OUTPATIENT
Start: 2021-05-31 | End: 2021-05-31

## 2021-05-31 RX ADMIN — FUROSEMIDE 100 MG: 10 INJECTION, SOLUTION INTRAMUSCULAR; INTRAVENOUS at 09:05

## 2021-05-31 RX ADMIN — ROSUVASTATIN CALCIUM 40 MG: 20 TABLET, FILM COATED ORAL at 09:05

## 2021-05-31 RX ADMIN — VASOPRESSIN 0.04 UNITS/MIN: 20 INJECTION INTRAVENOUS at 09:05

## 2021-05-31 RX ADMIN — Medication 0.06 MCG/KG/MIN: at 05:05

## 2021-05-31 RX ADMIN — FUROSEMIDE 100 MG: 10 INJECTION, SOLUTION INTRAMUSCULAR; INTRAVENOUS at 02:05

## 2021-05-31 RX ADMIN — NOREPINEPHRINE BITARTRATE 0.06 MCG/KG/MIN: 1 INJECTION, SOLUTION, CONCENTRATE INTRAVENOUS at 01:05

## 2021-05-31 RX ADMIN — FUROSEMIDE 100 MG: 10 INJECTION, SOLUTION INTRAMUSCULAR; INTRAVENOUS at 06:05

## 2021-05-31 RX ADMIN — Medication 325 MG: at 09:05

## 2021-05-31 RX ADMIN — INSULIN ASPART 4 UNITS: 100 INJECTION, SOLUTION INTRAVENOUS; SUBCUTANEOUS at 12:05

## 2021-05-31 RX ADMIN — POTASSIUM CHLORIDE 40 MEQ: 1500 TABLET, EXTENDED RELEASE ORAL at 05:05

## 2021-05-31 RX ADMIN — DOCUSATE SODIUM 50 MG AND SENNOSIDES 8.6 MG 2 TABLET: 8.6; 5 TABLET, FILM COATED ORAL at 09:05

## 2021-05-31 RX ADMIN — RANOLAZINE 500 MG: 500 TABLET, FILM COATED, EXTENDED RELEASE ORAL at 09:05

## 2021-05-31 RX ADMIN — TAMSULOSIN HYDROCHLORIDE 0.4 MG: 0.4 CAPSULE ORAL at 09:05

## 2021-05-31 RX ADMIN — Medication: at 09:05

## 2021-05-31 RX ADMIN — DOBUTAMINE HYDROCHLORIDE 5 MCG/KG/MIN: 400 INJECTION INTRAVENOUS at 10:05

## 2021-05-31 RX ADMIN — DOPAMINE HYDROCHLORIDE IN DEXTROSE 3 MCG/KG/MIN: 1.6 INJECTION, SOLUTION INTRAVENOUS at 10:05

## 2021-05-31 RX ADMIN — TICAGRELOR 90 MG: 90 TABLET ORAL at 09:05

## 2021-05-31 RX ADMIN — INSULIN ASPART 2 UNITS: 100 INJECTION, SOLUTION INTRAVENOUS; SUBCUTANEOUS at 07:05

## 2021-05-31 RX ADMIN — MUPIROCIN: 20 OINTMENT TOPICAL at 09:05

## 2021-05-31 RX ADMIN — VASOPRESSIN 0.04 UNITS/MIN: 20 INJECTION INTRAVENOUS at 03:05

## 2021-05-31 RX ADMIN — APIXABAN 5 MG: 5 TABLET, FILM COATED ORAL at 09:05

## 2021-06-01 LAB
ALBUMIN SERPL BCP-MCNC: 2.5 G/DL (ref 3.5–5.2)
ALBUMIN SERPL BCP-MCNC: 2.5 G/DL (ref 3.5–5.2)
ALLENS TEST: ABNORMAL
ALP SERPL-CCNC: 280 U/L (ref 55–135)
ALP SERPL-CCNC: 298 U/L (ref 55–135)
ALT SERPL W/O P-5'-P-CCNC: 48 U/L (ref 10–44)
ALT SERPL W/O P-5'-P-CCNC: 54 U/L (ref 10–44)
ANION GAP SERPL CALC-SCNC: 15 MMOL/L (ref 8–16)
ANION GAP SERPL CALC-SCNC: 16 MMOL/L (ref 8–16)
AST SERPL-CCNC: 48 U/L (ref 10–40)
AST SERPL-CCNC: 67 U/L (ref 10–40)
BASOPHILS # BLD AUTO: 0.01 K/UL (ref 0–0.2)
BASOPHILS NFR BLD: 0.1 % (ref 0–1.9)
BILIRUB SERPL-MCNC: 5.2 MG/DL (ref 0.1–1)
BILIRUB SERPL-MCNC: 5.3 MG/DL (ref 0.1–1)
BUN SERPL-MCNC: 13 MG/DL (ref 8–23)
BUN SERPL-MCNC: 16 MG/DL (ref 8–23)
CALCIUM SERPL-MCNC: 8.8 MG/DL (ref 8.7–10.5)
CALCIUM SERPL-MCNC: 9 MG/DL (ref 8.7–10.5)
CHLORIDE SERPL-SCNC: 90 MMOL/L (ref 95–110)
CHLORIDE SERPL-SCNC: 93 MMOL/L (ref 95–110)
CO2 SERPL-SCNC: 19 MMOL/L (ref 23–29)
CO2 SERPL-SCNC: 22 MMOL/L (ref 23–29)
CREAT SERPL-MCNC: 1.4 MG/DL (ref 0.5–1.4)
CREAT SERPL-MCNC: 1.7 MG/DL (ref 0.5–1.4)
DELSYS: ABNORMAL
DIFFERENTIAL METHOD: ABNORMAL
EOSINOPHIL # BLD AUTO: 0 K/UL (ref 0–0.5)
EOSINOPHIL NFR BLD: 0.4 % (ref 0–8)
ERYTHROCYTE [DISTWIDTH] IN BLOOD BY AUTOMATED COUNT: 24.4 % (ref 11.5–14.5)
EST. GFR  (AFRICAN AMERICAN): 47.5 ML/MIN/1.73 M^2
EST. GFR  (AFRICAN AMERICAN): >60 ML/MIN/1.73 M^2
EST. GFR  (NON AFRICAN AMERICAN): 41.1 ML/MIN/1.73 M^2
EST. GFR  (NON AFRICAN AMERICAN): 52 ML/MIN/1.73 M^2
FIO2: 21
GLUCOSE SERPL-MCNC: 165 MG/DL (ref 70–110)
GLUCOSE SERPL-MCNC: 333 MG/DL (ref 70–110)
HCO3 UR-SCNC: 25 MMOL/L (ref 24–28)
HCT VFR BLD AUTO: 33.5 % (ref 40–54)
HGB BLD-MCNC: 10.9 G/DL (ref 14–18)
IMM GRANULOCYTES # BLD AUTO: 0.04 K/UL (ref 0–0.04)
IMM GRANULOCYTES NFR BLD AUTO: 0.4 % (ref 0–0.5)
LYMPHOCYTES # BLD AUTO: 0.7 K/UL (ref 1–4.8)
LYMPHOCYTES NFR BLD: 6 % (ref 18–48)
MCH RBC QN AUTO: 25.5 PG (ref 27–31)
MCHC RBC AUTO-ENTMCNC: 32.5 G/DL (ref 32–36)
MCV RBC AUTO: 79 FL (ref 82–98)
MODE: ABNORMAL
MONOCYTES # BLD AUTO: 0.4 K/UL (ref 0.3–1)
MONOCYTES NFR BLD: 4.1 % (ref 4–15)
NEUTROPHILS # BLD AUTO: 9.7 K/UL (ref 1.8–7.7)
NEUTROPHILS NFR BLD: 89 % (ref 38–73)
NRBC BLD-RTO: 0 /100 WBC
PCO2 BLDA: 34.4 MMHG (ref 35–45)
PH SMN: 7.47 [PH] (ref 7.35–7.45)
PLATELET # BLD AUTO: 180 K/UL (ref 150–450)
PMV BLD AUTO: 10 FL (ref 9.2–12.9)
PO2 BLDA: 31 MMHG (ref 40–60)
POC BE: 1 MMOL/L
POC SATURATED O2: 64 % (ref 95–100)
POC TCO2: 26 MMOL/L (ref 24–29)
POCT GLUCOSE: 205 MG/DL (ref 70–110)
POCT GLUCOSE: 319 MG/DL (ref 70–110)
POTASSIUM SERPL-SCNC: 3.9 MMOL/L (ref 3.5–5.1)
POTASSIUM SERPL-SCNC: 4 MMOL/L (ref 3.5–5.1)
PROT SERPL-MCNC: 6.9 G/DL (ref 6–8.4)
PROT SERPL-MCNC: 7.1 G/DL (ref 6–8.4)
RBC # BLD AUTO: 4.27 M/UL (ref 4.6–6.2)
SAMPLE: ABNORMAL
SITE: ABNORMAL
SODIUM SERPL-SCNC: 127 MMOL/L (ref 136–145)
SODIUM SERPL-SCNC: 128 MMOL/L (ref 136–145)
SP02: 99
WBC # BLD AUTO: 10.83 K/UL (ref 3.9–12.7)

## 2021-06-01 PROCEDURE — 25000003 PHARM REV CODE 250: Performed by: INTERNAL MEDICINE

## 2021-06-01 PROCEDURE — 63600175 PHARM REV CODE 636 W HCPCS: Performed by: NURSE PRACTITIONER

## 2021-06-01 PROCEDURE — 80053 COMPREHEN METABOLIC PANEL: CPT | Mod: 91 | Performed by: INTERNAL MEDICINE

## 2021-06-01 PROCEDURE — 20000000 HC ICU ROOM

## 2021-06-01 PROCEDURE — 63600175 PHARM REV CODE 636 W HCPCS: Performed by: INTERNAL MEDICINE

## 2021-06-01 PROCEDURE — 99291 CRITICAL CARE FIRST HOUR: CPT | Mod: ,,, | Performed by: NURSE PRACTITIONER

## 2021-06-01 PROCEDURE — 94761 N-INVAS EAR/PLS OXIMETRY MLT: CPT

## 2021-06-01 PROCEDURE — 82803 BLOOD GASES ANY COMBINATION: CPT

## 2021-06-01 PROCEDURE — 25000003 PHARM REV CODE 250: Performed by: NURSE PRACTITIONER

## 2021-06-01 PROCEDURE — 99291 PR CRITICAL CARE, E/M 30-74 MINUTES: ICD-10-PCS | Mod: ,,, | Performed by: NURSE PRACTITIONER

## 2021-06-01 PROCEDURE — 25000003 PHARM REV CODE 250: Performed by: STUDENT IN AN ORGANIZED HEALTH CARE EDUCATION/TRAINING PROGRAM

## 2021-06-01 PROCEDURE — 25000003 PHARM REV CODE 250: Performed by: HOSPITALIST

## 2021-06-01 PROCEDURE — 99900035 HC TECH TIME PER 15 MIN (STAT)

## 2021-06-01 PROCEDURE — 85025 COMPLETE CBC W/AUTO DIFF WBC: CPT | Performed by: STUDENT IN AN ORGANIZED HEALTH CARE EDUCATION/TRAINING PROGRAM

## 2021-06-01 RX ADMIN — FUROSEMIDE 100 MG: 10 INJECTION, SOLUTION INTRAMUSCULAR; INTRAVENOUS at 10:06

## 2021-06-01 RX ADMIN — ROSUVASTATIN CALCIUM 40 MG: 20 TABLET, FILM COATED ORAL at 09:06

## 2021-06-01 RX ADMIN — APIXABAN 5 MG: 5 TABLET, FILM COATED ORAL at 08:06

## 2021-06-01 RX ADMIN — POTASSIUM BICARBONATE 20 MEQ: 391 TABLET, EFFERVESCENT ORAL at 05:06

## 2021-06-01 RX ADMIN — TICAGRELOR 90 MG: 90 TABLET ORAL at 08:06

## 2021-06-01 RX ADMIN — MUPIROCIN: 20 OINTMENT TOPICAL at 08:06

## 2021-06-01 RX ADMIN — INSULIN ASPART 8 UNITS: 100 INJECTION, SOLUTION INTRAVENOUS; SUBCUTANEOUS at 05:06

## 2021-06-01 RX ADMIN — TAMSULOSIN HYDROCHLORIDE 0.4 MG: 0.4 CAPSULE ORAL at 08:06

## 2021-06-01 RX ADMIN — Medication: at 08:06

## 2021-06-01 RX ADMIN — VASOPRESSIN 0.04 UNITS/MIN: 20 INJECTION INTRAVENOUS at 04:06

## 2021-06-01 RX ADMIN — FUROSEMIDE 100 MG: 10 INJECTION, SOLUTION INTRAMUSCULAR; INTRAVENOUS at 05:06

## 2021-06-01 RX ADMIN — FUROSEMIDE 100 MG: 10 INJECTION, SOLUTION INTRAMUSCULAR; INTRAVENOUS at 02:06

## 2021-06-01 RX ADMIN — VASOPRESSIN 0.04 UNITS/MIN: 20 INJECTION INTRAVENOUS at 06:06

## 2021-06-01 RX ADMIN — Medication: at 05:06

## 2021-06-01 RX ADMIN — DOBUTAMINE HYDROCHLORIDE 7.5 MCG/KG/MIN: 400 INJECTION INTRAVENOUS at 10:06

## 2021-06-01 RX ADMIN — RANOLAZINE 500 MG: 500 TABLET, FILM COATED, EXTENDED RELEASE ORAL at 08:06

## 2021-06-01 RX ADMIN — INSULIN ASPART 4 UNITS: 100 INJECTION, SOLUTION INTRAVENOUS; SUBCUTANEOUS at 08:06

## 2021-06-01 RX ADMIN — INSULIN ASPART 2 UNITS: 100 INJECTION, SOLUTION INTRAVENOUS; SUBCUTANEOUS at 08:06

## 2021-06-01 RX ADMIN — DOCUSATE SODIUM 50 MG AND SENNOSIDES 8.6 MG 2 TABLET: 8.6; 5 TABLET, FILM COATED ORAL at 08:06

## 2021-06-02 VITALS
DIASTOLIC BLOOD PRESSURE: 60 MMHG | TEMPERATURE: 97 F | HEART RATE: 89 BPM | WEIGHT: 124 LBS | OXYGEN SATURATION: 99 % | HEIGHT: 65 IN | RESPIRATION RATE: 39 BRPM | BODY MASS INDEX: 20.66 KG/M2 | SYSTOLIC BLOOD PRESSURE: 79 MMHG

## 2021-06-02 DIAGNOSIS — I10 ESSENTIAL HYPERTENSION: ICD-10-CM

## 2021-06-02 LAB
ALBUMIN SERPL BCP-MCNC: 2.4 G/DL (ref 3.5–5.2)
ALBUMIN SERPL BCP-MCNC: 2.6 G/DL (ref 3.5–5.2)
ALP SERPL-CCNC: 267 U/L (ref 55–135)
ALP SERPL-CCNC: 282 U/L (ref 55–135)
ALT SERPL W/O P-5'-P-CCNC: 42 U/L (ref 10–44)
ALT SERPL W/O P-5'-P-CCNC: 43 U/L (ref 10–44)
ANION GAP SERPL CALC-SCNC: 12 MMOL/L (ref 8–16)
ANION GAP SERPL CALC-SCNC: 13 MMOL/L (ref 8–16)
AST SERPL-CCNC: 35 U/L (ref 10–40)
AST SERPL-CCNC: 41 U/L (ref 10–40)
BASOPHILS # BLD AUTO: 0.02 K/UL (ref 0–0.2)
BASOPHILS NFR BLD: 0.3 % (ref 0–1.9)
BILIRUB SERPL-MCNC: 4.4 MG/DL (ref 0.1–1)
BILIRUB SERPL-MCNC: 4.5 MG/DL (ref 0.1–1)
BUN SERPL-MCNC: 16 MG/DL (ref 8–23)
BUN SERPL-MCNC: 16 MG/DL (ref 8–23)
CALCIUM SERPL-MCNC: 8.6 MG/DL (ref 8.7–10.5)
CALCIUM SERPL-MCNC: 9.2 MG/DL (ref 8.7–10.5)
CHLORIDE SERPL-SCNC: 90 MMOL/L (ref 95–110)
CHLORIDE SERPL-SCNC: 90 MMOL/L (ref 95–110)
CO2 SERPL-SCNC: 22 MMOL/L (ref 23–29)
CO2 SERPL-SCNC: 24 MMOL/L (ref 23–29)
CREAT SERPL-MCNC: 1.5 MG/DL (ref 0.5–1.4)
CREAT SERPL-MCNC: 1.5 MG/DL (ref 0.5–1.4)
DIFFERENTIAL METHOD: ABNORMAL
EOSINOPHIL # BLD AUTO: 0.2 K/UL (ref 0–0.5)
EOSINOPHIL NFR BLD: 1.9 % (ref 0–8)
ERYTHROCYTE [DISTWIDTH] IN BLOOD BY AUTOMATED COUNT: 24.2 % (ref 11.5–14.5)
EST. GFR  (AFRICAN AMERICAN): 55.3 ML/MIN/1.73 M^2
EST. GFR  (AFRICAN AMERICAN): 55.3 ML/MIN/1.73 M^2
EST. GFR  (NON AFRICAN AMERICAN): 47.8 ML/MIN/1.73 M^2
EST. GFR  (NON AFRICAN AMERICAN): 47.8 ML/MIN/1.73 M^2
GLUCOSE SERPL-MCNC: 180 MG/DL (ref 70–110)
GLUCOSE SERPL-MCNC: 205 MG/DL (ref 70–110)
HCT VFR BLD AUTO: 30.7 % (ref 40–54)
HGB BLD-MCNC: 10.3 G/DL (ref 14–18)
IMM GRANULOCYTES # BLD AUTO: 0.03 K/UL (ref 0–0.04)
IMM GRANULOCYTES NFR BLD AUTO: 0.4 % (ref 0–0.5)
LYMPHOCYTES # BLD AUTO: 0.7 K/UL (ref 1–4.8)
LYMPHOCYTES NFR BLD: 8.6 % (ref 18–48)
MAGNESIUM SERPL-MCNC: 2 MG/DL (ref 1.6–2.6)
MCH RBC QN AUTO: 25.9 PG (ref 27–31)
MCHC RBC AUTO-ENTMCNC: 33.6 G/DL (ref 32–36)
MCV RBC AUTO: 77 FL (ref 82–98)
MONOCYTES # BLD AUTO: 0.4 K/UL (ref 0.3–1)
MONOCYTES NFR BLD: 5.5 % (ref 4–15)
NEUTROPHILS # BLD AUTO: 6.5 K/UL (ref 1.8–7.7)
NEUTROPHILS NFR BLD: 83.3 % (ref 38–73)
NRBC BLD-RTO: 0 /100 WBC
PLATELET # BLD AUTO: 175 K/UL (ref 150–450)
PMV BLD AUTO: 10.5 FL (ref 9.2–12.9)
POCT GLUCOSE: 200 MG/DL (ref 70–110)
POCT GLUCOSE: 202 MG/DL (ref 70–110)
POCT GLUCOSE: 254 MG/DL (ref 70–110)
POCT GLUCOSE: 285 MG/DL (ref 70–110)
POTASSIUM SERPL-SCNC: 3.2 MMOL/L (ref 3.5–5.1)
POTASSIUM SERPL-SCNC: 3.5 MMOL/L (ref 3.5–5.1)
POTASSIUM SERPL-SCNC: 4.8 MMOL/L (ref 3.5–5.1)
PROT SERPL-MCNC: 6.9 G/DL (ref 6–8.4)
PROT SERPL-MCNC: 7 G/DL (ref 6–8.4)
RBC # BLD AUTO: 3.97 M/UL (ref 4.6–6.2)
SODIUM SERPL-SCNC: 125 MMOL/L (ref 136–145)
SODIUM SERPL-SCNC: 126 MMOL/L (ref 136–145)
SODIUM SERPL-SCNC: 126 MMOL/L (ref 136–145)
SODIUM SERPL-SCNC: 127 MMOL/L (ref 136–145)
WBC # BLD AUTO: 7.77 K/UL (ref 3.9–12.7)

## 2021-06-02 PROCEDURE — 99497 PR ADVNCD CARE PLAN 30 MIN: ICD-10-PCS | Mod: ,,, | Performed by: CLINICAL NURSE SPECIALIST

## 2021-06-02 PROCEDURE — 25000003 PHARM REV CODE 250: Performed by: NURSE PRACTITIONER

## 2021-06-02 PROCEDURE — C1751 CATH, INF, PER/CENT/MIDLINE: HCPCS

## 2021-06-02 PROCEDURE — 25000003 PHARM REV CODE 250: Performed by: STUDENT IN AN ORGANIZED HEALTH CARE EDUCATION/TRAINING PROGRAM

## 2021-06-02 PROCEDURE — 99497 ADVNCD CARE PLAN 30 MIN: CPT | Mod: ,,, | Performed by: CLINICAL NURSE SPECIALIST

## 2021-06-02 PROCEDURE — 76937 US GUIDE VASCULAR ACCESS: CPT

## 2021-06-02 PROCEDURE — 85025 COMPLETE CBC W/AUTO DIFF WBC: CPT | Performed by: STUDENT IN AN ORGANIZED HEALTH CARE EDUCATION/TRAINING PROGRAM

## 2021-06-02 PROCEDURE — 99233 PR SUBSEQUENT HOSPITAL CARE,LEVL III: ICD-10-PCS | Mod: ,,, | Performed by: CLINICAL NURSE SPECIALIST

## 2021-06-02 PROCEDURE — 99233 SBSQ HOSP IP/OBS HIGH 50: CPT | Mod: ,,, | Performed by: CLINICAL NURSE SPECIALIST

## 2021-06-02 PROCEDURE — 84132 ASSAY OF SERUM POTASSIUM: CPT | Performed by: INTERNAL MEDICINE

## 2021-06-02 PROCEDURE — 80053 COMPREHEN METABOLIC PANEL: CPT | Performed by: INTERNAL MEDICINE

## 2021-06-02 PROCEDURE — 25000003 PHARM REV CODE 250: Performed by: HOSPITALIST

## 2021-06-02 PROCEDURE — 63600175 PHARM REV CODE 636 W HCPCS: Performed by: INTERNAL MEDICINE

## 2021-06-02 PROCEDURE — 25000003 PHARM REV CODE 250: Performed by: INTERNAL MEDICINE

## 2021-06-02 PROCEDURE — 84295 ASSAY OF SERUM SODIUM: CPT | Mod: 91 | Performed by: INTERNAL MEDICINE

## 2021-06-02 PROCEDURE — 83735 ASSAY OF MAGNESIUM: CPT | Performed by: INTERNAL MEDICINE

## 2021-06-02 PROCEDURE — 63600175 PHARM REV CODE 636 W HCPCS: Performed by: NURSE PRACTITIONER

## 2021-06-02 PROCEDURE — 36573 INSJ PICC RS&I 5 YR+: CPT

## 2021-06-02 PROCEDURE — 84295 ASSAY OF SERUM SODIUM: CPT | Performed by: NURSE PRACTITIONER

## 2021-06-02 RX ORDER — INSULIN ASPART 100 [IU]/ML
1-10 INJECTION, SOLUTION INTRAVENOUS; SUBCUTANEOUS
Qty: 4 BOX | Refills: 3 | Status: SHIPPED | OUTPATIENT
Start: 2021-06-02 | End: 2022-06-02

## 2021-06-02 RX ORDER — AMOXICILLIN 250 MG
2 CAPSULE ORAL 2 TIMES DAILY
Qty: 120 TABLET | Refills: 3 | Status: SHIPPED | OUTPATIENT
Start: 2021-06-02

## 2021-06-02 RX ORDER — DOBUTAMINE HYDROCHLORIDE 400 MG/100ML
7.5 INJECTION INTRAVENOUS CONTINUOUS
Start: 2021-06-02

## 2021-06-02 RX ORDER — TOLVAPTAN 15 MG/1
15 TABLET ORAL ONCE
Status: COMPLETED | OUTPATIENT
Start: 2021-06-02 | End: 2021-06-02

## 2021-06-02 RX ORDER — FERROUS SULFATE 325(65) MG
325 TABLET, DELAYED RELEASE (ENTERIC COATED) ORAL DAILY
Qty: 30 TABLET | Refills: 3 | Status: SHIPPED | OUTPATIENT
Start: 2021-06-03

## 2021-06-02 RX ORDER — NITROGLYCERIN 0.4 MG/1
0.4 TABLET SUBLINGUAL EVERY 5 MIN PRN
Qty: 25 TABLET | Refills: 11 | Status: SHIPPED | OUTPATIENT
Start: 2021-06-02

## 2021-06-02 RX ORDER — TAMSULOSIN HYDROCHLORIDE 0.4 MG/1
0.4 CAPSULE ORAL DAILY
Qty: 30 CAPSULE | Refills: 11 | Status: SHIPPED | OUTPATIENT
Start: 2021-06-03 | End: 2022-06-03

## 2021-06-02 RX ORDER — FUROSEMIDE 40 MG/1
80 TABLET ORAL 2 TIMES DAILY
Qty: 120 TABLET | Refills: 0 | Status: SHIPPED | OUTPATIENT
Start: 2021-06-02 | End: 2021-06-02

## 2021-06-02 RX ORDER — FUROSEMIDE 40 MG/1
TABLET ORAL
Qty: 360 TABLET | Refills: 3 | Status: SHIPPED | OUTPATIENT
Start: 2021-06-02

## 2021-06-02 RX ADMIN — VASOPRESSIN 0.04 UNITS/MIN: 20 INJECTION INTRAVENOUS at 12:06

## 2021-06-02 RX ADMIN — APIXABAN 5 MG: 5 TABLET, FILM COATED ORAL at 08:06

## 2021-06-02 RX ADMIN — RANOLAZINE 500 MG: 500 TABLET, FILM COATED, EXTENDED RELEASE ORAL at 08:06

## 2021-06-02 RX ADMIN — Medication 325 MG: at 08:06

## 2021-06-02 RX ADMIN — MUPIROCIN: 20 OINTMENT TOPICAL at 08:06

## 2021-06-02 RX ADMIN — INSULIN ASPART 4 UNITS: 100 INJECTION, SOLUTION INTRAVENOUS; SUBCUTANEOUS at 09:06

## 2021-06-02 RX ADMIN — TOLVAPTAN 15 MG: 15 TABLET ORAL at 08:06

## 2021-06-02 RX ADMIN — DOBUTAMINE HYDROCHLORIDE 7.5 MCG/KG/MIN: 400 INJECTION INTRAVENOUS at 03:06

## 2021-06-02 RX ADMIN — Medication: at 08:06

## 2021-06-02 RX ADMIN — FUROSEMIDE 100 MG: 10 INJECTION, SOLUTION INTRAMUSCULAR; INTRAVENOUS at 06:06

## 2021-06-02 RX ADMIN — FUROSEMIDE 100 MG: 10 INJECTION, SOLUTION INTRAMUSCULAR; INTRAVENOUS at 01:06

## 2021-06-02 RX ADMIN — TAMSULOSIN HYDROCHLORIDE 0.4 MG: 0.4 CAPSULE ORAL at 08:06

## 2021-06-02 RX ADMIN — POTASSIUM BICARBONATE 60 MEQ: 391 TABLET, EFFERVESCENT ORAL at 05:06

## 2021-06-02 RX ADMIN — TICAGRELOR 90 MG: 90 TABLET ORAL at 08:06

## 2021-06-02 RX ADMIN — DOCUSATE SODIUM 50 MG AND SENNOSIDES 8.6 MG 2 TABLET: 8.6; 5 TABLET, FILM COATED ORAL at 08:06

## 2021-06-02 RX ADMIN — INSULIN ASPART 2 UNITS: 100 INJECTION, SOLUTION INTRAVENOUS; SUBCUTANEOUS at 05:06

## 2021-06-02 RX ADMIN — INSULIN ASPART 6 UNITS: 100 INJECTION, SOLUTION INTRAVENOUS; SUBCUTANEOUS at 12:06

## 2021-06-02 RX ADMIN — VASOPRESSIN 0.04 UNITS/MIN: 20 INJECTION INTRAVENOUS at 03:06

## 2021-06-02 RX ADMIN — POTASSIUM BICARBONATE 40 MEQ: 391 TABLET, EFFERVESCENT ORAL at 06:06

## 2021-06-02 RX ADMIN — ROSUVASTATIN CALCIUM 40 MG: 20 TABLET, FILM COATED ORAL at 08:06

## 2021-06-02 RX ADMIN — VASOPRESSIN 0.04 UNITS/MIN: 20 INJECTION INTRAVENOUS at 08:06

## 2021-07-03 ENCOUNTER — HOSPITAL ENCOUNTER (EMERGENCY)
Facility: HOSPITAL | Age: 66
Discharge: HOME | End: 2021-07-04
Attending: EMERGENCY MEDICINE
Payer: MEDICARE

## 2021-07-03 DIAGNOSIS — R11.2 NON-INTRACTABLE VOMITING WITH NAUSEA, UNSPECIFIED VOMITING TYPE: Primary | ICD-10-CM

## 2021-07-03 DIAGNOSIS — R10.13 EPIGASTRIC PAIN: ICD-10-CM

## 2021-07-03 LAB
ALBUMIN SERPL BCP-MCNC: 2.8 G/DL (ref 3.5–5.2)
ALP SERPL-CCNC: 235 U/L (ref 55–135)
ALT SERPL W/O P-5'-P-CCNC: 19 U/L (ref 10–44)
ANION GAP SERPL CALC-SCNC: 11 MMOL/L (ref 8–16)
ANION GAP SERPL CALC-SCNC: 20 MMOL/L (ref 8–16)
AST SERPL-CCNC: 21 U/L (ref 10–40)
BASOPHILS # BLD AUTO: 0.02 K/UL (ref 0–0.2)
BASOPHILS NFR BLD: 0.4 % (ref 0–1.9)
BILIRUB SERPL-MCNC: 3.8 MG/DL (ref 0.1–1)
BILIRUB UR QL STRIP: NEGATIVE
BNP SERPL-MCNC: 4306 PG/ML (ref 0–99)
BUN SERPL-MCNC: 13 MG/DL (ref 8–23)
BUN SERPL-MCNC: 16 MG/DL (ref 6–30)
CALCIUM SERPL-MCNC: 8.6 MG/DL (ref 8.7–10.5)
CHLORIDE SERPL-SCNC: 100 MMOL/L (ref 95–110)
CHLORIDE SERPL-SCNC: 95 MMOL/L (ref 95–110)
CLARITY UR: CLEAR
CO2 SERPL-SCNC: 23 MMOL/L (ref 23–29)
COLOR UR: YELLOW
CREAT SERPL-MCNC: 1.1 MG/DL (ref 0.5–1.4)
CREAT SERPL-MCNC: 1.2 MG/DL (ref 0.5–1.4)
DIFFERENTIAL METHOD: ABNORMAL
EOSINOPHIL # BLD AUTO: 0 K/UL (ref 0–0.5)
EOSINOPHIL NFR BLD: 0.8 % (ref 0–8)
ERYTHROCYTE [DISTWIDTH] IN BLOOD BY AUTOMATED COUNT: 20.8 % (ref 11.5–14.5)
EST. GFR  (AFRICAN AMERICAN): >60 ML/MIN/1.73 M^2
EST. GFR  (NON AFRICAN AMERICAN): >60 ML/MIN/1.73 M^2
GLUCOSE SERPL-MCNC: 113 MG/DL (ref 70–110)
GLUCOSE SERPL-MCNC: 115 MG/DL (ref 70–110)
GLUCOSE UR QL STRIP: NEGATIVE
HCT VFR BLD AUTO: 32.8 % (ref 40–54)
HCT VFR BLD CALC: 43 %PCV (ref 36–54)
HGB BLD-MCNC: 11 G/DL (ref 14–18)
HGB UR QL STRIP: NEGATIVE
IMM GRANULOCYTES # BLD AUTO: 0.02 K/UL (ref 0–0.04)
IMM GRANULOCYTES NFR BLD AUTO: 0.4 % (ref 0–0.5)
KETONES UR QL STRIP: NEGATIVE
LEUKOCYTE ESTERASE UR QL STRIP: NEGATIVE
LIPASE SERPL-CCNC: 14 U/L (ref 4–60)
LYMPHOCYTES # BLD AUTO: 1 K/UL (ref 1–4.8)
LYMPHOCYTES NFR BLD: 19.8 % (ref 18–48)
MAGNESIUM SERPL-MCNC: 2 MG/DL (ref 1.6–2.6)
MCH RBC QN AUTO: 28 PG (ref 27–31)
MCHC RBC AUTO-ENTMCNC: 33.5 G/DL (ref 32–36)
MCV RBC AUTO: 84 FL (ref 82–98)
MONOCYTES # BLD AUTO: 0.4 K/UL (ref 0.3–1)
MONOCYTES NFR BLD: 7.5 % (ref 4–15)
NEUTROPHILS # BLD AUTO: 3.6 K/UL (ref 1.8–7.7)
NEUTROPHILS NFR BLD: 71.1 % (ref 38–73)
NITRITE UR QL STRIP: NEGATIVE
NRBC BLD-RTO: 0 /100 WBC
PH UR STRIP: 5 [PH] (ref 5–8)
PLATELET # BLD AUTO: 152 K/UL (ref 150–450)
PMV BLD AUTO: 10.8 FL (ref 9.2–12.9)
POC IONIZED CALCIUM: 1.19 MMOL/L (ref 1.06–1.42)
POC TCO2 (MEASURED): 26 MMOL/L (ref 23–29)
POTASSIUM BLD-SCNC: 3.6 MMOL/L (ref 3.5–5.1)
POTASSIUM SERPL-SCNC: 3.5 MMOL/L (ref 3.5–5.1)
PROT SERPL-MCNC: 6.6 G/DL (ref 6–8.4)
PROT UR QL STRIP: ABNORMAL
RBC # BLD AUTO: 3.93 M/UL (ref 4.6–6.2)
SAMPLE: ABNORMAL
SODIUM BLD-SCNC: 136 MMOL/L (ref 136–145)
SODIUM SERPL-SCNC: 134 MMOL/L (ref 136–145)
SP GR UR STRIP: 1.01 (ref 1–1.03)
TROPONIN I SERPL DL<=0.01 NG/ML-MCNC: 0.01 NG/ML (ref 0–0.03)
URN SPEC COLLECT METH UR: ABNORMAL
UROBILINOGEN UR STRIP-ACNC: ABNORMAL EU/DL
WBC # BLD AUTO: 5.06 K/UL (ref 3.9–12.7)

## 2021-07-03 PROCEDURE — 93005 ELECTROCARDIOGRAM TRACING: CPT

## 2021-07-03 PROCEDURE — 83880 ASSAY OF NATRIURETIC PEPTIDE: CPT | Performed by: EMERGENCY MEDICINE

## 2021-07-03 PROCEDURE — 85025 COMPLETE CBC W/AUTO DIFF WBC: CPT | Performed by: EMERGENCY MEDICINE

## 2021-07-03 PROCEDURE — 80047 BASIC METABLC PNL IONIZED CA: CPT

## 2021-07-03 PROCEDURE — 81003 URINALYSIS AUTO W/O SCOPE: CPT | Performed by: EMERGENCY MEDICINE

## 2021-07-03 PROCEDURE — 83605 ASSAY OF LACTIC ACID: CPT

## 2021-07-03 PROCEDURE — 93010 EKG 12-LEAD: ICD-10-PCS | Mod: ,,, | Performed by: INTERNAL MEDICINE

## 2021-07-03 PROCEDURE — 93010 ELECTROCARDIOGRAM REPORT: CPT | Mod: ,,, | Performed by: INTERNAL MEDICINE

## 2021-07-03 PROCEDURE — 84132 ASSAY OF SERUM POTASSIUM: CPT

## 2021-07-03 PROCEDURE — 85014 HEMATOCRIT: CPT | Mod: 91

## 2021-07-03 PROCEDURE — 99283 EMERGENCY DEPT VISIT LOW MDM: CPT | Mod: 25

## 2021-07-03 PROCEDURE — 99900035 HC TECH TIME PER 15 MIN (STAT)

## 2021-07-03 PROCEDURE — 82565 ASSAY OF CREATININE: CPT

## 2021-07-03 PROCEDURE — 84484 ASSAY OF TROPONIN QUANT: CPT | Performed by: EMERGENCY MEDICINE

## 2021-07-03 PROCEDURE — 82330 ASSAY OF CALCIUM: CPT

## 2021-07-03 PROCEDURE — 84295 ASSAY OF SERUM SODIUM: CPT

## 2021-07-03 PROCEDURE — 83690 ASSAY OF LIPASE: CPT | Performed by: EMERGENCY MEDICINE

## 2021-07-03 PROCEDURE — 80053 COMPREHEN METABOLIC PANEL: CPT | Performed by: EMERGENCY MEDICINE

## 2021-07-03 PROCEDURE — 83735 ASSAY OF MAGNESIUM: CPT | Performed by: EMERGENCY MEDICINE

## 2021-07-03 RX ORDER — ONDANSETRON 4 MG/1
4 TABLET, FILM COATED ORAL EVERY 8 HOURS PRN
Qty: 12 TABLET | Refills: 0 | Status: SHIPPED | OUTPATIENT
Start: 2021-07-03

## 2021-07-04 VITALS
DIASTOLIC BLOOD PRESSURE: 82 MMHG | OXYGEN SATURATION: 98 % | WEIGHT: 130 LBS | TEMPERATURE: 99 F | HEIGHT: 65 IN | SYSTOLIC BLOOD PRESSURE: 112 MMHG | BODY MASS INDEX: 21.66 KG/M2 | HEART RATE: 82 BPM | RESPIRATION RATE: 18 BRPM

## 2021-07-15 ENCOUNTER — HOSPITAL ENCOUNTER (EMERGENCY)
Facility: HOSPITAL | Age: 66
Discharge: HOME | End: 2021-07-15
Attending: EMERGENCY MEDICINE
Payer: COMMERCIAL

## 2021-07-15 VITALS
HEART RATE: 98 BPM | DIASTOLIC BLOOD PRESSURE: 84 MMHG | OXYGEN SATURATION: 99 % | BODY MASS INDEX: 21.66 KG/M2 | TEMPERATURE: 98 F | WEIGHT: 130 LBS | RESPIRATION RATE: 16 BRPM | SYSTOLIC BLOOD PRESSURE: 111 MMHG | HEIGHT: 65 IN

## 2021-07-15 DIAGNOSIS — R79.89 ELEVATED BRAIN NATRIURETIC PEPTIDE (BNP) LEVEL: ICD-10-CM

## 2021-07-15 DIAGNOSIS — J81.0 ACUTE PULMONARY EDEMA: ICD-10-CM

## 2021-07-15 DIAGNOSIS — R06.02 SOB (SHORTNESS OF BREATH): Primary | ICD-10-CM

## 2021-07-15 DIAGNOSIS — J90 PLEURAL EFFUSION, LEFT: ICD-10-CM

## 2021-07-15 DIAGNOSIS — R60.0 LOWER LEG EDEMA: ICD-10-CM

## 2021-07-15 DIAGNOSIS — I50.43 ACUTE ON CHRONIC COMBINED SYSTOLIC AND DIASTOLIC CONGESTIVE HEART FAILURE: ICD-10-CM

## 2021-07-15 LAB
ALBUMIN SERPL BCP-MCNC: 2.9 G/DL (ref 3.5–5.2)
ALP SERPL-CCNC: 181 U/L (ref 55–135)
ALT SERPL W/O P-5'-P-CCNC: 15 U/L (ref 10–44)
ANION GAP SERPL CALC-SCNC: 16 MMOL/L (ref 8–16)
AST SERPL-CCNC: 30 U/L (ref 10–40)
BASOPHILS # BLD AUTO: 0.01 K/UL (ref 0–0.2)
BASOPHILS NFR BLD: 0.2 % (ref 0–1.9)
BILIRUB SERPL-MCNC: 4.4 MG/DL (ref 0.1–1)
BILIRUB UR QL STRIP: NEGATIVE
BNP SERPL-MCNC: >4500 PG/ML (ref 0–99)
BUN SERPL-MCNC: 20 MG/DL (ref 8–23)
CALCIUM SERPL-MCNC: 8.8 MG/DL (ref 8.7–10.5)
CHLORIDE SERPL-SCNC: 96 MMOL/L (ref 95–110)
CLARITY UR: CLEAR
CO2 SERPL-SCNC: 24 MMOL/L (ref 23–29)
COLOR UR: YELLOW
CREAT SERPL-MCNC: 1.3 MG/DL (ref 0.5–1.4)
DIFFERENTIAL METHOD: ABNORMAL
EOSINOPHIL # BLD AUTO: 0 K/UL (ref 0–0.5)
EOSINOPHIL NFR BLD: 0 % (ref 0–8)
ERYTHROCYTE [DISTWIDTH] IN BLOOD BY AUTOMATED COUNT: 19.1 % (ref 11.5–14.5)
EST. GFR  (AFRICAN AMERICAN): >60 ML/MIN/1.73 M^2
EST. GFR  (NON AFRICAN AMERICAN): 56.5 ML/MIN/1.73 M^2
GLUCOSE SERPL-MCNC: 161 MG/DL (ref 70–110)
GLUCOSE UR QL STRIP: NEGATIVE
HCT VFR BLD AUTO: 35.7 % (ref 40–54)
HGB BLD-MCNC: 11.4 G/DL (ref 14–18)
HGB UR QL STRIP: NEGATIVE
IMM GRANULOCYTES # BLD AUTO: 0.01 K/UL (ref 0–0.04)
IMM GRANULOCYTES NFR BLD AUTO: 0.2 % (ref 0–0.5)
KETONES UR QL STRIP: NEGATIVE
LEUKOCYTE ESTERASE UR QL STRIP: NEGATIVE
LIPASE SERPL-CCNC: 21 U/L (ref 4–60)
LYMPHOCYTES # BLD AUTO: 0.6 K/UL (ref 1–4.8)
LYMPHOCYTES NFR BLD: 11.8 % (ref 18–48)
MCH RBC QN AUTO: 26.8 PG (ref 27–31)
MCHC RBC AUTO-ENTMCNC: 31.9 G/DL (ref 32–36)
MCV RBC AUTO: 84 FL (ref 82–98)
MONOCYTES # BLD AUTO: 0.3 K/UL (ref 0.3–1)
MONOCYTES NFR BLD: 6.7 % (ref 4–15)
NEUTROPHILS # BLD AUTO: 4 K/UL (ref 1.8–7.7)
NEUTROPHILS NFR BLD: 81.1 % (ref 38–73)
NITRITE UR QL STRIP: NEGATIVE
NRBC BLD-RTO: 0 /100 WBC
PH UR STRIP: 7 [PH] (ref 5–8)
PLATELET # BLD AUTO: 200 K/UL (ref 150–450)
PMV BLD AUTO: 10.8 FL (ref 9.2–12.9)
POTASSIUM SERPL-SCNC: 4 MMOL/L (ref 3.5–5.1)
PROT SERPL-MCNC: 6.7 G/DL (ref 6–8.4)
PROT UR QL STRIP: NEGATIVE
RBC # BLD AUTO: 4.25 M/UL (ref 4.6–6.2)
SARS-COV-2 RDRP RESP QL NAA+PROBE: NEGATIVE
SODIUM SERPL-SCNC: 136 MMOL/L (ref 136–145)
SP GR UR STRIP: 1.01 (ref 1–1.03)
TROPONIN I SERPL DL<=0.01 NG/ML-MCNC: <0.03 NG/ML
URN SPEC COLLECT METH UR: ABNORMAL
UROBILINOGEN UR STRIP-ACNC: ABNORMAL EU/DL
WBC # BLD AUTO: 4.9 K/UL (ref 3.9–12.7)

## 2021-07-15 PROCEDURE — 96374 THER/PROPH/DIAG INJ IV PUSH: CPT

## 2021-07-15 PROCEDURE — 93005 ELECTROCARDIOGRAM TRACING: CPT | Performed by: INTERNAL MEDICINE

## 2021-07-15 PROCEDURE — 99285 EMERGENCY DEPT VISIT HI MDM: CPT | Mod: 25

## 2021-07-15 PROCEDURE — 84484 ASSAY OF TROPONIN QUANT: CPT | Performed by: NURSE PRACTITIONER

## 2021-07-15 PROCEDURE — 85025 COMPLETE CBC W/AUTO DIFF WBC: CPT | Performed by: NURSE PRACTITIONER

## 2021-07-15 PROCEDURE — 96376 TX/PRO/DX INJ SAME DRUG ADON: CPT

## 2021-07-15 PROCEDURE — 80053 COMPREHEN METABOLIC PANEL: CPT | Performed by: NURSE PRACTITIONER

## 2021-07-15 PROCEDURE — 83880 ASSAY OF NATRIURETIC PEPTIDE: CPT | Performed by: NURSE PRACTITIONER

## 2021-07-15 PROCEDURE — 63600175 PHARM REV CODE 636 W HCPCS: Performed by: NURSE PRACTITIONER

## 2021-07-15 PROCEDURE — 83690 ASSAY OF LIPASE: CPT | Performed by: NURSE PRACTITIONER

## 2021-07-15 PROCEDURE — 93010 EKG 12-LEAD: ICD-10-PCS | Mod: GW,,, | Performed by: INTERNAL MEDICINE

## 2021-07-15 PROCEDURE — 81003 URINALYSIS AUTO W/O SCOPE: CPT | Performed by: NURSE PRACTITIONER

## 2021-07-15 PROCEDURE — 93010 ELECTROCARDIOGRAM REPORT: CPT | Mod: GW,,, | Performed by: INTERNAL MEDICINE

## 2021-07-15 PROCEDURE — U0002 COVID-19 LAB TEST NON-CDC: HCPCS | Performed by: NURSE PRACTITIONER

## 2021-07-15 RX ORDER — FUROSEMIDE 10 MG/ML
80 INJECTION INTRAMUSCULAR; INTRAVENOUS
Status: COMPLETED | OUTPATIENT
Start: 2021-07-15 | End: 2021-07-15

## 2021-07-15 RX ORDER — FUROSEMIDE 10 MG/ML
40 INJECTION INTRAMUSCULAR; INTRAVENOUS
Status: COMPLETED | OUTPATIENT
Start: 2021-07-15 | End: 2021-07-15

## 2021-07-15 RX ORDER — (INSULIN DEGLUDEC AND LIRAGLUTIDE) 100; 3.6 [IU]/ML; MG/ML
6 INJECTION, SOLUTION SUBCUTANEOUS DAILY
COMMUNITY
Start: 2021-06-10

## 2021-07-15 RX ORDER — METOLAZONE 5 MG/1
5 TABLET ORAL DAILY
COMMUNITY

## 2021-07-15 RX ORDER — POTASSIUM CHLORIDE 750 MG/1
10 TABLET, EXTENDED RELEASE ORAL DAILY
COMMUNITY
Start: 2021-06-16

## 2021-07-15 RX ORDER — FUROSEMIDE 10 MG/ML
40 INJECTION INTRAMUSCULAR; INTRAVENOUS
Status: DISCONTINUED | OUTPATIENT
Start: 2021-07-15 | End: 2021-07-15

## 2021-07-15 RX ADMIN — FUROSEMIDE 80 MG: 10 INJECTION, SOLUTION INTRAMUSCULAR; INTRAVENOUS at 01:07

## 2021-07-15 RX ADMIN — FUROSEMIDE 40 MG: 10 INJECTION, SOLUTION INTRAMUSCULAR; INTRAVENOUS at 12:07

## 2021-10-18 PROBLEM — J81.0 ACUTE PULMONARY EDEMA: Status: RESOLVED | Noted: 2021-07-15 | Resolved: 2021-10-18

## 2022-06-10 NOTE — PROGRESS NOTES
TN taught Symptoms and Problems for Stroke home care with pt and with teach back:  1. Weakness on one side, 2. Trouble speaking, 3. Slurred speach. TN placed education sheet in [facility]..     Help at home will be from wife, Mary, assisting in pt's recovery.     TN taught patient about things he is responsible for when discharged TO HELP WITH his RECOVERY:  Particularly on how to Manage his Care At Home:  1. Getting his prescriptions filled.  2. Taking his medications as directed. DO NOT MISS ANY DOSES!  3. Going to his follow-up doctor appointments.   .  Lesia Culver RN, BSN, STN CCM           +L facial weakness/anomalies present generally intact

## 2022-11-13 NOTE — PLAN OF CARE
SW met with patient and spouse to explain IMM Notice, both verbalized understanding.       04/15/19 1128   Medicare Message   Important Message from Medicare regarding Discharge Appeal Rights Given to patient/caregiver;Explained to patient/caregiver;Signed/date by patient/caregiver      denies pain/discomfort

## 2024-05-15 NOTE — PLAN OF CARE
04/26/20 0845   Discharge Assessment   Assessment Type Discharge Planning Assessment   Confirmed/corrected address and phone number on facesheet? Yes   Assessment information obtained from? Patient;Medical Record   Communicated expected length of stay with patient/caregiver yes   Prior to hospitilization cognitive status: Alert/Oriented   Prior to hospitalization functional status: Independent   Current cognitive status: Alert/Oriented   Current Functional Status: Independent   Facility Arrived From: Home   Lives With sibling(s)   Able to Return to Prior Arrangements yes   Is patient able to care for self after discharge? Yes   Who are your caregiver(s) and their phone number(s)? Mary-former spouse: [phone]; Marlen-daughter: [phone]   Patient's perception of discharge disposition home or selfcare   Readmission Within the Last 30 Days planned readmission   If yes, most recent facility name: Unknown   Patient currently being followed by outpatient case management? No   Patient currently receives any other outside agency services? No   Equipment Currently Used at Home none   Do you have any problems affording any of your prescribed medications? No   Is the patient taking medications as prescribed? yes   Does the patient have transportation home? Yes   Transportation Anticipated family or friend will provide;car, drives self   Does the patient receive services at the [facility]? No   Discharge Plan A Home with family   Discharge Plan B Other  (TBD)   DME Needed Upon Discharge  other (see comments)  (TBD)   Patient/Family in Agreement with Plan yes   Readmission Questionnaire   At the time of your discharge, did someone talk to you about what your health problems were? Yes   At the time of discharge, did someone talk to you about what to watch out for regarding worsening of your health problem? Yes   At the time of discharge, did someone talk to you about what to do if you experienced worsening of your  health problem? Yes   At the time of discharge, did someone talk to you about which medication to take when you left the hospital and which ones to stop taking? Yes   At the time of discharge, did someone talk to you about when and where to follow up with a doctor after you left the hospital? Yes   What do you believe caused you to be sick enough to be re-admitted? Respiratory problems   How often do you need to have someone help you when you read instructions, pamphlets, or other written material from your doctor or pharmacy? Sometimes   Do you have problems taking your medications as prescribed? No   Do you have any problems affording any of  your prescribed medications? No   Do you have problems obtaining/receiving your medications? No   Does the patient have transportation to healthcare appointments? Yes   Living Arrangements house   Does the patient have family/friends to help with healtcare needs after discharge? yes   Does your caregiver provide all the help you need? Yes   If no, what kind of help do you need at home? None really; lives with sister who can help at home; former spouse also helps    SW Role explained to patient; two patient identifiers recognized; SW contact information placed on Communication board. Discussed patient managing health care at home; determined who would be helping patient at home with recovery: Lives with sister who will help with recovery; former spouse also helps    PCP: Amos Tee III, MD Prefers morning appointments    Extended Emergency Contact Information  Primary Emergency Contact: Mary Cox   [place] [facility]  Mobile Phone: [phone]  Relation: Spouse  Secondary Emergency Contact: Marlen Cox  Mobile Phone: [phone]  Relation: Daughter   needed? No     [facility] - [facility]  [address], [address]  [address], [facility] [address]  [place] [address]  Phone: [phone] Fax: [phone]    [facility] -  [facility] Villegas - [address]  [address]  [place] [address]  Phone: [phone] Fax: [phone]      Payor: MEDICARE / Plan: MEDICARE PART A & B / Product Type: Government /    declines

## (undated) DEVICE — KIT HAND CONTROL HIGH PRESSUR

## (undated) DEVICE — CATH EAGLE EYE PLATINUM

## (undated) DEVICE — CATH SWAN GANZ 8FR HEP FREE

## (undated) DEVICE — ELECTRODE EDGE SYSTEM RTS

## (undated) DEVICE — PAD DEFIB CADENCE ADULT R2

## (undated) DEVICE — OMNIPAQUE 350MG 150ML VIAL

## (undated) DEVICE — PACK CATH LAB

## (undated) DEVICE — KIT PROBE COVER WITH GEL

## (undated) DEVICE — GUIDEWIRE SAFE T J TIP 145X2.5

## (undated) DEVICE — BLLN SC EUPHORA RX 2.50MMX12MM

## (undated) DEVICE — CATH DXTERITY PIGSTR 110CM 6FR

## (undated) DEVICE — WIRE GUIDE SAFE-T-J .035 260CM

## (undated) DEVICE — INTRODUCER CATH 6F 11CM

## (undated) DEVICE — CATH DXTERITY NOTO 100CM 6FR

## (undated) DEVICE — HEMOSTAT VASC BAND REG 24CM

## (undated) DEVICE — KIT GLIDESHEATH SLEND 6FR 10CM

## (undated) DEVICE — VALVE CONTROL COPILOT

## (undated) DEVICE — INTRODUCER RX PSI KIT 8.5 FR

## (undated) DEVICE — GUIDEWIRE .021X260CM J-3MM TIP

## (undated) DEVICE — GUIDEWIRE PROWATER .014X180CM

## (undated) DEVICE — GUIDE LAUNCHER 6FR EBU 3.5

## (undated) DEVICE — SEE MEDLINE ITEM 156894

## (undated) DEVICE — KIT MANIFOLD LOW PRESS TUBING

## (undated) DEVICE — CATH EMERGE MR 20 X 2.50

## (undated) DEVICE — KIT MICROINTRODUCE MINI 5X10CM

## (undated) DEVICE — WIRE WHISPER MS 014 X 190

## (undated) DEVICE — OMNIPAQUE 300MG 50ML

## (undated) DEVICE — OMNIPAQUE 350 500ML

## (undated) DEVICE — INFLATOR ADVANTAGE ENCORE 26

## (undated) DEVICE — KIT SYR REUSABLE

## (undated) DEVICE — KIT CATH INSERT LINEAR 7.5F

## (undated) DEVICE — CATH ANGIOSCULPT RX 3X10X137

## (undated) DEVICE — CATH PIGTAIL 4F 8SH MICRO LOOP

## (undated) DEVICE — TRAY PICC CENTRAL LINE DRSNG

## (undated) DEVICE — PAD RADI FEMORAL

## (undated) DEVICE — CATH DXTERITY JL40 100CM 6FR